# Patient Record
Sex: MALE | Race: WHITE | NOT HISPANIC OR LATINO | Employment: UNEMPLOYED | URBAN - METROPOLITAN AREA
[De-identification: names, ages, dates, MRNs, and addresses within clinical notes are randomized per-mention and may not be internally consistent; named-entity substitution may affect disease eponyms.]

---

## 2019-11-12 ENCOUNTER — HOSPITAL ENCOUNTER (EMERGENCY)
Facility: HOSPITAL | Age: 39
Discharge: HOME | End: 2019-11-12
Attending: EMERGENCY MEDICINE

## 2019-11-12 VITALS
DIASTOLIC BLOOD PRESSURE: 75 MMHG | TEMPERATURE: 99 F | BODY MASS INDEX: 25.18 KG/M2 | HEIGHT: 69 IN | OXYGEN SATURATION: 99 % | SYSTOLIC BLOOD PRESSURE: 158 MMHG | WEIGHT: 170 LBS | RESPIRATION RATE: 18 BRPM | HEART RATE: 97 BPM

## 2019-11-12 DIAGNOSIS — F19.20 DRUG ABUSE AND DEPENDENCE: Primary | ICD-10-CM

## 2019-11-12 PROCEDURE — 99283 EMERGENCY DEPT VISIT LOW MDM: CPT

## 2019-11-12 NOTE — ED NOTES
Pt states being addicted to heroine, last time he used was this morning. Pt is requesting inpatient detox.

## 2019-11-13 NOTE — ED PROVIDER NOTES
Encounter Date: 11/12/2019       History     Chief Complaint   Patient presents with    WANTS DRUG DETOX     WANTS TO STOP METH AND HEROIN, WANTS IN PT     Presents with request for inpatient drug rehab  Pt is addicted to Meth and Heroine  He states he used today  Denies suicidal of homicidal ideations        Review of patient's allergies indicates:  No Known Allergies  Past Medical History:   Diagnosis Date    Depression     Substance abuse     METH AND HEROIN     No past surgical history on file.  No family history on file.  Social History     Tobacco Use    Smoking status: Not on file   Substance Use Topics    Alcohol use: Not on file    Drug use: Not on file     Review of Systems   Constitutional: Negative for fever.   Respiratory: Negative for cough, shortness of breath and wheezing.    Cardiovascular: Negative for chest pain, palpitations and leg swelling.   Gastrointestinal: Negative for abdominal pain, diarrhea, nausea and vomiting.   Musculoskeletal: Negative for back pain.   Skin: Negative for rash.   Neurological: Negative for dizziness, speech difficulty, weakness and light-headedness.   Psychiatric/Behavioral: Negative for agitation, confusion, hallucinations, self-injury and suicidal ideas. The patient is not nervous/anxious.        Physical Exam     Initial Vitals [11/12/19 1619]   BP Pulse Resp Temp SpO2   (!) 165/98 99 18 99.6 °F (37.6 °C) 98 %      MAP       --         Physical Exam    Constitutional: He appears well-developed and well-nourished.   HENT:   Mouth/Throat: Oropharynx is clear and moist.   Eyes: Conjunctivae are normal.   Neck: Normal range of motion. Neck supple.   Cardiovascular: Normal rate.   Pulmonary/Chest: Breath sounds normal.   Abdominal: Soft. Bowel sounds are normal.   Musculoskeletal: Normal range of motion.   Neurological: He is alert and oriented to person, place, and time.   Skin: Skin is warm. Capillary refill takes less than 2 seconds.   Psychiatric: He has a  normal mood and affect.         ED Course   Procedures  Labs Reviewed - No data to display       Imaging Results    None                       Attending Attestation:     Physician Attestation Statement for NP/PA:   I discussed this assessment and plan of this patient with the NP/PA, but I did not personally examine the patient. The face to face encounter was performed by the NP/PA.    Other NP/PA Attestation Additions:    History of Present Illness: I was not called upon to see this patient but was available for consultation and agree with the patient's disposition and management as it was presented to me by the APC.                                   Clinical Impression:       ICD-10-CM ICD-9-CM   1. Drug abuse and dependence F19.20 304.90                             Joan Marino NP  11/12/19 9447       Santi Lui MD  11/12/19 3985

## 2019-11-13 NOTE — DISCHARGE INSTRUCTIONS
You have a list of drug rehab programs  Please call and admit yourself to one of these  Best of luck to you

## 2019-11-13 NOTE — ED NOTES
Pt was given community resources by [facility] case management. Pt denies any suicidal or homicidal ideations. Pt is his friend Froy that gives him support. Ml VASQUEZ is at the bedside to assess the pt.

## 2022-05-25 ENCOUNTER — HOSPITAL ENCOUNTER (EMERGENCY)
Facility: HOSPITAL | Age: 42
Discharge: HOME | End: 2022-05-25
Attending: STUDENT IN AN ORGANIZED HEALTH CARE EDUCATION/TRAINING PROGRAM
Payer: MEDICAID

## 2022-05-25 VITALS
WEIGHT: 186.94 LBS | OXYGEN SATURATION: 97 % | SYSTOLIC BLOOD PRESSURE: 137 MMHG | RESPIRATION RATE: 17 BRPM | BODY MASS INDEX: 27.61 KG/M2 | DIASTOLIC BLOOD PRESSURE: 94 MMHG | TEMPERATURE: 100 F | HEART RATE: 99 BPM

## 2022-05-25 DIAGNOSIS — K08.89 PAIN, DENTAL: Primary | ICD-10-CM

## 2022-05-25 PROCEDURE — 99284 EMERGENCY DEPT VISIT MOD MDM: CPT

## 2022-05-25 PROCEDURE — 25000003 PHARM REV CODE 250: Performed by: STUDENT IN AN ORGANIZED HEALTH CARE EDUCATION/TRAINING PROGRAM

## 2022-05-25 RX ORDER — AMOXICILLIN AND CLAVULANATE POTASSIUM 875; 125 MG/1; MG/1
1 TABLET, FILM COATED ORAL 2 TIMES DAILY
Qty: 14 TABLET | Refills: 0 | Status: ON HOLD | OUTPATIENT
Start: 2022-05-25 | End: 2022-06-21

## 2022-05-25 RX ORDER — OXYCODONE AND ACETAMINOPHEN 5; 325 MG/1; MG/1
1 TABLET ORAL
Status: COMPLETED | OUTPATIENT
Start: 2022-05-25 | End: 2022-05-25

## 2022-05-25 RX ORDER — OXYCODONE AND ACETAMINOPHEN 5; 325 MG/1; MG/1
1 TABLET ORAL EVERY 4 HOURS PRN
Qty: 4 TABLET | Refills: 0 | Status: ON HOLD | OUTPATIENT
Start: 2022-05-25 | End: 2022-06-21

## 2022-05-25 RX ORDER — AMOXICILLIN AND CLAVULANATE POTASSIUM 875; 125 MG/1; MG/1
1 TABLET, FILM COATED ORAL
Status: COMPLETED | OUTPATIENT
Start: 2022-05-25 | End: 2022-05-25

## 2022-05-25 RX ADMIN — AMOXICILLIN AND CLAVULANATE POTASSIUM 1 TABLET: 875; 125 TABLET, FILM COATED ORAL at 11:05

## 2022-05-25 RX ADMIN — OXYCODONE HYDROCHLORIDE AND ACETAMINOPHEN 1 TABLET: 5; 325 TABLET ORAL at 11:05

## 2022-05-26 NOTE — ED PROVIDER NOTES
"Encounter Date: 5/25/2022       History     Chief Complaint   Patient presents with    Dental Pain     Mouth pain after pulling two teeth out with a pair of pliers at home 2 days ago - now c/o mouth pain with swelling. Subjective fever PTA     42-year-old male with history of hepatitis-C, meth and heroin use, poor dentition, presenting with anterior mouth pain after pulling two teeth out a pair of pliers home.  Patient reports that teeth were loose prior to removal.  He reports some swelling.  Subjective fever prior to arrival this morning.  Denies vomiting or diarrhea.  No other complaints.  Denies trouble swallowing or trouble breathing.        Review of patient's allergies indicates:  No Known Allergies  Past Medical History:   Diagnosis Date    Anxiety     Depression     Hepatitis C     Substance abuse     METH AND HEROIN     No past surgical history on file.  No family history on file.  Social History     Tobacco Use    Smoking status: Current Every Day Smoker     Packs/day: 0.50     Types: Cigarettes    Smokeless tobacco: Never Used   Substance Use Topics    Alcohol use: Yes     Comment: 5th whiskey or more daily    Drug use: Yes     Types: Methamphetamines, Marijuana     Comment: heroin , "pt reports he has been doing whatever he can get his hands on"      Review of Systems   Constitutional: Negative for fever.   HENT: Positive for dental problem. Negative for sore throat.    Respiratory: Negative for shortness of breath.    Cardiovascular: Negative for chest pain.   Gastrointestinal: Negative for nausea.   Genitourinary: Negative for dysuria.   Musculoskeletal: Negative for back pain.   Skin: Negative for rash.   Neurological: Negative for weakness.   Hematological: Does not bruise/bleed easily.       Physical Exam     Initial Vitals [05/25/22 2253]   BP Pulse Resp Temp SpO2   (!) 137/94 99 16 100 °F (37.8 °C) 97 %      MAP       --         Physical Exam    Nursing note and vitals " reviewed.  Constitutional: He appears well-developed.   HENT:   Generally very poor dentition.  No erythema or swelling to gum line.  Patient has no upper anterior teeth, and there is chronic decay at all sites.  No discharge.   Eyes: EOM are normal.   Neck:   Normal range of motion.  Cardiovascular:   No murmur heard.  Pulmonary/Chest: No respiratory distress.   Abdominal: He exhibits no distension.   Musculoskeletal:         General: Normal range of motion.      Cervical back: Normal range of motion.     Neurological: He is alert.   Skin: Skin is warm.   Psychiatric: He has a normal mood and affect.         ED Course   Procedures  Labs Reviewed - No data to display       Imaging Results    None          Medications   amoxicillin-clavulanate 875-125mg per tablet 1 tablet (has no administration in time range)   oxyCODONE-acetaminophen 5-325 mg per tablet 1 tablet (has no administration in time range)     Medical Decision Making:   Differential Diagnosis:   DDX:  Patient with generally poor dentition presenting with pain after removing two teeth. Patient has no signs of active infection on exam, but does have a temperature of 100° F.  Will cover for oral infection, and refer to dentist.  DX:  None  TX:  Antibiotics, analgesia p.r.n.  Dispo:  Discharge.  I have told patient the importance of following up with a dentist tomorrow.  Patient told to return to this emergency department if unable to get dental care.  Also told to watch for continue fever, vomiting, worsening pain, swelling, and told to return to the ED for these symptoms.                        Clinical Impression:   Final diagnoses:  [K08.89] Pain, dental (Primary)          ED Disposition Condition    Discharge Stable        ED Prescriptions     Medication Sig Dispense Start Date End Date Auth. Provider    amoxicillin-clavulanate 875-125mg (AUGMENTIN) 875-125 mg per tablet Take 1 tablet by mouth 2 (two) times daily. 14 tablet 5/25/2022  Doroteo Chen,  MD    oxyCODONE-acetaminophen (PERCOCET) 5-325 mg per tablet Take 1 tablet by mouth every 4 (four) hours as needed for Pain. 4 tablet 5/25/2022  Doroteo Chen MD        Follow-up Information     Follow up With Specialties Details Why Contact [facility] Schedule an appointment as soon as possible for a visit in 2 days  [address] [address]  [phone]      [facility] - Emergency Dept Emergency Medicine  If symptoms worsen [address] [facility] [address]  [phone]           Doroteo Chen MD  05/25/22 0674

## 2022-05-26 NOTE — DISCHARGE INSTRUCTIONS
Please follow up with your primary care physician within 2 days. Ensure that you review all lab work results and/or imaging results. If you have any questions about your discharge paperwork please call the Emergency Department.     Return to the ED for any headache, fever, nausea, vomiting, speech changes, difficulty swallowing, difficulty breathing, or any new or worsening symptoms.    Thank you for visiting [facility], Department of Emergency Medicine. Please see the entirety of the educational materials provided. Please note that a visit to the emergency department does not substitute ongoing care from a primary medical provider or specialist. Please ensure to follow up as recommended. However, please return to the emergency department immediately if symptoms do not improve as discussed, symptoms worsen, new symptoms develop, difficulty in following up or for any of your concerns or issues. Please note on discharge you are acknowledging understanding and agreement on medical evaluation, management recommendations and follow up recommendations.

## 2022-06-18 ENCOUNTER — HOSPITAL ENCOUNTER (INPATIENT)
Facility: HOSPITAL | Age: 42
LOS: 6 days | Discharge: HOME | DRG: 885 | End: 2022-06-24
Attending: PSYCHIATRY & NEUROLOGY | Admitting: PSYCHIATRY & NEUROLOGY
Payer: MEDICAID

## 2022-06-18 DIAGNOSIS — F32.A DEPRESSION, UNSPECIFIED DEPRESSION TYPE: Primary | ICD-10-CM

## 2022-06-18 DIAGNOSIS — F19.959 PSYCHOACTIVE SUBSTANCE-INDUCED PSYCHOSIS: ICD-10-CM

## 2022-06-18 PROCEDURE — 90792 PSYCH DIAG EVAL W/MED SRVCS: CPT | Mod: ,,, | Performed by: PSYCHIATRY & NEUROLOGY

## 2022-06-18 PROCEDURE — 99231 PR SUBSEQUENT HOSPITAL CARE,LEVL I: ICD-10-PCS | Mod: ,,, | Performed by: STUDENT IN AN ORGANIZED HEALTH CARE EDUCATION/TRAINING PROGRAM

## 2022-06-18 PROCEDURE — 25000003 PHARM REV CODE 250: Performed by: PSYCHIATRY & NEUROLOGY

## 2022-06-18 PROCEDURE — S4991 NICOTINE PATCH NONLEGEND: HCPCS | Performed by: PSYCHIATRY & NEUROLOGY

## 2022-06-18 PROCEDURE — 90792 PR PSYCHIATRIC DIAGNOSTIC EVALUATION W/MEDICAL SERVICES: ICD-10-PCS | Mod: ,,, | Performed by: PSYCHIATRY & NEUROLOGY

## 2022-06-18 PROCEDURE — 11400000 HC PSYCH PRIVATE ROOM

## 2022-06-18 PROCEDURE — 99231 SBSQ HOSP IP/OBS SF/LOW 25: CPT | Mod: ,,, | Performed by: STUDENT IN AN ORGANIZED HEALTH CARE EDUCATION/TRAINING PROGRAM

## 2022-06-18 RX ORDER — MAG HYDROX/ALUMINUM HYD/SIMETH 200-200-20
30 SUSPENSION, ORAL (FINAL DOSE FORM) ORAL EVERY 6 HOURS PRN
Status: DISCONTINUED | OUTPATIENT
Start: 2022-06-18 | End: 2022-06-24 | Stop reason: HOSPADM

## 2022-06-18 RX ORDER — FOLIC ACID 1 MG/1
1 TABLET ORAL DAILY
Status: DISCONTINUED | OUTPATIENT
Start: 2022-06-18 | End: 2022-06-24 | Stop reason: HOSPADM

## 2022-06-18 RX ORDER — IBUPROFEN 200 MG
1 TABLET ORAL DAILY PRN
Status: DISCONTINUED | OUTPATIENT
Start: 2022-06-18 | End: 2022-06-18

## 2022-06-18 RX ORDER — IBUPROFEN 200 MG
1 TABLET ORAL DAILY
Status: DISCONTINUED | OUTPATIENT
Start: 2022-06-20 | End: 2022-06-24 | Stop reason: HOSPADM

## 2022-06-18 RX ORDER — ADHESIVE BANDAGE
30 BANDAGE TOPICAL DAILY PRN
Status: DISCONTINUED | OUTPATIENT
Start: 2022-06-18 | End: 2022-06-24 | Stop reason: HOSPADM

## 2022-06-18 RX ORDER — IBUPROFEN 400 MG/1
400 TABLET ORAL EVERY 6 HOURS PRN
Status: DISCONTINUED | OUTPATIENT
Start: 2022-06-18 | End: 2022-06-24 | Stop reason: HOSPADM

## 2022-06-18 RX ORDER — QUETIAPINE FUMARATE 25 MG/1
50 TABLET, FILM COATED ORAL NIGHTLY
Status: DISCONTINUED | OUTPATIENT
Start: 2022-06-18 | End: 2022-06-20

## 2022-06-18 RX ORDER — OLANZAPINE 10 MG/1
10 TABLET ORAL EVERY 6 HOURS PRN
Status: DISCONTINUED | OUTPATIENT
Start: 2022-06-18 | End: 2022-06-24 | Stop reason: HOSPADM

## 2022-06-18 RX ORDER — OLANZAPINE 10 MG/2ML
10 INJECTION, POWDER, FOR SOLUTION INTRAMUSCULAR EVERY 6 HOURS PRN
Status: DISCONTINUED | OUTPATIENT
Start: 2022-06-18 | End: 2022-06-24 | Stop reason: HOSPADM

## 2022-06-18 RX ORDER — HYDROXYZINE PAMOATE 50 MG/1
50 CAPSULE ORAL EVERY 6 HOURS PRN
Status: DISCONTINUED | OUTPATIENT
Start: 2022-06-18 | End: 2022-06-24 | Stop reason: HOSPADM

## 2022-06-18 RX ORDER — BUPROPION HYDROCHLORIDE 150 MG/1
150 TABLET ORAL DAILY
Status: DISCONTINUED | OUTPATIENT
Start: 2022-06-18 | End: 2022-06-22

## 2022-06-18 RX ADMIN — BUPROPION HYDROCHLORIDE 150 MG: 150 TABLET, EXTENDED RELEASE ORAL at 01:06

## 2022-06-18 RX ADMIN — NICOTINE 1 PATCH: 14 PATCH TRANSDERMAL at 10:06

## 2022-06-18 RX ADMIN — THERA TABS 1 TABLET: TAB at 10:06

## 2022-06-18 RX ADMIN — FOLIC ACID 1 MG: 1 TABLET ORAL at 10:06

## 2022-06-18 RX ADMIN — QUETIAPINE FUMARATE 50 MG: 25 TABLET ORAL at 08:06

## 2022-06-18 RX ADMIN — HYDROXYZINE PAMOATE 50 MG: 50 CAPSULE ORAL at 10:06

## 2022-06-18 NOTE — SUBJECTIVE & OBJECTIVE
"Past Medical History:   Diagnosis Date    Anxiety     Depression     Hepatitis C     Substance abuse     METH AND HEROIN       No past surgical history on file.    Review of patient's allergies indicates:  No Known Allergies    Current Facility-Administered Medications on File Prior to Encounter   Medication    [COMPLETED] lactated ringers bolus 1,000 mL     Current Outpatient Medications on File Prior to Encounter   Medication Sig    amoxicillin-clavulanate 875-125mg (AUGMENTIN) 875-125 mg per tablet Take 1 tablet by mouth 2 (two) times daily.    oxyCODONE-acetaminophen (PERCOCET) 5-325 mg per tablet Take 1 tablet by mouth every 4 (four) hours as needed for Pain.     Family History    None       Tobacco Use    Smoking status: Current Every Day Smoker     Packs/day: 0.50     Types: Cigarettes    Smokeless tobacco: Never Used   Substance and Sexual Activity    Alcohol use: Yes     Comment: 5th whiskey or more daily    Drug use: Yes     Types: Methamphetamines, Marijuana     Comment: heroin , "pt reports he has been doing whatever he can get his hands on"     Sexual activity: Not on file     Review of Systems   Constitutional:  Negative for chills, fatigue and fever.   HENT:  Negative for congestion, sinus pain and sore throat.    Eyes:  Negative for redness and visual disturbance.   Respiratory:  Negative for cough and shortness of breath.    Cardiovascular:  Negative for chest pain and palpitations.   Gastrointestinal:  Negative for abdominal pain, diarrhea and nausea.   Endocrine: Negative for cold intolerance and heat intolerance.   Genitourinary:  Negative for dysuria, frequency, hematuria and urgency.   Musculoskeletal:  Negative for arthralgias and myalgias.   Neurological:  Negative for dizziness, weakness and light-headedness.   Psychiatric/Behavioral:  Negative for agitation.    Objective:     Vital Signs (Most Recent):    Vital Signs (24h Range):  Temp:  [97.9 °F (36.6 °C)] 97.9 °F (36.6 °C)  Pulse:  " [] 95  Resp:  [16-20] 18  SpO2:  [95 %-97 %] 97 %  BP: (122-144)/() 122/74        There is no height or weight on file to calculate BMI.    Physical Exam  Vitals reviewed.   HENT:      Head: Normocephalic and atraumatic.      Nose: Nose normal.      Mouth/Throat:      Mouth: Mucous membranes are moist.      Pharynx: Oropharynx is clear.   Eyes:      Conjunctiva/sclera: Conjunctivae normal.   Cardiovascular:      Rate and Rhythm: Normal rate and regular rhythm.      Heart sounds: Normal heart sounds. No murmur heard.  Pulmonary:      Effort: Pulmonary effort is normal.      Breath sounds: Normal breath sounds.   Abdominal:      General: Bowel sounds are normal.      Palpations: Abdomen is soft.   Musculoskeletal:      Cervical back: Neck supple.   Skin:     General: Skin is warm and dry.   Neurological:      Mental Status: He is alert.       Significant Labs: All pertinent labs within the past 24 hours have been reviewed.  BMP:   Recent Labs   Lab 06/18/22  0739   GLU 80      K 3.9      CO2 20*   BUN 25*   CREATININE 1.0   CALCIUM 8.7     CBC:   Recent Labs   Lab 06/18/22  0249   WBC 11.66   HGB 15.6   HCT 46.1        CMP:   Recent Labs   Lab 06/18/22  0249 06/18/22  0739    140   K 4.4 3.9    106   CO2 20* 20*   * 80   BUN 31* 25*   CREATININE 1.6* 1.0   CALCIUM 9.9 8.7   PROT 8.2 6.9   ALBUMIN 3.9 3.3*   BILITOT 0.3 0.4   ALKPHOS 60 48*   AST 67* 58*   ALT 54* 46*   ANIONGAP 15 14   EGFRNONAA 52* >60     Urine Studies:   Recent Labs   Lab 06/18/22  0242   COLORU Yellow   APPEARANCEUA Clear   PHUR 6.0   SPECGRAV >=1.030*   PROTEINUA Trace*   GLUCUA Negative   KETONESU Trace*   BILIRUBINUA Negative   OCCULTUA Negative   NITRITE Negative   UROBILINOGEN Negative   LEUKOCYTESUR Negative       Significant Imaging: I have reviewed all pertinent imaging results/findings within the past 24 hours.

## 2022-06-18 NOTE — CONSULTS
[facility] Hospital Medicine  Consult Note    Patient Name: Leighton Carroll  MRN: [id number]  Admission Date: 6/18/2022  Hospital Length of Stay: 0 days  Attending Physician: Mundo Myrick MD   Primary Care Provider: Primary Doctor No           Patient information was obtained from patient and ER records.     Inpatient consult to Family Medicine  Consult performed by: Ge Cleary MD  Consult ordered by: Mundo Myrick MD        Subjective:     Principal Problem: <principal problem not specified>    Chief Complaint: No chief complaint on file.       HPI: Per ER:   42 y.o. male with PMHx of anxiety, depression, hepatitis C and polysubstance abuse who presents wto the ED after being OPC'd by girlfriend. Per OPC (Girlfriend: Ada Brower), he has been having homicidal ideations and threatening to stab his friends with a knife. Additionally, he has been walking in fields and having violent outbursts. Additionally, he has had auditory hallucinations. Per patient, he has had suicidal ideations but he denies homicidal ideations and hallucinations. He states that his mother is sick in the hospital and this has caused him to be severely depressed. He denies plan of suicide. He states that been off of his psychiatric meds for several years, as he was unable to receive them while in care home.     We have been consulted for medical management.       Past Medical History:   Diagnosis Date    Anxiety     Depression     Hepatitis C     Substance abuse     METH AND HEROIN       No past surgical history on file.    Review of patient's allergies indicates:  No Known Allergies    Current Facility-Administered Medications on File Prior to Encounter   Medication    [COMPLETED] lactated ringers bolus 1,000 mL     Current Outpatient Medications on File Prior to Encounter   Medication Sig    amoxicillin-clavulanate 875-125mg (AUGMENTIN) 875-125 mg per tablet Take 1 tablet by mouth 2 (two) times daily.  "   oxyCODONE-acetaminophen (PERCOCET) 5-325 mg per tablet Take 1 tablet by mouth every 4 (four) hours as needed for Pain.     Family History    None       Tobacco Use    Smoking status: Current Every Day Smoker     Packs/day: 0.50     Types: Cigarettes    Smokeless tobacco: Never Used   Substance and Sexual Activity    Alcohol use: Yes     Comment: 5th whiskey or more daily    Drug use: Yes     Types: Methamphetamines, Marijuana     Comment: heroin , "pt reports he has been doing whatever he can get his hands on"     Sexual activity: Not on file     Review of Systems   Constitutional:  Negative for chills, fatigue and fever.   HENT:  Negative for congestion, sinus pain and sore throat.    Eyes:  Negative for redness and visual disturbance.   Respiratory:  Negative for cough and shortness of breath.    Cardiovascular:  Negative for chest pain and palpitations.   Gastrointestinal:  Negative for abdominal pain, diarrhea and nausea.   Endocrine: Negative for cold intolerance and heat intolerance.   Genitourinary:  Negative for dysuria, frequency, hematuria and urgency.   Musculoskeletal:  Negative for arthralgias and myalgias.   Neurological:  Negative for dizziness, weakness and light-headedness.   Psychiatric/Behavioral:  Negative for agitation.    Objective:     Vital Signs (Most Recent):    Vital Signs (24h Range):  Temp:  [97.9 °F (36.6 °C)] 97.9 °F (36.6 °C)  Pulse:  [] 95  Resp:  [16-20] 18  SpO2:  [95 %-97 %] 97 %  BP: (122-144)/() 122/74        There is no height or weight on file to calculate BMI.    Physical Exam  Vitals reviewed.   HENT:      Head: Normocephalic and atraumatic.      Nose: Nose normal.      Mouth/Throat:      Mouth: Mucous membranes are moist.      Pharynx: Oropharynx is clear.   Eyes:      Conjunctiva/sclera: Conjunctivae normal.   Cardiovascular:      Rate and Rhythm: Normal rate and regular rhythm.      Heart sounds: Normal heart sounds. No murmur heard.  Pulmonary:      " Effort: Pulmonary effort is normal.      Breath sounds: Normal breath sounds.   Abdominal:      General: Bowel sounds are normal.      Palpations: Abdomen is soft.   Musculoskeletal:      Cervical back: Neck supple.   Skin:     General: Skin is warm and dry.   Neurological:      Mental Status: He is alert.       Significant Labs: All pertinent labs within the past 24 hours have been reviewed.  BMP:   Recent Labs   Lab 06/18/22  0739   GLU 80      K 3.9      CO2 20*   BUN 25*   CREATININE 1.0   CALCIUM 8.7     CBC:   Recent Labs   Lab 06/18/22  0249   WBC 11.66   HGB 15.6   HCT 46.1        CMP:   Recent Labs   Lab 06/18/22  0249 06/18/22  0739    140   K 4.4 3.9    106   CO2 20* 20*   * 80   BUN 31* 25*   CREATININE 1.6* 1.0   CALCIUM 9.9 8.7   PROT 8.2 6.9   ALBUMIN 3.9 3.3*   BILITOT 0.3 0.4   ALKPHOS 60 48*   AST 67* 58*   ALT 54* 46*   ANIONGAP 15 14   EGFRNONAA 52* >60     Urine Studies:   Recent Labs   Lab 06/18/22  0242   COLORU Yellow   APPEARANCEUA Clear   PHUR 6.0   SPECGRAV >=1.030*   PROTEINUA Trace*   GLUCUA Negative   KETONESU Trace*   BILIRUBINUA Negative   OCCULTUA Negative   NITRITE Negative   UROBILINOGEN Negative   LEUKOCYTESUR Negative       Significant Imaging: I have reviewed all pertinent imaging results/findings within the past 24 hours.    Assessment/Plan:     Depression  Plan per psych        VTE Risk Mitigation (From admission, onward)    None              Thank you for your consult. I will sign off. Please contact us if you have any additional questions.    Ge Cleary MD  Department of Hospital Medicine   [facility]
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
## 2022-06-18 NOTE — HPI
Per ER:   42 y.o. male with PMHx of anxiety, depression, hepatitis C and polysubstance abuse who presents wto the ED after being OPC'd by girlfriend. Per OPC (Girlfriend: Ada Brower), he has been having homicidal ideations and threatening to stab his friends with a knife. Additionally, he has been walking in fields and having violent outbursts. Additionally, he has had auditory hallucinations. Per patient, he has had suicidal ideations but he denies homicidal ideations and hallucinations. He states that his mother is sick in the hospital and this has caused him to be severely depressed. He denies plan of suicide. He states that been off of his psychiatric meds for several years, as he was unable to receive them while in residential.     We have been consulted for medical management.

## 2022-06-18 NOTE — NURSING
Report received from ED. Patient is a 43 y/o male that came in with an OPC by the girlfriend. Was reported that the patient has been off of psych meds since release from [facility] about one month ago. Hallucinating, and wanting to stab someone. Patient reports that his mom is in hospital and he is suffering from increased depression due to her illness. Tearful on admit to ED reported. Patient denies SI,HI, and hallucinations at this time. Alcohol level at this time 84. UDS + amphetamines and THC. Patient has been calm and cooperative. Awaiting patient arrival to unit.

## 2022-06-18 NOTE — NURSING
Patient is restless, diaphoretic, tearful and regretful during the initial interview assessment. He denies suicidal and homicidal ideation at this time and minimizing drug and alcohol abuse. Reports that he is regretful for saying he wanted to stab people and regretful for the drinking and drug use recently. He reports that his goal is to begin his meds and have them adjusted to help his depression. He is cooperative. Contracts for safety. Reviewed unit policies and rules. Rights of patient also reviewed. Patient denies any questions at this time. Escorted to room 213 and ambulated without difficulty.

## 2022-06-18 NOTE — NURSING
Patient up to unit via wheelchair with security and staff, patient in blue scrubs, wand used to assess for contraband, none found.  Ambulated onto unit without difficulty. Urinated without difficulty. Skin assessment completed. PIV remained from ED, removed, cath in tact, pressure held. No wounds found on skin. Multiple tattoos on body. Cooperative. Belongings taken for inventory.

## 2022-06-19 PROCEDURE — 99232 SBSQ HOSP IP/OBS MODERATE 35: CPT | Mod: ,,, | Performed by: PSYCHIATRY & NEUROLOGY

## 2022-06-19 PROCEDURE — 25000003 PHARM REV CODE 250: Performed by: PSYCHIATRY & NEUROLOGY

## 2022-06-19 PROCEDURE — 11400000 HC PSYCH PRIVATE ROOM

## 2022-06-19 PROCEDURE — 99232 PR SUBSEQUENT HOSPITAL CARE,LEVL II: ICD-10-PCS | Mod: ,,, | Performed by: PSYCHIATRY & NEUROLOGY

## 2022-06-19 RX ADMIN — NICOTINE 1 PATCH: 14 PATCH TRANSDERMAL at 08:06

## 2022-06-19 RX ADMIN — THERA TABS 1 TABLET: TAB at 08:06

## 2022-06-19 RX ADMIN — OLANZAPINE 10 MG: 10 TABLET, FILM COATED ORAL at 12:06

## 2022-06-19 RX ADMIN — HYDROXYZINE PAMOATE 50 MG: 50 CAPSULE ORAL at 07:06

## 2022-06-19 RX ADMIN — BUPROPION HYDROCHLORIDE 150 MG: 150 TABLET, EXTENDED RELEASE ORAL at 08:06

## 2022-06-19 RX ADMIN — QUETIAPINE FUMARATE 50 MG: 25 TABLET ORAL at 08:06

## 2022-06-19 RX ADMIN — FOLIC ACID 1 MG: 1 TABLET ORAL at 08:06

## 2022-06-19 NOTE — NURSING
Rested quietly with closed eyes and even resp for 7.5 hours, as pt remains now.  Modified VC and all precautions maintained.  Pathways clear and bed in low position.

## 2022-06-19 NOTE — PROGRESS NOTES
PSYCHIATRY DAILY INPATIENT PROGRESS NOTE  SUBSEQUENT HOSPITAL VISIT    ENCOUNTER DATE: 6/19/2022  SITE: [facility]    DATE OF ADMISSION: 6/18/2022 10:24 AM  LENGTH OF STAY: 1 days      CHIEF COMPLAINT   Leighton Carroll is a 42 y.o. male, seen during daily somers rounds on the inpatient unit.  Leighton Carroll presented with the chief complaint of depression, anxiety and substance problems      The patient was seen and examined. The chart was reviewed.     Reviewed notes from Rns and MD and labs from the last 24 hours.    The patient's case was discussed with the treatment team/care providers today including Rns    Staff reports no behavioral or management issues.     The patient has been compliant with treatment.      Subjective 06/19/2022       Today the patient reports he is feeling anxious. States he is worried about his girlfriend without him at home. Discussed the potential for wellbutrin to worsen anxiety and pt is adamant that his anxiety is not related to the wellbutrin. Pt states his mood is still depressed. No longer having SI this morning.     Pt slept well last night. He feels the medications are helping and denies noticing any side effects at this time.     He is somewhat guarded today and interview is limited 2/2 this.       The patient denies any side effects to medications.          Psychiatric ROS (observed, reported, or endorsed/denied):  Depressed mood - endorses  Interest/pleasure/anhedonia: endorses  Guilt/hopelessness/worthlessness - endorses  Changes in Sleep - endorses  Changes in Appetite - endorses  Changes in Concentration - endorses  Changes in Energy - endorses  PMA/R- No  Suicidal- active/passive ideations - No  Homicidal ideations: active/passive ideations - No    Hallucinations - No  Delusions - No  Disorganized behavior - No  Disorganized speech - No  Negative symptoms - No    Elevated mood - No  Decreased need for sleep - No  Grandiosity - No  Racing thoughts - No  Impulsivity -  No  Irritability- No  Increased energy - No  Distractibility - No  Increase in goal-directed activity or PMA- No    Symptoms of ARASELI - endorses  Symptoms of Panic Disorder- No  Symptoms of PTSD - No        Overall progress: Patient is showing no improvement on the Unit to date        Psychotherapy:  · Target symptoms: depression, anxiety , substance abuse  · Why chosen therapy is appropriate versus another modality: relevant to diagnosis  · Outcome monitoring methods: self-report, lab data, observation  · Therapeutic intervention type: insight oriented psychotherapy, interactive psychotherapy  · Topics discussed/themes: building skills sets for symptom management, symptom recognition, substance abuse  · The patient's response to the intervention is guarded. The patient's progress toward treatment goals is fair.   · Duration of intervention: 16 minutes.        Medical ROS  Review of Systems   Constitutional: Negative for chills and fever.   HENT: Negative for congestion, hearing loss, sore throat and tinnitus.    Eyes: Negative for blurred vision, double vision, photophobia and pain.   Respiratory: Negative for cough, hemoptysis, sputum production, shortness of breath and wheezing.    Cardiovascular: Negative for chest pain, palpitations and leg swelling.   Gastrointestinal: Negative for abdominal pain, blood in stool, constipation, diarrhea, nausea and vomiting.   Genitourinary: Negative for dysuria, frequency, hematuria and urgency.   Musculoskeletal: Negative for back pain, joint pain and myalgias.   Skin: Negative for rash.   Neurological: Negative for dizziness, tremors, seizures, weakness and headaches.         PAST MEDICAL HISTORY   Past Medical History:   Diagnosis Date    Anxiety     Depression     Hepatitis C     Substance abuse     METH AND HEROIN           PSYCHOTROPIC MEDICATIONS   Scheduled Meds:   buPROPion  150 mg Oral Daily    folic acid  1 mg Oral Daily    multivitamin  1 tablet Oral Daily     [START ON 6/20/2022] nicotine  1 patch Transdermal Daily    QUEtiapine  50 mg Oral QHS     Continuous Infusions:  PRN Meds:.aluminum-magnesium hydroxide-simethicone, hydrOXYzine pamoate, ibuprofen, magnesium hydroxide 400 mg/5 ml, OLANZapine **AND** OLANZapine        EXAMINATION    VITALS   Vitals:    06/18/22 2000 06/18/22 2050 06/19/22 0740 06/19/22 0800   BP: (!) 167/84  (!) 146/97    BP Location: Right arm      Patient Position: Sitting      Pulse: (!) 112 104 97    Resp: 20  20    Temp: 97.5 °F (36.4 °C)  96.8 °F (36 °C)    TempSrc:   Temporal    Weight:    75.8 kg (167 lb 1.7 oz)   Height:           Body mass index is 24.68 kg/m².        CONSTITUTIONAL  General Appearance: unremarkable, age appropriate, tatooed    MUSCULOSKELETAL  Muscle Strength and Tone:no tremor, no tic  Abnormal Involuntary Movements: No  Gait and Station: non-ataxic    PSYCHIATRIC   Level of Consciousness: awake and alert   Orientation: person, place and situation  Grooming: Hospital garb and Well groomed  Psychomotor Behavior: normal, cooperative  Speech: normal tone, normal rate, normal pitch, normal volume  Language: grossly intact, able to name, able to repeat  Mood: dysphoric  Affect: Consistent with mood  Thought Process: linear, logical  Associations: intact   Thought Content: DENIES suicidal ideation, DENIES homicidal ideation and no delusions  Perceptions: denies hallucinations  Memory: Able to recall past events, Remote intact and Recent intact  Attention:Attends to interview without distraction  Fund of Knowledge: Aware of current events and Vocabulary appropriate   Estimate if Intelligence:  Average based on work/education history, vocabulary and mental status exam  Insight: has awareness of illness  Judgment: behavior is adequate to circumstances        DIAGNOSTIC TESTING   Laboratory Results  No results found for this or any previous visit (from the past 24 hour(s)).          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 The patient expresses understanding of the above and displays the capacity to agree with this treatment given said understanding. Patient also agrees that, currently, the benefits outweigh the risks and would like to pursue/continue treatment at this time.      DISCHARGE PLANNING  Expected Disposition Plan: Home or Self Care      NEED FOR CONTINUED HOSPITALIZATION  Psychiatric illness continues to pose a potential threat to life or bodily function, of self or others, thereby requiring the need for continued inpatient psychiatric hospitalization: Yes, due to: danger to self and suicidal ideation, as evidenced by:  Ongoing concerns with SI.    Protective inpatient pyschiatric hospitalization required while a safe disposition plan is enacted: Yes    Patient stabilized and ready for discharge from inpatient psychiatric unit: No        STAFF:   Bartolome Myrick MD  Psychiatry

## 2022-06-19 NOTE — PLAN OF CARE
Patient mistrust, restless, and withdrawn.  Denies intentions to harm self and/or others at this time. Denies auditory and/or visual hallucinations.  Affect and emotion restricted, tearful, depressed, and sad.  Preoccupation with discharge.  Isolates in bed/room; minimal interacts with select peers and staff.  Accepts meals and medications.  Discussed medication and plan of care as well as healthy coping skills and techniques; will continue to educate and reinforce.    07:09-PRN Vistaril given for complaints of anxiety.  08: 35--Patient returned to nurses station tearful with conflicting information from admit. Reinforced coping skills and instructed to return to nurses station if unable to rest.  09:30--Follow up: patient resting comfortably in bed; appears to be sleeping.    After lunch, patient observed pacing hallway with arms crossed.  12pm PRN Zyprexa po given  2pm follow up: patient appears to be resting comfortably in bed/sleeping
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
## 2022-06-20 PROCEDURE — S4991 NICOTINE PATCH NONLEGEND: HCPCS | Performed by: PSYCHIATRY & NEUROLOGY

## 2022-06-20 PROCEDURE — 11400000 HC PSYCH PRIVATE ROOM

## 2022-06-20 PROCEDURE — 99233 SBSQ HOSP IP/OBS HIGH 50: CPT | Mod: ,,, | Performed by: STUDENT IN AN ORGANIZED HEALTH CARE EDUCATION/TRAINING PROGRAM

## 2022-06-20 PROCEDURE — 25000003 PHARM REV CODE 250: Performed by: PSYCHIATRY & NEUROLOGY

## 2022-06-20 PROCEDURE — 25000003 PHARM REV CODE 250: Performed by: STUDENT IN AN ORGANIZED HEALTH CARE EDUCATION/TRAINING PROGRAM

## 2022-06-20 PROCEDURE — 99233 PR SUBSEQUENT HOSPITAL CARE,LEVL III: ICD-10-PCS | Mod: ,,, | Performed by: STUDENT IN AN ORGANIZED HEALTH CARE EDUCATION/TRAINING PROGRAM

## 2022-06-20 RX ORDER — GABAPENTIN 300 MG/1
300 CAPSULE ORAL 3 TIMES DAILY
Status: DISCONTINUED | OUTPATIENT
Start: 2022-06-20 | End: 2022-06-21

## 2022-06-20 RX ORDER — QUETIAPINE FUMARATE 100 MG/1
100 TABLET, FILM COATED ORAL NIGHTLY
Status: DISCONTINUED | OUTPATIENT
Start: 2022-06-20 | End: 2022-06-21

## 2022-06-20 RX ADMIN — HYDROXYZINE PAMOATE 50 MG: 50 CAPSULE ORAL at 09:06

## 2022-06-20 RX ADMIN — QUETIAPINE FUMARATE 100 MG: 100 TABLET ORAL at 08:06

## 2022-06-20 RX ADMIN — GABAPENTIN 300 MG: 300 CAPSULE ORAL at 02:06

## 2022-06-20 RX ADMIN — GABAPENTIN 300 MG: 300 CAPSULE ORAL at 08:06

## 2022-06-20 RX ADMIN — BUPROPION HYDROCHLORIDE 150 MG: 150 TABLET, EXTENDED RELEASE ORAL at 08:06

## 2022-06-20 RX ADMIN — HYDROXYZINE PAMOATE 50 MG: 50 CAPSULE ORAL at 03:06

## 2022-06-20 RX ADMIN — NICOTINE 1 PATCH: 14 PATCH TRANSDERMAL at 08:06

## 2022-06-20 RX ADMIN — IBUPROFEN 400 MG: 400 TABLET, FILM COATED ORAL at 03:06

## 2022-06-20 RX ADMIN — OLANZAPINE 10 MG: 10 TABLET, FILM COATED ORAL at 12:06

## 2022-06-20 RX ADMIN — OLANZAPINE 10 MG: 10 TABLET, FILM COATED ORAL at 03:06

## 2022-06-20 RX ADMIN — OLANZAPINE 10 MG: 10 TABLET, FILM COATED ORAL at 06:06

## 2022-06-20 RX ADMIN — FOLIC ACID 1 MG: 1 TABLET ORAL at 08:06

## 2022-06-20 RX ADMIN — THERA TABS 1 TABLET: TAB at 08:06
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
## 2022-06-20 NOTE — PLAN OF CARE
"Lying quietly in bed, eyes closed, respirations even, unlabored. Apparently asleep. Slept 8.5 hours thus far with 3 interruptions. Patient received Zyprexa 10 for agitation and Ibuprofen 400 mg for generalized pain. At that time, patient stated that his "heart is broken" because he is away from girlfriend and his family. When told that he should discuss his feelings with psychiatrist, he said, "If I tell him my feelings, he will make me stay longer."  Safety precautions maintained. Rounds done every 15 minutes. Bed is fixed in low position and room is uncluttered and pathways are clear.   "

## 2022-06-20 NOTE — PROGRESS NOTES
PSYCHIATRY DAILY INPATIENT PROGRESS NOTE  SUBSEQUENT HOSPITAL VISIT    ENCOUNTER DATE: 6/20/2022  SITE: [facility]    DATE OF ADMISSION: 6/18/2022 10:24 AM  LENGTH OF STAY: 2 days      CHIEF COMPLAINT   Leighton Carroll is a 42 y.o. male, seen during daily somers rounds on the inpatient unit.  Leighton Carroll presented with the chief complaint of depression, anxiety and substance problems      The patient was seen and examined. The chart was reviewed.     Reviewed notes from Rns and MD and labs from the last 24 hours.    The patient's case was discussed with the treatment team/care providers today including Rns    Staff reports no behavioral or management issues.     The patient has been compliant with treatment.      Subjective 06/20/2022       Patient agitated and RIS yesterday, required PRN zyprexa. Patient isolating to his bedroom today. Refuses rehab because he is on parole. Discussed risks and consequences of continued substance abuse.    The patient denies any side effects to medications.        Per RN:  Patient mistrust, restless, and withdrawn.  Denies intentions to harm self and/or others at this time. Denies auditory and/or visual hallucinations.  Affect and emotion restricted, tearful, depressed, and sad.  Preoccupation with discharge.  Isolates in bed/room; minimal interacts with select peers and staff.  Accepts meals and medications.  Discussed medication and plan of care as well as healthy coping skills and techniques; will continue to educate and reinforce.     07:09-PRN Vistaril given for complaints of anxiety.  08: 35--Patient returned to nurses station tearful with conflicting information from admit. Reinforced coping skills and instructed to return to nurses station if unable to rest.  09:30--Follow up: patient resting comfortably in bed; appears to be sleeping.     After lunch, patient observed pacing hallway with arms crossed.  12pm PRN Zyprexa po given  2pm follow up: patient appears to be  resting comfortably in bed/sleeping       Zyprexa 10mg po given for severe agitation.  Noted on camera talking to self and angrily waving arms up and down and pacing room        Psychiatric ROS (observed, reported, or endorsed/denied):  Depressed mood - Continuing  Interest/pleasure/anhedonia: Continuing  Guilt/hopelessness/worthlessness - less  Changes in Sleep - fluctuating  Changes in Appetite - less  Changes in Concentration - fluctuating  Changes in Energy - Continuing  PMA/R- No  Suicidal- active/passive ideations - denies  Homicidal ideations: active/passive ideations - denies    Hallucinations - recently observed by staff  Delusions - No  Disorganized behavior - recently observed by staff  Disorganized speech - No  Negative symptoms - No    Elevated mood - No  Decreased need for sleep - No  Grandiosity - No  Racing thoughts - No  Impulsivity - No  Irritability- No  Increased energy - No  Distractibility - No  Increase in goal-directed activity or PMA- No    Symptoms of ARASELI - fluctuating  Symptoms of Panic Disorder- No  Symptoms of PTSD - No        Overall progress: Patient is showing no improvement on the Unit to date        Medical ROS  Review of Systems   Constitutional: Negative for chills and fever.   HENT: Negative for congestion, hearing loss, sore throat and tinnitus.    Eyes: Negative for blurred vision, double vision, photophobia and pain.   Respiratory: Negative for cough, hemoptysis, sputum production, shortness of breath and wheezing.    Cardiovascular: Negative for chest pain, palpitations and leg swelling.   Gastrointestinal: Negative for abdominal pain, blood in stool, constipation, diarrhea, nausea and vomiting.   Genitourinary: Negative for dysuria, frequency, hematuria and urgency.   Musculoskeletal: Negative for back pain, joint pain and myalgias.   Skin: Negative for rash.   Neurological: Negative for dizziness, tremors, seizures, weakness and headaches.         PAST MEDICAL HISTORY  "  Past Medical History:   Diagnosis Date    Anxiety     Depression     Hepatitis C     Substance abuse     METH AND HEROIN           PSYCHOTROPIC MEDICATIONS   Scheduled Meds:   buPROPion  150 mg Oral Daily    folic acid  1 mg Oral Daily    multivitamin  1 tablet Oral Daily    nicotine  1 patch Transdermal Daily    QUEtiapine  50 mg Oral QHS     Continuous Infusions:  PRN Meds:.aluminum-magnesium hydroxide-simethicone, hydrOXYzine pamoate, ibuprofen, magnesium hydroxide 400 mg/5 ml, OLANZapine **AND** OLANZapine        EXAMINATION    VITALS   Vitals:    06/19/22 0740 06/19/22 0800 06/19/22 2000 06/20/22 0800   BP: (!) 146/97  (!) 140/83 122/83   BP Location:   Right arm Right arm   Patient Position:   Sitting Sitting   Pulse: 97  99 68   Resp: 20  20 20   Temp: 96.8 °F (36 °C)  98 °F (36.7 °C) 97.2 °F (36.2 °C)   TempSrc: Temporal  Temporal Temporal   Weight:  75.8 kg (167 lb 1.7 oz)     Height:           Body mass index is 24.68 kg/m².        CONSTITUTIONAL  General Appearance: unremarkable, age appropriate    MUSCULOSKELETAL  Muscle Strength and Tone:no tremor, no tic  Abnormal Involuntary Movements: No  Gait and Station: non-ataxic    PSYCHIATRIC   Level of Consciousness: awake and alert   Orientation: person, place and situation  Grooming: Hospital garb and Well groomed  Psychomotor Behavior: normal, cooperative  Speech: normal tone, normal rate, normal pitch, normal volume  Language: grossly intact, able to name, able to repeat  Mood: "okay"  Affect: Consistent with mood  Thought Process: linear, logical  Associations: intact   Thought Content: DENIES suicidal ideation, DENIES homicidal ideation and no delusions  Perceptions: denies hallucinations  Memory: Able to recall past events, Remote intact and Recent intact  Attention:Attends to interview without distraction  Fund of Knowledge: Aware of current events and Vocabulary appropriate   Estimate if Intelligence:  Average based on work/education " history, vocabulary and mental status exam  Insight: has awareness of illness  Judgment: behavior is adequate to circumstances        DIAGNOSTIC TESTING   Laboratory Results  No results found for this or any previous visit (from the past 24 hour(s)).          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) and ANXIETY DISORDERS; 7.9.Generalized Anxiety Disorder (F41.1)   Suicidal ideations  Homicidal ideations          PROBLEM LIST AND MANAGEMENT PLANS    #Mood  - continue Wellbutrin XL 150mg PO daily with plan to titrate as tolerated  - continue Seroquel 50mg PO QHS for augmentation and insomnia  -increase to 100 mg PO qhs tonight  - follow up with outpatient mental health provider after discharge  - pt counseled     #Anxiety  - start gabapentin 300 mg PO BID today  - pt counseled  - follow up with outpatient mental health provider after discharge    Suicidal ideations  - continue psychiatric hospitalization  - provide psychotherapeutic interventions and medication management      Homicidal ideations  - continue psychiatric hospitalization  - provide psychotherapeutic interventions and medication management            Discussed diagnosis, risks and benefits of proposed treatment vs alternative treatments vs no treatment, potential side effects of these treatments and the inherent unpredictability of treatment. The patient expresses understanding of the above and displays the capacity to agree with this treatment given said understanding. Patient also agrees that, currently, the benefits outweigh the risks and would like to pursue/continue treatment at this time.      DISCHARGE PLANNING  Expected Disposition Plan: Home or Self Care      NEED FOR CONTINUED HOSPITALIZATION  Psychiatric illness  continues to pose a potential threat to life or bodily function, of self or others, thereby requiring the need for continued inpatient psychiatric hospitalization: Yes, due to: danger to self, danger to others and gravely disabled, as evidenced by:  Ongoing concerns with perceptual aberrancy and paranoid persecutory delusions leading to potential harm of self or others.    Protective inpatient pyschiatric hospitalization required while a safe disposition plan is enacted: Yes    Patient stabilized and ready for discharge from inpatient psychiatric unit: No        STAFF:   Abebe Jorge III, MD  Psychiatry

## 2022-06-20 NOTE — NURSING
"Zyprexa 10mg po given for severe agitation.  Noted on camera talking to self and angrily waving arms up and down and pacing room.  Denied hallucinations to staff.  Said him being here is making things worse.  "I'm all alone in this cold room.  My gf's out there.  Me being here is making things worse".  Said the pills don't help anything except make him not care.    "

## 2022-06-20 NOTE — CONSULTS
"  [facility]  Adult Nutrition  Consult Note    SUMMARY     Recommendations    Recommendation:   1. Regular diet appropriate - Double portions ok per MD  2. Monitor for changes in PO intake   3. RD to follow    Goals: pt to consume at least 75% of all meals by f/u  Nutrition Goal Status: new  Communication of RD Recs:  (POC)    Assessment and Plan  Nutrition Problem:  Altered Nutrition Related Lab Value    Related to (etiology):   Food choices    Signs and Symptoms (as evidenced by):   HDL 23, triglycerides 396    Interventions:  Collaboration with other providers    Nutrition Diagnosis Status:   New    Malnutrition Assessment  10.2% wt loss in <1 month    Reason for Assessment    Reason For Assessment: consult  Diagnosis: psychological disorder  Relevant Medical History: depression, Hep C    General Information Comments: Pt has consumed % of meals so far. Per chart review, pt has lost 19lbs since 5/25. NFPE not completed per psych status.    Nutrition Discharge Planning: general healthful diet    Nutrition Risk Screen    Nutrition Risk Screen: no indicators present    Nutrition/Diet History    Food Allergies: NKFA  Factors Affecting Nutritional Intake: decreased appetite    Anthropometrics    Temp: 97.2 °F (36.2 °C)  Height: 5' 9" (175.3 cm)  Height (inches): 69 in  Weight Method: Standard Scale  Weight: 75.8 kg (167 lb 1.7 oz)  Weight (lb): 167.11 lb  Ideal Body Weight (IBW), Male: 160 lb  % Ideal Body Weight, Male (lb): 106.38 %  BMI (Calculated): 24.7  BMI Grade: 18.5-24.9 - normal  Weight Change Amount: 19 lb       Lab/Procedures/Meds    Pertinent Labs Reviewed: reviewed  Pertinent Labs Comments: BUN 25, alk phos 48, albumin 3.3, AST 58, ALT 46, HDL 23, triglycerides 396  Pertinent Medications Reviewed: reviewed  Pertinent Medications Comments: folic acid, MVI    Estimated/Assessed Needs    Weight Used For Calorie Calculations: 75.8 kg (167 lb 1.7 oz)  Energy Calorie Requirements (kcal): " 2135  Energy Need Method: [place] (*1.3)  Protein Requirements: 60-75 g (.8-1 g/kg)  Weight Used For Protein Calculations: 75.8 kg (167 lb 1.7 oz)     Estimated Fluid Requirement Method: RDA Method  RDA Method (mL): 2135       Nutrition Prescription Ordered    Current Diet Order: Regular diet Double portions    Evaluation of Received Nutrient/Fluid Intake    Fluid Required: meeting needs  % Intake of Estimated Energy Needs: 75 - 100 %  % Meal Intake: %    Nutrition Risk    Level of Risk/Frequency of Follow-up: low       Monitor and Evaluation    Food and Nutrient Intake: energy intake, food and beverage intake  Food and Nutrient Adminstration: diet order  Physical Activity and Function: nutrition-related ADLs and IADLs  Anthropometric Measurements: weight, weight change, body mass index  Biochemical Data, Medical Tests and Procedures: electrolyte and renal panel, glucose/endocrine profile, lipid profile  Nutrition-Focused Physical Findings: overall appearance       Nutrition Follow-Up    RD Follow-up?: Yes

## 2022-06-20 NOTE — PROGRESS NOTES
06/20/22 1040   [facility] Group Therapy   Group Name Therapeutic Recreation   Participation Level None   Patient is resting in bed with his eyes closed and did not give a verbal response when approached for group.

## 2022-06-20 NOTE — NURSING
Patient withdrawn, isolative,depressed sad, anxious & irritable.  Appears unkempt, disheveled,unclean,& is malodorous,affect flat, bizarre, & restricted.  Thought blocking & response lag exhibited, speech pressured. Patient overheard mumbling to himself, denies A/V hallucinations, & SI / HI.  Monitor q 15 minutes per protocol

## 2022-06-20 NOTE — NURSING
Pt to nurses station stating that he is anxious and needs something to help him because he is mad about being here. PRN Visteril administered to aid with anxiety.

## 2022-06-20 NOTE — NURSING
Pt to nurses station stating angry after being awoken, and stating that he wants to go home. PRN Zyprexa PO administered to aid with agitation.

## 2022-06-20 NOTE — PLAN OF CARE
Recommendation:   1. Regular diet appropriate - Double portions ok per MD  2. Monitor for changes in PO intake   3. RD to follow    Goals: pt to consume at least 75% of all meals by f/u  Nutrition Goal Status: new

## 2022-06-20 NOTE — PSYCH
PLPC was unable to complete assessment due to pt lying in bed stretched out  with no covers unable to keep eyes open. PLPC will attempt at a later time when pt is alert to complete assessment.

## 2022-06-21 PROCEDURE — 25000003 PHARM REV CODE 250: Performed by: PSYCHIATRY & NEUROLOGY

## 2022-06-21 PROCEDURE — 25000003 PHARM REV CODE 250: Performed by: STUDENT IN AN ORGANIZED HEALTH CARE EDUCATION/TRAINING PROGRAM

## 2022-06-21 PROCEDURE — 90833 PR PSYCHOTHERAPY W/PATIENT W/E&M, 30 MIN (ADD ON): ICD-10-PCS | Mod: ,,, | Performed by: PSYCHIATRY & NEUROLOGY

## 2022-06-21 PROCEDURE — 99233 PR SUBSEQUENT HOSPITAL CARE,LEVL III: ICD-10-PCS | Mod: ,,, | Performed by: PSYCHIATRY & NEUROLOGY

## 2022-06-21 PROCEDURE — 11400000 HC PSYCH PRIVATE ROOM

## 2022-06-21 PROCEDURE — 99233 SBSQ HOSP IP/OBS HIGH 50: CPT | Mod: ,,, | Performed by: PSYCHIATRY & NEUROLOGY

## 2022-06-21 PROCEDURE — 90833 PSYTX W PT W E/M 30 MIN: CPT | Mod: ,,, | Performed by: PSYCHIATRY & NEUROLOGY

## 2022-06-21 PROCEDURE — S4991 NICOTINE PATCH NONLEGEND: HCPCS | Performed by: PSYCHIATRY & NEUROLOGY

## 2022-06-21 RX ORDER — QUETIAPINE FUMARATE 200 MG/1
200 TABLET, FILM COATED ORAL NIGHTLY
Status: DISCONTINUED | OUTPATIENT
Start: 2022-06-21 | End: 2022-06-24 | Stop reason: HOSPADM

## 2022-06-21 RX ORDER — GABAPENTIN 300 MG/1
600 CAPSULE ORAL 3 TIMES DAILY
Status: DISCONTINUED | OUTPATIENT
Start: 2022-06-21 | End: 2022-06-24 | Stop reason: HOSPADM

## 2022-06-21 RX ADMIN — MAGNESIUM HYDROXIDE 2400 MG: 400 SUSPENSION ORAL at 04:06

## 2022-06-21 RX ADMIN — NICOTINE 1 PATCH: 14 PATCH TRANSDERMAL at 08:06

## 2022-06-21 RX ADMIN — QUETIAPINE FUMARATE 200 MG: 200 TABLET ORAL at 08:06

## 2022-06-21 RX ADMIN — BUPROPION HYDROCHLORIDE 150 MG: 150 TABLET, EXTENDED RELEASE ORAL at 08:06

## 2022-06-21 RX ADMIN — FOLIC ACID 1 MG: 1 TABLET ORAL at 08:06

## 2022-06-21 RX ADMIN — GABAPENTIN 600 MG: 300 CAPSULE ORAL at 03:06

## 2022-06-21 RX ADMIN — THERA TABS 1 TABLET: TAB at 08:06

## 2022-06-21 RX ADMIN — GABAPENTIN 600 MG: 300 CAPSULE ORAL at 08:06

## 2022-06-21 RX ADMIN — GABAPENTIN 300 MG: 300 CAPSULE ORAL at 08:06

## 2022-06-21 NOTE — PLAN OF CARE
"Pt states that he is "not happy" today, spending majority of time in room. Pt irritable and annoyed that he was admitted onto the unit, and angry that he is not discharged yet. Denies SI/HI. Denies hallucinations. Denies sleep disturbances. PRN Visteril administered to aid with his anxiety and irritability. Appetite adequate. NADN. Remains calm and cooperative. Safety precautions remain in place.  "

## 2022-06-21 NOTE — PLAN OF CARE
Problem: Adult Behavioral Health Plan of Care  Goal: Optimized Coping Skills in Response to Life Stressors  Outcome: Ongoing, Progressing        GROUP NOTE:  Pt did not attend group today. PLPC met with pt individually. PLPC attempted to discuss with pt on group discussion. Pt was resting in bed quietly. Pt could not keep his eyes opened and kept falling in and out of sleep. PLPC discussed with pt PLPC will come back at a later time and date to discuss group.

## 2022-06-21 NOTE — PROGRESS NOTES
"PSYCHIATRY DAILY INPATIENT PROGRESS NOTE  SUBSEQUENT HOSPITAL VISIT    ENCOUNTER DATE: 6/21/2022  SITE: [facility]    DATE OF ADMISSION: 6/18/2022 10:24 AM  LENGTH OF STAY: 3 days      CHIEF COMPLAINT   Leighton Carroll is a 42 y.o. male, seen during daily somers rounds on the inpatient unit.  Leighton Carroll presented with the chief complaint of depression, anxiety and substance problems      The patient was seen and examined. The chart was reviewed.     Reviewed notes from Rns and MD and labs from the last 24 hours.    The patient's case was discussed with the treatment team/care providers today including Rns    Staff reports no behavioral or management issues.     The patient has been compliant with treatment.      Subjective 06/21/2022    Per yesterday's report: Patient agitated and RIS yesterday, required PRN zyprexa. Patient isolating to his bedroom today. Refuses rehab because he is on parole. Discussed risks and consequences of continued substance abuse.    Today, he reports he is here for drinking too much and "saying something I don't even know what." He reports significant stressors including legal, family, housing, and occupational.   -he was inappropriately focused on discharge, "being here is ruining my relationship."    He received prns 3x for psychotic agitation.    The patient denies any side effects to medications.      Psychiatric ROS (observed, reported, or endorsed/denied):  Depressed mood - variable  Interest/pleasure/anhedonia: variable  Guilt/hopelessness/worthlessness - less  Changes in Sleep - fluctuating  Changes in Appetite - less  Changes in Concentration - fluctuating  Changes in Energy - variable  PMA/R- No  Suicidal- active/passive ideations - denies  Homicidal ideations: active/passive ideations - denies    Hallucinations - improving steadily  Delusions - No  Disorganized behavior - improving steadily  Disorganized speech - No  Negative symptoms - No    Elevated mood - No  Decreased " need for sleep - No  Grandiosity - No  Racing thoughts - No  Impulsivity - No  Irritability- No  Increased energy - No  Distractibility - No  Increase in goal-directed activity or PMA- No    Symptoms of ARASELI - fluctuating  Symptoms of Panic Disorder- No  Symptoms of PTSD - No        Overall progress: Patient is showing no improvement on the Unit to date    PSYCHOTHERAPY ADD-ON +[id number]   16-37 minutes    Time: 16 minutes  Participants: Met with patient    Therapeutic Intervention Type: insight oriented psychotherapy, behavior modifying psychotherapy, supportive psychotherapy, interactive psychotherapy  Why chosen therapy is appropriate versus another modality: relevant to diagnosis, patient responds to this modality, evidence based practice    Target symptoms: alcohol abuse, depression, anxiety   Primary focus: mood, psychosocial stressors and substance/alcohol use  Psychotherapeutic techniques: supportive and psycho-educational techniques;     Outcome monitoring methods: self-report, observation    Patient's response to intervention:  The patient's response to intervention is reluctant.    Progress toward goals:  The patient's progress toward goals is limited.        Medical ROS  Review of Systems   Constitutional: Negative for chills and fever.   HENT: Negative for congestion, hearing loss, sore throat and tinnitus.    Eyes: Negative for blurred vision, double vision, photophobia and pain.   Respiratory: Negative for cough, hemoptysis, sputum production, shortness of breath and wheezing.    Cardiovascular: Negative for chest pain, palpitations and leg swelling.   Gastrointestinal: Negative for abdominal pain, blood in stool, constipation, diarrhea, nausea and vomiting.   Genitourinary: Negative for dysuria, frequency, hematuria and urgency.   Musculoskeletal: Negative for back pain, joint pain and myalgias.   Skin: Negative for rash.   Neurological: Negative for dizziness, tremors, seizures, weakness and headaches.  "  Endo/Heme/Allergies: Negative.    Psychiatric/Behavioral:        See HPI         PAST MEDICAL HISTORY   Past Medical History:   Diagnosis Date    Anxiety     Depression     Hepatitis C     Substance abuse     METH AND HEROIN           PSYCHOTROPIC MEDICATIONS   Scheduled Meds:   buPROPion  150 mg Oral Daily    folic acid  1 mg Oral Daily    gabapentin  300 mg Oral TID    multivitamin  1 tablet Oral Daily    nicotine  1 patch Transdermal Daily    QUEtiapine  100 mg Oral QHS     Continuous Infusions:  PRN Meds:.aluminum-magnesium hydroxide-simethicone, hydrOXYzine pamoate, ibuprofen, magnesium hydroxide 400 mg/5 ml, OLANZapine **AND** OLANZapine        EXAMINATION    VITALS   Vitals:    06/19/22 2000 06/20/22 0800 06/20/22 2024 06/21/22 0800   BP: (!) 140/83 122/83 (!) 140/93 (!) 140/93   BP Location: Right arm Right arm Right arm Right arm   Patient Position: Sitting Sitting Sitting Sitting   Pulse: 99 68 94 75   Resp: 20 20 17 16   Temp: 98 °F (36.7 °C) 97.2 °F (36.2 °C) 97.2 °F (36.2 °C) 97.7 °F (36.5 °C)   TempSrc: Temporal Temporal Temporal Temporal   Weight:       Height:           Body mass index is 24.68 kg/m².        CONSTITUTIONAL  General Appearance: unremarkable, age appropriate, normal weight, well nourished    MUSCULOSKELETAL  Muscle Strength and Tone:no tremor, no tic  Abnormal Involuntary Movements: None  Gait and Station: non-ataxic    PSYCHIATRIC   Level of Consciousness: awake and alert   Orientation: person, place, time and situation  Grooming: Hospital garb and Well groomed  Psychomotor Behavior: normal, cooperative, reluctant to participate  Speech: normal tone, normal rate, normal pitch, normal volume  Language: grossly intact, able to name, able to repeat  Mood: "okay"  Affect: Consistent with mood  Thought Process: linear, logical  Associations: intact   Thought Content: DENIES suicidal ideation, DENIES homicidal ideation and no delusions  Perceptions: denies " hallucinations  Memory: Able to recall past events, Remote intact and Recent intact  Attention:Attends to interview without distraction  Fund of Knowledge: Aware of current events and Vocabulary appropriate   Estimate if Intelligence:  Average based on work/education history, vocabulary and mental status exam  Insight: has awareness of illness- fair  Judgment: behavior is adequate to circumstances- poor with recent agitation        DIAGNOSTIC TESTING   Laboratory Results  No results found for this or any previous visit (from the past 24 hour(s)).          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)and ANXIETY DISORDERS; 7.9.Generalized Anxiety Disorder (F41.1)     Suicidal ideations  Homicidal ideations    Psychosocial stressors    Alcohol abuse        PROBLEM LIST AND MANAGEMENT PLANS    Mood:  - continue Wellbutrin XL 150mg PO daily with plan to titrate as tolerated  - continue Seroquel 50mg PO QHS for augmentation and insomnia  -increase to 100 mg PO qhs tonight- increase to 200 mg po q HS tonight  - follow up with outpatient mental health provider after discharge  - pt counseled     Anxiety:  - start gabapentin 300 mg PO TID today- increase to 600 mg po TID   - pt counseled  - follow up with outpatient mental health provider after discharge    Suicidal ideations  - continue psychiatric hospitalization  - provide psychotherapeutic interventions and medication management    Homicidal ideations  - continue psychiatric hospitalization  - provide psychotherapeutic interventions and medication management    Psychosocial stressors: pt counseled  -SW consulted to assist with resources    Alcohol abuse: pt counseled  Alcohol Brief Intervention    1. Discussed with patient that there is concern  he/she is drinking at unhealthy levels known to increase his/her risk of alcohol-related health problems - Yes  2. Discussed general feedback on health risks associated with drinking and/or given personalized feedback - Yes  3. Patient was advised to abstain from alcohol use - Yes      Substance use: pt counseled  -pt counseled      Discussed diagnosis, risks and benefits of proposed treatment vs alternative treatments vs no treatment, potential side effects of these treatments and the inherent unpredictability of treatment. The patient expresses understanding of the above and displays the capacity to agree with this treatment given said understanding. Patient also agrees that, currently, the benefits outweigh the risks and would like to pursue/continue treatment at this time.      DISCHARGE PLANNING  Expected Disposition Plan: Home or Self Care      NEED FOR CONTINUED HOSPITALIZATION  Psychiatric illness continues to pose a potential threat to life or bodily function, of self or others, thereby requiring the need for continued inpatient psychiatric hospitalization: Yes, due to: danger to self, danger to others and gravely disabled, as evidenced by:  Ongoing concerns with perceptual aberrancy and paranoid persecutory delusions leading to potential harm of self or others.    Protective inpatient pyschiatric hospitalization required while a safe disposition plan is enacted: Yes    Patient stabilized and ready for discharge from inpatient psychiatric unit: No        STAFF:   Ge Leon MD  Psychiatry

## 2022-06-21 NOTE — PLAN OF CARE
[facility] discussed with pt on collateral consent. Pt refused collateral. [facility] will attempt at a later time and date.

## 2022-06-21 NOTE — NURSING
Patient withdrawn, isolative,depressed sad, anxious & irritable.  Appears unkempt, disheveled, affect flat, bizarre, & restricted. Thoughts linear, thought blocking & response lag exhibited  Speech pressured.  Denies A/V hallucinations & SI / HI. Monitor q 15 minutes per protocol

## 2022-06-21 NOTE — PLAN OF CARE
Pt taking scheduled medications w/out difficulty;  Seizure /  Fall  prevention in place;  MVC Monitored per policy. Denies SI / HI; No C/O pain  No sleep disturbances as reported by PM shift.     Upon morning assessment patient was      awake     tense        quiet         accepting of care    Denies A/VH. No RIS observed.    Contracted for safety.      Avoids social contact,  No Interaction with peers noted.  (Mood/Behavior/Emotion):   Easily agitated angry anxious combative flat  hypoactive labile sad  withdrawn  anxious guarded labile depressed tense distrustful   thoughts linear  Ate meals/snack.     Did attend/participate in groups.  Continue POC.

## 2022-06-21 NOTE — NURSING
Pt sleeping at this time, slept 7.5 hrs with  2 awakenings. NAD. Resp even and unlabored.Pathways clear,bed in low position. Q 15 min safety check ongoing.All precautions maintained.

## 2022-06-21 NOTE — PROGRESS NOTES
06/21/22 1100   [facility] Group Therapy   Group Name Education   Specific Interventions Coping Skills Training   Participation Level Appropriate;Sharing   Participation Quality Cooperative   Insight/Motivation Improved   Affect/Mood Display Appropriate   Cognition Alert   Psychomotor WNL   Patient encouraged to participate in the activity group, sits unless directed into the activity, identified positive steps to wellbeing are seeing the big picture, asking himself what he can do right now to help himself. Accepting some situations he just can't change. Coping skills are drawing, tattooing (showing peers his tattoos he adarsh on his legs), painting

## 2022-06-21 NOTE — PLAN OF CARE
Problem: Adult Behavioral Health Plan of Care  Goal: Rounds/Family Conference  Outcome: Ongoing, Progressing  Flowsheets (Taken 6/21/2022 1021)  Participants:   psychiatrist   nursing   therapeutic recreation   other ([facility] and discharge planner)   TREATMENT TEAM      Chief Complaint:    Pt is a 42 year old male with chief complaints of depression, anxiety, and substance abuse.   Pt states he does not remember coming here and got all drunk. States his uncle bought him some whiskey and he drank it and said something that he does not remember saying.         Pt Goal(s):    Pt states he wants to go home.    Current Progress:   Pt attended treatment team dressed in blue scrubs.  Pt affect constricted/expansive mood  Pt states he just got released from alf 2 months and his uncles wife's  best friend  moved her best friend in with me and he is not ready for a relationship  and it puts a lot of stress on him.  States he is having trouble re-entering society from coming out of alf and it has took a toll on him to readjust. He does not know why he said what he said because it was just drunken words and he is not feeling homicidal nor suicidal.   Pt states he is locked up on the [facility] unit  just staring at the 4 walls and does not know what the hell is going on at home and being in here is upsetting him and that is why he is irritated.  Pt states he endorsed in meth and marijuana in the past, but endorsed in using alcohol with his uncle every now and then.  Pt states that he was on medication before he went to residential, but while in residential he did not get his medications and would like to get back on his anxiety medications. He states the whole problem is him not being on his medications and feels that the medication will help him get better.   Pt states the medication given here on the unit has been making him very sleepy.  Pt states being here is messing with his relationship and his life at home.   Pt has significant  "stressors including legal, family, housing, and occupational.   Pt was inappropriately focused on discharge, "being here is ruining my relationship."     Program/groups:  Encourage pt to continue to attend all groups.        Revisions to Plan:  Encourage pt to continue to attend treatment team and to attend all groups.  MEDICATIONS:  Seroquel- increase to 200 mg po q HS tonight  Gabapentin-Increase to 600 mg po TID      "
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
## 2022-06-21 NOTE — PROGRESS NOTES
"   06/21/22 1526   Assessment   Patient's Identification of the Problem Patient presents calm, cooperative, reports "sleepy" mood, states his admit is due to "I got drunk on some whiskey, walked out, said a bunch of crazy stuff, don't remember saying it." Patient reports he been having issues with depression and anxiety because he has been off his medications. Per H&P patient has a past history of depression, anxiety and substance abuse. Patient admits to negative leisure lifestyle of alcohol, marijuana and meth. Patient reports that he is single, has no children, 9th & GED, unemployed, live in [place] with his uncle and his wife. Patient verbalized hios main goal is "just to get back on my medicine.   Leisure Interest Arts and Crafts;Listen to Music;Enjoy Family/Friends   Leisure Barriers Lack of Transportation;Loss of Interest;Lack of Finances;ETOH Use;Drug Use   Treatment Focus To Improve Mood;To Decrease Guardedness and Isolation;To Improve Leisure Awareness/Lifestyle/Interest;To Improve Coping Skills;To Promote Successful and Safe Self Expression;To Educate and Increase Awareness of Sober Free Activities     Treatment Recommendation:  1:1 Intervention (as needed)    Cognitive Stimulation Skilled Activity  Self Expression Skilled Activity  Mild Exercises Skilled Activity  Coping Skilled Activity  Leisure Education and Awareness Skilled Activity    Treatment Goal(s):  Long Term Goals Refer To Master Treatment Plan    Short Term Treatment Goal(s)  Patient Will:  Comply with Treatment  Integrate with Therapeutic Milieu  Verbalize Improvement in Mood  Identify at Least 2 Coping Skills or Leisure Skills to Reduce Depression and Hopelessness Upon Request from Therapist    Discharge Recommendations:  Encourage Patient to Actively Utilize Available Community Resources to Increase Leisure Involvement to Decrease Signs and Symptoms of Illness  Encourage Patient to Utilize Coping Skills on a Regular Basis to Reduce the " Risk of Decompensating and Re-Hospitalizations  AA/NA Meetings  Follow Up with After Care Appointments

## 2022-06-22 PROCEDURE — 90833 PR PSYCHOTHERAPY W/PATIENT W/E&M, 30 MIN (ADD ON): ICD-10-PCS | Mod: ,,, | Performed by: PSYCHIATRY & NEUROLOGY

## 2022-06-22 PROCEDURE — 99233 SBSQ HOSP IP/OBS HIGH 50: CPT | Mod: ,,, | Performed by: PSYCHIATRY & NEUROLOGY

## 2022-06-22 PROCEDURE — S4991 NICOTINE PATCH NONLEGEND: HCPCS | Performed by: PSYCHIATRY & NEUROLOGY

## 2022-06-22 PROCEDURE — 25000003 PHARM REV CODE 250: Performed by: PSYCHIATRY & NEUROLOGY

## 2022-06-22 PROCEDURE — 99233 PR SUBSEQUENT HOSPITAL CARE,LEVL III: ICD-10-PCS | Mod: ,,, | Performed by: PSYCHIATRY & NEUROLOGY

## 2022-06-22 PROCEDURE — 11400000 HC PSYCH PRIVATE ROOM

## 2022-06-22 PROCEDURE — 90833 PSYTX W PT W E/M 30 MIN: CPT | Mod: ,,, | Performed by: PSYCHIATRY & NEUROLOGY

## 2022-06-22 RX ORDER — BUPROPION HYDROCHLORIDE 300 MG/1
300 TABLET ORAL DAILY
Status: DISCONTINUED | OUTPATIENT
Start: 2022-06-23 | End: 2022-06-24 | Stop reason: HOSPADM

## 2022-06-22 RX ADMIN — GABAPENTIN 600 MG: 300 CAPSULE ORAL at 02:06

## 2022-06-22 RX ADMIN — QUETIAPINE FUMARATE 200 MG: 200 TABLET ORAL at 09:06

## 2022-06-22 RX ADMIN — THERA TABS 1 TABLET: TAB at 08:06

## 2022-06-22 RX ADMIN — FOLIC ACID 1 MG: 1 TABLET ORAL at 08:06

## 2022-06-22 RX ADMIN — NICOTINE 1 PATCH: 14 PATCH TRANSDERMAL at 08:06

## 2022-06-22 RX ADMIN — GABAPENTIN 600 MG: 300 CAPSULE ORAL at 09:06

## 2022-06-22 RX ADMIN — GABAPENTIN 600 MG: 300 CAPSULE ORAL at 08:06

## 2022-06-22 RX ADMIN — BUPROPION HYDROCHLORIDE 150 MG: 150 TABLET, EXTENDED RELEASE ORAL at 08:06

## 2022-06-22 NOTE — PLAN OF CARE
"Pt states that he feels "a little better" stating that although he has stressors going on at home, he is trying to cope with it. Denies SI/HI. Denies hallucinations. Appetite adequate. Denies sleep disturbances. NADN. Remains calm and cooperative. Medication complaint. Safety precautions remain in place.  "

## 2022-06-22 NOTE — PROGRESS NOTES
"   06/22/22 1045   [facility] Group Therapy   Group Name Therapeutic Recreation   Specific Interventions Cognitive Stimulation Training   Participation Level Appropriate;Sharing   Participation Quality Cooperative   Insight/Motivation Improved   Affect/Mood Display Appropriate   Cognition Alert   Psychomotor WNL   Patient presents pleasant, calm, reports "better" mood, states the medicine is helping him to "feel calmer and more stable." Patient read, wrote and shared his answers.  "

## 2022-06-22 NOTE — PLAN OF CARE
Patient anxious, restless, and cooperative.  Denies intentions to harm self and/or others at this time. Denies auditory and/or visual hallucinations.  Affect and emotion aloof, restricted, and suspicious.  Thoughts are linear and logical.  Interacts appropriately with peers and staff. Accepts meals and medications. Discussed medication and plan of care as well as healthy coping skills and techniques; patient voices understanding-will continue to reinforce.

## 2022-06-22 NOTE — NURSING
Pt awake at this time, slept 7 hrs with 1 awakening 1. NAD. Resp even and unlabored.Pathways clear,bed in low position. Q 15 min safety check ongoing.All precautions maintained.

## 2022-06-22 NOTE — PLAN OF CARE
Problem: Adult Behavioral Health Plan of Care  Goal: Optimized Coping Skills in Response to Life Stressors  Outcome: Ongoing, Progressing  Intervention: Promote Effective Coping Strategies  Flowsheets (Taken 6/22/2022 5937)  Supportive Measures:   active listening utilized   counseling provided   guided imagery facilitated   positive reinforcement provided   verbalization of feelings encouraged   Group Note      Behavior    Patient attended group psychotherapy today. Pt affect flat with guarded mood.      Intervention      CBT-based group psychotherapy focused on Shape Shifters. Patients practiced how positive feelings about one-self gives courage and strength. The worksheet provide direction and gave meaning to one's life. Pts explored and discussed positive thinking of them self.        Response     Patient stated  I feel afraid because I do not want to go back to [facility] and I am very guarded about how I handle my life in certain situations.  .      Plan  Patient will be encouraged to continue to attend group psychotherapy.

## 2022-06-22 NOTE — PROGRESS NOTES
"PSYCHIATRY DAILY INPATIENT PROGRESS NOTE  SUBSEQUENT HOSPITAL VISIT    ENCOUNTER DATE: 6/22/2022  [facility] [place]    DATE OF ADMISSION: 6/18/2022 10:24 AM  LENGTH OF STAY: 4 days      CHIEF COMPLAINT   Leighton Carroll is a 42 y.o. male, seen during daily somers rounds on the inpatient unit.  Leighton Carroll presented with the chief complaint of depression, anxiety and substance problems      The patient was seen and examined. The chart was reviewed.     Reviewed notes from Rns, CTRS and LPC and labs from the last 24 hours.    The patient's case was discussed with the treatment team/care providers today including Rns, CTRS and LPC    Staff reports no behavioral or management issues.     The patient has been compliant with treatment.      Subjective 06/22/2022    Today, he reports that he is here for drinking too much and "saying something I don't even know what." He reports significant stressors including legal, family, housing, and occupational.   -he was less inappropriately focused on discharge, "being here is ruining my relationship."    He reports that he had severe family stressors which caused stress.     He received less prns  psychotic agitation.    The patient denies any side effects to medications.      Psychiatric ROS (observed, reported, or endorsed/denied):  Depressed mood - variable  Interest/pleasure/anhedonia: variable  Guilt/hopelessness/worthlessness - less  Changes in Sleep - fluctuating  Changes in Appetite - less  Changes in Concentration - fluctuating  Changes in Energy - variable  PMA/R- No  Suicidal- active/passive ideations - denies  Homicidal ideations: active/passive ideations - denies    Hallucinations - improving steadily  Delusions - No  Disorganized behavior - improving steadily  Disorganized speech - No  Negative symptoms - No    Elevated mood - No  Decreased need for sleep - No  Grandiosity - No  Racing thoughts - No  Impulsivity - No  Irritability- No  Increased energy - " No  Distractibility - No  Increase in goal-directed activity or PMA- No    Symptoms of ARASELI - fluctuating  Symptoms of Panic Disorder- No  Symptoms of PTSD - No        Overall progress: Patient is showing mild improvement     PSYCHOTHERAPY ADD-ON [id number]   16-37 minutes    Time: 16 minutes  Participants: Met with patient    Therapeutic Intervention Type: insight oriented psychotherapy, behavior modifying psychotherapy, supportive psychotherapy, interactive psychotherapy  Why chosen therapy is appropriate versus another modality: relevant to diagnosis, patient responds to this modality, evidence based practice    Target symptoms: alcohol abuse, depression, anxiety   Primary focus: mood, psychosocial stressors and substance/alcohol use  Psychotherapeutic techniques: supportive and psycho-educational techniques;     Outcome monitoring methods: self-report, observation    Patient's response to intervention:  The patient's response to intervention is reluctant.    Progress toward goals:  The patient's progress toward goals is limited.        Medical ROS  Review of Systems   Constitutional: Negative for chills and fever.   HENT: Negative for congestion, hearing loss, sore throat and tinnitus.    Eyes: Negative for blurred vision, double vision, photophobia and pain.   Respiratory: Negative for cough, hemoptysis, sputum production, shortness of breath and wheezing.    Cardiovascular: Negative for chest pain, palpitations and leg swelling.   Gastrointestinal: Negative for abdominal pain, blood in stool, constipation, diarrhea, nausea and vomiting.   Genitourinary: Negative for dysuria, frequency, hematuria and urgency.   Musculoskeletal: Negative for back pain, joint pain and myalgias.   Skin: Negative for rash.   Neurological: Negative for dizziness, tremors, seizures, weakness and headaches.   Endo/Heme/Allergies: Negative.    Psychiatric/Behavioral:        See HPI         PAST MEDICAL HISTORY   Past Medical History:  "  Diagnosis Date    Anxiety     Depression     Hepatitis C     Substance abuse     METH AND HEROIN           PSYCHOTROPIC MEDICATIONS   Scheduled Meds:   buPROPion  150 mg Oral Daily    folic acid  1 mg Oral Daily    gabapentin  600 mg Oral TID    multivitamin  1 tablet Oral Daily    nicotine  1 patch Transdermal Daily    QUEtiapine  200 mg Oral QHS     Continuous Infusions:  PRN Meds:.aluminum-magnesium hydroxide-simethicone, hydrOXYzine pamoate, ibuprofen, magnesium hydroxide 400 mg/5 ml, OLANZapine **AND** OLANZapine        EXAMINATION    VITALS   Vitals:    06/21/22 0800 06/21/22 2001 06/22/22 0700 06/22/22 0742   BP: (!) 140/93 (!) 159/91  (!) 167/96   BP Location: Right arm Right arm  Right arm   Patient Position: Sitting Sitting  Lying   Pulse: 75 (!) 112  101   Resp: 16 16  20   Temp: 97.7 °F (36.5 °C) 97.3 °F (36.3 °C)  97.8 °F (36.6 °C)   TempSrc: Temporal Temporal  Temporal   Weight:   75.4 kg (166 lb 5.4 oz)    Height:           Body mass index is 24.56 kg/m².        CONSTITUTIONAL  General Appearance: unremarkable, age appropriate, normal weight, well nourished    MUSCULOSKELETAL  Muscle Strength and Tone:no dyskinesia, no tremor, no tic  Abnormal Involuntary Movements: None  Gait and Station: non-ataxic    PSYCHIATRIC   Level of Consciousness: awake and alert   Orientation: person, place, time and situation  Grooming: Hospital garb and Well groomed  Psychomotor Behavior: normal, cooperative, reluctant to participate  Speech: normal tone, normal rate, normal pitch, normal volume  Language: grossly intact, able to name, able to repeat  Mood: "okay"  Affect: Consistent with mood  Thought Process: linear, logical  Associations: intact   Thought Content: DENIES suicidal ideation, DENIES homicidal ideation and no delusions  Perceptions: denies hallucinations  Memory: Able to recall past events, Remote intact and Recent intact  Attention:Attends to interview without distraction  Fund of Knowledge: " Aware of current events and Vocabulary appropriate   Estimate if Intelligence:  Average based on work/education history, vocabulary and mental status exam  Insight: has awareness of illness- fair  Judgment: behavior is adequate to circumstances- poor with recent agitation        DIAGNOSTIC TESTING   Laboratory Results  No results found for this or any previous visit (from the past 24 hour(s)).          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)and ANXIETY DISORDERS; 7.9.Generalized Anxiety Disorder (F41.1)     Suicidal ideations  Homicidal ideations    Psychosocial stressors    Alcohol abuse        PROBLEM LIST AND MANAGEMENT PLANS    Mood:  - continue Wellbutrin XL 150mg PO daily with plan to titrate as tolerated- increase to 300 mg po q day  - continue Seroquel 50mg PO QHS for augmentation and insomnia  -increase to 100 mg PO qhs tonight- increase to 200 mg po q HS tonight  - follow up with outpatient mental health provider after discharge  - pt counseled     Anxiety:  - start gabapentin 300 mg PO TID today- increase to 600 mg po TID   - pt counseled  - follow up with outpatient mental health provider after discharge    Suicidal ideations  - continue psychiatric hospitalization  - provide psychotherapeutic interventions and medication management    Homicidal ideations  - resolved    Psychosocial stressors: pt counseled  -SW consulted and assisting with resources    Alcohol abuse: pt counseled  Alcohol Brief Intervention    1. Discussed with patient that there is concern he/she is drinking at unhealthy levels known to increase his/her risk of alcohol-related health problems - Yes  2. Discussed general feedback on health risks associated with drinking and/or given personalized feedback -  Yes  3. Patient was advised to abstain from alcohol use - Yes      Substance use: pt counseled  -pt counseled      Discussed diagnosis, risks and benefits of proposed treatment vs alternative treatments vs no treatment, potential side effects of these treatments and the inherent unpredictability of treatment. The patient expresses understanding of the above and displays the capacity to agree with this treatment given said understanding. Patient also agrees that, currently, the benefits outweigh the risks and would like to pursue/continue treatment at this time.      DISCHARGE PLANNING  Expected Disposition Plan: Home or Self Care      NEED FOR CONTINUED HOSPITALIZATION  Psychiatric illness continues to pose a potential threat to life or bodily function, of self or others, thereby requiring the need for continued inpatient psychiatric hospitalization: Yes, due to: danger to self, danger to others and gravely disabled, as evidenced by:  Ongoing concerns with perceptual aberrancy and paranoid persecutory delusions leading to potential harm of self or others.    Protective inpatient pyschiatric hospitalization required while a safe disposition plan is enacted: Yes    Patient stabilized and ready for discharge from inpatient psychiatric unit: No        STAFF:   Ge Leon MD  Psychiatry

## 2022-06-23 PROCEDURE — 90833 PR PSYCHOTHERAPY W/PATIENT W/E&M, 30 MIN (ADD ON): ICD-10-PCS | Mod: ,,, | Performed by: PSYCHIATRY & NEUROLOGY

## 2022-06-23 PROCEDURE — 90833 PSYTX W PT W E/M 30 MIN: CPT | Mod: ,,, | Performed by: PSYCHIATRY & NEUROLOGY

## 2022-06-23 PROCEDURE — S4991 NICOTINE PATCH NONLEGEND: HCPCS | Performed by: PSYCHIATRY & NEUROLOGY

## 2022-06-23 PROCEDURE — 25000003 PHARM REV CODE 250: Performed by: PSYCHIATRY & NEUROLOGY

## 2022-06-23 PROCEDURE — 11400000 HC PSYCH PRIVATE ROOM

## 2022-06-23 PROCEDURE — 99233 PR SUBSEQUENT HOSPITAL CARE,LEVL III: ICD-10-PCS | Mod: ,,, | Performed by: PSYCHIATRY & NEUROLOGY

## 2022-06-23 PROCEDURE — 99233 SBSQ HOSP IP/OBS HIGH 50: CPT | Mod: ,,, | Performed by: PSYCHIATRY & NEUROLOGY

## 2022-06-23 RX ORDER — IBUPROFEN 200 MG
1 TABLET ORAL DAILY
COMMUNITY
Start: 2022-06-23

## 2022-06-23 RX ORDER — QUETIAPINE FUMARATE 200 MG/1
200 TABLET, FILM COATED ORAL NIGHTLY
Qty: 30 TABLET | Refills: 2 | Status: ON HOLD | OUTPATIENT
Start: 2022-06-23 | End: 2022-07-18 | Stop reason: SDUPTHER

## 2022-06-23 RX ORDER — BUPROPION HYDROCHLORIDE 300 MG/1
300 TABLET ORAL DAILY
Qty: 30 TABLET | Refills: 2 | Status: ON HOLD | OUTPATIENT
Start: 2022-06-24 | End: 2022-07-18 | Stop reason: HOSPADM

## 2022-06-23 RX ORDER — GABAPENTIN 300 MG/1
600 CAPSULE ORAL 3 TIMES DAILY
Qty: 180 CAPSULE | Refills: 2 | Status: ON HOLD | OUTPATIENT
Start: 2022-06-23 | End: 2022-07-18 | Stop reason: SDUPTHER

## 2022-06-23 RX ADMIN — FOLIC ACID 1 MG: 1 TABLET ORAL at 08:06

## 2022-06-23 RX ADMIN — BUPROPION HYDROCHLORIDE 300 MG: 300 TABLET, FILM COATED, EXTENDED RELEASE ORAL at 08:06

## 2022-06-23 RX ADMIN — GABAPENTIN 600 MG: 300 CAPSULE ORAL at 02:06

## 2022-06-23 RX ADMIN — MAGNESIUM HYDROXIDE 2400 MG: 400 SUSPENSION ORAL at 09:06

## 2022-06-23 RX ADMIN — QUETIAPINE FUMARATE 200 MG: 200 TABLET ORAL at 08:06

## 2022-06-23 RX ADMIN — THERA TABS 1 TABLET: TAB at 08:06

## 2022-06-23 RX ADMIN — HYDROXYZINE PAMOATE 50 MG: 50 CAPSULE ORAL at 01:06

## 2022-06-23 RX ADMIN — NICOTINE 1 PATCH: 14 PATCH TRANSDERMAL at 08:06

## 2022-06-23 RX ADMIN — GABAPENTIN 600 MG: 300 CAPSULE ORAL at 08:06

## 2022-06-23 NOTE — PLAN OF CARE
Patient anxious, restless, and cooperative.  Denies intentions to harm self and/or others at this time. Denies auditory and/or visual hallucinations.  Affect aloof and restricted.  Thoughts are linear and logical. Interacts appropriately with peers and staff. Accepts meals and medications.  Discussed medication and plan of care as well as healthy coping skills and techniques; patient voiced understanding.

## 2022-06-23 NOTE — PROGRESS NOTES
"PSYCHIATRY DAILY INPATIENT PROGRESS NOTE  SUBSEQUENT HOSPITAL VISIT    ENCOUNTER DATE: 6/23/2022  [facility] [place]    DATE OF ADMISSION: 6/18/2022 10:24 AM  LENGTH OF STAY: 5 days      CHIEF COMPLAINT   Leighton Carroll is a 42 y.o. male, seen during daily somers rounds on the inpatient unit.  Leighton Carroll presented with the chief complaint of depression, anxiety and substance problems      The patient was seen and examined. The chart was reviewed.     Reviewed notes from Rns, CTRS and LPC and labs from the last 24 hours.    The patient's case was discussed with the treatment team/care providers today including Rns, CTRS and LPC    Staff reports no behavioral or management issues.     The patient has been compliant with treatment.      Subjective 06/23/2022    Today, he discussed his drinking too much and recent drug use. He reported significant stressors including legal, family, housing, and occupational.   -he was more appropriately focused on discharge"    He feels better able to handle his stressors. He is calmer, more logical and better able to discuss solutions.  He reports that he plans to go back to his Uncle's home then go to rehab. He reports that he needs to talk with his  tomorrow.     He received no prns  psychotic agitation.    He is improving and should be stable for discharge home tomorrow and able to transition to outpatient care.     The patient denies any side effects to medications.      Psychiatric ROS (observed, reported, or endorsed/denied):  Depressed mood - improving steadily  Interest/pleasure/anhedonia: improving steadily  Guilt/hopelessness/worthlessness - improving steadily  Changes in Sleep - improving steadily  Changes in Appetite - improving steadily  Changes in Concentration - improving steadily  Changes in Energy - improving steadily  PMA/R- No  Suicidal- active/passive ideations - denies  Homicidal ideations: active/passive ideations - denies    Hallucinations - " denies  Delusions - No  Disorganized behavior - denies  Disorganized speech - No  Negative symptoms - No    Elevated mood - No  Decreased need for sleep - No  Grandiosity - No  Racing thoughts - No  Impulsivity - No  Irritability- No  Increased energy - No  Distractibility - No  Increase in goal-directed activity or PMA- No    Symptoms of ARASELI - improving steadily  Symptoms of Panic Disorder- No  Symptoms of PTSD - No      Overall progress: Patient is showing moderate improvement      PSYCHOTHERAPY ADD-ON +[id number]   16-37 minutes    Time: 16 minutes  Participants: Met with patient    Therapeutic Intervention Type: insight oriented psychotherapy, behavior modifying psychotherapy, supportive psychotherapy, interactive psychotherapy  Why chosen therapy is appropriate versus another modality: relevant to diagnosis, patient responds to this modality, evidence based practice    Target symptoms: alcohol abuse, depression, anxiety   Primary focus: mood, psychosocial stressors and substance/alcohol use  Psychotherapeutic techniques: supportive and psycho-educational techniques; motivational techniques    Outcome monitoring methods: self-report, observation    Patient's response to intervention:  The patient's response to intervention is accepting.    Progress toward goals:  The patient's progress toward goals is good.        Medical ROS  Review of Systems   Constitutional: Negative for chills and fever.   HENT: Negative for congestion, hearing loss, sore throat and tinnitus.    Eyes: Negative for blurred vision, double vision, photophobia and pain.   Respiratory: Negative for cough, hemoptysis, sputum production, shortness of breath and wheezing.    Cardiovascular: Negative for chest pain, palpitations and leg swelling.   Gastrointestinal: Negative for abdominal pain, blood in stool, constipation, diarrhea, nausea and vomiting.   Genitourinary: Negative for dysuria, frequency, hematuria and urgency.   Musculoskeletal: Negative  "for back pain, joint pain and myalgias.   Skin: Negative for rash.   Neurological: Negative for dizziness, tremors, seizures, weakness and headaches.   Endo/Heme/Allergies: Negative.    Psychiatric/Behavioral:        See HPI         PAST MEDICAL HISTORY   Past Medical History:   Diagnosis Date    Anxiety     Depression     Hepatitis C     Substance abuse     METH AND HEROIN           PSYCHOTROPIC MEDICATIONS   Scheduled Meds:   buPROPion  300 mg Oral Daily    folic acid  1 mg Oral Daily    gabapentin  600 mg Oral TID    multivitamin  1 tablet Oral Daily    nicotine  1 patch Transdermal Daily    QUEtiapine  200 mg Oral QHS     Continuous Infusions:  PRN Meds:.aluminum-magnesium hydroxide-simethicone, hydrOXYzine pamoate, ibuprofen, magnesium hydroxide 400 mg/5 ml, OLANZapine **AND** OLANZapine        EXAMINATION    VITALS   Vitals:    06/22/22 0700 06/22/22 0742 06/22/22 2001 06/23/22 0755   BP:  (!) 167/96 (!) 164/81 (!) 162/103   BP Location:  Right arm Right arm Left arm   Patient Position:  Lying Sitting Lying   Pulse:  101 104 90   Resp:  20 16 16   Temp:  97.8 °F (36.6 °C) 98.2 °F (36.8 °C) 98.3 °F (36.8 °C)   TempSrc:  Temporal Temporal Temporal   Weight: 75.4 kg (166 lb 5.4 oz)      Height:           Body mass index is 24.56 kg/m².        CONSTITUTIONAL  General Appearance: unremarkable, age appropriate, normal weight, well nourished    MUSCULOSKELETAL  Muscle Strength and Tone:no dyskinesia, no tremor, no tic  Abnormal Involuntary Movements: None  Gait and Station: non-ataxic    PSYCHIATRIC   Level of Consciousness: awake and alert   Orientation: person, place, time and situation  Grooming: Hospital garb and Well groomed  Psychomotor Behavior: normal, cooperative, reluctant to participate  Speech: normal tone, normal rate, normal pitch, normal volume  Language: grossly intact, able to name, able to repeat  Mood: "okay"  Affect: Consistent with mood  Thought Process: linear, logical  Associations: " intact   Thought Content: DENIES suicidal ideation, DENIES homicidal ideation and no delusions  Perceptions: denies hallucinations  Memory: Able to recall past events, Remote intact and Recent intact  Attention:Attends to interview without distraction  Fund of Knowledge: Aware of current events and Vocabulary appropriate   Estimate if Intelligence:  Average based on work/education history, vocabulary and mental status exam  Insight: has awareness of illness- good  Judgment: behavior is adequate to circumstances- good/improving      DIAGNOSTIC TESTING   Laboratory Results  No results found for this or any previous visit (from the past 24 hour(s)).          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)and ANXIETY DISORDERS; 7.9.Generalized Anxiety Disorder (F41.1)     Suicidal ideations  Homicidal ideations    Psychosocial stressors    Alcohol abuse        PROBLEM LIST AND MANAGEMENT PLANS    Mood:  - continue Wellbutrin XL 150mg PO daily with plan to titrate as tolerated- increase to 300 mg po q day  - continue Seroquel 50mg PO QHS for augmentation and insomnia  -increase to 100 mg PO qhs tonight- increase to/conitnue at 200 mg po q HS tonight  - follow up with outpatient mental health provider after discharge  - pt counseled     Anxiety:  - start gabapentin 300 mg PO TID today- increase to/continue at 600 mg po TID   - pt counseled  - follow up with outpatient mental health provider after discharge    Suicidal ideations  - continue psychiatric hospitalization  - provide psychotherapeutic interventions and medication management    Homicidal ideations  - resolved    Psychosocial stressors: pt counseled  -SW consulted and assisting with resources    Alcohol abuse: pt counseled  Alcohol Brief  Intervention    1. Discussed with patient that there is concern he/she is drinking at unhealthy levels known to increase his/her risk of alcohol-related health problems - Yes  2. Discussed general feedback on health risks associated with drinking and/or given personalized feedback - Yes  3. Patient was advised to abstain from alcohol use - Yes      Substance use: pt counseled  -pt counseled      Discussed diagnosis, risks and benefits of proposed treatment vs alternative treatments vs no treatment, potential side effects of these treatments and the inherent unpredictability of treatment. The patient expresses understanding of the above and displays the capacity to agree with this treatment given said understanding. Patient also agrees that, currently, the benefits outweigh the risks and would like to pursue/continue treatment at this time.      DISCHARGE PLANNING  Expected Disposition Plan: Home or Self Care- pt is improving and will be stable for discharge home tomorrow      NEED FOR CONTINUED HOSPITALIZATION  Psychiatric illness continues to pose a potential threat to life or bodily function, of self or others, thereby requiring the need for continued inpatient psychiatric hospitalization: Yes, due to: danger to self, danger to others and gravely disabled, as evidenced by:  Ongoing concerns with perceptual aberrancy and paranoid persecutory delusions leading to potential harm of self or others.    Protective inpatient pyschiatric hospitalization required while a safe disposition plan is enacted: Yes    Patient stabilized and ready for discharge from inpatient psychiatric unit: No        STAFF:   Ge Leon MD  Psychiatry

## 2022-06-23 NOTE — PROGRESS NOTES
06/23/22 1050   [facility] Group Therapy   Group Name Therapeutic Recreation   Specific Interventions Cognitive Stimulation Training   Participation Level Active;Appropriate   Participation Quality Cooperative   Insight/Motivation Applies New Skills;Improved   Affect/Mood Display Appropriate   Cognition Alert   Psychomotor WNL   Patient presents calm, cooperative, reports good mood, shows interest and initial ability to comprehend directions, ask for Sudoku puzzles and was given some, receptive to resource information given.

## 2022-06-23 NOTE — NURSING
Rested intermittently with closed eyes and even resp for 2.75 hours.  Said he shouldn't have taken a nap yesterday.   Modified VC and all precautions maintained.  Pathways clear and bed in low position.

## 2022-06-23 NOTE — PLAN OF CARE
"Denies suicidal thoughts at present.  Good mood.  Smiling.  Appears relaxed this evening.  Ate snack.  Interacting well with peers and staff.  Asked staff about jobs.  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  Positive encouragement given.  Said before coming into hospital, he walked in on his gf with his uncle in the bathroom.  Said they were hugging each other.  Said his gf is afraid of getting kicked out of his house.  "All I want is honesty.  Is that too much to ask"?  Denies suicidal thoughts at present.  Accepted hs meds.  Stressed compliance with meds upon discharge.    "

## 2022-06-24 VITALS
HEART RATE: 110 BPM | WEIGHT: 166.31 LBS | DIASTOLIC BLOOD PRESSURE: 90 MMHG | TEMPERATURE: 98 F | BODY MASS INDEX: 24.63 KG/M2 | RESPIRATION RATE: 18 BRPM | HEIGHT: 69 IN | SYSTOLIC BLOOD PRESSURE: 136 MMHG

## 2022-06-24 PROCEDURE — 25000003 PHARM REV CODE 250: Performed by: PSYCHIATRY & NEUROLOGY

## 2022-06-24 PROCEDURE — 90833 PSYTX W PT W E/M 30 MIN: CPT | Mod: ,,, | Performed by: PSYCHIATRY & NEUROLOGY

## 2022-06-24 PROCEDURE — 99239 PR HOSPITAL DISCHARGE DAY,>30 MIN: ICD-10-PCS | Mod: ,,, | Performed by: PSYCHIATRY & NEUROLOGY

## 2022-06-24 PROCEDURE — S4991 NICOTINE PATCH NONLEGEND: HCPCS | Performed by: PSYCHIATRY & NEUROLOGY

## 2022-06-24 PROCEDURE — 99239 HOSP IP/OBS DSCHRG MGMT >30: CPT | Mod: ,,, | Performed by: PSYCHIATRY & NEUROLOGY

## 2022-06-24 PROCEDURE — 90833 PR PSYCHOTHERAPY W/PATIENT W/E&M, 30 MIN (ADD ON): ICD-10-PCS | Mod: ,,, | Performed by: PSYCHIATRY & NEUROLOGY

## 2022-06-24 RX ADMIN — GABAPENTIN 600 MG: 300 CAPSULE ORAL at 08:06

## 2022-06-24 RX ADMIN — NICOTINE 1 PATCH: 14 PATCH TRANSDERMAL at 08:06

## 2022-06-24 RX ADMIN — BUPROPION HYDROCHLORIDE 300 MG: 300 TABLET, FILM COATED, EXTENDED RELEASE ORAL at 08:06

## 2022-06-24 NOTE — CARE UPDATE
[facility]  Encounter Date: 2022 10:24 AM    Discharge Date No discharge date for patient encounter.   Hospital Account: [id number]    MRN: [id number]   Guarantor: VALENTINA MITCHELL   Contact Serial #: [id number]         ENCOUNTER             Patient Class: IP Psych   [facility]   Hospital Service: Psychiatry   Bed: 213   Admitting Provider: Mundo Myrick Md   Referring Physician:     Attending Provider: Mundo Myrick Md   Adm Diagnosis: Psychoactive substance-i*      PATIENT                  Name: VALENTINA MITCHELL : 1980 (42 yrs)   Address: [address] [place] [address] Sex: Male   City: [place] [address]       Primary Care Provider: Primary Doctor No         Primary Phone: [phone]   EMERGENCY CONTACT   Contact Name Legal Guardian? Relationship to Patient Home Phone Work Phone Mobile Phone   1. Ana Maria Thomas  2. *No Contact Specified* No    Mother              [phone]      GUARANTOR                  Guarantor: VALENTINA MITCHELL       : 1980   Address: [address] [place] [address]    Sex: [place]     [place] [address] Guarantor  Type: B/H   Relation to Patient: Self         Home Phone: [phone]   Guarantor ID: [id number]         Work Phone:     GUARANTOR EMPLOYER     Employer:             Status: NOT EMPLO*      COVERAGE          PRIMARY INSURANCE   Payor / Plan: [place]ICA[employer] [employer]       Group Number:         Subscriber Name: VALENTINA MITCHELL Subscriber : 1980   Subscriber ID: [id number] Pat. Rel. to Subscriber: Self   Insurance Address:  O [address] [address]       SECONDARY INSURANCE   Payor / Plan: - No Secondary Coverage -       Group Number:         Subscriber Name:   Subscriber :     Subscriber ID:   Pat. Rel. to Subscriber:     Insurance Address:            Contact Serial # ([id number])         2022    Chart ID ([id number])

## 2022-06-24 NOTE — PLAN OF CARE
"Denies suicidal thoughts at present.  Smiling and interacting well.  Said he's looking forward to going home tomorrow "as long as I don't get ambushed by the police".  Reports aunt will be picking him up.  Voiced one of his biggest fears is going back to [facility].  Said he's spent 22 years of his life in [facility].  Accepted MOM this evening for constipation.  Accepted hs meds.  Stressed compliance with meds upon discharge.    "

## 2022-06-24 NOTE — DISCHARGE SUMMARY
"Discharge Summary  Psychiatry    Admit Date: 6/18/2022    Discharge Date and Time:  06/24/2022 10:27 AM    Attending Physician: Mundo Myrick MD; Ge Leon MD    Discharge Provider: Ge Leon MD    Reason for Admission:   "I was drinking and said something I didn't mean"     History of Present Illness:   Leighton Carroll is a 42 y.o. male with past psychiatric history of depression, anxiety, substance abuse     Per ER Note:  42 y.o. male with PMHx of anxiety, depression, hepatitis C and polysubstance abuse who presents wto the ED after being OPC'd by girlfriend. Per OPC (Girlfriend: Ada Brower), he has been having homicidal ideations and threatening to stab his friends with a knife. Additionally, he has been walking in fields and having violent outbursts. Additionally, he has had auditory hallucinations. Per patient, he has had suicidal ideations but he denies homicidal ideations and hallucinations. He states that his mother is sick in the hospital and this has caused him to be severely depressed. He denies plan of suicide. He states that been off of his psychiatric meds for several years, as he was unable to receive them while in snf.       Per Initial Interview:  Pt reports he was "drunk and said some shit." States that he has been going through a lot of stress lately. States his uncle bought some whiskey last night and he must have had too much to drink. States he ended up getting into an argument with his girlfriend and he told her that he feels like killing himself sometimes. He states he could never kill himself.      Pt states he has been dealing with depression for several months but that it got worse in the past month. States that he has been living with a female roommate and he does not trust her. He ended up getting in a relationship with her and being with her makes him miserable. States he has feelings for her but he is not happy. States he does not want to go through the " pain of letting her go, and additionally she lives with her.      Pt reports he has taken gabapentin wellbutrin and seroquel int he past and found this helpful. Reports that his mind is always racing and it slowed down with those medications. States he has not had these medications in about two years after being taken off of them in [facility]. States that his thoughts mess up how he feels.      Pt states that he uses amphetamines intermittently, but does smoke weed regularly.      Pt reports his mom in in the hospital and this has been a big stressor for him. Becomes tearful when attmepting to discuss this.      Denies KOBY and AVH.      Psych ROS:   Endorses consistent depression symptoms over the past 2 weeks including decreased interest/motivation/pleasure/energy/appetite/concentration/sleep.     Denies any history of manic symptoms, including decreased need for sleep, increased energy, increased goal oriented behavior, risky behavior, racing thoughts.      Denies history of psychotic symptoms, including AVH, paranoia, thought insertion/broadcasting/withdrawal, delusions.      Endorses ARASELI symptoms including excess worry, tension, insomnia. Denies panic attacks.      Denies history of PTSD symptoms including flashbacks, nightmares, hypervigilance.     Denies OCD and eating disorder symptoms.       Procedures Performed: * No surgery found *    Hospital Course:    Patient was admitted to the inpatient psychiatry unit after being medically cleared in the ED. Chart and labs were reviewed. The patient was stabilized as follows:      Mood:  - continue Wellbutrin XL 150mg PO daily with plan to titrate as tolerated- increase to 300 mg po q day  - continue Seroquel 50mg PO QHS for augmentation and insomnia  -increase to 100 mg PO qhs tonight- increase to/conitnue at 200 mg po q HS tonight  - follow up with outpatient mental health provider after discharge  - pt counseled     Anxiety:  - start gabapentin 300 mg PO TID today-  increase to/continue at 600 mg po TID   - pt counseled  - follow up with outpatient mental health provider after discharge    Suicidal ideations  -resolved     Homicidal ideations  - resolved     Psychosocial stressors: pt counseled  -SW consulted and assisted with resources     Alcohol abuse: pt counseled  Alcohol Brief Intervention     1. Discussed with patient that there is concern he/she is drinking at unhealthy levels known to increase his/her risk of alcohol-related health problems - Yes  2. Discussed general feedback on health risks associated with drinking and/or given personalized feedback - Yes  3. Patient was advised to abstain from alcohol use - Yes        Substance use: pt counseled  -pt counseled  -he declined rehab, but he is open to attending outpatient care        During hospitalization, the patient was encouraged to go to both groups and individual counseling. Patient was monitored for any side effects. A meeting was held with multidisciplinary team prior to discharge and pt's diagnosis, current medications, and follow up were discussed. The patient has been compliant with treatment and can adequately attend to activities of daily living in an independent manner. The patient denies any side effects. The patient denies SI, HI, plan or intent for self harm or harm to others. The patient is no longer a danger to self or others nor gravely disabled disabled. Patient discharged  in stable condition with scheduled outpatient follow up.      The patient reports improved symptoms as documented below. He is requesting discharge. He is currently stable for discharge home and is able/willing to attend outpatient care. He is hopeful, future oriented and goal directed. He can identify positive social support and coping skills.     Psychiatric ROS (observed, reported, or endorsed/denied):  Depressed mood - improved  Interest/pleasure/anhedonia: improved  Guilt/hopelessness/worthlessness - improved  Changes in  Sleep - improved  Changes in Appetite - improved  Changes in Concentration - improved  Changes in Energy - improved  PMA/R- No  Suicidal- active/passive ideations - denies  Homicidal ideations: active/passive ideations - denies     Hallucinations - denies  Delusions - No  Disorganized behavior - denies  Disorganized speech - No  Negative symptoms - No     Elevated mood - No  Decreased need for sleep - No  Grandiosity - No  Racing thoughts - No  Impulsivity - No  Irritability- No  Increased energy - No  Distractibility - No  Increase in goal-directed activity or PMA- No     Symptoms of ARASELI - improved  Symptoms of Panic Disorder- No  Symptoms of PTSD - No      Discussed diagnosis, risks and benefits of proposed treatment vs alternative treatments vs no treatment, and potential side effects of these treatments.  The patient expresses understanding of the above and displays the capacity to agree with this treatment given said understanding.  Patient also agrees that, currently, the benefits outweigh the risks and would like to pursue treatment at this time.      Discharge MSE: stated age, casually dressed, well groomed.  No psychomotor agitation or retardation.  No abnormal involuntary movements.  Gait normal.  Speech normal, conversational.  Language fluent English. Mood fine.  Affect normal range, pleasant, euthymic.  Thought process linear.  Associations intact.  Denies suicidal or homicidal ideation.  Denies auditory hallucinations, paranoid ideation, ideas of reference.  Memory intact.  Attention intact.  Fund of knowledge intact.  Insight intact.  Judgment intact.  Alert and oriented to person, place, time.      Tobacco Usage:  Is patient a smoker? Yes  Does patient want prescription for Tobacco Cessation? Yes  Does patient want counseling for Tobacco Cessation? No    If patient would like to quit, then over the counter nicotine patch could be used. The patient could also follow up with his PCP or psychiatric  provider for other alternatives.     Final Diagnoses:    Principal Problem: Major Depressive Disorder, Recurrent: Severe Without Psychotic Features    Secondary Diagnoses:   Generalized Anxiety Disorder     Psychosocial stressors     Alcohol abuse  Amphetamine Related Disorders; Amphetamine Abuse  and Cannabis-Related Disorders; Cannabis Abuse    Labs:  Admission on 06/18/2022, Discharged on 06/18/2022   Component Date Value Ref Range Status    WBC 06/18/2022 11.66  3.90 - 12.70 K/uL Final    RBC 06/18/2022 5.21  4.60 - 6.20 M/uL Final    Hemoglobin 06/18/2022 15.6  14.0 - 18.0 g/dL Final    Hematocrit 06/18/2022 46.1  40.0 - 54.0 % Final    MCV 06/18/2022 89  82 - 98 fL Final    MCH 06/18/2022 29.9  27.0 - 31.0 pg Final    MCHC 06/18/2022 33.8  32.0 - 36.0 g/dL Final    RDW 06/18/2022 14.5  11.5 - 14.5 % Final    Platelets 06/18/2022 321  150 - 450 K/uL Final    MPV 06/18/2022 9.3  9.2 - 12.9 fL Final    Immature Granulocytes 06/18/2022 0.3  0.0 - 0.5 % Final    Gran # (ANC) 06/18/2022 9.1 (A) 1.8 - 7.7 K/uL Final    Immature Grans (Abs) 06/18/2022 0.04  0.00 - 0.04 K/uL Final    Lymph # 06/18/2022 1.9  1.0 - 4.8 K/uL Final    Mono # 06/18/2022 0.6  0.3 - 1.0 K/uL Final    Eos # 06/18/2022 0.0  0.0 - 0.5 K/uL Final    Baso # 06/18/2022 0.06  0.00 - 0.20 K/uL Final    nRBC 06/18/2022 0  0 /100 WBC Final    Gran % 06/18/2022 78.0 (A) 38.0 - 73.0 % Final    Lymph % 06/18/2022 16.0 (A) 18.0 - 48.0 % Final    Mono % 06/18/2022 4.9  4.0 - 15.0 % Final    Eosinophil % 06/18/2022 0.3  0.0 - 8.0 % Final    Basophil % 06/18/2022 0.5  0.0 - 1.9 % Final    Differential Method 06/18/2022 Automated   Final    Sodium 06/18/2022 141  136 - 145 mmol/L Final    Potassium 06/18/2022 4.4  3.5 - 5.1 mmol/L Final    Chloride 06/18/2022 106  95 - 110 mmol/L Final    CO2 06/18/2022 20 (A) 23 - 29 mmol/L Final    Glucose 06/18/2022 126 (A) 70 - 110 mg/dL Final    BUN 06/18/2022 31 (A) 6 - 20 mg/dL Final     Creatinine 06/18/2022 1.6 (A) 0.5 - 1.4 mg/dL Final    Calcium 06/18/2022 9.9  8.7 - 10.5 mg/dL Final    Total Protein 06/18/2022 8.2  6.0 - 8.4 g/dL Final    Albumin 06/18/2022 3.9  3.5 - 5.2 g/dL Final    Total Bilirubin 06/18/2022 0.3  0.1 - 1.0 mg/dL Final    Alkaline Phosphatase 06/18/2022 60  55 - 135 U/L Final    AST 06/18/2022 67 (A) 10 - 40 U/L Final    ALT 06/18/2022 54 (A) 10 - 44 U/L Final    Anion Gap 06/18/2022 15  8 - 16 mmol/L Final    eGFR if African American 06/18/2022 >60  >60 mL/min/1.73 m^2 Final    eGFR if non  06/18/2022 52 (A) >60 mL/min/1.73 m^2 Final    TSH 06/18/2022 0.655  0.400 - 4.000 uIU/mL Final    Specimen UA 06/18/2022 Urine, Clean Catch   Final    Color, UA 06/18/2022 Yellow  Yellow, Straw, Susu Final    Appearance, UA 06/18/2022 Clear  Clear Final    pH, UA 06/18/2022 6.0  5.0 - 8.0 Final    Specific Gravity, UA 06/18/2022 >=1.030 (A) 1.005 - 1.030 Final    Protein, UA 06/18/2022 Trace (A) Negative Final    Glucose, UA 06/18/2022 Negative  Negative Final    Ketones, UA 06/18/2022 Trace (A) Negative Final    Bilirubin (UA) 06/18/2022 Negative  Negative Final    Occult Blood UA 06/18/2022 Negative  Negative Final    Nitrite, UA 06/18/2022 Negative  Negative Final    Urobilinogen, UA 06/18/2022 Negative  <2.0 EU/dL Final    Leukocytes, UA 06/18/2022 Negative  Negative Final    Benzodiazepines 06/18/2022 Negative  Negative Final    Methadone metabolites 06/18/2022 Negative  Negative Final    Cocaine (Metab.) 06/18/2022 Negative  Negative Final    Opiate Scrn, Ur 06/18/2022 Negative  Negative Final    Barbiturate Screen, Ur 06/18/2022 Negative  Negative Final    Amphetamine Screen, Ur 06/18/2022 Presumptive Positive (A) Negative Final    THC 06/18/2022 Presumptive Positive (A) Negative Final    Phencyclidine 06/18/2022 Negative  Negative Final    Creatinine, Urine 06/18/2022 292.1  23.0 - 375.0 mg/dL Final    Toxicology Information  06/18/2022 SEE COMMENT   Final    Alcohol, Serum 06/18/2022 202 (A) <10 mg/dL Final    Acetaminophen (Tylenol), Serum 06/18/2022 <3.0 (A) 10.0 - 20.0 ug/mL Final    SARS-CoV-2 RNA, Amplification, Qual 06/18/2022 Negative  Negative Final    Sodium 06/18/2022 140  136 - 145 mmol/L Final    Potassium 06/18/2022 3.9  3.5 - 5.1 mmol/L Final    Chloride 06/18/2022 106  95 - 110 mmol/L Final    CO2 06/18/2022 20 (A) 23 - 29 mmol/L Final    Glucose 06/18/2022 80  70 - 110 mg/dL Final    BUN 06/18/2022 25 (A) 6 - 20 mg/dL Final    Creatinine 06/18/2022 1.0  0.5 - 1.4 mg/dL Final    Calcium 06/18/2022 8.7  8.7 - 10.5 mg/dL Final    Total Protein 06/18/2022 6.9  6.0 - 8.4 g/dL Final    Albumin 06/18/2022 3.3 (A) 3.5 - 5.2 g/dL Final    Total Bilirubin 06/18/2022 0.4  0.1 - 1.0 mg/dL Final    Alkaline Phosphatase 06/18/2022 48 (A) 55 - 135 U/L Final    AST 06/18/2022 58 (A) 10 - 40 U/L Final    ALT 06/18/2022 46 (A) 10 - 44 U/L Final    Anion Gap 06/18/2022 14  8 - 16 mmol/L Final    eGFR if African American 06/18/2022 >60  >60 mL/min/1.73 m^2 Final    eGFR if non African American 06/18/2022 >60  >60 mL/min/1.73 m^2 Final    Alcohol, Serum 06/18/2022 84 (A) <10 mg/dL Final    Hemoglobin A1C 06/18/2022 5.0  4.0 - 5.6 % Final    Estimated Avg Glucose 06/18/2022 97  68 - 131 mg/dL Final    Cholesterol 06/18/2022 170  120 - 199 mg/dL Final    Triglycerides 06/18/2022 396 (A) 30 - 150 mg/dL Final    HDL 06/18/2022 23 (A) 40 - 75 mg/dL Final    LDL Cholesterol 06/18/2022 67.8  63.0 - 159.0 mg/dL Final    HDL/Cholesterol Ratio 06/18/2022 13.5 (A) 20.0 - 50.0 % Final    Total Cholesterol/HDL Ratio 06/18/2022 7.4 (A) 2.0 - 5.0 Final    Non-HDL Cholesterol 06/18/2022 147  mg/dL Final         Discharged Condition: stable and improved; not currently a danger to self/others or gravely disabled    Disposition: Home or Self Care    Is patient being discharged on multiple neuroleptics? No    Follow Up/Patient  Instructions:     · Take all medications as prescribed.  · Attend all psychiatric and medical follow up appointments.   · Abstain from all drugs and alcohol.  · Call the crisis line at: [phone] for help in a crisis and emergent situations or call 911 and Return to ED for any acute worsening of your condition including suicidal or homicidal ideations      No discharge procedures on file.   Follow-up Information     [facility] Follow up.    Specialties: Psychology, Psychiatry, Behavioral Health  Contact information:  [address] [address]  [phone]                           [place] [facility] [place]      Medications:  Reconciled Home Medications:      Medication List      START taking these medications    buPROPion 300 MG 24 hr tablet  Commonly known as: WELLBUTRIN XL  Take 1 tablet (300 mg total) by mouth once daily.     gabapentin 300 MG capsule  Commonly known as: NEURONTIN  Take 2 capsules (600 mg total) by mouth 3 (three) times daily.     nicotine 14 mg/24 hr  Commonly known as: NICODERM CQ  Place 1 patch onto the skin once daily.     QUEtiapine 200 MG Tab  Commonly known as: SEROQUEL  Take 1 tablet (200 mg total) by mouth every evening.        STOP taking these medications    amoxicillin-clavulanate 875-125mg 875-125 mg per tablet  Commonly known as: AUGMENTIN     oxyCODONE-acetaminophen 5-325 mg per tablet  Commonly known as: PERCOCET              Diet: regular     Activity as tolerated    Total time spent discharging patient: 35 minutes    Ge Leon MD  Psychiatry

## 2022-06-24 NOTE — NURSING
Patient restless,sad, guarded & withdrawn.  Affect flat, appears disheveled & unkempt. Thoughts linear, speech effective.  Denies SI / HI & A/V hallucinations.  Monitor q 15 minutes per protocol.

## 2022-06-24 NOTE — PSYCH
Patient will be follow up with [facility] at [address] [place] [address] in [place], [phone].  Appointment will be on 6/27/22 at 2 pm.  Patient will receive tobacco cessation therapy and addictions counseling along with substance abuse therapy due to a positive UDS on admit.  AVS faxed to [phone] on 6/24/2022 on 1:04 pm.

## 2022-06-24 NOTE — NURSING
Rested quietly with closed eyes and even resp for 6 hours.  Modified VC and all precautions maintained.  Pathways clear and bed in low position.

## 2022-06-24 NOTE — PROGRESS NOTES
PSYCHOTHERAPY ADD-ON +[id number]   16-37 minutes     Time: 16 minutes  Participants: Met with patient     Therapeutic Intervention Type: insight oriented psychotherapy, behavior modifying psychotherapy, supportive psychotherapy, interactive psychotherapy  Why chosen therapy is appropriate versus another modality: relevant to diagnosis, patient responds to this modality, evidence based practice     Target symptoms: alcohol abuse, depression, anxiety   Primary focus: mood, psychosocial stressors and substance/alcohol use  Psychotherapeutic techniques: supportive and psycho-educational techniques; motivational techniques  -safety planning and wrap up session   Outcome monitoring methods: self-report, observation     Patient's response to intervention:  The patient's response to intervention is accepting.     Progress toward goals:  The patient's progress toward goals is good.    Ge Leon MD  Psychiatry

## 2022-06-24 NOTE — NURSING
Patient discharged routinely per  Returned personal belongings to patient, patient signed inventory of personal items certification of receipt on discharge sheet. Prescriptions sent to [facility] in [place], [place],  patient  given verbal instructions, patient verbalized understanding. Discharge paperwork reviewed with and given to patient, patient verbalized understanding. Patient denied SI/HI and A/V/H at this time. Patient escorted to the entrance with staff where family, aunt was waiting. Patient discharged stable without S/S of distress @ 12:53

## 2022-07-11 ENCOUNTER — HOSPITAL ENCOUNTER (INPATIENT)
Facility: HOSPITAL | Age: 42
LOS: 7 days | Discharge: HOME | DRG: 885 | End: 2022-07-18
Attending: STUDENT IN AN ORGANIZED HEALTH CARE EDUCATION/TRAINING PROGRAM | Admitting: STUDENT IN AN ORGANIZED HEALTH CARE EDUCATION/TRAINING PROGRAM
Payer: MEDICAID

## 2022-07-11 DIAGNOSIS — Z72.0 NICOTINE ABUSE: ICD-10-CM

## 2022-07-11 DIAGNOSIS — F19.10 SUBSTANCE ABUSE: ICD-10-CM

## 2022-07-11 DIAGNOSIS — T14.91XA SUICIDE ATTEMPT: ICD-10-CM

## 2022-07-11 DIAGNOSIS — F32.A DEPRESSION, UNSPECIFIED DEPRESSION TYPE: Primary | ICD-10-CM

## 2022-07-11 DIAGNOSIS — R74.8 ELEVATED CPK: ICD-10-CM

## 2022-07-11 LAB
ESTIMATED AVG GLUCOSE: 97 MG/DL (ref 68–131)
HBA1C MFR BLD: 5 % (ref 4–5.6)

## 2022-07-11 PROCEDURE — 11400000 HC PSYCH PRIVATE ROOM

## 2022-07-11 PROCEDURE — S4991 NICOTINE PATCH NONLEGEND: HCPCS | Performed by: STUDENT IN AN ORGANIZED HEALTH CARE EDUCATION/TRAINING PROGRAM

## 2022-07-11 PROCEDURE — 36415 COLL VENOUS BLD VENIPUNCTURE: CPT | Performed by: STUDENT IN AN ORGANIZED HEALTH CARE EDUCATION/TRAINING PROGRAM

## 2022-07-11 PROCEDURE — 83036 HEMOGLOBIN GLYCOSYLATED A1C: CPT | Performed by: STUDENT IN AN ORGANIZED HEALTH CARE EDUCATION/TRAINING PROGRAM

## 2022-07-11 PROCEDURE — 25000003 PHARM REV CODE 250: Performed by: STUDENT IN AN ORGANIZED HEALTH CARE EDUCATION/TRAINING PROGRAM

## 2022-07-11 RX ORDER — IBUPROFEN 200 MG
1 TABLET ORAL DAILY
Status: DISCONTINUED | OUTPATIENT
Start: 2022-07-11 | End: 2022-07-18 | Stop reason: HOSPADM

## 2022-07-11 RX ADMIN — NICOTINE 1 PATCH: 14 PATCH, EXTENDED RELEASE TRANSDERMAL at 07:07

## 2022-07-11 NOTE — PLAN OF CARE
Pt presented to our ER on OPC by Aunt for SI today, pt PEC'd for fractured thought process with some paranoia, pt up to unit with security and MHT in attendance, pt anxious, restless, elevated, aloof, bizarre,  pt states he took some pills to get loaded, pt rambling speech, pt given tobacco cessation education and handout, pt offered tobacco cessation patch but he refused, pt to receive out patient education for tobacco cessation upon discharge. oriented pt to unit, safety precautions maintained, will continue to monitor

## 2022-07-12 PROBLEM — Z72.0 NICOTINE ABUSE: Status: ACTIVE | Noted: 2022-07-12

## 2022-07-12 PROBLEM — F19.10 SUBSTANCE ABUSE: Status: ACTIVE | Noted: 2022-07-12

## 2022-07-12 PROBLEM — R74.8 ELEVATED CPK: Status: ACTIVE | Noted: 2022-07-12

## 2022-07-12 PROBLEM — R45.851 SUICIDAL IDEATION: Status: ACTIVE | Noted: 2022-07-12

## 2022-07-12 PROCEDURE — 93010 ELECTROCARDIOGRAM REPORT: CPT | Mod: ,,, | Performed by: INTERNAL MEDICINE

## 2022-07-12 PROCEDURE — 99222 PR INITIAL HOSPITAL CARE,LEVL II: ICD-10-PCS | Mod: ,,, | Performed by: NURSE PRACTITIONER

## 2022-07-12 PROCEDURE — 99222 1ST HOSP IP/OBS MODERATE 55: CPT | Mod: ,,, | Performed by: NURSE PRACTITIONER

## 2022-07-12 PROCEDURE — 99223 1ST HOSP IP/OBS HIGH 75: CPT | Mod: ,,, | Performed by: STUDENT IN AN ORGANIZED HEALTH CARE EDUCATION/TRAINING PROGRAM

## 2022-07-12 PROCEDURE — 25000003 PHARM REV CODE 250: Performed by: STUDENT IN AN ORGANIZED HEALTH CARE EDUCATION/TRAINING PROGRAM

## 2022-07-12 PROCEDURE — 11400000 HC PSYCH PRIVATE ROOM

## 2022-07-12 PROCEDURE — 99223 PR INITIAL HOSPITAL CARE,LEVL III: ICD-10-PCS | Mod: ,,, | Performed by: STUDENT IN AN ORGANIZED HEALTH CARE EDUCATION/TRAINING PROGRAM

## 2022-07-12 PROCEDURE — 93005 ELECTROCARDIOGRAM TRACING: CPT

## 2022-07-12 PROCEDURE — 93010 EKG 12-LEAD: ICD-10-PCS | Mod: ,,, | Performed by: INTERNAL MEDICINE

## 2022-07-12 PROCEDURE — S4991 NICOTINE PATCH NONLEGEND: HCPCS | Performed by: STUDENT IN AN ORGANIZED HEALTH CARE EDUCATION/TRAINING PROGRAM

## 2022-07-12 RX ORDER — LORAZEPAM 1 MG/1
1 TABLET ORAL 3 TIMES DAILY
Status: DISCONTINUED | OUTPATIENT
Start: 2022-07-12 | End: 2022-07-13

## 2022-07-12 RX ORDER — RISPERIDONE 0.5 MG/1
1 TABLET ORAL ONCE AS NEEDED
Status: DISCONTINUED | OUTPATIENT
Start: 2022-07-12 | End: 2022-07-12

## 2022-07-12 RX ORDER — LORAZEPAM 1 MG/1
1 TABLET ORAL ONCE AS NEEDED
Status: DISCONTINUED | OUTPATIENT
Start: 2022-07-12 | End: 2022-07-12

## 2022-07-12 RX ORDER — LORAZEPAM 1 MG/1
1 TABLET ORAL ONCE AS NEEDED
Status: COMPLETED | OUTPATIENT
Start: 2022-07-12 | End: 2022-07-13

## 2022-07-12 RX ORDER — LORAZEPAM 1 MG/1
2 TABLET ORAL EVERY 6 HOURS PRN
Status: DISCONTINUED | OUTPATIENT
Start: 2022-07-12 | End: 2022-07-12

## 2022-07-12 RX ORDER — LORAZEPAM 2 MG/ML
2 INJECTION INTRAMUSCULAR ONCE AS NEEDED
Status: DISCONTINUED | OUTPATIENT
Start: 2022-07-12 | End: 2022-07-18 | Stop reason: HOSPADM

## 2022-07-12 RX ORDER — RISPERIDONE 0.5 MG/1
1 TABLET ORAL ONCE AS NEEDED
Status: COMPLETED | OUTPATIENT
Start: 2022-07-12 | End: 2022-07-13

## 2022-07-12 RX ADMIN — LORAZEPAM 1 MG: 1 TABLET ORAL at 08:07

## 2022-07-12 RX ADMIN — LORAZEPAM 1 MG: 1 TABLET ORAL at 01:07

## 2022-07-12 RX ADMIN — NICOTINE 1 PATCH: 14 PATCH, EXTENDED RELEASE TRANSDERMAL at 08:07

## 2022-07-12 RX ADMIN — LORAZEPAM 2 MG: 1 TABLET ORAL at 08:07
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
## 2022-07-12 NOTE — HPI
Leighton Carroll is a 42 y.o. male past psychiatric history of substance abuse, depression, anxiety currently admitted to the inpatient unit with the following chief complaint: thoughts of death/suicide and overdose    Consulted for medical H&P    Records reviewed;   42-year-old male presents after taking excess Wellbutrin and gabapentin today  Patient initially said that he felt he took the pills out of anger and is not suicidal  [facility] arrived with reported suicidal ideation, off of all medications for psych  Patient is not angry, patient is not violent, aunt says he is depressed on OPC  Patient appears to be agitated but making complete sense on initial evaluation
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
## 2022-07-12 NOTE — PLAN OF CARE
Problem: Adult Behavioral Health Plan of Care  Goal: Optimized Coping Skills in Response to Life Stressors  Outcome: Ongoing, Progressing        Pt did not attend group today. PLPC met with pt individually.  PLPC attempted to  discuss with pt on group discussion. Pt was resting in bed quietly. Pt had covers over his body. PLPC discussed with pt PLPC will come back at a later time and date to discuss group.

## 2022-07-12 NOTE — PLAN OF CARE
Pt is resting in bed at this time and has slept maybe 1 hour.  Pt has been restless, pacing room back and forth from bed to window to bathroom.  Appears to be talking to responding to internal stimuli..  NAD.  Resp even & unlabored.  Pathways clear.  Q 15 minute safety checks ongoing.  All precautions maintained

## 2022-07-12 NOTE — ASSESSMENT & PLAN NOTE
Per psychiatry      feels better today  enjoying a burger    81 years old white gentleman who is well-known to me underwent thoracic surgery yesterday as follows:      procedure robotic rt upper lobe wedge enblock pleural resection followed by apical posterior segementectomy and mediastinal ln disection     He spent the night in the ICU and is recovering nicely.  Still coughing up some mucous plugs and desaturates on exertion however there is no air leak from the chest tube into the pleural VAC.  And he is sitting up comfortably in no distress.  Course noted and reviewed and discussed with Dr. Shay yesterday as well as ICU team today and yesterday.    Past medical history of COPD stridor in the past idiopathic pulmonary fibrosis history of pneumonia Parkinson disease he has had history of esophageal reflux hernia hypertension coronary disease had a cardiac clearance preoperatively and had a stress test on my understanding history of ulcerative colitis.  Surgical history also included a cardiac catheterization 2001 a lung biopsy in 20 12.  Umbilical hernia surgery bronchoscopy and may 2015  and a cardiac EP ablation and 2019.  History of ulcerative colitis on immunosuppression including Remicade and azathioprine suspected to may have induced his interstitial lung disease and fibrosis.  Previous stridor has resolved his solitary pulmonary nodule is was measured at 1.5 x 1.2 cm in the right upper lobe and then had a PET +1.6 cm prior to surgery.  I believe his cardiac clearance was completed by Dr. Jimbo Lopez    Preoperative pulmonary function test as noted  FVC and FEV1 are normal.  The ratio is 71.  Scooping of expiratory limb is seen.  MVV and TLC are normal.  RV is isolated reduction to 55% predicted.  Diffusion capacity adjusted for volume, is normal.    CONCLUSION:  Mild obstructive physiology with otherwise noncontributory.        Family history of prostate cancer.  There is coronary disease in the family.    Social history  no longer smoker no alcohol no drugs  and retired.    Allergies (1) Active Reaction  morphine None Documented      Patient History: Home Medications  amLODIPine 10 mg, PO, qAM  06/12/2020 08:33  atorvastatin (Lipitor 40 mg oral tablet) 40 mg = 1 tab, PO, qPM  06/12/2020 08:33  azaTHIOprine 25 mg, qPM  06/12/2020 08:33  bacillus coagulans-inulin (Probiotic Formula (Bacillus Coagulans) oral capsule) 1 cap, PO, qDay  06/12/2020 08:33  carbidopa-levodopa (carbidopa-levodopa 25 mg-100 mg oral tablet) 1 tab, PO, TID  06/12/2020 08:33  cholecalciferol (Vitamin D3 5000 intl units (125 mcg) oral tablet) PO, qDay  06/12/2020 08:33  cyanocobalamin (Vitamin B12 1000 mcg oral tablet) 1,000 mcg = 1 tab, PO, qDay  06/12/2020 08:33  folic acid (folic acid 1 mg oral tablet) 1 mg = 1 tab, PO, qDay  06/12/2020 08:33  metoprolol (metoprolol succinate 25 mg oral capsule, extended release) 25 mg = 1 cap, PO, BID  06/12/2020 08:33  mirabegron (Myrbetriq 50 mg oral tablet, extended release) 50 mg = 1 tab, PO, qPM  06/12/2020 08:33  multivitamin qDay, stop now per anesthesia guidelines  06/12/2020 08:33  omega-3 polyunsaturated fatty acids (Fish Oil) PO, stop now  06/12/2020 08:33  omeprazole (omeprazole 40 mg oral delayed release capsule) 40 mg = 1 cap, PO, BID  06/12/2020 08:33  umeclidinium-vilanterol (Anoro Ellipta 62.5 mcg-25 mcg inhalation powder) 1 puff, Inhalation, qAM  06/12/2020 08:33  tamsulosin (Flomax 0.4 mg oral capsule) 0.8 mg = 2 cap, PO, qPM, # 30 cap(s)  06/12/2020 08:33  ascorbic acid (Vitamin C)   12/03/2010 07:28    Medications (20) Active  Scheduled: (14)  albuterol-ipratropium 2.5 mg-0.5 mg/3 mL Soln UD  3 mL, Nebulized, Q6RT  amLODIPine 5 mg Tab  10 mg 2 tab, PO, qAM  atorvastatin 40 mg Tab  40 mg 1 tab, PO, qPM  azaTHIOprine 50mg Tab  25 mg 0.5 tab, PO, qPM  azithromycin 250 mg Tab  500 mg 2 tab, PO, qDay  carbid/levo 25/100 Tab  1 tab, PO, TID  cefTRIAXone 2,000 mg/20 mL inj  2,000 mg 20 mL, IV Push,  q24hr  docusate sodium 100 mg Cap  100 mg 1 cap, PO, BID  famotidine 20 mg Tab  20 mg 1 tab, PO, q12hrS  heparin 5000 units/mL Vial  5,000 unit 1 mL, SubQ, q8hr  metoprolol succ 25 mg ER Tab  25 mg 1 tab, PO, BID  Mybetriq 50mg Oral Tablet Extended Release  50 mg 1 tab, PO, qDay  tamsulosin 0.4 mg Cap  0.8 mg 2 cap, PO, qPM  umeclidinium-vilanterol (ANORO) 62.5 mcg-25 mcg/inh Powd  1 puff, Inhalation, qAM  Continuous: (0)  PRN: (6)  acetaminophen 325 mg Tab  650 mg 2 tab, PO, q4hr  albuterol 0.083% neb Soln  2.5 mg 3 mL, Nebulized, Q4RT  bisacodyl 5mg EC Tab  5 mg 1 tab, PO, qDay  HYDROcodone-APAP 10/325 Tab  1 tab, PO, q4hr  HYDROcodone-APAP 5/325 Tab  1 tab, PO, q4hr  ondansetron 4 mg/2 mL Vial  4 mg 2 mL, IV Push, q6hr          Vital Signs (last 24 hrs)_____ Last Charted___________Minimum____________ Maximum____________  Temp    98  (JUN 18 11:50) L 97.5 (JUN 18 07:47) 98.2  (JUN 17 22:19)  Heart Rate   76  (JUN 18 11:50) 63  (JUN 17 12:43) 79  (JUN 17 22:19)  Resp Rate       14  (JUN 18 11:50) 16  (JUN 17 19:44) 20  (JUN 17 13:00)  SBP    105  (JUN 18 11:50) 105  (JUN 18 11:50) 128  (JUN 17 19:44)  DBP    61  (JUN 18 11:50) 61  (JUN 18 11:50) 67  (JUN 17 15:10)  Awake alert comfortable less tachypneic withmuch less retraction diminished in the right base no wheezing heart is regular   extremities noncontributory mentation is normal 4 L 95%  up on chair        6.18  7.46 H  36.0  70.0 L        Impression    Status post robotic rt upper lobe wedge enblock pleural resection followed by apical posterior segementectomy and mediastinal ln disection.  Frozen section is compatible with non-small cell cancer official pathology is pending.  There is a concern that there may have been pleural invasion final pathology will tell us more  COPD  Parkinson disease   ulcerative colitis on immunosuppression      6.18  Patient is having hemoptysis and increasing FiO2 requirement most likely postoperative related issue  Concern  that blood may cause a secondary infection  His breathing pattern is compatible with an element of respiratory compromise with some tachypnea and minimal retraction but no paradox    6.19  CXR 6/18  IMPRESSION: Infiltrate in the right upper lobe which is new since  prior study.  Increasing pleural effusion versus pleural thickening on  the right.  Atelectasis versus infiltrate in the right lower lobe.  Left lung remains clear  still on 3 L o2 sat is 93%    Plan  add Zithromax to  Rocephin empirically    Respiratory treatments as needed to clear secretions and with COPD therapy postoperatively Anoro  DVT and ulcer prophylaxis per thoracic surgery    Pain control and early ambulation  discussed with Dr Jovani Roldan  Patient is not ready for discharge          Electronically Signed On 06.19.2020 13:34  ___________________________________________________   Mikel Rodriguez MD

## 2022-07-12 NOTE — PLAN OF CARE
Pt isolating in room most of shift. Up for meal- eating well. Compliant with meds. Safety precautions continued.

## 2022-07-12 NOTE — PLAN OF CARE
Pt bizarre behavior, fractured thought process, anxious, restless, denies SI at this time, out on unit earlier trying to use phone, could not dial phone, I assisted him and he was able to reach number desired but know one answered, pt had snack then went to sleep, no distress noted

## 2022-07-12 NOTE — CONSULTS
[facility] Hospital Medicine  Consult Note    Patient Name: Leighton Carroll  MRN: [id number]  Admission Date: 7/11/2022  Hospital Length of Stay: 1 days  Attending Physician: Abebe Jorge III, MD   Primary Care Provider: Primary Doctor No           Patient information was obtained from patient, past medical records and ER records.     Inpatient consult to [facility] for History and Physical  Consult performed by: Breanne Dumont NP  Consult ordered by: Abebe Jorge III, MD        Subjective:     Principal Problem: <principal problem not specified>    Chief Complaint: No chief complaint on file.       HPI: Leighton Carroll is a 42 y.o. male past psychiatric history of substance abuse, depression, anxiety currently admitted to the inpatient unit with the following chief complaint: thoughts of death/suicide and overdose    Consulted for medical H&P    Records reviewed;   42-year-old male presents after taking excess Wellbutrin and gabapentin today  Patient initially said that he felt he took the pills out of anger and is not suicidal  [facility] arrived with reported suicidal ideation, off of all medications for psych  Patient is not angry, patient is not violent, aunt says he is depressed on OPC  Patient appears to be agitated but making complete sense on initial evaluation         Past Medical History:   Diagnosis Date    Anxiety     Depression     Hepatitis C     Substance abuse     METH AND HEROIN       No past surgical history on file.    Review of patient's allergies indicates:  No Known Allergies    Current Facility-Administered Medications on File Prior to Encounter   Medication    [COMPLETED] OLANZapine injection 10 mg    [DISCONTINUED] diphenhydrAMINE injection 50 mg    [DISCONTINUED] haloperidol lactate injection 5 mg    [DISCONTINUED] lorazepam injection 2 mg     Current Outpatient Medications on File Prior to Encounter   Medication Sig    buPROPion (WELLBUTRIN XL) 300 MG 24 hr  "tablet Take 1 tablet (300 mg total) by mouth once daily.    gabapentin (NEURONTIN) 300 MG capsule Take 2 capsules (600 mg total) by mouth 3 (three) times daily.    nicotine (NICODERM CQ) 14 mg/24 hr Place 1 patch onto the skin once daily.    QUEtiapine (SEROQUEL) 200 MG Tab Take 1 tablet (200 mg total) by mouth every evening.     Family History    None       Tobacco Use    Smoking status: Current Every Day Smoker     Packs/day: 0.50     Types: Cigarettes    Smokeless tobacco: Never Used   Substance and Sexual Activity    Alcohol use: Yes     Comment: 5th whiskey or more daily    Drug use: Yes     Types: Methamphetamines, Marijuana     Comment: heroin , "pt reports he has been doing whatever he can get his hands on"     Sexual activity: Not on file     Review of Systems   Constitutional:  Negative for chills and fever.   HENT:  Negative for congestion and sore throat.    Respiratory:  Negative for cough, chest tightness and shortness of breath.    Cardiovascular:  Negative for chest pain, palpitations and leg swelling.   Gastrointestinal:  Negative for abdominal pain, diarrhea, nausea and vomiting.   Genitourinary:  Negative for flank pain, frequency and hematuria.   Musculoskeletal:  Negative for back pain and neck pain.   Skin:  Negative for rash and wound.   Neurological:  Negative for dizziness, seizures, syncope and headaches.   Psychiatric/Behavioral:  Positive for dysphoric mood and suicidal ideas. Negative for agitation, confusion and self-injury.    Objective:     Vital Signs (Most Recent):  Temp: 98.4 °F (36.9 °C) (07/11/22 2000)  Pulse: 99 (07/11/22 2000)  Resp: 18 (07/11/22 2000)  BP: 131/87 (07/11/22 2000) Vital Signs (24h Range):  Temp:  [97.2 °F (36.2 °C)-99.2 °F (37.3 °C)] 98.4 °F (36.9 °C)  Pulse:  [] 99  Resp:  [16-22] 18  SpO2:  [95 %-97 %] 95 %  BP: (114-170)/() 131/87     Weight: 80.3 kg (177 lb 2.2 oz)  Body mass index is 26.16 kg/m².    Physical Exam  Vitals and nursing note " reviewed.   Constitutional:       General: He is not in acute distress.     Appearance: He is well-developed.   HENT:      Head: Normocephalic and atraumatic.   Eyes:      Pupils: Pupils are equal, round, and reactive to light.   Neck:      Thyroid: No thyromegaly.   Cardiovascular:      Rate and Rhythm: Normal rate and regular rhythm.      Heart sounds: Normal heart sounds. No murmur heard.  Pulmonary:      Effort: Pulmonary effort is normal. No respiratory distress.      Breath sounds: Normal breath sounds. No wheezing or rales.   Abdominal:      General: Bowel sounds are normal. There is no distension.      Palpations: Abdomen is soft. There is no mass.      Tenderness: There is no abdominal tenderness.   Musculoskeletal:         General: No deformity. Normal range of motion.   Lymphadenopathy:      Cervical: No cervical adenopathy.   Skin:     General: Skin is warm and dry.      Findings: No erythema or rash.   Neurological:      Mental Status: He is alert and oriented to person, place, and time.      Comments: CN II visual fields full to confrontation  CN III, Iv, VI- pupils ERRL   CN III- no palsy  Nystagmus; none   Diplopia- none  ophthalmoparesis- none  CN V- facial sensation intact  CN VII- facial expression full and symmetric  CNVIII- normal  CN IV-Normal  CN X- normal  CN XI- Normal   CN XII normal          Significant Labs: All pertinent labs within the past 24 hours have been reviewed.  Urine Culture: No results for input(s): LABURIN in the last 48 hours.  Urine Studies:   Recent Labs   Lab 07/11/22  1041   COLORU Yellow   APPEARANCEUA Clear   PHUR 7.0   SPECGRAV 1.025   PROTEINUA Negative   GLUCUA Negative   KETONESU Negative   BILIRUBINUA Negative   OCCULTUA Trace*   NITRITE Negative   UROBILINOGEN Negative   LEUKOCYTESUR Negative     UPT  No results found for this or any previous visit.  U/A  No results found for this or any previous visit.  UDS  Results for orders placed or performed during the  hospital encounter of 07/11/22   Drug screen panel, in-house   Result Value Ref Range    Benzodiazepines Negative Negative    Methadone metabolites Negative Negative    Cocaine (Metab.) Negative Negative    Opiate Scrn, Ur Negative Negative    Barbiturate Screen, Ur Negative Negative    Amphetamine Screen, Ur Negative Negative    THC Presumptive Positive (A) Negative    Phencyclidine Negative Negative    Creatinine, Urine 238.7 23.0 - 375.0 mg/dL    Toxicology Information SEE COMMENT      CBC  Results for orders placed or performed during the hospital encounter of 07/11/22   CBC Auto Differential   Result Value Ref Range    WBC 9.59 3.90 - 12.70 K/uL    RBC 4.88 4.60 - 6.20 M/uL    Hemoglobin 14.5 14.0 - 18.0 g/dL    Hematocrit 43.9 40.0 - 54.0 %    MCV 90 82 - 98 fL    MCH 29.7 27.0 - 31.0 pg    MCHC 33.0 32.0 - 36.0 g/dL    RDW 15.3 (H) 11.5 - 14.5 %    Platelets 384 150 - 450 K/uL    MPV 8.9 (L) 9.2 - 12.9 fL    Immature Granulocytes 0.5 0.0 - 0.5 %    Gran # (ANC) 7.1 1.8 - 7.7 K/uL    Immature Grans (Abs) 0.05 (H) 0.00 - 0.04 K/uL    Lymph # 1.4 1.0 - 4.8 K/uL    Mono # 0.9 0.3 - 1.0 K/uL    Eos # 0.0 0.0 - 0.5 K/uL    Baso # 0.04 0.00 - 0.20 K/uL    nRBC 0 0 /100 WBC    Gran % 74.5 (H) 38.0 - 73.0 %    Lymph % 15.0 (L) 18.0 - 48.0 %    Mono % 9.2 4.0 - 15.0 %    Eosinophil % 0.4 0.0 - 8.0 %    Basophil % 0.4 0.0 - 1.9 %    Differential Method Automated      CMP  Results for orders placed or performed during the hospital encounter of 07/11/22   Comprehensive Metabolic Panel   Result Value Ref Range    Sodium 141 136 - 145 mmol/L    Potassium 5.0 3.5 - 5.1 mmol/L    Chloride 105 95 - 110 mmol/L    CO2 26 23 - 29 mmol/L    Glucose 119 (H) 70 - 110 mg/dL    BUN 14 6 - 20 mg/dL    Creatinine 1.2 0.5 - 1.4 mg/dL    Calcium 9.6 8.7 - 10.5 mg/dL    Total Protein 8.2 6.0 - 8.4 g/dL    Albumin 4.1 3.5 - 5.2 g/dL    Total Bilirubin 0.5 0.1 - 1.0 mg/dL    Alkaline Phosphatase 82 55 - 135 U/L    AST 56 (H) 10 - 40 U/L     ALT 80 (H) 10 - 44 U/L    Anion Gap 10 8 - 16 mmol/L    eGFR if African American >60 >60 mL/min/1.73 m^2    eGFR if non African American >60 >60 mL/min/1.73 m^2     TSH  Results for orders placed or performed during the hospital encounter of 07/11/22   TSH   Result Value Ref Range    TSH 3.077 0.400 - 4.000 uIU/mL     ETOH  Results for orders placed or performed during the hospital encounter of 07/11/22   Ethanol   Result Value Ref Range    Alcohol, Serum <10 <10 mg/dL     Salicylate  Results for orders placed or performed during the hospital encounter of 07/11/22   Salicylate Level   Result Value Ref Range    Salicylate Lvl <5.0 (L) 15.0 - 30.0 mg/dL     Acetaminophen  Results for orders placed or performed during the hospital encounter of 07/11/22   Acetaminophen Level   Result Value Ref Range    Acetaminophen (Tylenol), Serum <3.0 (L) 10.0 - 20.0 ug/mL     Lab Results   Component Value Date    HGBA1C 5.0 07/11/2022      , normal TNI     Lab Results   Component Value Date    FOLATE 16.8 07/11/2022     Lab Results   Component Value Date    [id number] 540 07/11/2022   Vitamin D- 53   Covid 19 negative     Significant Imaging: I have reviewed and interpreted all pertinent imaging results/findings within the past 24 hours.    NONE     Assessment/Plan:     Elevated CPK  Monitor CPK- trend , scheduled for am   Encourage fluids       Nicotine abuse  nicotine patch       Substance abuse  Per psychiatry       Suicidal ideation  Per psychiatry       Depression  Per psychiatry         VTE Risk Mitigation (From admission, onward)    None              Thank you for your consult. I will sign off. Please contact us if you have any additional questions.    Breanne Dumont, NP  Department of Hospital Medicine   [facility]

## 2022-07-12 NOTE — SUBJECTIVE & OBJECTIVE
"Past Medical History:   Diagnosis Date    Anxiety     Depression     Hepatitis C     Substance abuse     METH AND HEROIN       No past surgical history on file.    Review of patient's allergies indicates:  No Known Allergies    Current Facility-Administered Medications on File Prior to Encounter   Medication    [COMPLETED] OLANZapine injection 10 mg    [DISCONTINUED] diphenhydrAMINE injection 50 mg    [DISCONTINUED] haloperidol lactate injection 5 mg    [DISCONTINUED] lorazepam injection 2 mg     Current Outpatient Medications on File Prior to Encounter   Medication Sig    buPROPion (WELLBUTRIN XL) 300 MG 24 hr tablet Take 1 tablet (300 mg total) by mouth once daily.    gabapentin (NEURONTIN) 300 MG capsule Take 2 capsules (600 mg total) by mouth 3 (three) times daily.    nicotine (NICODERM CQ) 14 mg/24 hr Place 1 patch onto the skin once daily.    QUEtiapine (SEROQUEL) 200 MG Tab Take 1 tablet (200 mg total) by mouth every evening.     Family History    None       Tobacco Use    Smoking status: Current Every Day Smoker     Packs/day: 0.50     Types: Cigarettes    Smokeless tobacco: Never Used   Substance and Sexual Activity    Alcohol use: Yes     Comment: 5th whiskey or more daily    Drug use: Yes     Types: Methamphetamines, Marijuana     Comment: heroin , "pt reports he has been doing whatever he can get his hands on"     Sexual activity: Not on file     Review of Systems   Constitutional:  Negative for chills and fever.   HENT:  Negative for congestion and sore throat.    Respiratory:  Negative for cough, chest tightness and shortness of breath.    Cardiovascular:  Negative for chest pain, palpitations and leg swelling.   Gastrointestinal:  Negative for abdominal pain, diarrhea, nausea and vomiting.   Genitourinary:  Negative for flank pain, frequency and hematuria.   Musculoskeletal:  Negative for back pain and neck pain.   Skin:  Negative for rash and wound.   Neurological:  Negative for dizziness, seizures, " syncope and headaches.   Psychiatric/Behavioral:  Positive for dysphoric mood and suicidal ideas. Negative for agitation, confusion and self-injury.    Objective:     Vital Signs (Most Recent):  Temp: 98.4 °F (36.9 °C) (07/11/22 2000)  Pulse: 99 (07/11/22 2000)  Resp: 18 (07/11/22 2000)  BP: 131/87 (07/11/22 2000) Vital Signs (24h Range):  Temp:  [97.2 °F (36.2 °C)-99.2 °F (37.3 °C)] 98.4 °F (36.9 °C)  Pulse:  [] 99  Resp:  [16-22] 18  SpO2:  [95 %-97 %] 95 %  BP: (114-170)/() 131/87     Weight: 80.3 kg (177 lb 2.2 oz)  Body mass index is 26.16 kg/m².    Physical Exam  Vitals and nursing note reviewed.   Constitutional:       General: He is not in acute distress.     Appearance: He is well-developed.   HENT:      Head: Normocephalic and atraumatic.   Eyes:      Pupils: Pupils are equal, round, and reactive to light.   Neck:      Thyroid: No thyromegaly.   Cardiovascular:      Rate and Rhythm: Normal rate and regular rhythm.      Heart sounds: Normal heart sounds. No murmur heard.  Pulmonary:      Effort: Pulmonary effort is normal. No respiratory distress.      Breath sounds: Normal breath sounds. No wheezing or rales.   Abdominal:      General: Bowel sounds are normal. There is no distension.      Palpations: Abdomen is soft. There is no mass.      Tenderness: There is no abdominal tenderness.   Musculoskeletal:         General: No deformity. Normal range of motion.   Lymphadenopathy:      Cervical: No cervical adenopathy.   Skin:     General: Skin is warm and dry.      Findings: No erythema or rash.   Neurological:      Mental Status: He is alert and oriented to person, place, and time.      Comments: CN II visual fields full to confrontation  CN III, Iv, VI- pupils ERRL   CN III- no palsy  Nystagmus; none   Diplopia- none  ophthalmoparesis- none  CN V- facial sensation intact  CN VII- facial expression full and symmetric  CNVIII- normal  CN IV-Normal  CN X- normal  CN XI- Normal   CN XII normal           Significant Labs: All pertinent labs within the past 24 hours have been reviewed.  Urine Culture: No results for input(s): LABURIN in the last 48 hours.  Urine Studies:   Recent Labs   Lab 07/11/22  1041   COLORU Yellow   APPEARANCEUA Clear   PHUR 7.0   SPECGRAV 1.025   PROTEINUA Negative   GLUCUA Negative   KETONESU Negative   BILIRUBINUA Negative   OCCULTUA Trace*   NITRITE Negative   UROBILINOGEN Negative   LEUKOCYTESUR Negative     UPT  No results found for this or any previous visit.  U/A  No results found for this or any previous visit.  UDS  Results for orders placed or performed during the hospital encounter of 07/11/22   Drug screen panel, in-house   Result Value Ref Range    Benzodiazepines Negative Negative    Methadone metabolites Negative Negative    Cocaine (Metab.) Negative Negative    Opiate Scrn, Ur Negative Negative    Barbiturate Screen, Ur Negative Negative    Amphetamine Screen, Ur Negative Negative    THC Presumptive Positive (A) Negative    Phencyclidine Negative Negative    Creatinine, Urine 238.7 23.0 - 375.0 mg/dL    Toxicology Information SEE COMMENT      CBC  Results for orders placed or performed during the hospital encounter of 07/11/22   CBC Auto Differential   Result Value Ref Range    WBC 9.59 3.90 - 12.70 K/uL    RBC 4.88 4.60 - 6.20 M/uL    Hemoglobin 14.5 14.0 - 18.0 g/dL    Hematocrit 43.9 40.0 - 54.0 %    MCV 90 82 - 98 fL    MCH 29.7 27.0 - 31.0 pg    MCHC 33.0 32.0 - 36.0 g/dL    RDW 15.3 (H) 11.5 - 14.5 %    Platelets 384 150 - 450 K/uL    MPV 8.9 (L) 9.2 - 12.9 fL    Immature Granulocytes 0.5 0.0 - 0.5 %    Gran # (ANC) 7.1 1.8 - 7.7 K/uL    Immature Grans (Abs) 0.05 (H) 0.00 - 0.04 K/uL    Lymph # 1.4 1.0 - 4.8 K/uL    Mono # 0.9 0.3 - 1.0 K/uL    Eos # 0.0 0.0 - 0.5 K/uL    Baso # 0.04 0.00 - 0.20 K/uL    nRBC 0 0 /100 WBC    Gran % 74.5 (H) 38.0 - 73.0 %    Lymph % 15.0 (L) 18.0 - 48.0 %    Mono % 9.2 4.0 - 15.0 %    Eosinophil % 0.4 0.0 - 8.0 %    Basophil % 0.4  0.0 - 1.9 %    Differential Method Automated      CMP  Results for orders placed or performed during the hospital encounter of 07/11/22   Comprehensive Metabolic Panel   Result Value Ref Range    Sodium 141 136 - 145 mmol/L    Potassium 5.0 3.5 - 5.1 mmol/L    Chloride 105 95 - 110 mmol/L    CO2 26 23 - 29 mmol/L    Glucose 119 (H) 70 - 110 mg/dL    BUN 14 6 - 20 mg/dL    Creatinine 1.2 0.5 - 1.4 mg/dL    Calcium 9.6 8.7 - 10.5 mg/dL    Total Protein 8.2 6.0 - 8.4 g/dL    Albumin 4.1 3.5 - 5.2 g/dL    Total Bilirubin 0.5 0.1 - 1.0 mg/dL    Alkaline Phosphatase 82 55 - 135 U/L    AST 56 (H) 10 - 40 U/L    ALT 80 (H) 10 - 44 U/L    Anion Gap 10 8 - 16 mmol/L    eGFR if African American >60 >60 mL/min/1.73 m^2    eGFR if non African American >60 >60 mL/min/1.73 m^2     TSH  Results for orders placed or performed during the hospital encounter of 07/11/22   TSH   Result Value Ref Range    TSH 3.077 0.400 - 4.000 uIU/mL     ETOH  Results for orders placed or performed during the hospital encounter of 07/11/22   Ethanol   Result Value Ref Range    Alcohol, Serum <10 <10 mg/dL     Salicylate  Results for orders placed or performed during the hospital encounter of 07/11/22   Salicylate Level   Result Value Ref Range    Salicylate Lvl <5.0 (L) 15.0 - 30.0 mg/dL     Acetaminophen  Results for orders placed or performed during the hospital encounter of 07/11/22   Acetaminophen Level   Result Value Ref Range    Acetaminophen (Tylenol), Serum <3.0 (L) 10.0 - 20.0 ug/mL     Lab Results   Component Value Date    HGBA1C 5.0 07/11/2022      , normal TNI     Lab Results   Component Value Date    FOLATE 16.8 07/11/2022     Lab Results   Component Value Date    MMVPMBBH27 540 07/11/2022   Vitamin D- 53   Covid 19 negative     Significant Imaging: I have reviewed and interpreted all pertinent imaging results/findings within the past 24 hours.    NONE

## 2022-07-13 LAB — CK SERPL-CCNC: 167 U/L (ref 20–200)

## 2022-07-13 PROCEDURE — 99233 SBSQ HOSP IP/OBS HIGH 50: CPT | Mod: ,,, | Performed by: STUDENT IN AN ORGANIZED HEALTH CARE EDUCATION/TRAINING PROGRAM

## 2022-07-13 PROCEDURE — 11400000 HC PSYCH PRIVATE ROOM

## 2022-07-13 PROCEDURE — 36415 COLL VENOUS BLD VENIPUNCTURE: CPT | Performed by: STUDENT IN AN ORGANIZED HEALTH CARE EDUCATION/TRAINING PROGRAM

## 2022-07-13 PROCEDURE — 82550 ASSAY OF CK (CPK): CPT | Performed by: STUDENT IN AN ORGANIZED HEALTH CARE EDUCATION/TRAINING PROGRAM

## 2022-07-13 PROCEDURE — 99233 PR SUBSEQUENT HOSPITAL CARE,LEVL III: ICD-10-PCS | Mod: ,,, | Performed by: STUDENT IN AN ORGANIZED HEALTH CARE EDUCATION/TRAINING PROGRAM

## 2022-07-13 PROCEDURE — S4991 NICOTINE PATCH NONLEGEND: HCPCS | Performed by: STUDENT IN AN ORGANIZED HEALTH CARE EDUCATION/TRAINING PROGRAM

## 2022-07-13 PROCEDURE — 25000003 PHARM REV CODE 250: Performed by: STUDENT IN AN ORGANIZED HEALTH CARE EDUCATION/TRAINING PROGRAM

## 2022-07-13 RX ORDER — DIVALPROEX SODIUM 500 MG/1
500 TABLET, FILM COATED, EXTENDED RELEASE ORAL NIGHTLY
Status: DISCONTINUED | OUTPATIENT
Start: 2022-07-13 | End: 2022-07-14

## 2022-07-13 RX ORDER — DIVALPROEX SODIUM 500 MG/1
1000 TABLET, FILM COATED, EXTENDED RELEASE ORAL DAILY
Status: DISCONTINUED | OUTPATIENT
Start: 2022-07-13 | End: 2022-07-18 | Stop reason: HOSPADM

## 2022-07-13 RX ORDER — LORAZEPAM 1 MG/1
2 TABLET ORAL 3 TIMES DAILY
Status: DISCONTINUED | OUTPATIENT
Start: 2022-07-13 | End: 2022-07-14

## 2022-07-13 RX ORDER — RISPERIDONE 0.5 MG/1
1 TABLET ORAL 2 TIMES DAILY
Status: DISCONTINUED | OUTPATIENT
Start: 2022-07-13 | End: 2022-07-13

## 2022-07-13 RX ORDER — RISPERIDONE 2 MG/1
2 TABLET ORAL 2 TIMES DAILY
Status: DISCONTINUED | OUTPATIENT
Start: 2022-07-13 | End: 2022-07-14

## 2022-07-13 RX ADMIN — LORAZEPAM 1 MG: 1 TABLET ORAL at 12:07

## 2022-07-13 RX ADMIN — RISPERIDONE 1 MG: 0.5 TABLET ORAL at 12:07

## 2022-07-13 RX ADMIN — RISPERIDONE 2 MG: 2 TABLET ORAL at 08:07

## 2022-07-13 RX ADMIN — LORAZEPAM 2 MG: 1 TABLET ORAL at 08:07

## 2022-07-13 RX ADMIN — LORAZEPAM 2 MG: 1 TABLET ORAL at 02:07

## 2022-07-13 RX ADMIN — NICOTINE 1 PATCH: 14 PATCH, EXTENDED RELEASE TRANSDERMAL at 09:07

## 2022-07-13 RX ADMIN — LORAZEPAM 1 MG: 1 TABLET ORAL at 09:07

## 2022-07-13 RX ADMIN — DIVALPROEX SODIUM 1000 MG: 500 TABLET, FILM COATED, EXTENDED RELEASE ORAL at 12:07

## 2022-07-13 RX ADMIN — DIVALPROEX SODIUM 500 MG: 500 TABLET, FILM COATED, EXTENDED RELEASE ORAL at 08:07

## 2022-07-13 NOTE — PLAN OF CARE
Problem: Adult Behavioral Health Plan of Care  Goal: Optimized Coping Skills in Response to Life Stressors  Outcome: Ongoing, Progressing      Group Note:  Pt did not attend group today. PLPC met with pt individually.  PLPC attempted to  discuss with pt on group discussion on Forgiveness. Pt was resting in bed quietly. Pt could not keep his eyes opened and kept falling in and out of sleep. PLPC discussed with pt PLPC will come back at a later time and date to discuss group.

## 2022-07-13 NOTE — PLAN OF CARE
Pt is sleeping at this time and has been in assigned room 213 sleeping since 0300.  NAD.  Resp even & unlabored.  Pathways clear and bed in low position.  Q 15 minute safety checks ongoing.  All precautions maintained

## 2022-07-13 NOTE — NURSING
Pt increasingly psychotic and uncooperative.  Pulling on doors, pacing.  Warm milk provided.  Staff redirecting pt to stay in room and try to sleep.  Pt talking about supper.  Reoriented pt to time.
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
## 2022-07-13 NOTE — PLAN OF CARE
"Pt responding to internal stimuli, pt states "the picture frame is moving" however the frame is secured to the wall immovable, pt restless, MD aware  "

## 2022-07-13 NOTE — PROGRESS NOTES
"PSYCHIATRY DAILY INPATIENT PROGRESS NOTE  SUBSEQUENT HOSPITAL VISIT    ENCOUNTER DATE: 7/13/2022  SITE: [facility] [place]    DATE OF ADMISSION: 7/11/2022  5:46 PM  LENGTH OF STAY: 2 days      CHIEF COMPLAINT   Leighton Carroll is a 42 y.o. male, seen during daily somers rounds on the inpatient unit.  Leighton Carroll presented with the chief complaint of SI, overdose, psychosis      The patient was seen and examined. The chart was reviewed.     Reviewed notes from Rns, CTRS and LPC and labs from the last 24 hours.    The patient's case was discussed with the treatment team/care providers today including Rns, CTRS and LPC    Staff reports severe (requiring PRN medications for non redirectable, agitated behavior in order to prevent harm to self or others) behavioral or management issues.     The patient has been compliant with treatment.      Subjective 07/13/2022       Today the patient is visibly agitated. He exhibited threatening behavior. He has poor insight into his condition. Reality testing is not intact.    Insomnia and psychotic agitation overnight noted.    The patient denies any side effects to medications.        Interim/overnight events per report/notes:  Pt seen on camera pulling on picture screwed into wall in assigned room.  Staff asking pt what he was doing and pt replied that the picture was moving and had been freaking him out.  Says last night it was moving and mechanics were outside working and he is scared they are trying to get him.  Supportive therapy provided.  Ativan 2mg po given prn.  Pt is asking to change rooms.  Pt informed that he needs a private room at this time and that the only room available is double bed.  Pt encouraged to let the medicine have time to work and try to get some sleep and that we are monitoring him.    Pt responding to internal stimuli, pt states "the picture frame is moving" however the frame is secured to the wall immovable, pt restless, MD aware    Pt moved to quiet room due " to continued paranoia, restlessness, saying the picture frame is moving, punching at wall and bed.    Pt remains restless, actively hallucinating.  Walked to 213 and back to quiet room twice.  Says he feels like the meds are starting to work and he will sleep.  Yet pt continues walking around quiet room.  Will continue to monitor for safety.    Pt with continued psychosis.  Says there is a family in the bed with him and they need a blanket.  Ativan 1mg and risperadal 1mg po given per order.  Pt remains in quiet room.  Will continue to monitor for safety.    Pt increasingly psychotic and uncooperative.  Pulling on doors, pacing.  Warm milk provided.  Staff redirecting pt to stay in room and try to sleep.  Pt talking about supper.  Reoriented pt to time.            Psychiatric ROS (observed, reported, or endorsed/denied):  Depressed mood - No  Interest/pleasure/anhedonia: No  Guilt/hopelessness/worthlessness - No  Changes in Sleep - Yes  Changes in Appetite - No  Changes in Concentration - Yes  Changes in Energy - Yes  PMA/R- Yes  Suicidal- active/passive ideations - less  Homicidal ideations: active/passive ideations - No    Hallucinations - Continuing  Delusions - Continuing  Disorganized behavior - Continuing  Disorganized speech - Continuing  Negative symptoms - Continuing    Elevated mood - No  Decreased need for sleep - Yes  Grandiosity - No  Racing thoughts - No  Impulsivity - Yes  Irritability- Yes  Increased energy - Yes  Distractibility - Yes  Increase in goal-directed activity or PMA- Yes    Symptoms of ARASELI - Continuing  Symptoms of Panic Disorder- No  Symptoms of PTSD - No        Overall progress: Patient is showing no improvement on the Unit to date          Medical ROS  Review of Systems   Constitutional: Negative for chills and fever.   HENT: Negative for hearing loss.    Eyes: Negative for blurred vision and double vision.   Respiratory: Negative for shortness of breath.    Cardiovascular: Negative for  chest pain and palpitations.   Gastrointestinal: Negative for constipation, diarrhea, nausea and vomiting.   Genitourinary: Negative for dysuria.   Musculoskeletal: Negative for back pain and neck pain.   Skin: Negative for rash.   Neurological: Negative for dizziness and headaches.   Endo/Heme/Allergies: Negative for environmental allergies.         PAST MEDICAL HISTORY   Past Medical History:   Diagnosis Date    Anxiety     Depression     Hepatitis C     Substance abuse     METH AND HEROIN           PSYCHOTROPIC MEDICATIONS   Scheduled Meds:   divalproex ER  1,000 mg Oral Daily    divalproex ER  500 mg Oral QHS    LORazepam  2 mg Oral TID    nicotine  1 patch Transdermal Daily    risperiDONE  2 mg Oral BID     Continuous Infusions:  PRN Meds:.lorazepam        EXAMINATION    VITALS   Vitals:    07/11/22 2000 07/12/22 0800 07/12/22 1946 07/13/22 1200   BP: 131/87 134/75 126/76 122/76   BP Location: Right arm Right arm  Right arm   Patient Position: Sitting Sitting  Sitting   Pulse: 99 84 88 99   Resp: 18 20 16 20   Temp: 98.4 °F (36.9 °C) 97.9 °F (36.6 °C) 97.2 °F (36.2 °C) 97.9 °F (36.6 °C)   TempSrc: Temporal Temporal  Temporal   Weight:       Height:           Body mass index is 26.16 kg/m².        CONSTITUTIONAL  General Appearance: unremarkable, age appropriate, heavily tattoo'd    MUSCULOSKELETAL  Muscle Strength and Tone:no tremor, no tic  Abnormal Involuntary Movements: No  Gait and Station: non-ataxic    PSYCHIATRIC   Level of Consciousness: awake and alert   Orientation: person, place and situation  Grooming: Disheveled and Hospital garb  Psychomotor Behavior: reluctant to participate, uncooperative, hostile, psychomotor agitation  Speech: loud, profane, rapid  Language: grossly intact  Mood: anxious  Affect: Consistent with mood  Thought Process: circumstantial  Associations: intact   Thought Content: +delusions, less SI and denies HI  Perceptions: +AH and +VH  Memory: Able to recall past events,  Remote intact and Recent intact  Attention:Easily distracted  Fund of Knowledge: Aware of current events and Vocabulary appropriate   Estimate if Intelligence:  Average based on work/education history, vocabulary and mental status exam  Insight: poor awareness of illness  Judgment: poor        DIAGNOSTIC TESTING   Laboratory Results  Recent Results (from the past 24 hour(s))   CK    Collection Time: 07/13/22  6:23 AM   Result Value Ref Range     20 - 200 U/L           MEDICAL DECISION MAKING        ASSESSMENT      Unspecified mood disorder  (pt unreliable historian)  Unspecified psychotic disorder (pt unreliable historian)  Suicidal ideations  Overdose of medications  Cannabis abuse  Amphetamine abuse  Alcohol abuse  Nicotine dependence  Psychosocial stressors     Elevated CK           PROBLEM LIST AND MANAGEMENT PLANS     Unspecified mood disorder  (pt unreliable historian)  - hold seroquel and wellbutrin for now given overdose and seizure risk  - start depakote 1000 mg PO qd and 500 mg PO qhs  - pt counseled  - follow up with outpatient mental health provider after discharge     Unspecified psychotic disorder (pt unreliable historian)  - started risperdal 1mg yesterday given PRN,  - 1 mg today with 2 mg PO tonight, then 2 mg PO BID tomorrow  (depakote and ativan should help to decrease seizure risk)  - hold seroquel given overdose and seizure risk  - pt counseled  - follow up with outpatient mental health provider after discharge        Suicidal ideations  - reported per family, c/f pt minimization   - continue psychiatric hospitalization  - provide psychotherapeutic interventions and medication management  - monitor     Overdose of medications  - FM consulted  - repeat EKG qtc WNL  - continue psychiatric hospitalization  - provide psychotherapeutic interventions and medication management  - monitor     Cannabis abuse  - SW consulted for assistance with additional resources   - pt counseled  - follow up  with outpatient mental health provider after discharge        Amphetamine abuse  - SW consulted for assistance with additional resources   - pt counseled  - follow up with outpatient mental health provider after discharge     Alcohol abuse  Alcohol Brief Intervention     1. Discussed with patient that there is concern he/she is drinking at unhealthy levels known to increase his/her risk of alcohol-related health problems - Yes  2. Discussed general feedback on health risks associated with drinking and/or given personalized feedback - Yes  3. Patient was advised to abstain from alcohol use - Yes     - start ativan 1 mg PO TID for detox -increase to 2 mg PO TID, will taper as tolerated  - pt counseled  - follow up with outpatient mental health provider after discharge     Nicotine dependence  - start nicotine patch 14 mg PO qd PRN        Psychosocial stressors  - pt counseled  - SW consulted for assistance with additional resources         Elevated CK  - FM consulted: Monitor CPK- trend , scheduled for am   Encourage fluids   - monitor   - recheck improved/WNL        Discussed diagnosis, risks and benefits of proposed treatment vs alternative treatments vs no treatment, potential side effects of these treatments and the inherent unpredictability of treatment. The patient expresses understanding of the above and displays the capacity to agree with this treatment given said understanding. Patient also agrees that, currently, the benefits outweigh the risks and would like to pursue/continue treatment at this time.      DISCHARGE PLANNING  Expected Disposition Plan: Home or Self Care      NEED FOR CONTINUED HOSPITALIZATION  Psychiatric illness continues to pose a potential threat to life or bodily function, of self or others, thereby requiring the need for continued inpatient psychiatric hospitalization: Yes, due to: danger to self and danger to others, as evidenced by:  Ongoing concerns with Active HI/Threatening  behavior. and Ongoing concerns with perceptual aberrancy and paranoid persecutory delusions leading to potential harm of self or others.    Protective inpatient pyschiatric hospitalization required while a safe disposition plan is enacted: Yes    Patient stabilized and ready for discharge from inpatient psychiatric unit: No        STAFF:   Abebe Jorge III, MD  Psychiatry

## 2022-07-13 NOTE — NURSING
Pt moved to quiet room due to continued paranoia, restlessness, saying the picture frame is moving, punching at wall and bed.

## 2022-07-13 NOTE — NURSING
Patient restless,confused, withdrawn & isolative.  Appears unkempt, bizarre, & is malodors,affect flat & restricted. Easily agitated,irritable, & very paranoid. Patient denies SI / HI & A/V hallucinations.  Monitor q 15 minutes per protocol.

## 2022-07-13 NOTE — PROGRESS NOTES
"   07/13/22 1050   [facility] Group Therapy   Group Name Therapeutic Recreation   Specific Interventions Cognitive Stimulation Training  (skilled match-up activity)   Participation Level Minimal   Participation Quality Requires Prompting;Lack of Interest   Insight/Motivation Improved;Other (see comments)   Affect/Mood Display Other (see comments)   Cognition Alert   Psychomotor WNL   Patient pouting and frowning, focused on discharge, sits unless directed into the activity, states "I'm ready to go. I'm missing work."   "
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
## 2022-07-13 NOTE — NURSING
Pt seen on camera pulling on picture screwed into wall in assigned room.  Staff asking pt what he was doing and pt replied that the picture was moving and had been freaking him out.  Says last night it was moving and mechanics were outside working and he is scared they are trying to get him.  Supportive therapy provided.  Ativan 2mg po given prn.  Pt is asking to change rooms.  Pt informed that he needs a private room at this time and that the only room available is double bed.  Pt encouraged to let the medicine have time to work and try to get some sleep and that we are monitoring him.

## 2022-07-13 NOTE — PLAN OF CARE
Lying quietly in bed, eyes closed, respirations even, unlabored. Apparently asleep. Slept 2 hours thus far. Safety precautions maintained. Rounds done every 15 minutes. Bed is fixed in low position and room is uncluttered and pathways are clear.

## 2022-07-13 NOTE — PROGRESS NOTES
"   07/12/22 1550   Assessment   Patient's Identification of the Problem Patient presents upset and focused on discharge. Patient states his admit is due to "my aunt bought me here because I was having chest pains." Patient states "I took 8 of my prescribe medicine. I was drunk and making poor judgements." Per H&P patient admit is due to thoughts of death/suicide and overdose. Patient verbalized "I don't want to come here wasting my life away. The longer I stay in here the more damage it does out there, my job, my relationship." Patient admits to negative leisure lifestyle of cigarettes, alcohol, marijuana daily, and occasional meth use. Patient reports that he is single, has one son, GED, unemployed, lives in [place] with his aunt, uncle and girlfriend. Patient verbalized his main goals "stay drug free and follow up with the mental health clinic."   Leisure Interest Enjoy Family/Friends;Ride Bike;Sit Outside  (swim, hiking,going for a ride in the car)   Leisure Barriers Lack of Transportation;Loss of Interest;Lack of Finances;ETOH Use;Drug Use;Fears/Phobias;Other (See Comments)  (fear going back to prison and being locked in a place that drive me nuts)   Treatment Focus To Improve Leisure Awareness/Lifestyle/Interest;To Improve Coping Skills;To Promote Successful and Safe Self Expression;To Educate and Increase Awareness of Sober Free Activities     Treatment Recommendation:   1:1 Intervention (as needed)    Cognitive Stimulation Skilled Activity  Self Expression Skilled Activity  Mild Exercises Skilled Activity  Coping Skilled Activity  Leisure Education and Awareness Skilled Activity    Treatment Goal(s):  Long Term Goals Refer To Master Treatment Plan    Short Term Treatment Goal(s)  Patient Will:  Comply with Treatment  Exhibit Improvement in Mood  Demonstrate Constructive Expression of Feelings and Behavior  Verbalize Improvement in Mood  Identify (+) Leisure / Recreation Activities that Promote Wellness and " Promote a Sober Lifestyle    Discharge Recommendations:  Encourage Patient to Actively Utilize Available Community Resources to Increase Leisure Involvement to Decrease Signs and Symptoms of Illness  Encourage Patient to Utilize Coping Skills on a Regular Basis to Reduce the Risk of Decompensating and Re-Hospitalizations  AA/NA Meetings  Follow Up with After Care Appointments

## 2022-07-13 NOTE — NURSING
Pt with continued psychosis.  Says there is a family in the bed with him and they need a blanket.  Ativan 1mg and risperadal 1mg po given per order.  Pt remains in quiet room.  Will continue to monitor for safety.

## 2022-07-13 NOTE — NURSING
Pt remains restless, actively hallucinating.  Walked to 213 and back to quiet room twice.  Says he feels like the meds are starting to work and he will sleep.  Yet pt continues walking around quiet room.  Will continue to monitor for safety.

## 2022-07-13 NOTE — MEDICAL/APP STUDENT
PSYCHIATRY DAILY INPATIENT PROGRESS NOTE  SUBSEQUENT HOSPITAL VISIT    ENCOUNTER DATE: 7/13/2022  SITE: [facility]    DATE OF ADMISSION: 7/11/2022  5:46 PM  LENGTH OF STAY: 2 days      CHIEF COMPLAINT   Leighton Carroll is a 42 y.o. male, seen during daily somers rounds on the inpatient unit.  Leighton Carroll presented with the chief complaint of thoughts of death/suicide and overdose      The patient was seen and examined. The chart was reviewed.     Reviewed notes from Rns, MD, CTRS, NP and LPC and labs from the last 24 hours.    The patient's case was discussed with the treatment team/care providers today including Rns, MD, CTRS, NP and LPC    Staff reports some behavioral or management issues.     The patient has been compliant with treatment.      Subjective 07/13/2022       Today the patient reports lethargy/fatigued. Patient sleeping comfortably in bed upon entering the room. Expected given minimal sleep over course of the night. Had repeat questions multiple times over the course of the interview.       The patient denies any side effects to medications.        Interim/overnight events per report/notes:    Per RN Note:    Pt seen on camera pulling on picture screwed into wall in assigned room.  Staff asking pt what he was doing and pt replied that the picture was moving and had been freaking him out.  Says last night it was moving and mechanics were outside working and he is scared they are trying to get him.  Supportive therapy provided.  Ativan 2mg po given prn at 2042.  Pt is asking to change rooms.  Pt informed that he needs a private room at this time and that the only room available is double bed.  Pt encouraged to let the medicine have time to work and try to get some sleep and that we are monitoring him.     Patient moved to quiet room due to continued paranoia/restlessness. Patient walked to quiet room and back to room 213 twice. States he needs a blanket because there is another family in the bed with  him. Given Ativan 1mg and risperadal 1mg PO at 0017. Patient became increasing psychotic, pulling on doors and pacing. Slept approximately 2 hrs       Psychiatric ROS (observed, reported, or endorsed/denied):  Depressed mood - denies  Interest/pleasure/anhedonia: denies  Guilt/hopelessness/worthlessness - denies  Changes in Sleep - continuing; slept 2 hrs, restless/psychotic during the night  Changes in Appetite - No  Changes in Concentration - No  Changes in Energy - Yes  PMA/R- No  Suicidal- active/passive ideations - denies, yes per report from family  Homicidal ideations: active/passive ideations - No    Hallucinations - denies, however, observed by staff overnight  Delusions - No  Disorganized behavior - No  Disorganized speech - No  Negative symptoms - No    Elevated mood - No  Decreased need for sleep - No  Grandiosity - No  Racing thoughts - No  Impulsivity - No  Irritability- No  Increased energy - No  Distractibility - No  Increase in goal-directed activity or PMA- No    Symptoms of ARASELI - denies  Symptoms of Panic Disorder- denies  Symptoms of PTSD - denies        Overall progress: Patient is showing no improvement on the Unit to date      Medical ROS  Review of Systems   Constitutional: Negative.    HENT: Negative.    Eyes: Negative.    Respiratory: Negative.    Cardiovascular: Negative.    Gastrointestinal: Negative.    Genitourinary: Negative.    Musculoskeletal: Negative.    Skin: Negative.    Neurological: Negative.    Endo/Heme/Allergies: Negative.          PAST MEDICAL HISTORY   Past Medical History:   Diagnosis Date    Anxiety     Depression     Hepatitis C     Substance abuse     METH AND HEROIN           PSYCHOTROPIC MEDICATIONS   Scheduled Meds:   LORazepam  1 mg Oral TID    nicotine  1 patch Transdermal Daily     Continuous Infusions:  PRN Meds:.lorazepam        EXAMINATION    VITALS   Vitals:    07/11/22 1830 07/11/22 2000 07/12/22 0800 07/12/22 1946   BP: (!) 162/97 131/87 134/75 126/76  "  BP Location: Right arm Right arm Right arm    Patient Position: Sitting Sitting Sitting    Pulse: 102 99 84 88   Resp: 16 18 20 16   Temp: 97.2 °F (36.2 °C) 98.4 °F (36.9 °C) 97.9 °F (36.6 °C) 97.2 °F (36.2 °C)   TempSrc: Temporal Temporal Temporal    Weight: 80.3 kg (177 lb 2.2 oz)      Height: 5' 9" (1.753 m)          Body mass index is 26.16 kg/m².        CONSTITUTIONAL  General Appearance: unremarkable, age appropriate, lying in bed    MUSCULOSKELETAL  Muscle Strength and Tone:no tremor, no tic  Abnormal Involuntary Movements: No  Gait and Station: non-ataxic    PSYCHIATRIC   Level of Consciousness: lethargic and responds to stimulation  Orientation: person, place and situation  Grooming: Hospital garb  Psychomotor Behavior: reluctant to participate, uncooperative  Speech: normal tone, normal rate, normal pitch, normal volume  Language: not tested  Mood: unable to ilicit  Affect: unable to illict  Thought Process: linear, logical  Associations: intact   Thought Content: denies SI, denies HI and no delusions  Perceptions: +VH, +RIS and +observed RIS ,   Memory: Unable to assess  Attention: lethargic, must repeat question multiple times  Fund of Knowledge: unable to assess  Estimate if Intelligence:  Average based on work/education history, vocabulary and mental status exam  Insight: poor awareness of illness  Judgment: behavior is adequate to circumstances        DIAGNOSTIC TESTING   Laboratory Results  Recent Results (from the past 24 hour(s))   CK    Collection Time: 07/13/22  6:23 AM   Result Value Ref Range     20 - 200 U/L             MEDICAL DECISION MAKING      ASSESSMENT:     Mood Disorder, unspecified  Psychotic Disorder, unspecified   Suicidal Ideations   Overdose on medications  Cannabis Abuse  Alcohol Abuse   Amphetamine Abuse   Nicotine Dependence  Psychosocial stressors   Elevated CK        PROBLEM LIST AND MANAGEMENT PLANS    Mood Disorder, unspecified  - start Wellbutrin 150mg QD, " gabapentin 100mg TID, quetiapine 100mg QHS, will titrate up to home doses during admission  - f/u outpatient with counselor and psychiatrist     Psychotic Disorder, unspecified   - start Wellbutrin 150mg QD, gabapentin 100mg TID, quetiapine 100mg QHS, will titrate up to home doses during admission  - continue risperidone 1 mg PO PRN  - continue lorazepam 1 mg PO PRN  - f/u outpatient with counselor and psychiatrist     Suicidal Ideations   - continue psychiatric hospitalization  - provide psychotherapy and medication management    Medication Overdose  -  patient on correct medication dose and usage  - monitor for seizures   - EKG normal: QTc 426    Cannabis Abuse  - f/u outpatient with counselor and psychiatrist    Alcohol Abuse   - continue lorazepam 1mg TID PO, taper as tolerated  - monitor for Delirium Tremens   - encourage rehab admission  - f/u outpatient with counselor and psychiatrist    Amphetamine Abuse   - continue lorazepam 1mg TID PO, taper as tolerated  - encourage rehab admission  - f/u outpatient with counselor and psychiatrist    Nicotine Dependence  - nicotine patch 14mg prn    Psychosocial stressors       Elevated CK  - Hospital medicine consulted   - trending, CPK normal range at 167   - Encourage fluids           Discussed diagnosis, risks and benefits of proposed treatment vs alternative treatments vs no treatment, potential side effects of these treatments and the inherent unpredictability of treatment. The patient expresses understanding of the above and displays the capacity to agree with this treatment given said understanding. Patient also agrees that, currently, the benefits outweigh the risks and would like to pursue/continue treatment at this time.      DISCHARGE PLANNING  Expected Disposition Plan: Home or Self Care      NEED FOR CONTINUED HOSPITALIZATION  Psychiatric illness continues to pose a potential threat to life or bodily function, of self or others, thereby requiring the  need for continued inpatient psychiatric hospitalization: Yes, due to: hallucinations, danger to self and suicidal ideation, as evidenced by:  Concerns with SI--Fading. and Ongoing concerns with command hallucinations to hit/harm others.    Protective inpatient pyschiatric hospitalization required while a safe disposition plan is enacted: Yes    Patient stabilized and ready for discharge from inpatient psychiatric unit: No         Saji Miranda, MS3  Psychiatry

## 2022-07-14 PROCEDURE — S4991 NICOTINE PATCH NONLEGEND: HCPCS | Performed by: STUDENT IN AN ORGANIZED HEALTH CARE EDUCATION/TRAINING PROGRAM

## 2022-07-14 PROCEDURE — 99233 PR SUBSEQUENT HOSPITAL CARE,LEVL III: ICD-10-PCS | Mod: ,,, | Performed by: STUDENT IN AN ORGANIZED HEALTH CARE EDUCATION/TRAINING PROGRAM

## 2022-07-14 PROCEDURE — 11400000 HC PSYCH PRIVATE ROOM

## 2022-07-14 PROCEDURE — 99233 SBSQ HOSP IP/OBS HIGH 50: CPT | Mod: ,,, | Performed by: STUDENT IN AN ORGANIZED HEALTH CARE EDUCATION/TRAINING PROGRAM

## 2022-07-14 PROCEDURE — 25000003 PHARM REV CODE 250: Performed by: STUDENT IN AN ORGANIZED HEALTH CARE EDUCATION/TRAINING PROGRAM

## 2022-07-14 RX ORDER — BUPROPION HYDROCHLORIDE 150 MG/1
150 TABLET ORAL DAILY
Status: DISCONTINUED | OUTPATIENT
Start: 2022-07-15 | End: 2022-07-18 | Stop reason: HOSPADM

## 2022-07-14 RX ORDER — GABAPENTIN 300 MG/1
300 CAPSULE ORAL 3 TIMES DAILY
Status: DISCONTINUED | OUTPATIENT
Start: 2022-07-14 | End: 2022-07-15

## 2022-07-14 RX ORDER — LORAZEPAM 1 MG/1
1 TABLET ORAL 3 TIMES DAILY
Status: DISCONTINUED | OUTPATIENT
Start: 2022-07-14 | End: 2022-07-17

## 2022-07-14 RX ORDER — TALC
9 POWDER (GRAM) TOPICAL NIGHTLY PRN
Status: DISCONTINUED | OUTPATIENT
Start: 2022-07-14 | End: 2022-07-18 | Stop reason: HOSPADM

## 2022-07-14 RX ORDER — HALOPERIDOL 5 MG/1
5 TABLET ORAL EVERY 8 HOURS PRN
Status: DISCONTINUED | OUTPATIENT
Start: 2022-07-15 | End: 2022-07-15

## 2022-07-14 RX ORDER — RISPERIDONE 2 MG/1
2 TABLET ORAL DAILY
Status: DISCONTINUED | OUTPATIENT
Start: 2022-07-15 | End: 2022-07-18 | Stop reason: HOSPADM

## 2022-07-14 RX ORDER — QUETIAPINE FUMARATE 100 MG/1
100 TABLET, FILM COATED ORAL NIGHTLY
Status: DISCONTINUED | OUTPATIENT
Start: 2022-07-14 | End: 2022-07-15

## 2022-07-14 RX ORDER — DIPHENHYDRAMINE HCL 50 MG
50 CAPSULE ORAL EVERY 6 HOURS PRN
Status: DISCONTINUED | OUTPATIENT
Start: 2022-07-15 | End: 2022-07-15

## 2022-07-14 RX ADMIN — LORAZEPAM 1 MG: 1 TABLET ORAL at 08:07

## 2022-07-14 RX ADMIN — GABAPENTIN 300 MG: 300 CAPSULE ORAL at 04:07

## 2022-07-14 RX ADMIN — LORAZEPAM 2 MG: 1 TABLET ORAL at 08:07

## 2022-07-14 RX ADMIN — NICOTINE 1 PATCH: 14 PATCH, EXTENDED RELEASE TRANSDERMAL at 08:07

## 2022-07-14 RX ADMIN — GABAPENTIN 300 MG: 300 CAPSULE ORAL at 08:07

## 2022-07-14 RX ADMIN — RISPERIDONE 2 MG: 2 TABLET ORAL at 08:07

## 2022-07-14 RX ADMIN — Medication 9 MG: at 11:07

## 2022-07-14 RX ADMIN — QUETIAPINE FUMARATE 100 MG: 100 TABLET ORAL at 08:07

## 2022-07-14 RX ADMIN — LORAZEPAM 2 MG: 1 TABLET ORAL at 02:07

## 2022-07-14 RX ADMIN — DIVALPROEX SODIUM 1000 MG: 500 TABLET, FILM COATED, EXTENDED RELEASE ORAL at 08:07

## 2022-07-14 NOTE — PLAN OF CARE
Problem: Adult Behavioral Health Plan of Care  Goal: Optimized Coping Skills in Response to Life Stressors  Outcome: Ongoing, Progressing       Group Note:    Pt is isolating in room, in bed refusing to attend group. Pt was in bed with covers over head.  [facility] provided 1:1 with pt to dicuss group. Patient states he felt tired from the medications hew as given. Staff encouraged pt to meeet with staff at a later time to initatie group discussion.

## 2022-07-14 NOTE — PROGRESS NOTES
"PSYCHIATRY DAILY INPATIENT PROGRESS NOTE  SUBSEQUENT HOSPITAL VISIT    ENCOUNTER DATE: 7/14/2022  [facility]    DATE OF ADMISSION: 7/11/2022  5:46 PM  LENGTH OF STAY: 3 days      CHIEF COMPLAINT   Leighton Carroll is a 42 y.o. male, seen during daily somers rounds on the inpatient unit.  Leighton Carroll presented with the chief complaint of SI, overdose, psychosis      The patient was seen and examined. The chart was reviewed.     Reviewed notes from Rns, CTRS and LPC and labs from the last 24 hours.    The patient's case was discussed with the treatment team/care providers today including Rns, CTRS and LPC    Staff reports severe (requiring PRN medications for non redirectable, agitated behavior in order to prevent harm to self or others) behavioral or management issues.     The patient has been compliant with treatment.      Subjective 07/14/2022       Today the patient continues to exhibit agitated behavior despite multiple sedatives. Educated patient regarding overdose. He verbalized u/a. He requests to get back on wellbutrin, seroquel, and gabapentin, "That's what was working for me.... I've got no energy without those, no desire for life."    He has a significant history of severe antisocial behavior resulting in long incarceration and severe substance abuse. He has a high tolerance to medications.    The patient denies any side effects to medications.        Interim/overnight events per report/notes:  Patient anxious, & frustrated, states, " I'm not taking any more medications!  They make me feel shaky & tired!".  Easily agitated, guarded & paranoid.  Affect flat, restricted, appears unkempt, disheveled, & bizarre. Denies SI / HI & A/V hallucinations, no RIS observed.  Monitor q 15 minutes per protocol.      Patient sat frowning with his arms folded. Patient presents anxious, upset, reports "no energy, no vibe for life" mood, states "I'm thinking about getting the hell out of here now that they took me off " "the medicine that was helping me, made me stable. I'm all messed up." Patient participated minimal, lack interest.        Psychiatric ROS (observed, reported, or endorsed/denied):  Depressed mood - Yes  Interest/pleasure/anhedonia: Yes  Guilt/hopelessness/worthlessness - No  Changes in Sleep - Yes  Changes in Appetite - No  Changes in Concentration - Yes  Changes in Energy - Yes  PMA/R- Yes  Suicidal- active/passive ideations - less  Homicidal ideations: active/passive ideations - No    Hallucinations - less  Delusions - less  Disorganized behavior - less  Disorganized speech - less  Negative symptoms - less    Elevated mood - No  Decreased need for sleep - Yes  Grandiosity - No  Racing thoughts - No  Impulsivity - Continuing  Irritability- Continuing  Increased energy - Continuing  Distractibility - Continuing  Increase in goal-directed activity or PMA- less    Symptoms of ARASELI - Continuing  Symptoms of Panic Disorder- No  Symptoms of PTSD - No        Overall progress: minimal improvement          Medical ROS  Review of Systems   Constitutional: Negative for chills and fever.   HENT: Negative for hearing loss.    Eyes: Negative for blurred vision and double vision.   Respiratory: Negative for shortness of breath.    Cardiovascular: Negative for chest pain and palpitations.   Gastrointestinal: Negative for constipation, diarrhea, nausea and vomiting.   Genitourinary: Negative for dysuria.   Musculoskeletal: Negative for back pain and neck pain.   Skin: Negative for rash.   Neurological: Negative for dizziness and headaches.   Endo/Heme/Allergies: Negative for environmental allergies.         PAST MEDICAL HISTORY   Past Medical History:   Diagnosis Date    Anxiety     Depression     Hepatitis C     Substance abuse     METH AND HEROIN           PSYCHOTROPIC MEDICATIONS   Scheduled Meds:   divalproex ER  1,000 mg Oral Daily    divalproex ER  500 mg Oral QHS    LORazepam  2 mg Oral TID    nicotine  1 patch " Transdermal Daily    risperiDONE  2 mg Oral BID     Continuous Infusions:  PRN Meds:.lorazepam        EXAMINATION    VITALS   Vitals:    07/12/22 1946 07/13/22 1200 07/13/22 2002 07/14/22 0800   BP: 126/76 122/76 120/69 109/67   BP Location:  Right arm Left arm Right arm   Patient Position:  Sitting Sitting Sitting   Pulse: 88 99 102 95   Resp: 16 20 18 20   Temp: 97.2 °F (36.2 °C) 97.9 °F (36.6 °C) 97.8 °F (36.6 °C) 97.6 °F (36.4 °C)   TempSrc:  Temporal  Temporal   Weight:       Height:           Body mass index is 26.16 kg/m².        CONSTITUTIONAL  General Appearance: unremarkable, age appropriate, heavily tattoo'd    MUSCULOSKELETAL  Muscle Strength and Tone:no tremor, no tic  Abnormal Involuntary Movements: No  Gait and Station: non-ataxic    PSYCHIATRIC   Level of Consciousness: awake and alert   Orientation: person, place and situation  Grooming: Disheveled and Hospital garb  Psychomotor Behavior: reluctant to participate, uncooperative, hostile, psychomotor agitation  Speech: normal tone, normal rate, normal pitch, normal volume  Language: grossly intact  Mood: depressed  Affect: Consistent with mood  Thought Process: circumstantial  Associations: intact   Thought Content: +delusions, less SI and denies HI  Perceptions: +AH and +VH  Memory: Able to recall past events, Remote intact and Recent intact  Attention:Easily distracted  Fund of Knowledge: Aware of current events and Vocabulary appropriate   Estimate if Intelligence:  Average based on work/education history, vocabulary and mental status exam  Insight: poor awareness of illness  Judgment: poor        DIAGNOSTIC TESTING   Laboratory Results  No results found for this or any previous visit (from the past 24 hour(s)).        MEDICAL DECISION MAKING        ASSESSMENT      Unspecified mood disorder  (pt unreliable historian)  Unspecified psychotic disorder (pt unreliable historian)  Suicidal ideations  Overdose of medications  Cannabis abuse  Amphetamine  abuse  Alcohol abuse  Nicotine dependence  Psychosocial stressors     Elevated CK           PROBLEM LIST AND MANAGEMENT PLANS     Unspecified mood disorder  (pt unreliable historian)  - resume wellbutrin 150 mg PO qd tomorrow  - started/continue depakote 1000 mg PO qd and 500 mg PO qhs  -decrease to 1000 mg PO qd; will check level  - pt counseled  - follow up with outpatient mental health provider after discharge     Unspecified psychotic disorder (pt unreliable historian)  - started risperdal 1mg yesterday given PRN,  - 1 mg today with 2 mg PO  -change to seroquel 100 mg PO qhs per patient's request and risperdal 2 mg PO qd (depakote and ativan should help to decrease seizure risk)  - hold seroquel given overdose and seizure risk  - pt counseled  - follow up with outpatient mental health provider after discharge        Suicidal ideations  - reported per family, c/f pt minimization   - continue psychiatric hospitalization  - provide psychotherapeutic interventions and medication management  - monitor     Overdose of medications  - FM consulted  - repeat EKG qtc WNL  - continue psychiatric hospitalization  - provide psychotherapeutic interventions and medication management  - monitor     Cannabis abuse  - SW consulted for assistance with additional resources   - pt counseled  - follow up with outpatient mental health provider after discharge        Amphetamine abuse  - SW consulted for assistance with additional resources   - pt counseled  - follow up with outpatient mental health provider after discharge     Alcohol abuse  Alcohol Brief Intervention     1. Discussed with patient that there is concern he/she is drinking at unhealthy levels known to increase his/her risk of alcohol-related health problems - Yes  2. Discussed general feedback on health risks associated with drinking and/or given personalized feedback - Yes  3. Patient was advised to abstain from alcohol use - Yes     - start ativan 1 mg PO TID for  detox -increase to 2 mg PO TID  -decrease to 1 mg PO TID  - start gabapentin 300 mg PO BID today - TID tomorrow  - pt counseled  - follow up with outpatient mental health provider after discharge     Nicotine dependence  - start nicotine patch 14 mg PO qd PRN        Psychosocial stressors  - pt counseled  - SW consulted for assistance with additional resources         Elevated CK  - FM consulted: Monitor CPK- trend , scheduled for am   Encourage fluids   - monitor   - recheck improved/WNL        Discussed diagnosis, risks and benefits of proposed treatment vs alternative treatments vs no treatment, potential side effects of these treatments and the inherent unpredictability of treatment. The patient expresses understanding of the above and displays the capacity to agree with this treatment given said understanding. Patient also agrees that, currently, the benefits outweigh the risks and would like to pursue/continue treatment at this time.      DISCHARGE PLANNING  Expected Disposition Plan: Home or Self Care      NEED FOR CONTINUED HOSPITALIZATION  Psychiatric illness continues to pose a potential threat to life or bodily function, of self or others, thereby requiring the need for continued inpatient psychiatric hospitalization: Yes, due to: danger to self and danger to others, as evidenced by:  Ongoing concerns with Active HI/Threatening behavior. and Ongoing concerns with perceptual aberrancy and paranoid persecutory delusions leading to potential harm of self or others.    Protective inpatient pyschiatric hospitalization required while a safe disposition plan is enacted: Yes    Patient stabilized and ready for discharge from inpatient psychiatric unit: No        STAFF:   Abebe Jorge III, MD  Psychiatry
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
## 2022-07-14 NOTE — NURSING
"Patient anxious, & frustrated, states, " I'm not taking any more medications!  They make me feel shaky & tired!".  Easily agitated, guarded & paranoid.  Affect flat, restricted, appears unkempt, disheveled, & bizarre. Denies SI / HI & A/V hallucinations, no RIS observed.  Monitor q 15 minutes per protocol.  "
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
## 2022-07-14 NOTE — PLAN OF CARE
"Less paranoid this evening.  No outward evidence of responding to hallucinations.  Said he doesn't think he needs to be here.  "I drank too much and took a few extra pills by accident".  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  Denies suicidal/homicidal thoughts at this time.  Accepted hs snack and medications.  Stressed compliance with meds upon discharge.    "

## 2022-07-14 NOTE — PROGRESS NOTES
"   07/14/22 1040   [facility] Group Therapy   Group Name Therapeutic Recreation   Specific Interventions Coping Skills Training  (brain teaser)   Participation Level Minimal   Participation Quality Cooperative   Insight/Motivation Improved   Affect/Mood Display Irritable;Anxious   Cognition Alert   Psychomotor WNL   Patient sat frowning with his arms folded. Patient presents anxious, upset, reports "no energy, no vibe for life" mood, states "I'm thinking about getting the hell out of here now that they took me off the medicine that was helping me, made me stable. I'm all messed up." Patient participated minimal, lack interest.  "

## 2022-07-15 PROCEDURE — 25000003 PHARM REV CODE 250: Performed by: STUDENT IN AN ORGANIZED HEALTH CARE EDUCATION/TRAINING PROGRAM

## 2022-07-15 PROCEDURE — 11400000 HC PSYCH PRIVATE ROOM

## 2022-07-15 PROCEDURE — 99233 SBSQ HOSP IP/OBS HIGH 50: CPT | Mod: ,,, | Performed by: STUDENT IN AN ORGANIZED HEALTH CARE EDUCATION/TRAINING PROGRAM

## 2022-07-15 PROCEDURE — 63600175 PHARM REV CODE 636 W HCPCS: Performed by: STUDENT IN AN ORGANIZED HEALTH CARE EDUCATION/TRAINING PROGRAM

## 2022-07-15 PROCEDURE — S4991 NICOTINE PATCH NONLEGEND: HCPCS | Performed by: STUDENT IN AN ORGANIZED HEALTH CARE EDUCATION/TRAINING PROGRAM

## 2022-07-15 PROCEDURE — 99233 PR SUBSEQUENT HOSPITAL CARE,LEVL III: ICD-10-PCS | Mod: ,,, | Performed by: STUDENT IN AN ORGANIZED HEALTH CARE EDUCATION/TRAINING PROGRAM

## 2022-07-15 RX ORDER — LORAZEPAM 2 MG/ML
2 INJECTION INTRAMUSCULAR EVERY 6 HOURS PRN
Status: DISCONTINUED | OUTPATIENT
Start: 2022-07-15 | End: 2022-07-18 | Stop reason: HOSPADM

## 2022-07-15 RX ORDER — GABAPENTIN 300 MG/1
600 CAPSULE ORAL 3 TIMES DAILY
Status: DISCONTINUED | OUTPATIENT
Start: 2022-07-15 | End: 2022-07-18 | Stop reason: HOSPADM

## 2022-07-15 RX ORDER — QUETIAPINE FUMARATE 200 MG/1
200 TABLET, FILM COATED ORAL NIGHTLY
Status: DISCONTINUED | OUTPATIENT
Start: 2022-07-15 | End: 2022-07-18 | Stop reason: HOSPADM

## 2022-07-15 RX ORDER — HALOPERIDOL 5 MG/ML
5 INJECTION INTRAMUSCULAR EVERY 6 HOURS PRN
Status: DISCONTINUED | OUTPATIENT
Start: 2022-07-15 | End: 2022-07-18 | Stop reason: HOSPADM

## 2022-07-15 RX ORDER — DIPHENHYDRAMINE HYDROCHLORIDE 50 MG/ML
50 INJECTION INTRAMUSCULAR; INTRAVENOUS EVERY 6 HOURS PRN
Status: DISCONTINUED | OUTPATIENT
Start: 2022-07-15 | End: 2022-07-18 | Stop reason: HOSPADM

## 2022-07-15 RX ADMIN — GABAPENTIN 300 MG: 300 CAPSULE ORAL at 02:07

## 2022-07-15 RX ADMIN — QUETIAPINE FUMARATE 200 MG: 200 TABLET ORAL at 08:07

## 2022-07-15 RX ADMIN — LORAZEPAM 1 MG: 1 TABLET ORAL at 02:07

## 2022-07-15 RX ADMIN — HALOPERIDOL LACTATE 5 MG: 5 INJECTION, SOLUTION INTRAMUSCULAR at 03:07

## 2022-07-15 RX ADMIN — Medication 9 MG: at 08:07

## 2022-07-15 RX ADMIN — LORAZEPAM 1 MG: 1 TABLET ORAL at 08:07

## 2022-07-15 RX ADMIN — BUPROPION HYDROCHLORIDE 150 MG: 150 TABLET, EXTENDED RELEASE ORAL at 08:07

## 2022-07-15 RX ADMIN — LORAZEPAM 2 MG: 2 INJECTION INTRAMUSCULAR; INTRAVENOUS at 03:07

## 2022-07-15 RX ADMIN — NICOTINE 1 PATCH: 14 PATCH, EXTENDED RELEASE TRANSDERMAL at 08:07

## 2022-07-15 RX ADMIN — GABAPENTIN 300 MG: 300 CAPSULE ORAL at 08:07

## 2022-07-15 RX ADMIN — DIVALPROEX SODIUM 1000 MG: 500 TABLET, FILM COATED, EXTENDED RELEASE ORAL at 08:07

## 2022-07-15 RX ADMIN — GABAPENTIN 600 MG: 300 CAPSULE ORAL at 08:07

## 2022-07-15 RX ADMIN — RISPERIDONE 2 MG: 2 TABLET ORAL at 08:07

## 2022-07-15 RX ADMIN — DIPHENHYDRAMINE HYDROCHLORIDE 50 MG: 50 INJECTION, SOLUTION INTRAMUSCULAR; INTRAVENOUS at 03:07

## 2022-07-15 NOTE — PROGRESS NOTES
"PSYCHIATRY DAILY INPATIENT PROGRESS NOTE  SUBSEQUENT HOSPITAL VISIT    ENCOUNTER DATE: 7/15/2022  SITE: [facility] [place]    DATE OF ADMISSION: 7/11/2022  5:46 PM  LENGTH OF STAY: 4 days      CHIEF COMPLAINT   Leighton Carroll is a 42 y.o. male, seen during daily somers rounds on the inpatient unit.  Leightno Carroll presented with the chief complaint of SI, overdose, psychosis      The patient was seen and examined. The chart was reviewed.     Reviewed notes from Rns and labs from the last 24 hours.    The patient's case was discussed with the treatment team/care providers today including Rns, CTRS and LPC    Staff reports severe (requiring PRN medications for non redirectable, agitated behavior in order to prevent harm to self or others) behavioral or management issues.     The patient has been compliant with treatment.      Subjective 07/15/2022       Today the patient continues to exhibit agitated behavior despite being administered multiple scheduled sedatives. He exhibited intrusive behavior during interview, invading personal space in a threatening manner, he appeared irritable, and was showing PMA, pacing hallways. He could not be redirected therefore interview was terminated.    He has a significant history of severe antisocial behavior resulting in long incarceration and severe substance abuse as well. He has a high tolerance to medications AEB his treatment response.    The patient denies any side effects to medications.    His symptoms of psychosis/king are not currently controlled.        Interim/overnight events per report/notes:    Per RNs:  Pt states that he feels "alright" today, spending majority of time in room and hallway on telephone. Pt restless in room    Pt states that he feels "alright" today, spending majority of time in room and hallway pacing with minimal interaction with peers.    Denies hallucinations, but intermittently appears to be distracted in room      Psychiatric ROS (observed, reported, " or endorsed/denied):  Depressed mood - less  Interest/pleasure/anhedonia: Yes  Guilt/hopelessness/worthlessness - No  Changes in Sleep - Yes  Changes in Appetite - No  Changes in Concentration - Yes  Changes in Energy - Yes  PMA/R- Yes  Suicidal- active/passive ideations - less  Homicidal ideations: active/passive ideations - No    Hallucinations - fluctuating  Delusions - less  Disorganized behavior - less  Disorganized speech - less  Negative symptoms - less    Elevated mood - No  Decreased need for sleep - Continuing  Grandiosity - No  Racing thoughts - No  Impulsivity - Continuing  Irritability- Continuing  Increased energy - Continuing  Distractibility - Continuing  Increase in goal-directed activity or PMA- Continuing    Symptoms of ARASELI - Continuing  Symptoms of Panic Disorder- No  Symptoms of PTSD - No        Overall progress: minimal improvement          Medical ROS  Review of Systems   Constitutional: Negative for chills and fever.   HENT: Negative for hearing loss.    Eyes: Negative for blurred vision and double vision.   Respiratory: Negative for shortness of breath.    Cardiovascular: Negative for chest pain and palpitations.   Gastrointestinal: Negative for constipation, diarrhea, nausea and vomiting.   Genitourinary: Negative for dysuria.   Musculoskeletal: Negative for back pain and neck pain.   Skin: Negative for rash.   Neurological: Negative for dizziness and headaches.   Endo/Heme/Allergies: Negative for environmental allergies.         PAST MEDICAL HISTORY   Past Medical History:   Diagnosis Date    Anxiety     Depression     Hepatitis C     Substance abuse     METH AND HEROIN           PSYCHOTROPIC MEDICATIONS   Scheduled Meds:   buPROPion  150 mg Oral Daily    divalproex ER  1,000 mg Oral Daily    gabapentin  300 mg Oral TID    LORazepam  1 mg Oral TID    nicotine  1 patch Transdermal Daily    QUEtiapine  100 mg Oral QHS    risperiDONE  2 mg Oral Daily     Continuous Infusions:  PRN  Meds:.diphenhydrAMINE, haloperidoL, lorazepam, melatonin        EXAMINATION    VITALS   Vitals:    07/14/22 0800 07/14/22 1945 07/15/22 0707 07/15/22 0800   BP: 109/67 119/76 117/71 117/71   BP Location: Right arm  Right arm Right arm   Patient Position: Sitting  Sitting Sitting   Pulse: 95 99 69 69   Resp: 20 18 16 16   Temp: 97.6 °F (36.4 °C) 97.7 °F (36.5 °C) 97.2 °F (36.2 °C) 97.2 °F (36.2 °C)   TempSrc: Temporal   Temporal   Weight:       Height:           Body mass index is 26.16 kg/m².        CONSTITUTIONAL  General Appearance: unremarkable, age appropriate, heavily tattoo'd    MUSCULOSKELETAL  Muscle Strength and Tone:no tremor, no tic  Abnormal Involuntary Movements: No  Gait and Station: non-ataxic    PSYCHIATRIC   Level of Consciousness: awake and alert   Orientation: person, place and situation  Grooming: Disheveled and Hospital garb  Psychomotor Behavior: reluctant to participate, uncooperative, hostile, psychomotor agitation  Speech: normal tone, normal rate, normal pitch, normal volume  Language: grossly intact  Mood: alright  Affect: Constricted and Inappropriate  Thought Process: circumstantial  Associations: intact   Thought Content: less SI, denies HI and no delusions  Perceptions: +AH and +VH  Memory: Able to recall past events, Remote intact and Recent intact  Attention:Easily distracted  Fund of Knowledge: Aware of current events and Vocabulary appropriate   Estimate if Intelligence:  Average based on work/education history, vocabulary and mental status exam  Insight: poor awareness of illness  Judgment: poor        DIAGNOSTIC TESTING   Laboratory Results  No results found for this or any previous visit (from the past 24 hour(s)).        MEDICAL DECISION MAKING        ASSESSMENT      Unspecified mood disorder  (pt unreliable historian)  Unspecified psychotic disorder (pt unreliable historian)  Suicidal ideations  Overdose of medications  Cannabis abuse  Amphetamine abuse  Alcohol  abuse  Nicotine dependence  Psychosocial stressors     Elevated CK           PROBLEM LIST AND MANAGEMENT PLANS     Unspecified mood disorder  (pt unreliable historian)  - resumed/continue wellbutrin 150 mg PO qd today  - started/continue depakote 1000 mg PO qd and 500 mg PO qhs  -decreased to 1000 mg PO qd 2/2 pt c/o ASE (vague, low suspicion); will check level  - pt counseled  - follow up with outpatient mental health provider after discharge     Unspecified psychotic disorder (pt unreliable historian)  - started risperdal 1mg given PRN,  - increased to 1 mg with 2 mg PO  -changed to seroquel 100 mg PO qhs per patient's request and risperdal 2 mg PO qd (depakote and ativan should help to decrease seizure risk post overdose)  -increase to risperdal 2 mg PO qd and seroquel 200 mg PO qhs  - pt counseled  - follow up with outpatient mental health provider after discharge        Suicidal ideations  - reported per family, c/f pt minimization   - continue psychiatric hospitalization  - provide psychotherapeutic interventions and medication management  - monitor     Overdose of medications  - FM consulted  - repeat EKG qtc WNL  - continue psychiatric hospitalization  - provide psychotherapeutic interventions and medication management  - monitor     Cannabis abuse  - SW consulted for assistance with additional resources   - pt counseled  - follow up with outpatient mental health provider after discharge        Amphetamine abuse  - SW consulted for assistance with additional resources   - pt counseled  - follow up with outpatient mental health provider after discharge     Alcohol abuse  Alcohol Brief Intervention     1. Discussed with patient that there is concern he/she is drinking at unhealthy levels known to increase his/her risk of alcohol-related health problems - Yes  2. Discussed general feedback on health risks associated with drinking and/or given personalized feedback - Yes  3. Patient was advised to abstain from  alcohol use - Yes     - started ativan 1 mg PO TID for detox -increase to 2 mg PO TID  -decreased to 1 mg PO TID, will taper off as tolerated  - started/continue gabapentin 300 mg PO BID -increase to 600 mg PO TID   - pt counseled  - follow up with outpatient mental health provider after discharge     Nicotine dependence  - started/continue nicotine patch 14 mg PO qd PRN        Psychosocial stressors  - pt counseled  - SW consulted for assistance with additional resources         Elevated CK  - FM consulted: Monitor CPK- trend , scheduled for am   Encourage fluids   - monitor   - recheck improved/WNL        Discussed diagnosis, risks and benefits of proposed treatment vs alternative treatments vs no treatment, potential side effects of these treatments and the inherent unpredictability of treatment. The patient expresses understanding of the above and displays the capacity to agree with this treatment given said understanding. Patient also agrees that, currently, the benefits outweigh the risks and would like to pursue/continue treatment at this time.      DISCHARGE PLANNING  Expected Disposition Plan: Home or Self Care      NEED FOR CONTINUED HOSPITALIZATION  Psychiatric illness continues to pose a potential threat to life or bodily function, of self or others, thereby requiring the need for continued inpatient psychiatric hospitalization: Yes, due to: danger to self and danger to others, as evidenced by:  Ongoing concerns with Active HI/Threatening behavior. and Ongoing concerns with perceptual aberrancy and paranoid persecutory delusions leading to potential harm of self or others.    Protective inpatient pyschiatric hospitalization required while a safe disposition plan is enacted: Yes    Patient stabilized and ready for discharge from inpatient psychiatric unit: No        STAFF:   Abebe Jorge III, MD  Psychiatry

## 2022-07-15 NOTE — PROGRESS NOTES
"   07/15/22 1040   [facility] Group Therapy   Group Name Education   Participation Level None   Participation Quality Refused   Patient presents irritable, upset, isolating in room, refused to attend group, focused on discharge. Patient states everybody is leaving but him and his problems are less than their's. Patient states "I took a few extra pills for energy but that didn't happen. It backfired on me. I'm fine now."  "

## 2022-07-15 NOTE — NURSING
Rested intermittently with closed eyes and even resp for 5 hours, as pt remains now.  Modified VC and all precautions maintained.  Pathways clear and bed in low position.

## 2022-07-15 NOTE — PLAN OF CARE
"Pt states that he feels "alright" today, spending majority of time in room and hallway on telephone. Pt restless in room. Denies SI/HI. Denies hallucinations, but intermittently appears to be distracted in room. Medication complaint. Appetite adequate. Denies sleep disturbances. NADN. Remains calm and cooperative. Safety precautions remain in place.  "

## 2022-07-15 NOTE — MEDICAL/APP STUDENT
"PSYCHIATRY DAILY INPATIENT PROGRESS NOTE  SUBSEQUENT HOSPITAL VISIT    ENCOUNTER DATE: 7/15/2022  [facility]    DATE OF ADMISSION: 7/11/2022  5:46 PM  LENGTH OF STAY: 4 days      CHIEF COMPLAINT   Leighton Carroll is a 42 y.o. male, seen during daily somers rounds on the inpatient unit.  Leighton Carroll presented with the chief complaint of suicidal ideations, overdose, and psychosis      The patient was seen and examined. The chart was reviewed.     Reviewed notes from Rns, MD, CTRS and LPC and labs from the last 24 hours.    The patient's case was discussed with the treatment team/care providers today including Rns, MD, CTRS and LPC    Staff reports less behavioral or management issues.     The patient has been compliant with treatment.      Subjective 07/15/2022       Today the patient reports his "mood has been down since they took my normal medicines away". Remains focused on discharge. Endorses anxiety about how he is anxious and ready to get out of the hospital to go back to work. Denies SI/HI. States he is sleeping well. Denies any changes to his appetite. Denies VH/AH.      The patient denies any side effects to medications.        Interim/overnight events per report/notes:    Per RN Note:    Pt states that he feels "alright" today, spending majority of time in room and hallway on telephone. Pt restless in room. Denies SI/HI. Denies hallucinations, but intermittently appears to be distracted in room. Medication complaint. Appetite adequate. Denies sleep disturbances. NADN. Remains calm and cooperative. Safety precautions remain in place    Rested intermittently with closed eyes and even resp for 5 hrs       Psychiatric ROS (observed, reported, or endorsed/denied):  Depressed mood - Yes  Interest/pleasure/anhedonia: Yes  Guilt/hopelessness/worthlessness - No  Changes in Sleep - improving minimally  Changes in Appetite - No  Changes in Concentration - Yes  Changes in Energy - Yes  PMA/R- Yes  Suicidal- " active/passive ideations - denies  Homicidal ideations: active/passive ideations - No    Hallucinations - improving minimally  Delusions - improving minimally  Disorganized behavior - improving minimally  Disorganized speech - improving minimally  Negative symptoms - improving minimally    Elevated mood - No  Decreased need for sleep - Yes  Grandiosity - No  Racing thoughts - No  Impulsivity - less  Irritability- fluctuating  Increased energy - Continuing  Distractibility - Continuing  Increase in goal-directed activity or PMA- Yes    Symptoms of ARASELI - Continuing  Symptoms of Panic Disorder- No  Symptoms of PTSD - No        Overall progress: Patient is showing mild improvement         Medical ROS  ROS      PAST MEDICAL HISTORY   Past Medical History:   Diagnosis Date    Anxiety     Depression     Hepatitis C     Substance abuse     METH AND HEROIN           PSYCHOTROPIC MEDICATIONS   Scheduled Meds:   buPROPion  150 mg Oral Daily    divalproex ER  1,000 mg Oral Daily    gabapentin  300 mg Oral TID    LORazepam  1 mg Oral TID    nicotine  1 patch Transdermal Daily    QUEtiapine  100 mg Oral QHS    risperiDONE  2 mg Oral Daily     Continuous Infusions:  PRN Meds:.diphenhydrAMINE, haloperidoL, lorazepam, melatonin        EXAMINATION    VITALS   Vitals:    07/13/22 2002 07/14/22 0800 07/14/22 1945 07/15/22 0707   BP: 120/69 109/67 119/76 117/71   BP Location: Left arm Right arm  Right arm   Patient Position: Sitting Sitting  Sitting   Pulse: 102 95 99 69   Resp: 18 20 18 16   Temp: 97.8 °F (36.6 °C) 97.6 °F (36.4 °C) 97.7 °F (36.5 °C) 97.2 °F (36.2 °C)   TempSrc:  Temporal     Weight:       Height:           Body mass index is 26.16 kg/m².        CONSTITUTIONAL  General Appearance: unremarkable, age appropriate    MUSCULOSKELETAL  Muscle Strength and Tone:no tremor, no tic  Abnormal Involuntary Movements: No  Gait and Station: non-ataxic    PSYCHIATRIC   Level of Consciousness: awake, alert  and  "oriented  Orientation: person, place, time and situation  Grooming: Hospital garb and Well groomed  Psychomotor Behavior: cooperative, psychomotor agitation, eye contact normal  Speech: normal tone, normal rate, normal pitch, normal volume  Language: grossly intact  Mood: anxious, depressed and "down since they took away my normal meds"  Affect: Consistent with mood  Thought Process: linear, logical  Associations: intact   Thought Content: denies SI, denies HI and no delusions  Perceptions: less AH and less VH  Memory: Able to recall past events, Remote intact and Recent intact  Attention:Attends to interview without distraction  Fund of Knowledge: Aware of current events and Vocabulary appropriate   Estimate if Intelligence:  Average based on work/education history, vocabulary and mental status exam  Insight: limited awareness of illness  Judgment: Poor        DIAGNOSTIC TESTING   Laboratory Results  No results found for this or any previous visit (from the past 24 hour(s)).          MEDICAL DECISION MAKING      ASSESSMENT:     Mood Disorder, unspecified  Psychotic Disorder, unspecified   Suicidal Ideations   Overdose on medications  Cannabis Abuse  Alcohol Abuse   Amphetamine Abuse   Nicotine Dependence  Psychosocial stressors       PROBLEM LIST AND MANAGEMENT PLANS    Mood Disorder, unspecified  - start Wellbutrin 150mg QD  - continue gabapentin 300mg TID  - quetiapine 100mg QHS, will titrate up to home doses during admission  - continue depakote 1000mg PO qd, f/u valproic acid levels, titrate prn  - f/u outpatient with counselor and psychiatrist      Psychotic Disorder, unspecified   - continue risperidone 2 mg PO PRN  - continue quetiapine 100mg QHS, titrate to home doses prn  - f/u outpatient with counselor and psychiatrist      Suicidal Ideations   - continue psychiatric hospitalization  - provide psychotherapy and medication management    Medication Overdose  -  patient on correct medication dose and " usage  - monitor for seizures   - EKG normal: QTc 426     Cannabis Abuse  - f/u outpatient with counselor and psychiatrist     Alcohol Abuse   - monitor for Delirium Tremens   - encourage rehab admission  - f/u outpatient with counselor and psychiatrist     Amphetamine Abuse   - encourage rehab admission  - f/u outpatient with counselor and psychiatrist     Nicotine Dependence  - nicotine patch 14mg prn     Psychosocial stressors   - patient counseled   - f/u outpatient with counselor and psychiatrist                Discussed diagnosis, risks and benefits of proposed treatment vs alternative treatments vs no treatment, potential side effects of these treatments and the inherent unpredictability of treatment. The patient expresses understanding of the above and displays the capacity to agree with this treatment given said understanding. Patient also agrees that, currently, the benefits outweigh the risks and would like to pursue/continue treatment at this time.      DISCHARGE PLANNING  Expected Disposition Plan: Home or Self Care      NEED FOR CONTINUED HOSPITALIZATION  Psychiatric illness continues to pose a potential threat to life or bodily function, of self or others, thereby requiring the need for continued inpatient psychiatric hospitalization: Yes, due to: hallucinations, danger to self and suicidal ideation, as evidenced by:  Ongoing concerns with SI., Ongoing concerns with Active HI/Threatening behavior. and Ongoing concerns with perceptual aberrancy and paranoid persecutory delusions leading to potential harm of self or others.    Protective inpatient pyschiatric hospitalization required while a safe disposition plan is enacted: Yes    Patient stabilized and ready for discharge from inpatient psychiatric unit: No        STAFF:   Saji Miranda MS3  Psychiatry

## 2022-07-15 NOTE — PLAN OF CARE
"Pt states that he feels "alright" today, spending majority of time in room and hallway pacing with minimal interaction with peers. Denies SI/HI. Denies hallucinations. Appetite adequate. Medication complaint. Remains calm and cooperative. Safety precautions remain in place.  "

## 2022-07-15 NOTE — PLAN OF CARE
Problem: Adult Behavioral Health Plan of Care  Goal: Optimized Coping Skills in Response to Life Stressors  Outcome: Ongoing, Progressing     Group Note:    Pt is isolating in room, in bed refusing to attend group. Pt was  in room talking to self and curing. Pt was very agitated.  PLPC provided 1:1 with pt to dicuss group. Staff encouraged pt to meet with staff at a later time to initatie group discussion.

## 2022-07-15 NOTE — NURSING
Pt agitated due to not being able to be discharged tomorrow. Despite educating pt that he needs to be tapered off the Ativan, pt continues to become increasingly agitated. PRN Benadryl, Ativan, and Haldol administered IM  For relief of agitation.

## 2022-07-16 LAB
ALBUMIN SERPL BCP-MCNC: 3.6 G/DL (ref 3.5–5.2)
ALP SERPL-CCNC: 67 U/L (ref 55–135)
ALT SERPL W/O P-5'-P-CCNC: 60 U/L (ref 10–44)
ANION GAP SERPL CALC-SCNC: 9 MMOL/L (ref 8–16)
AST SERPL-CCNC: 41 U/L (ref 10–40)
BILIRUB SERPL-MCNC: 0.6 MG/DL (ref 0.1–1)
BUN SERPL-MCNC: 21 MG/DL (ref 6–20)
CALCIUM SERPL-MCNC: 9.2 MG/DL (ref 8.7–10.5)
CHLORIDE SERPL-SCNC: 104 MMOL/L (ref 95–110)
CO2 SERPL-SCNC: 25 MMOL/L (ref 23–29)
CREAT SERPL-MCNC: 0.8 MG/DL (ref 0.5–1.4)
EST. GFR  (AFRICAN AMERICAN): >60 ML/MIN/1.73 M^2
EST. GFR  (NON AFRICAN AMERICAN): >60 ML/MIN/1.73 M^2
GLUCOSE SERPL-MCNC: 79 MG/DL (ref 70–110)
POTASSIUM SERPL-SCNC: 4.5 MMOL/L (ref 3.5–5.1)
PROT SERPL-MCNC: 7.3 G/DL (ref 6–8.4)
SODIUM SERPL-SCNC: 138 MMOL/L (ref 136–145)
VALPROATE SERPL-MCNC: 57.2 UG/ML (ref 50–100)

## 2022-07-16 PROCEDURE — 25000003 PHARM REV CODE 250: Performed by: STUDENT IN AN ORGANIZED HEALTH CARE EDUCATION/TRAINING PROGRAM

## 2022-07-16 PROCEDURE — 80053 COMPREHEN METABOLIC PANEL: CPT | Performed by: STUDENT IN AN ORGANIZED HEALTH CARE EDUCATION/TRAINING PROGRAM

## 2022-07-16 PROCEDURE — S4991 NICOTINE PATCH NONLEGEND: HCPCS | Performed by: STUDENT IN AN ORGANIZED HEALTH CARE EDUCATION/TRAINING PROGRAM

## 2022-07-16 PROCEDURE — 99232 SBSQ HOSP IP/OBS MODERATE 35: CPT | Mod: ,,, | Performed by: PSYCHIATRY & NEUROLOGY

## 2022-07-16 PROCEDURE — 11400000 HC PSYCH PRIVATE ROOM

## 2022-07-16 PROCEDURE — 99232 PR SUBSEQUENT HOSPITAL CARE,LEVL II: ICD-10-PCS | Mod: ,,, | Performed by: PSYCHIATRY & NEUROLOGY

## 2022-07-16 PROCEDURE — 36415 COLL VENOUS BLD VENIPUNCTURE: CPT | Performed by: STUDENT IN AN ORGANIZED HEALTH CARE EDUCATION/TRAINING PROGRAM

## 2022-07-16 PROCEDURE — 80164 ASSAY DIPROPYLACETIC ACD TOT: CPT | Performed by: STUDENT IN AN ORGANIZED HEALTH CARE EDUCATION/TRAINING PROGRAM

## 2022-07-16 RX ADMIN — LORAZEPAM 1 MG: 1 TABLET ORAL at 08:07

## 2022-07-16 RX ADMIN — LORAZEPAM 1 MG: 1 TABLET ORAL at 02:07

## 2022-07-16 RX ADMIN — NICOTINE 1 PATCH: 14 PATCH, EXTENDED RELEASE TRANSDERMAL at 08:07

## 2022-07-16 RX ADMIN — GABAPENTIN 600 MG: 300 CAPSULE ORAL at 08:07

## 2022-07-16 RX ADMIN — BUPROPION HYDROCHLORIDE 150 MG: 150 TABLET, EXTENDED RELEASE ORAL at 08:07

## 2022-07-16 RX ADMIN — QUETIAPINE FUMARATE 200 MG: 200 TABLET ORAL at 08:07

## 2022-07-16 RX ADMIN — GABAPENTIN 600 MG: 300 CAPSULE ORAL at 02:07

## 2022-07-16 RX ADMIN — Medication 9 MG: at 08:07

## 2022-07-16 RX ADMIN — RISPERIDONE 2 MG: 2 TABLET ORAL at 08:07

## 2022-07-16 RX ADMIN — DIVALPROEX SODIUM 1000 MG: 500 TABLET, FILM COATED, EXTENDED RELEASE ORAL at 08:07

## 2022-07-16 NOTE — PLAN OF CARE
Lying quietly in bed, eyes closed, respirations even, unlabored. Apparently asleep. Slept 3.5 hours thus far with multiple interruptions. Safety precautions maintained. Rounds done every 15 minutes. Bed is fixed in low position and room is uncluttered and pathways are clear.

## 2022-07-16 NOTE — PLAN OF CARE
"Pt states that he feels "alright", spending majority of time in room resting with RR E/U with minimal interaction with peers. Denies SI/HI. Denies hallucinations. Appetite adequate. Medication complaint. Remains calm and cooperative. Safety precautions remain in place.  "

## 2022-07-16 NOTE — PROGRESS NOTES
PSYCHIATRY DAILY INPATIENT PROGRESS NOTE  SUBSEQUENT HOSPITAL VISIT    ENCOUNTER DATE: 7/16/2022  S[facility]    DATE OF ADMISSION: 7/11/2022  5:46 PM  LENGTH OF STAY: 5 days      CHIEF COMPLAINT   Leighton Carroll is a 42 y.o. male, seen during daily somers rounds on the inpatient unit.  Leighton Carroll presented with the chief complaint of SI, overdose, psychosis      The patient was seen and examined. The chart was reviewed.     Reviewed notes from Rns and labs from the last 24 hours.    The patient's case was discussed with the treatment team/care providers today including Rns, CTRS and LPC    Staff reports severe (requiring PRN medications for non redirectable, agitated behavior in order to prevent harm to self or others) behavioral or management issues.     The patient has been compliant with treatment.      Subjective 07/16/2022       Today the patient is calm and cooperative. Reports he is feeling alright this morning, and is asking about discharge. Discussed Ativan taper with pt.  Pt reports no issues with his medication. States he slept well last night. Mood is improving.    Pt received IM PRN medication in yesterday afternoon for agitation.      He has a significant history of severe antisocial behavior resulting in long incarceration and severe substance abuse as well. He has a high tolerance to medications AEB his treatment response.    The patient denies any side effects to medications.    His symptoms of psychosis/king are not currently controlled.        Interim/overnight events per report/notes:    Per RNs:  Pt agitated due to not being able to be discharged tomorrow. Despite educating pt that he needs to be tapered off the Ativan, pt continues to become increasingly agitated. PRN Benadryl, Ativan, and Haldol administered IM  For relief of agitation.       Psychiatric ROS (observed, reported, or endorsed/denied):  Depressed mood - less  Interest/pleasure/anhedonia:  Yes  Guilt/hopelessness/worthlessness - No  Changes in Sleep - Yes  Changes in Appetite - No  Changes in Concentration - Yes  Changes in Energy - Yes  PMA/R- Yes  Suicidal- active/passive ideations - less  Homicidal ideations: active/passive ideations - No    Hallucinations - fluctuating  Delusions - less  Disorganized behavior - less  Disorganized speech - less  Negative symptoms - less    Elevated mood - No  Decreased need for sleep - Continuing  Grandiosity - No  Racing thoughts - No  Impulsivity - Continuing  Irritability- Continuing  Increased energy - Continuing  Distractibility - Continuing  Increase in goal-directed activity or PMA- Continuing    Symptoms of ARASELI - Continuing  Symptoms of Panic Disorder- No  Symptoms of PTSD - No        Overall progress: minimal improvement          Medical ROS  Review of Systems   Constitutional: Negative for chills and fever.   HENT: Negative for hearing loss.    Eyes: Negative for blurred vision and double vision.   Respiratory: Negative for shortness of breath.    Cardiovascular: Negative for chest pain and palpitations.   Gastrointestinal: Negative for constipation, diarrhea, nausea and vomiting.   Genitourinary: Negative for dysuria.   Musculoskeletal: Negative for back pain and neck pain.   Skin: Negative for rash.   Neurological: Negative for dizziness and headaches.   Endo/Heme/Allergies: Negative for environmental allergies.         PAST MEDICAL HISTORY   Past Medical History:   Diagnosis Date    Anxiety     Depression     Hepatitis C     Substance abuse     METH AND HEROIN           PSYCHOTROPIC MEDICATIONS   Scheduled Meds:   buPROPion  150 mg Oral Daily    divalproex ER  1,000 mg Oral Daily    gabapentin  600 mg Oral TID    LORazepam  1 mg Oral TID    nicotine  1 patch Transdermal Daily    QUEtiapine  200 mg Oral QHS    risperiDONE  2 mg Oral Daily     Continuous Infusions:  PRN Meds:.haloperidol lactate **AND** lorazepam **AND** diphenhydrAMINE,  lorazepam, melatonin        EXAMINATION    VITALS   Vitals:    07/15/22 0707 07/15/22 0800 07/15/22 2002 07/16/22 0742   BP: 117/71 117/71 130/77 111/70   BP Location: Right arm Right arm Left arm    Patient Position: Sitting Sitting Sitting    Pulse: 69 69 82 71   Resp: 16 16 20 18   Temp: 97.2 °F (36.2 °C) 97.2 °F (36.2 °C) 97.4 °F (36.3 °C) 97.4 °F (36.3 °C)   TempSrc:  Temporal     Weight:       Height:           Body mass index is 26.16 kg/m².        CONSTITUTIONAL  General Appearance: unremarkable, age appropriate, heavily tattoo'd    MUSCULOSKELETAL  Muscle Strength and Tone:no tremor, no tic  Abnormal Involuntary Movements: No  Gait and Station: non-ataxic    PSYCHIATRIC   Level of Consciousness: awake and alert   Orientation: person, place and situation  Grooming: Disheveled and Hospital garb  Psychomotor Behavior: reluctant to participate, uncooperative, hostile, psychomotor agitation  Speech: normal tone, normal rate, normal pitch, normal volume  Language: grossly intact  Mood: alright  Affect: Constricted and Inappropriate  Thought Process: circumstantial  Associations: intact   Thought Content: less SI, denies HI and no delusions  Perceptions: +AH and +VH  Memory: Able to recall past events, Remote intact and Recent intact  Attention:Easily distracted  Fund of Knowledge: Aware of current events and Vocabulary appropriate   Estimate if Intelligence:  Average based on work/education history, vocabulary and mental status exam  Insight: poor awareness of illness  Judgment: poor        DIAGNOSTIC TESTING   Laboratory Results  Recent Results (from the past 24 hour(s))   Valproic Acid    Collection Time: 07/16/22  7:13 AM   Result Value Ref Range    Valproic Acid Level 57.2 50.0 - 100.0 ug/mL   Comprehensive metabolic panel    Collection Time: 07/16/22  7:13 AM   Result Value Ref Range    Sodium 138 136 - 145 mmol/L    Potassium 4.5 3.5 - 5.1 mmol/L    Chloride 104 95 - 110 mmol/L    CO2 25 23 - 29 mmol/L     Glucose 79 70 - 110 mg/dL    BUN 21 (H) 6 - 20 mg/dL    Creatinine 0.8 0.5 - 1.4 mg/dL    Calcium 9.2 8.7 - 10.5 mg/dL    Total Protein 7.3 6.0 - 8.4 g/dL    Albumin 3.6 3.5 - 5.2 g/dL    Total Bilirubin 0.6 0.1 - 1.0 mg/dL    Alkaline Phosphatase 67 55 - 135 U/L    AST 41 (H) 10 - 40 U/L    ALT 60 (H) 10 - 44 U/L    Anion Gap 9 8 - 16 mmol/L    eGFR if African American >60 >60 mL/min/1.73 m^2    eGFR if non African American >60 >60 mL/min/1.73 m^2           MEDICAL DECISION MAKING        ASSESSMENT      Unspecified mood disorder  (pt unreliable historian)  Unspecified psychotic disorder (pt unreliable historian)  Suicidal ideations  Overdose of medications  Cannabis abuse  Amphetamine abuse  Alcohol abuse  Nicotine dependence  Psychosocial stressors     Elevated CK           PROBLEM LIST AND MANAGEMENT PLANS     Unspecified mood disorder  (pt unreliable historian)  - resumed/continue wellbutrin 150 mg PO qd today  - started/continue depakote 1000 mg PO qd and 500 mg PO qhs  -decreased to 1000 mg PO qd 2/2 pt c/o ASE (vague, low suspicion); VPA 57.2 on 7/16  - pt counseled  - follow up with outpatient mental health provider after discharge     Unspecified psychotic disorder (pt unreliable historian)  - started risperdal 1mg given PRN,  - increased to 1 mg with 2 mg PO  -changed to seroquel 100 mg PO qhs per patient's request and risperdal 2 mg PO qd (depakote and ativan should help to decrease seizure risk post overdose)  -increase to risperdal 2 mg PO qd and seroquel 200 mg PO qhs  - pt counseled  - follow up with outpatient mental health provider after discharge        Suicidal ideations  - reported per family, c/f pt minimization   - continue psychiatric hospitalization  - provide psychotherapeutic interventions and medication management  - monitor     Overdose of medications  - FM consulted  - repeat EKG qtc WNL  - continue psychiatric hospitalization  - provide psychotherapeutic interventions and medication  management  - monitor     Cannabis abuse  - SW consulted for assistance with additional resources   - pt counseled  - follow up with outpatient mental health provider after discharge        Amphetamine abuse  - SW consulted for assistance with additional resources   - pt counseled  - follow up with outpatient mental health provider after discharge     Alcohol abuse  Alcohol Brief Intervention     1. Discussed with patient that there is concern he/she is drinking at unhealthy levels known to increase his/her risk of alcohol-related health problems - Yes  2. Discussed general feedback on health risks associated with drinking and/or given personalized feedback - Yes  3. Patient was advised to abstain from alcohol use - Yes     - started ativan 1 mg PO TID for detox -increase to 2 mg PO TID  -decreased to 1 mg PO TID, will taper off as tolerated. Plan to taper tomorrow.   - started/continue gabapentin 300 mg PO BID -increase to 600 mg PO TID   - pt counseled  - follow up with outpatient mental health provider after discharge     Nicotine dependence  - started/continue nicotine patch 14 mg PO qd PRN        Psychosocial stressors  - pt counseled  - SW consulted for assistance with additional resources         Elevated CK  - FM consulted: Monitor CPK- trend , scheduled for am   Encourage fluids   - monitor   - recheck improved/WNL        Discussed diagnosis, risks and benefits of proposed treatment vs alternative treatments vs no treatment, potential side effects of these treatments and the inherent unpredictability of treatment. The patient expresses understanding of the above and displays the capacity to agree with this treatment given said understanding. Patient also agrees that, currently, the benefits outweigh the risks and would like to pursue/continue treatment at this time.      DISCHARGE PLANNING  Expected Disposition Plan: Home or Self Care      NEED FOR CONTINUED HOSPITALIZATION  Psychiatric illness continues to  pose a potential threat to life or bodily function, of self or others, thereby requiring the need for continued inpatient psychiatric hospitalization: Yes, due to: danger to self and danger to others, as evidenced by:  Ongoing concerns with Active HI/Threatening behavior. and Ongoing concerns with perceptual aberrancy and paranoid persecutory delusions leading to potential harm of self or others.    Protective inpatient pyschiatric hospitalization required while a safe disposition plan is enacted: Yes    Patient stabilized and ready for discharge from inpatient psychiatric unit: No        STAFF:   Bartolome Myrick MD  Psychiatry

## 2022-07-16 NOTE — PLAN OF CARE
"Isolating in room. Denies SI/HI/AH/VH. States "Im tired from yesterday. They gave me two shots because I was aggravated. Appetite good.   "

## 2022-07-17 PROCEDURE — 99232 PR SUBSEQUENT HOSPITAL CARE,LEVL II: ICD-10-PCS | Mod: ,,, | Performed by: PSYCHIATRY & NEUROLOGY

## 2022-07-17 PROCEDURE — S4991 NICOTINE PATCH NONLEGEND: HCPCS | Performed by: STUDENT IN AN ORGANIZED HEALTH CARE EDUCATION/TRAINING PROGRAM

## 2022-07-17 PROCEDURE — 25000003 PHARM REV CODE 250: Performed by: STUDENT IN AN ORGANIZED HEALTH CARE EDUCATION/TRAINING PROGRAM

## 2022-07-17 PROCEDURE — 25000003 PHARM REV CODE 250: Performed by: PSYCHIATRY & NEUROLOGY

## 2022-07-17 PROCEDURE — 11400000 HC PSYCH PRIVATE ROOM

## 2022-07-17 PROCEDURE — 99232 SBSQ HOSP IP/OBS MODERATE 35: CPT | Mod: ,,, | Performed by: PSYCHIATRY & NEUROLOGY

## 2022-07-17 RX ORDER — LORAZEPAM 0.5 MG/1
0.5 TABLET ORAL 3 TIMES DAILY
Status: DISCONTINUED | OUTPATIENT
Start: 2022-07-17 | End: 2022-07-18

## 2022-07-17 RX ADMIN — GABAPENTIN 600 MG: 300 CAPSULE ORAL at 08:07

## 2022-07-17 RX ADMIN — RISPERIDONE 2 MG: 2 TABLET ORAL at 08:07

## 2022-07-17 RX ADMIN — DIVALPROEX SODIUM 1000 MG: 500 TABLET, FILM COATED, EXTENDED RELEASE ORAL at 08:07

## 2022-07-17 RX ADMIN — LORAZEPAM 1 MG: 1 TABLET ORAL at 08:07

## 2022-07-17 RX ADMIN — QUETIAPINE FUMARATE 200 MG: 200 TABLET ORAL at 08:07

## 2022-07-17 RX ADMIN — NICOTINE 1 PATCH: 14 PATCH, EXTENDED RELEASE TRANSDERMAL at 08:07

## 2022-07-17 RX ADMIN — BUPROPION HYDROCHLORIDE 150 MG: 150 TABLET, EXTENDED RELEASE ORAL at 08:07

## 2022-07-17 RX ADMIN — LORAZEPAM 0.5 MG: 0.5 TABLET ORAL at 08:07

## 2022-07-17 RX ADMIN — Medication 9 MG: at 08:07

## 2022-07-17 RX ADMIN — LORAZEPAM 0.5 MG: 0.5 TABLET ORAL at 02:07

## 2022-07-17 RX ADMIN — GABAPENTIN 600 MG: 300 CAPSULE ORAL at 02:07

## 2022-07-17 NOTE — PROGRESS NOTES
PSYCHIATRY DAILY INPATIENT PROGRESS NOTE  SUBSEQUENT HOSPITAL VISIT    ENCOUNTER DATE: 7/17/2022  [facility]    DATE OF ADMISSION: 7/11/2022  5:46 PM  LENGTH OF STAY: 6 days      CHIEF COMPLAINT   Leighton Carroll is a 42 y.o. male, seen during daily somers rounds on the inpatient unit.  Leighton Carroll presented with the chief complaint of SI, overdose, psychosis      The patient was seen and examined. The chart was reviewed.     Reviewed notes from Rns and labs from the last 24 hours.    The patient's case was discussed with the treatment team/care providers today including Rns, CTRS and LPC    Staff reports severe (requiring PRN medications for non redirectable, agitated behavior in order to prevent harm to self or others) behavioral or management issues.     The patient has been compliant with treatment.      Subjective 07/17/2022       Today the patient is calm and cooperative. Reports he is feeling alright this morning.  Discussed Ativan taper with pt, and he is amenable to taper today.      Pt reports no issues with his medication. States he slept well last night. Mood is improving. He is somewhat guarded this morning.     He has a significant history of severe antisocial behavior resulting in long incarceration and severe substance abuse as well. He has a high tolerance to medications AEB his treatment response.    The patient denies any side effects to medications.    His symptoms of psychosis/king are not currently controlled.      Psychiatric ROS (observed, reported, or endorsed/denied):  Depressed mood - less  Interest/pleasure/anhedonia: Yes  Guilt/hopelessness/worthlessness - No  Changes in Sleep - Yes  Changes in Appetite - No  Changes in Concentration - Yes  Changes in Energy - Yes  PMA/R- Yes  Suicidal- active/passive ideations - less  Homicidal ideations: active/passive ideations - No    Hallucinations - fluctuating  Delusions - less  Disorganized behavior - less  Disorganized speech -  less  Negative symptoms - less    Elevated mood - No  Decreased need for sleep - Continuing  Grandiosity - No  Racing thoughts - No  Impulsivity - Continuing  Irritability- Continuing  Increased energy - Continuing  Distractibility - Continuing  Increase in goal-directed activity or PMA- Continuing    Symptoms of ARASELI - Continuing  Symptoms of Panic Disorder- No  Symptoms of PTSD - No        Overall progress: minimal improvement          Medical ROS  Review of Systems   Constitutional: Negative for chills and fever.   HENT: Negative for hearing loss.    Eyes: Negative for blurred vision and double vision.   Respiratory: Negative for shortness of breath.    Cardiovascular: Negative for chest pain and palpitations.   Gastrointestinal: Negative for constipation, diarrhea, nausea and vomiting.   Genitourinary: Negative for dysuria.   Musculoskeletal: Negative for back pain and neck pain.   Skin: Negative for rash.   Neurological: Negative for dizziness and headaches.   Endo/Heme/Allergies: Negative for environmental allergies.         PAST MEDICAL HISTORY   Past Medical History:   Diagnosis Date    Anxiety     Depression     Hepatitis C     Substance abuse     METH AND HEROIN           PSYCHOTROPIC MEDICATIONS   Scheduled Meds:   buPROPion  150 mg Oral Daily    divalproex ER  1,000 mg Oral Daily    gabapentin  600 mg Oral TID    LORazepam  1 mg Oral TID    nicotine  1 patch Transdermal Daily    QUEtiapine  200 mg Oral QHS    risperiDONE  2 mg Oral Daily     Continuous Infusions:  PRN Meds:.haloperidol lactate **AND** lorazepam **AND** diphenhydrAMINE, lorazepam, melatonin        EXAMINATION    VITALS   Vitals:    07/16/22 0742 07/16/22 1925 07/17/22 0715 07/17/22 0800   BP: 111/70 (!) 149/76 (!) 145/67    BP Location:  Right arm Left arm    Patient Position:  Sitting Sitting    Pulse: 71 96 100    Resp: 18 20 20    Temp: 97.4 °F (36.3 °C) 97.8 °F (36.6 °C) 97.5 °F (36.4 °C)    TempSrc:   Temporal    Weight:     76.6 kg (168 lb 14 oz)   Height:           Body mass index is 24.94 kg/m².        CONSTITUTIONAL  General Appearance: unremarkable, age appropriate, heavily tattoo'd    MUSCULOSKELETAL  Muscle Strength and Tone:no tremor, no tic  Abnormal Involuntary Movements: No  Gait and Station: non-ataxic    PSYCHIATRIC   Level of Consciousness: awake and alert   Orientation: person, place and situation  Grooming: Disheveled and Hospital garb  Psychomotor Behavior: reluctant to participate, uncooperative, hostile, psychomotor agitation  Speech: normal tone, normal rate, normal pitch, normal volume  Language: grossly intact  Mood: alright  Affect: Constricted and Inappropriate  Thought Process: circumstantial  Associations: intact   Thought Content: less SI, denies HI and no delusions  Perceptions: +AH and +VH  Memory: Able to recall past events, Remote intact and Recent intact  Attention:Easily distracted  Fund of Knowledge: Aware of current events and Vocabulary appropriate   Estimate if Intelligence:  Average based on work/education history, vocabulary and mental status exam  Insight: poor awareness of illness  Judgment: poor        DIAGNOSTIC TESTING   Laboratory Results  No results found for this or any previous visit (from the past 24 hour(s)).        MEDICAL DECISION MAKING        ASSESSMENT      Unspecified mood disorder  (pt unreliable historian)  Unspecified psychotic disorder (pt unreliable historian)  Suicidal ideations  Overdose of medications  Cannabis abuse  Amphetamine abuse  Alcohol abuse  Nicotine dependence  Psychosocial stressors     Elevated CK           PROBLEM LIST AND MANAGEMENT PLANS     Unspecified mood disorder  (pt unreliable historian)  - resumed/continue wellbutrin 150 mg PO qd today  - started/continue depakote 1000 mg PO qd and 500 mg PO qhs  -decreased to 1000 mg PO qd 2/2 pt c/o ASE (vague, low suspicion); VPA 57.2 on 7/16  - pt counseled  - follow up with outpatient mental health provider after  discharge     Unspecified psychotic disorder (pt unreliable historian)  - started risperdal 1mg given PRN,  - increased to 1 mg with 2 mg PO  -changed to seroquel 100 mg PO qhs per patient's request and risperdal 2 mg PO qd (depakote and ativan should help to decrease seizure risk post overdose)  -increase to risperdal 2 mg PO qd and seroquel 200 mg PO qhs  - pt counseled  - follow up with outpatient mental health provider after discharge        Suicidal ideations  - reported per family, c/f pt minimization   - continue psychiatric hospitalization  - provide psychotherapeutic interventions and medication management  - monitor     Overdose of medications  - FM consulted  - repeat EKG qtc WNL  - continue psychiatric hospitalization  - provide psychotherapeutic interventions and medication management  - monitor     Cannabis abuse  - SW consulted for assistance with additional resources   - pt counseled  - follow up with outpatient mental health provider after discharge        Amphetamine abuse  - SW consulted for assistance with additional resources   - pt counseled  - follow up with outpatient mental health provider after discharge     Alcohol abuse  Alcohol Brief Intervention     1. Discussed with patient that there is concern he/she is drinking at unhealthy levels known to increase his/her risk of alcohol-related health problems - Yes  2. Discussed general feedback on health risks associated with drinking and/or given personalized feedback - Yes  3. Patient was advised to abstain from alcohol use - Yes     - started ativan 1 mg PO TID for detox -increase to 2 mg PO TID  -decreased to 1 mg PO TID, will taper off as tolerated. --> Taper to 0.5mg PO TID on 7/17.   - started/continue gabapentin 300 mg PO BID -increase to 600 mg PO TID   - pt counseled  - follow up with outpatient mental health provider after discharge     Nicotine dependence  - started/continue nicotine patch 14 mg PO qd PRN        Psychosocial  stressors  - pt counseled  - SW consulted for assistance with additional resources         Elevated CK  - FM consulted: Monitor CPK- trend , scheduled for am   Encourage fluids   - monitor   - recheck improved/WNL        Discussed diagnosis, risks and benefits of proposed treatment vs alternative treatments vs no treatment, potential side effects of these treatments and the inherent unpredictability of treatment. The patient expresses understanding of the above and displays the capacity to agree with this treatment given said understanding. Patient also agrees that, currently, the benefits outweigh the risks and would like to pursue/continue treatment at this time.      DISCHARGE PLANNING  Expected Disposition Plan: Home or Self Care      NEED FOR CONTINUED HOSPITALIZATION  Psychiatric illness continues to pose a potential threat to life or bodily function, of self or others, thereby requiring the need for continued inpatient psychiatric hospitalization: Yes, due to: danger to self and danger to others, as evidenced by:  Ongoing concerns with Active HI/Threatening behavior. and Ongoing concerns with perceptual aberrancy and paranoid persecutory delusions leading to potential harm of self or others.    Protective inpatient pyschiatric hospitalization required while a safe disposition plan is enacted: Yes    Patient stabilized and ready for discharge from inpatient psychiatric unit: No        STAFF:   Bartolome Myrick MD  Psychiatry

## 2022-07-17 NOTE — PLAN OF CARE
Plan of care reviewed. Denies intent to harm self or others at this time. Accepts snacks and medications. Gait steady, no falls. Limited interactions noted with staff and peers. Patient denies having hallucinations and delusions. Patient is cooperative. Promoted individualized safety plan, reality-based interactions, effective coping strategies, and impulse control. Will continue precautions and monitor for safety.

## 2022-07-17 NOTE — PLAN OF CARE
Pt making needs known,no distress noted,minimal interaction with others,restricted affect,accepting meds and snacks, performing ADLs,denies hallucinations no outward s/s of responding to internal stimuli noted.

## 2022-07-18 VITALS
BODY MASS INDEX: 25.01 KG/M2 | RESPIRATION RATE: 16 BRPM | HEART RATE: 87 BPM | WEIGHT: 168.88 LBS | SYSTOLIC BLOOD PRESSURE: 120 MMHG | DIASTOLIC BLOOD PRESSURE: 65 MMHG | TEMPERATURE: 97 F | HEIGHT: 69 IN

## 2022-07-18 PROCEDURE — 25000003 PHARM REV CODE 250: Performed by: PSYCHIATRY & NEUROLOGY

## 2022-07-18 PROCEDURE — 99239 HOSP IP/OBS DSCHRG MGMT >30: CPT | Mod: ,,, | Performed by: STUDENT IN AN ORGANIZED HEALTH CARE EDUCATION/TRAINING PROGRAM

## 2022-07-18 PROCEDURE — 99239 PR HOSPITAL DISCHARGE DAY,>30 MIN: ICD-10-PCS | Mod: ,,, | Performed by: STUDENT IN AN ORGANIZED HEALTH CARE EDUCATION/TRAINING PROGRAM

## 2022-07-18 PROCEDURE — 25000003 PHARM REV CODE 250: Performed by: STUDENT IN AN ORGANIZED HEALTH CARE EDUCATION/TRAINING PROGRAM

## 2022-07-18 PROCEDURE — S4991 NICOTINE PATCH NONLEGEND: HCPCS | Performed by: STUDENT IN AN ORGANIZED HEALTH CARE EDUCATION/TRAINING PROGRAM

## 2022-07-18 RX ORDER — LORAZEPAM 0.5 MG/1
TABLET ORAL
Qty: 2 TABLET | Refills: 0 | Status: ON HOLD | OUTPATIENT
Start: 2022-07-18 | End: 2022-09-15 | Stop reason: HOSPADM

## 2022-07-18 RX ORDER — QUETIAPINE FUMARATE 200 MG/1
200 TABLET, FILM COATED ORAL NIGHTLY
Qty: 30 TABLET | Refills: 0 | Status: SHIPPED | OUTPATIENT
Start: 2022-07-18 | End: 2023-07-18

## 2022-07-18 RX ORDER — RISPERIDONE 2 MG/1
2 TABLET ORAL DAILY
Qty: 30 TABLET | Refills: 0 | Status: SHIPPED | OUTPATIENT
Start: 2022-07-19 | End: 2023-07-19

## 2022-07-18 RX ORDER — LORAZEPAM 0.5 MG/1
0.5 TABLET ORAL 2 TIMES DAILY
Status: DISCONTINUED | OUTPATIENT
Start: 2022-07-18 | End: 2022-07-18 | Stop reason: HOSPADM

## 2022-07-18 RX ORDER — BUPROPION HYDROCHLORIDE 150 MG/1
150 TABLET ORAL DAILY
Qty: 30 TABLET | Refills: 0 | Status: SHIPPED | OUTPATIENT
Start: 2022-07-19 | End: 2023-07-19

## 2022-07-18 RX ORDER — GABAPENTIN 300 MG/1
600 CAPSULE ORAL 3 TIMES DAILY
Qty: 180 CAPSULE | Refills: 0 | Status: ON HOLD | OUTPATIENT
Start: 2022-07-18 | End: 2022-09-15 | Stop reason: HOSPADM

## 2022-07-18 RX ORDER — DIVALPROEX SODIUM 500 MG/1
1000 TABLET, FILM COATED, EXTENDED RELEASE ORAL DAILY
Qty: 60 TABLET | Refills: 0 | Status: SHIPPED | OUTPATIENT
Start: 2022-07-19 | End: 2023-07-19

## 2022-07-18 RX ADMIN — RISPERIDONE 2 MG: 2 TABLET ORAL at 08:07

## 2022-07-18 RX ADMIN — GABAPENTIN 600 MG: 300 CAPSULE ORAL at 08:07

## 2022-07-18 RX ADMIN — LORAZEPAM 0.5 MG: 0.5 TABLET ORAL at 08:07

## 2022-07-18 RX ADMIN — GABAPENTIN 600 MG: 300 CAPSULE ORAL at 02:07

## 2022-07-18 RX ADMIN — BUPROPION HYDROCHLORIDE 150 MG: 150 TABLET, EXTENDED RELEASE ORAL at 08:07

## 2022-07-18 RX ADMIN — NICOTINE 1 PATCH: 14 PATCH, EXTENDED RELEASE TRANSDERMAL at 08:07

## 2022-07-18 RX ADMIN — DIVALPROEX SODIUM 1000 MG: 500 TABLET, FILM COATED, EXTENDED RELEASE ORAL at 08:07

## 2022-07-18 NOTE — PROGRESS NOTES
"   07/18/22 1240   [facility] Group Therapy   Group Name Other  (positive steps to wellbeing)   Specific Interventions Coping Skills Training   Participation Level Appropriate   Participation Quality Cooperative   Insight/Motivation Improved   Affect/Mood Display Appropriate   Cognition Alert   Psychomotor WNL   Patient reports a "good" mood, states " the medication is helping, without the medicine it's like a storm or screaming in my head. When I get high it calms me down. When I take the medicine it calms me down." "I had time to think and review some of the choices I made and could have made a different choice and got a better solution.   "

## 2022-07-18 NOTE — PSYCH
Called [employer] at [phone] for discharge ride.  Spoke to [place] and was given a ride number of [id number].  3 hour window is at 5:00 pm.

## 2022-07-18 NOTE — PLAN OF CARE
Problem: Adult Behavioral Health Plan of Care  Goal: Optimized Coping Skills in Response to Life Stressors  Outcome: Ongoing, Progressing       Group Note:    Pt is isolating in room, in bed refusing to attend group. Pt was in resting quietly in bed. [facility] provided 1:1 with pt to dicuss group. Patient states he felt tired. Staff encouraged pt to meeet with staff at a later time to initatie group discussion.

## 2022-07-18 NOTE — PSYCH
Patient will be following up with [facility] at [address] [place] [address] in [place], [phone].  Appointment will be on 7/20/2022 at 9 am.  Patient will receive tobacco cessation therapy and addictions counseling along with substance abuse therapy due to a positive UDS on admit.  AVS faxed to [phone] on 7/18/2022 at 3:14 pm.

## 2022-07-18 NOTE — NURSING
AVS and discharge information reviewed with pt. Prescriptions reviewed and educated pt on how to safely take medications. Follow up appointment scheduled and reviewed with pt. Smoking cessation education provided. Pt also encouraged and educated on drug cessation. Informed pt that if any worsening symptoms or thoughts begin, to return to ED/BHU for further evaluation. Pt verbalizes understanding for all information/education provided. Safety precautions remain in place until pt departure.

## 2022-07-18 NOTE — PLAN OF CARE
Lying quietly in bed, eyes closed, respirations even, unlabored. Apparently asleep. Slept 6 hours thus far with 2 interruptions. Safety precautions maintained. Rounds done every 15 minutes. Bed is fixed in low position and room is uncluttered and pathways are clear.

## 2022-07-18 NOTE — PLAN OF CARE
Cooperative but slightly anxious. Slightly paranoid, pacing around room and with darting eye contact. Accepts meals and medications without difficulty. Interactions with staff and peers are guarded. Denies SI / HI and hallucinations at this time. Will continue to monitor for safety on unit.

## 2022-07-18 NOTE — DISCHARGE SUMMARY
"Discharge Summary  Psychiatry    Admit Date: 7/11/2022    Discharge Date and Time:  07/18/2022 2:48 PM    Attending Physician: Abebe Jorge III, MD     Discharge Provider: Abebe Jorge III    Reason for Admission:  CHIEF COMPLAINT   Leighton Carroll is a 42 y.o. male with a past psychiatric history of substance abuse, depression, anxiety currently admitted to the inpatient unit with the following chief complaint: thoughts of death/suicide and overdose    HPI   The patient was seen and examined. The chart was reviewed.     The patient presented to the ER on 7/11/2022 .     The patient was medically cleared and admitted to the U.     Per ED RN:  42 y.o. male presents to ER        Chief Complaint   Patient presents with    Ingestion       States took all buproprion hcl 300mg (approx 12 tablets) and gabapentin 300 mg (approx 72 capsules). Denies SI, HI, hallucinations.   . No acute distress noted.     States drank 1 fifth of alcohol yesterday and smokes weed regularly.  Took these pills around 3am-4am this am. States tooks pills because he became angry.  States was trying to take them to get "loaded."  Patient states he didn't want to come because he doesn't want to go to [facility]. States he does not want to die.  Endorses feeling dizzy and falling 8 times this morning.  Denies loss of consciousness     Per RN:  Spoke with Sasha from Poison Control who reported to get basic psych labs, monitor for serotonin syndrome, seizures, tremors, and hallucinations. If QRS >100 give Bicarb, if QTC >450 give mag. MD notified.     Per ED MD:  States took all buproprion hcl 300mg (approx 12 tablets) and gabapentin 300 mg (approx 72 capsules). Denies SI, HI, hallucinations.      42-year-old male presents after taking excess Wellbutrin and gabapentin today  Patient initially said that he felt he took the pills out of anger and is not suicidal  OPC arrived with reported suicidal ideation, off of all medications for psych  Patient is not " "angry, patient is not violent, aunt says he is depressed on OPC  Patient appears to be agitated but making complete sense on initial evaluation     Per RN:  Pt is resting in bed at this time and has slept maybe 1 hour.  Pt has been restless, pacing room back and forth from bed to window to bathroom.  Appears to be talking to responding to internal stimuli..       Psychiatric interview:  Patient states he was "drunk," and didn't realize how many pills he was taking. He states he was trying to take the pills "for energy." He states he was drinking because he was in [place]. Patient reports he has been doing well since leaving the hospital, denies any recent methamphetamine abuse. He states he has been compliant with outpatient treatment. He states he has a new job which is going well. He states his aunt has "autism," and the OCP/history that she provided is inaccurate. Patient discharged from this [facility] about 3 weeks ago.           Procedures Performed: * No surgery found *    Hospital Course:    Patient was admitted to the inpatient psychiatry unit after being medically cleared in the ED. Chart and labs were reviewed. The patient was stabilized as follows:      Unspecified mood disorder  (pt unreliable historian)  - resumed/continue wellbutrin 150 mg PO qd today  - started/continue depakote 1000 mg PO qd and 500 mg PO qhs  -decreased to 1000 mg PO qd 2/2 pt c/o ASE (vague, low suspicion); VPA 57.2 on 7/16  - pt counseled  - follow up with outpatient mental health provider after discharge     Unspecified psychotic disorder (pt unreliable historian)  - started risperdal 1mg given PRN,  - increased to 1 mg with 2 mg PO  -changed to seroquel 100 mg PO qhs per patient's request and risperdal 2 mg PO qd (depakote and ativan should help to decrease seizure risk post overdose)  -increase to risperdal 2 mg PO qd and seroquel 200 mg PO qhs, complete cross taper to monotherapy under care of outpatient provider  - pt " counseled  - follow up with outpatient mental health provider after discharge        Suicidal ideations  - resolved, no return of suicidality observed     Overdose of medications  - FM consulted  - repeat EKG qtc WNL  - continue psychiatric hospitalization  - provide psychotherapeutic interventions and medication management  - monitor     Cannabis abuse  - SW consulted for assistance with additional resources   - pt counseled  - follow up with outpatient mental health provider after discharge        Amphetamine abuse  - SW consulted for assistance with additional resources   - pt counseled  - follow up with outpatient mental health provider after discharge     Alcohol abuse  - refuses rehab    Alcohol Brief Intervention     1. Discussed with patient that there is concern he/she is drinking at unhealthy levels known to increase his/her risk of alcohol-related health problems - Yes  2. Discussed general feedback on health risks associated with drinking and/or given personalized feedback - Yes  3. Patient was advised to abstain from alcohol use - Yes     - started ativan 1 mg PO TID for detox -increase to 2 mg PO TID  -decreased to 1 mg PO TID, will taper off as tolerated. --> Taper to 0.5mg PO TID on 7/17 -BID today, once tomorrow then stop (complete outpatient)  - started/continue gabapentin 300 mg PO BID -increase to 600 mg PO TID   - pt counseled  - follow up with outpatient mental health provider after discharge     Nicotine dependence  - started/continue nicotine patch 14 mg PO qd PRN        Psychosocial stressors  - pt counseled  - SW consulted for assistance with additional resources         Elevated CK  - FM consulted: Monitor CPK- trend , scheduled for am   Encourage fluids   - monitor   - recheck improved/WNL              During hospitalization, the patient was encouraged to go to both groups and individual counseling. Patient was monitored for any side effects. A meeting was held with multidisciplinary team  prior to discharge and pt's diagnosis, current medications, and follow up were discussed. The patient has been compliant with treatment and can adequately attend to activities of daily living in an independent manner. The patient denies any side effects. The patient denies SI, HI, plan or intent for self harm or harm to others. The patient is no longer a danger to self or others nor gravely disabled disabled. He request to be discharged, declines further inpatient care. Patient discharged  in stable condition with scheduled outpatient follow up.      Discussed diagnosis, risks and benefits of proposed treatment vs alternative treatments vs no treatment, and potential side effects of these treatments.  The patient expresses understanding of the above and displays the capacity to agree with this treatment given said understanding.  Patient also agrees that, currently, the benefits outweigh the risks and would like to pursue treatment at this time.      Discharge MSE: stated age, casually dressed, well groomed.  No psychomotor agitation or retardation.  No abnormal involuntary movements.  Gait normal.  Speech normal, conversational.  Language fluent English. Mood fine.  Affect normal range, pleasant, euthymic.  Thought process linear.  Associations intact.  Denies suicidal or homicidal ideation.  Denies auditory hallucinations, paranoid ideation, ideas of reference.  Memory intact.  Attention intact.  Fund of knowledge intact.  Insight intact.  Judgment intact.  Alert and oriented to person, place, time.      Tobacco Usage:  Is patient a smoker? Yes  Does patient want prescription for Tobacco Cessation? Yes  Does patient want counseling for Tobacco Cessation? Yes    If patient would like to quit, then over the counter nicotine patch could be used. The patient could also follow up with his PCP or psychiatric provider for other alternatives.     Final Diagnoses:    Principal Problem: Unspecified mood disorder  (pt  unreliable historian)   Secondary Diagnoses:       Unspecified psychotic disorder (pt unreliable historian)  Suicidal ideations  Overdose of medications  Cannabis abuse  Amphetamine abuse  Alcohol abuse  Nicotine dependence  Psychosocial stressors     Elevated CK       Labs:  Admission on 07/11/2022   Component Date Value Ref Range Status    Hemoglobin A1C 07/11/2022 5.0  4.0 - 5.6 % Final    Estimated Avg Glucose 07/11/2022 97  68 - 131 mg/dL Final    CPK 07/13/2022 167  20 - 200 U/L Final    Valproic Acid Level 07/16/2022 57.2  50.0 - 100.0 ug/mL Final    Sodium 07/16/2022 138  136 - 145 mmol/L Final    Potassium 07/16/2022 4.5  3.5 - 5.1 mmol/L Final    Chloride 07/16/2022 104  95 - 110 mmol/L Final    CO2 07/16/2022 25  23 - 29 mmol/L Final    Glucose 07/16/2022 79  70 - 110 mg/dL Final    BUN 07/16/2022 21 (A) 6 - 20 mg/dL Final    Creatinine 07/16/2022 0.8  0.5 - 1.4 mg/dL Final    Calcium 07/16/2022 9.2  8.7 - 10.5 mg/dL Final    Total Protein 07/16/2022 7.3  6.0 - 8.4 g/dL Final    Albumin 07/16/2022 3.6  3.5 - 5.2 g/dL Final    Total Bilirubin 07/16/2022 0.6  0.1 - 1.0 mg/dL Final    Alkaline Phosphatase 07/16/2022 67  55 - 135 U/L Final    AST 07/16/2022 41 (A) 10 - 40 U/L Final    ALT 07/16/2022 60 (A) 10 - 44 U/L Final    Anion Gap 07/16/2022 9  8 - 16 mmol/L Final    eGFR if African American 07/16/2022 >60  >60 mL/min/1.73 m^2 Final    eGFR if non African American 07/16/2022 >60  >60 mL/min/1.73 m^2 Final   Admission on 07/11/2022, Discharged on 07/11/2022   Component Date Value Ref Range Status    Sodium 07/11/2022 141  136 - 145 mmol/L Final    Potassium 07/11/2022 5.0  3.5 - 5.1 mmol/L Final    Chloride 07/11/2022 105  95 - 110 mmol/L Final    CO2 07/11/2022 26  23 - 29 mmol/L Final    Glucose 07/11/2022 119 (A) 70 - 110 mg/dL Final    BUN 07/11/2022 14  6 - 20 mg/dL Final    Creatinine 07/11/2022 1.2  0.5 - 1.4 mg/dL Final    Calcium 07/11/2022 9.6  8.7 - 10.5 mg/dL  Final    Total Protein 07/11/2022 8.2  6.0 - 8.4 g/dL Final    Albumin 07/11/2022 4.1  3.5 - 5.2 g/dL Final    Total Bilirubin 07/11/2022 0.5  0.1 - 1.0 mg/dL Final    Alkaline Phosphatase 07/11/2022 82  55 - 135 U/L Final    AST 07/11/2022 56 (A) 10 - 40 U/L Final    ALT 07/11/2022 80 (A) 10 - 44 U/L Final    Anion Gap 07/11/2022 10  8 - 16 mmol/L Final    eGFR if African American 07/11/2022 >60  >60 mL/min/1.73 m^2 Final    eGFR if non African American 07/11/2022 >60  >60 mL/min/1.73 m^2 Final    WBC 07/11/2022 9.59  3.90 - 12.70 K/uL Final    RBC 07/11/2022 4.88  4.60 - 6.20 M/uL Final    Hemoglobin 07/11/2022 14.5  14.0 - 18.0 g/dL Final    Hematocrit 07/11/2022 43.9  40.0 - 54.0 % Final    MCV 07/11/2022 90  82 - 98 fL Final    MCH 07/11/2022 29.7  27.0 - 31.0 pg Final    MCHC 07/11/2022 33.0  32.0 - 36.0 g/dL Final    RDW 07/11/2022 15.3 (A) 11.5 - 14.5 % Final    Platelets 07/11/2022 384  150 - 450 K/uL Final    MPV 07/11/2022 8.9 (A) 9.2 - 12.9 fL Final    Immature Granulocytes 07/11/2022 0.5  0.0 - 0.5 % Final    Gran # (ANC) 07/11/2022 7.1  1.8 - 7.7 K/uL Final    Immature Grans (Abs) 07/11/2022 0.05 (A) 0.00 - 0.04 K/uL Final    Lymph # 07/11/2022 1.4  1.0 - 4.8 K/uL Final    Mono # 07/11/2022 0.9  0.3 - 1.0 K/uL Final    Eos # 07/11/2022 0.0  0.0 - 0.5 K/uL Final    Baso # 07/11/2022 0.04  0.00 - 0.20 K/uL Final    nRBC 07/11/2022 0  0 /100 WBC Final    Gran % 07/11/2022 74.5 (A) 38.0 - 73.0 % Final    Lymph % 07/11/2022 15.0 (A) 18.0 - 48.0 % Final    Mono % 07/11/2022 9.2  4.0 - 15.0 % Final    Eosinophil % 07/11/2022 0.4  0.0 - 8.0 % Final    Basophil % 07/11/2022 0.4  0.0 - 1.9 % Final    Differential Method 07/11/2022 Automated   Final    Troponin I 07/11/2022 <0.006  0.000 - 0.026 ng/mL Final    CPK 07/11/2022 428 (A) 20 - 200 U/L Final    CPK 07/11/2022 428 (A) 20 - 200 U/L Final    CPK MB 07/11/2022 4.6  0.1 - 6.5 ng/mL Final    MB % 07/11/2022 1.1  0.0 - 5.0  % Final    Acetaminophen (Tylenol), Serum 07/11/2022 <3.0 (A) 10.0 - 20.0 ug/mL Final    SARS-CoV-2 RNA, Amplification, Qual 07/11/2022 Negative  Negative Final    Specimen UA 07/11/2022 Urine, Clean Catch   Final    Color, UA 07/11/2022 Yellow  Yellow, Straw, Susu Final    Appearance, UA 07/11/2022 Clear  Clear Final    pH, UA 07/11/2022 7.0  5.0 - 8.0 Final    Specific Gravity, UA 07/11/2022 1.025  1.005 - 1.030 Final    Protein, UA 07/11/2022 Negative  Negative Final    Glucose, UA 07/11/2022 Negative  Negative Final    Ketones, UA 07/11/2022 Negative  Negative Final    Bilirubin (UA) 07/11/2022 Negative  Negative Final    Occult Blood UA 07/11/2022 Trace (A) Negative Final    Nitrite, UA 07/11/2022 Negative  Negative Final    Urobilinogen, UA 07/11/2022 Negative  <2.0 EU/dL Final    Leukocytes, UA 07/11/2022 Negative  Negative Final    Benzodiazepines 07/11/2022 Negative  Negative Final    Methadone metabolites 07/11/2022 Negative  Negative Final    Cocaine (Metab.) 07/11/2022 Negative  Negative Final    Opiate Scrn, Ur 07/11/2022 Negative  Negative Final    Barbiturate Screen, Ur 07/11/2022 Negative  Negative Final    Amphetamine Screen, Ur 07/11/2022 Negative  Negative Final    THC 07/11/2022 Presumptive Positive (A) Negative Final    Phencyclidine 07/11/2022 Negative  Negative Final    Creatinine, Urine 07/11/2022 238.7  23.0 - 375.0 mg/dL Final    Toxicology Information 07/11/2022 SEE COMMENT   Final    Salicylate Lvl 07/11/2022 <5.0 (A) 15.0 - 30.0 mg/dL Final    TSH 07/11/2022 3.077  0.400 - 4.000 uIU/mL Final    Vit D, 25-Hydroxy 07/11/2022 53  30 - 96 ng/mL Final    Free T4 07/11/2022 0.89  0.71 - 1.51 ng/dL Final    Vitamin B-12 07/11/2022 540  210 - 950 pg/mL Final    Folate 07/11/2022 16.8  4.0 - 24.0 ng/mL Final    Alcohol, Serum 07/11/2022 <10  <10 mg/dL Final         Discharged Condition: stable and improved; not currently a danger to self/others or gravely  disabled    Disposition: Home or Self Care    Is patient being discharged on multiple neuroleptics? Yes  cross-taper to monotherapy in progress    Follow Up/Patient Instructions:     · Take all medications as prescribed.  · Attend all psychiatric and medical follow up appointments.   · Abstain from all drugs and alcohol.  · Call the crisis line at: [phone] for help in a crisis and emergent situations or call 911 and Return to ED for any acute worsening of your condition including suicidal or homicidal ideations      Discharge Procedure Orders   Notify your health care provider if you experience any of the following:  temperature >100.4     Notify your health care provider if you experience any of the following:  persistent nausea and vomiting or diarrhea     Notify your health care provider if you experience any of the following:   Order Comments: Suicidal thoughts, homicidal thoughts, or any other changes in mental status  If you would like immediate help/crisis counseling, please call [phone] (TALK). Through this toll-free phone number for a network of crisis centers across the country. These centers staff their lines with people who are trained to listen and offer support to people in emotional crisis. If you are in an emergency, please call 911.     Notify your health care provider if you experience any of the following:  persistent dizziness, light-headedness, or visual disturbances     Notify your health care provider if you experience any of the following:  increased confusion or weakness     Reason for not Prescribing Nicotine Replacement     Order Specific Question Answer Comments   Reason for not Prescribing: Other (specify)    Other (Specify): d      Activity as tolerated           Follow up apt: [facility]      Medications:  Reconciled Home Medications:      Medication List      START taking these medications    divalproex  MG Tb24  Commonly known as: DEPAKOTE  Take 2 tablets (1,000 mg  total) by mouth once daily.  Start taking on: July 19, 2022     LORazepam 0.5 MG tablet  Commonly known as: ATIVAN  Take 1 tablet at bedtime 7/18, one tablet in morning on 7/19 then stop     risperiDONE 2 MG tablet  Commonly known as: RISPERDAL  Take 1 tablet (2 mg total) by mouth once daily.  Start taking on: July 19, 2022        CHANGE how you take these medications    buPROPion 150 MG TB24 tablet  Commonly known as: WELLBUTRIN XL  Take 1 tablet (150 mg total) by mouth once daily.  Start taking on: July 19, 2022  What changed:   · medication strength  · how much to take        CONTINUE taking these medications    gabapentin 300 MG capsule  Commonly known as: NEURONTIN  Take 2 capsules (600 mg total) by mouth 3 (three) times daily.     nicotine 14 mg/24 hr  Commonly known as: NICODERM CQ  Place 1 patch onto the skin once daily.     QUEtiapine 200 MG Tab  Commonly known as: SEROQUEL  Take 1 tablet (200 mg total) by mouth every evening.              Diet: regular     Activity as tolerated    Total time spent discharging patient: 34 minutes    Abebe Jorge III, MD  Psychiatry

## 2022-07-18 NOTE — DISCHARGE INSTRUCTIONS
Attend follow up appointment and take medications as prescribed. If any worsening signs or symptoms occur, return to nearest hospital for further evaluation.

## 2022-07-18 NOTE — NURSING
Pt discharged off unit with family accompanied per myself. NADN. Belongings, discharge paperwork, prescriptions, and home medications sent with pt.

## 2022-07-18 NOTE — CARE UPDATE
[facility]  Encounter Date: 2022  5:46 PM    Discharge Date No discharge date for patient encounter.   Hospital Account: [id number]    MRN: [id number]   Guarantor: VALENTINA MITCHELL   Contact Serial #: [id number]         ENCOUNTER             Patient Class: IP Psych   Unit: [facility]*   Hospital Service: Psychiatry   Bed: 213   Admitting Provider: Abebe Jorge Iii, Md   Referring Physician:     Attending Provider: Abebe Jorge Iii, Md   Adm Diagnosis: Suicide attempt [T14.91X*      PATIENT                  Name: VALENTINA MITCHELL : 1980 (42 yrs)   Address: [address] [place] [address] Sex: Male   City: [place] [address]       Primary Care Provider: Primary Doctor No         Primary Phone: [phone]   EMERGENCY CONTACT   Contact Name Legal Guardian? Relationship to Patient Home Phone Work Phone Mobile Phone   1. Ana Maria Thomas  2. *No Contact Specified* No    Mother              [phone]      GUARANTOR                  Guarantor: VALENTINA MITCHELL       : 1980   Address: [address] [place] [address]    Sex: [place]     [place] [address] Guarantor  Type: B/H   Relation to Patient: Self         Home Phone: [phone]   Guarantor ID: [id number]         Work Phone:     GUARANTOR EMPLOYER     Employer:             Status: NOT EMPLO*      COVERAGE          PRIMARY INSURANCE   Payor / Plan: [place]ICA[employer]/[place] [employer]       Group Number:         Subscriber Name: VALENTINA MITCHELL Subscriber : 1980   Subscriber ID: [id number] Pat. Rel. to Subscriber: Self   Insurance Address:  O [address] [address]       SECONDARY INSURANCE   Payor / Plan: - No Secondary Coverage -       Group Number:         Subscriber Name:   Subscriber :     Subscriber ID:   Pat. Rel. to Subscriber:     Insurance Address:            Contact Serial # ([id number])         2022    Chart ID ([id number])

## 2022-07-18 NOTE — PLAN OF CARE
Plan of care reviewed. Denies intent to harm self or others at this time. Accepts snacks and medications. Gait steady, no falls. Limited interactions noted with staff and peers. Promoted individualized safety plan, reality-based interactions, effective coping strategies, and impulse control. Will continue precautions and monitor for safety.

## 2022-08-25 ENCOUNTER — HOSPITAL ENCOUNTER (EMERGENCY)
Facility: HOSPITAL | Age: 42
Discharge: ANOTHER HOSPITAL | End: 2022-08-26
Attending: STUDENT IN AN ORGANIZED HEALTH CARE EDUCATION/TRAINING PROGRAM
Payer: MEDICAID

## 2022-08-25 DIAGNOSIS — R06.02 SHORTNESS OF BREATH: ICD-10-CM

## 2022-08-25 DIAGNOSIS — Z78.9 INTUBATION OF AIRWAY PERFORMED WITHOUT DIFFICULTY: ICD-10-CM

## 2022-08-25 DIAGNOSIS — R05.9 COUGH: ICD-10-CM

## 2022-08-25 DIAGNOSIS — R65.20 SEPSIS WITH ACUTE HYPOXIC RESPIRATORY FAILURE WITHOUT SEPTIC SHOCK, DUE TO UNSPECIFIED ORGANISM: ICD-10-CM

## 2022-08-25 DIAGNOSIS — J18.9 SEPSIS DUE TO PNEUMONIA: Primary | ICD-10-CM

## 2022-08-25 DIAGNOSIS — R50.9 FEVER: ICD-10-CM

## 2022-08-25 DIAGNOSIS — F12.10 MILD TETRAHYDROCANNABINOL (THC) ABUSE: ICD-10-CM

## 2022-08-25 DIAGNOSIS — A41.9 SEPSIS: ICD-10-CM

## 2022-08-25 DIAGNOSIS — R06.89 HYPERCAPNIA: ICD-10-CM

## 2022-08-25 DIAGNOSIS — Z01.818 ENCOUNTER FOR INTUBATION: ICD-10-CM

## 2022-08-25 DIAGNOSIS — A41.9 SEPSIS WITH ACUTE HYPOXIC RESPIRATORY FAILURE WITHOUT SEPTIC SHOCK, DUE TO UNSPECIFIED ORGANISM: ICD-10-CM

## 2022-08-25 DIAGNOSIS — J96.01 SEPSIS WITH ACUTE HYPOXIC RESPIRATORY FAILURE WITHOUT SEPTIC SHOCK, DUE TO UNSPECIFIED ORGANISM: ICD-10-CM

## 2022-08-25 DIAGNOSIS — Z97.8 ENDOTRACHEALLY INTUBATED: ICD-10-CM

## 2022-08-25 DIAGNOSIS — A41.9 SEPSIS DUE TO PNEUMONIA: Primary | ICD-10-CM

## 2022-08-25 LAB
BASOPHILS # BLD AUTO: 0.04 K/UL (ref 0–0.2)
BASOPHILS NFR BLD: 0.2 % (ref 0–1.9)
DIFFERENTIAL METHOD: ABNORMAL
EOSINOPHIL # BLD AUTO: 0 K/UL (ref 0–0.5)
EOSINOPHIL NFR BLD: 0.1 % (ref 0–8)
ERYTHROCYTE [DISTWIDTH] IN BLOOD BY AUTOMATED COUNT: 14.5 % (ref 11.5–14.5)
HCT VFR BLD AUTO: 43.3 % (ref 40–54)
HGB BLD-MCNC: 14.1 G/DL (ref 14–18)
IMM GRANULOCYTES # BLD AUTO: 0.07 K/UL (ref 0–0.04)
IMM GRANULOCYTES NFR BLD AUTO: 0.3 % (ref 0–0.5)
LYMPHOCYTES # BLD AUTO: 0.5 K/UL (ref 1–4.8)
LYMPHOCYTES NFR BLD: 2.2 % (ref 18–48)
MCH RBC QN AUTO: 30 PG (ref 27–31)
MCHC RBC AUTO-ENTMCNC: 32.6 G/DL (ref 32–36)
MCV RBC AUTO: 92 FL (ref 82–98)
MONOCYTES # BLD AUTO: 0.5 K/UL (ref 0.3–1)
MONOCYTES NFR BLD: 2.6 % (ref 4–15)
NEUTROPHILS # BLD AUTO: 19 K/UL (ref 1.8–7.7)
NEUTROPHILS NFR BLD: 94.6 % (ref 38–73)
NRBC BLD-RTO: 0 /100 WBC
PLATELET # BLD AUTO: 403 K/UL (ref 150–450)
PMV BLD AUTO: 8.5 FL (ref 9.2–12.9)
RBC # BLD AUTO: 4.7 M/UL (ref 4.6–6.2)
WBC # BLD AUTO: 20.07 K/UL (ref 3.9–12.7)

## 2022-08-25 PROCEDURE — 99291 CRITICAL CARE FIRST HOUR: CPT | Mod: 25

## 2022-08-25 PROCEDURE — 83605 ASSAY OF LACTIC ACID: CPT | Performed by: STUDENT IN AN ORGANIZED HEALTH CARE EDUCATION/TRAINING PROGRAM

## 2022-08-25 PROCEDURE — 87651 STREP A DNA AMP PROBE: CPT | Performed by: STUDENT IN AN ORGANIZED HEALTH CARE EDUCATION/TRAINING PROGRAM

## 2022-08-25 PROCEDURE — 87502 INFLUENZA DNA AMP PROBE: CPT | Performed by: STUDENT IN AN ORGANIZED HEALTH CARE EDUCATION/TRAINING PROGRAM

## 2022-08-25 PROCEDURE — 85730 THROMBOPLASTIN TIME PARTIAL: CPT | Performed by: STUDENT IN AN ORGANIZED HEALTH CARE EDUCATION/TRAINING PROGRAM

## 2022-08-25 PROCEDURE — 87186 SC STD MICRODIL/AGAR DIL: CPT | Mod: 59 | Performed by: STUDENT IN AN ORGANIZED HEALTH CARE EDUCATION/TRAINING PROGRAM

## 2022-08-25 PROCEDURE — 93010 ELECTROCARDIOGRAM REPORT: CPT | Mod: ,,, | Performed by: INTERNAL MEDICINE

## 2022-08-25 PROCEDURE — 93010 EKG 12-LEAD: ICD-10-PCS | Mod: ,,, | Performed by: INTERNAL MEDICINE

## 2022-08-25 PROCEDURE — 84484 ASSAY OF TROPONIN QUANT: CPT | Performed by: STUDENT IN AN ORGANIZED HEALTH CARE EDUCATION/TRAINING PROGRAM

## 2022-08-25 PROCEDURE — 96365 THER/PROPH/DIAG IV INF INIT: CPT

## 2022-08-25 PROCEDURE — 99900035 HC TECH TIME PER 15 MIN (STAT)

## 2022-08-25 PROCEDURE — 27000221 HC OXYGEN, UP TO 24 HOURS

## 2022-08-25 PROCEDURE — 85025 COMPLETE CBC W/AUTO DIFF WBC: CPT | Performed by: STUDENT IN AN ORGANIZED HEALTH CARE EDUCATION/TRAINING PROGRAM

## 2022-08-25 PROCEDURE — 96360 HYDRATION IV INFUSION INIT: CPT

## 2022-08-25 PROCEDURE — U0002 COVID-19 LAB TEST NON-CDC: HCPCS | Performed by: STUDENT IN AN ORGANIZED HEALTH CARE EDUCATION/TRAINING PROGRAM

## 2022-08-25 PROCEDURE — 87077 CULTURE AEROBIC IDENTIFY: CPT | Performed by: STUDENT IN AN ORGANIZED HEALTH CARE EDUCATION/TRAINING PROGRAM

## 2022-08-25 PROCEDURE — 83880 ASSAY OF NATRIURETIC PEPTIDE: CPT | Performed by: STUDENT IN AN ORGANIZED HEALTH CARE EDUCATION/TRAINING PROGRAM

## 2022-08-25 PROCEDURE — 96366 THER/PROPH/DIAG IV INF ADDON: CPT

## 2022-08-25 PROCEDURE — 83735 ASSAY OF MAGNESIUM: CPT | Performed by: STUDENT IN AN ORGANIZED HEALTH CARE EDUCATION/TRAINING PROGRAM

## 2022-08-25 PROCEDURE — 63600175 PHARM REV CODE 636 W HCPCS: Performed by: STUDENT IN AN ORGANIZED HEALTH CARE EDUCATION/TRAINING PROGRAM

## 2022-08-25 PROCEDURE — 85610 PROTHROMBIN TIME: CPT | Performed by: STUDENT IN AN ORGANIZED HEALTH CARE EDUCATION/TRAINING PROGRAM

## 2022-08-25 PROCEDURE — 99292 CRITICAL CARE ADDL 30 MIN: CPT

## 2022-08-25 PROCEDURE — 87184 SC STD DISK METHOD PER PLATE: CPT | Performed by: STUDENT IN AN ORGANIZED HEALTH CARE EDUCATION/TRAINING PROGRAM

## 2022-08-25 PROCEDURE — 85379 FIBRIN DEGRADATION QUANT: CPT | Performed by: STUDENT IN AN ORGANIZED HEALTH CARE EDUCATION/TRAINING PROGRAM

## 2022-08-25 PROCEDURE — 87040 BLOOD CULTURE FOR BACTERIA: CPT | Performed by: STUDENT IN AN ORGANIZED HEALTH CARE EDUCATION/TRAINING PROGRAM

## 2022-08-25 PROCEDURE — 94660 CPAP INITIATION&MGMT: CPT

## 2022-08-25 PROCEDURE — 96367 TX/PROPH/DG ADDL SEQ IV INF: CPT

## 2022-08-25 PROCEDURE — 84100 ASSAY OF PHOSPHORUS: CPT | Performed by: STUDENT IN AN ORGANIZED HEALTH CARE EDUCATION/TRAINING PROGRAM

## 2022-08-25 PROCEDURE — 27000190 HC CPAP FULL FACE MASK W/VALVE

## 2022-08-25 PROCEDURE — 84145 PROCALCITONIN (PCT): CPT | Performed by: STUDENT IN AN ORGANIZED HEALTH CARE EDUCATION/TRAINING PROGRAM

## 2022-08-25 PROCEDURE — 93005 ELECTROCARDIOGRAM TRACING: CPT

## 2022-08-25 PROCEDURE — 80053 COMPREHEN METABOLIC PANEL: CPT | Performed by: STUDENT IN AN ORGANIZED HEALTH CARE EDUCATION/TRAINING PROGRAM

## 2022-08-25 RX ORDER — VANCOMYCIN 2 GRAM/500 ML IN 0.9 % SODIUM CHLORIDE INTRAVENOUS
2000 ONCE
Status: COMPLETED | OUTPATIENT
Start: 2022-08-26 | End: 2022-08-26

## 2022-08-25 RX ORDER — CEFEPIME HYDROCHLORIDE 1 G/50ML
2 INJECTION, SOLUTION INTRAVENOUS
Status: DISCONTINUED | OUTPATIENT
Start: 2022-08-26 | End: 2022-08-26

## 2022-08-25 RX ADMIN — CEFEPIME HYDROCHLORIDE 2 G: 2 INJECTION, SOLUTION INTRAVENOUS at 11:08

## 2022-08-25 RX ADMIN — SODIUM CHLORIDE, SODIUM LACTATE, POTASSIUM CHLORIDE, AND CALCIUM CHLORIDE 2286 ML: .6; .31; .03; .02 INJECTION, SOLUTION INTRAVENOUS at 11:08

## 2022-08-26 ENCOUNTER — HOSPITAL ENCOUNTER (INPATIENT)
Facility: OTHER | Age: 42
LOS: 20 days | Discharge: NURSING, REHAB OR LONG-TERM CARE | DRG: 870 | End: 2022-09-15
Attending: INTERNAL MEDICINE | Admitting: INTERNAL MEDICINE
Payer: MEDICAID

## 2022-08-26 VITALS
OXYGEN SATURATION: 100 % | RESPIRATION RATE: 22 BRPM | DIASTOLIC BLOOD PRESSURE: 59 MMHG | HEART RATE: 82 BPM | WEIGHT: 168 LBS | BODY MASS INDEX: 24.88 KG/M2 | HEIGHT: 69 IN | TEMPERATURE: 100 F | SYSTOLIC BLOOD PRESSURE: 103 MMHG

## 2022-08-26 DIAGNOSIS — R78.81 GRAM-NEGATIVE BACTEREMIA: ICD-10-CM

## 2022-08-26 DIAGNOSIS — J96.90 RESPIRATORY FAILURE: ICD-10-CM

## 2022-08-26 DIAGNOSIS — T17.908D ASPIRATION INTO AIRWAY, SUBSEQUENT ENCOUNTER: ICD-10-CM

## 2022-08-26 DIAGNOSIS — J15.1 PNEUMONIA OF BOTH LUNGS DUE TO PSEUDOMONAS SPECIES, UNSPECIFIED PART OF LUNG: Primary | ICD-10-CM

## 2022-08-26 DIAGNOSIS — J15.20 STAPHYLOCOCCAL PNEUMONIA: ICD-10-CM

## 2022-08-26 DIAGNOSIS — J96.01 ACUTE RESPIRATORY FAILURE WITH HYPOXIA AND HYPERCARBIA: ICD-10-CM

## 2022-08-26 DIAGNOSIS — J96.02 ACUTE RESPIRATORY FAILURE WITH HYPOXIA AND HYPERCARBIA: ICD-10-CM

## 2022-08-26 DIAGNOSIS — J80 ARDS (ADULT RESPIRATORY DISTRESS SYNDROME): ICD-10-CM

## 2022-08-26 DIAGNOSIS — J18.9 PNEUMONIA: ICD-10-CM

## 2022-08-26 DIAGNOSIS — J95.851 VENTILATOR ASSOCIATED PNEUMONIA: ICD-10-CM

## 2022-08-26 DIAGNOSIS — J15.212 PNEUMONIA DUE TO METHICILLIN RESISTANT STAPHYLOCOCCUS AUREUS (MRSA), UNSPECIFIED LATERALITY, UNSPECIFIED PART OF LUNG: ICD-10-CM

## 2022-08-26 DIAGNOSIS — R00.0 TACHYCARDIA: ICD-10-CM

## 2022-08-26 DIAGNOSIS — I49.9 ARRHYTHMIA: ICD-10-CM
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
## 2022-08-26 PROBLEM — Z91.89 HISTORY OF DRUG OVERDOSE: Status: ACTIVE | Noted: 2022-08-26

## 2022-08-26 PROBLEM — R65.20 SEVERE SEPSIS: Status: ACTIVE | Noted: 2022-08-26

## 2022-08-26 PROBLEM — R65.20 SEVERE SEPSIS: Status: RESOLVED | Noted: 2022-08-26 | Resolved: 2022-08-26

## 2022-08-26 PROBLEM — A41.9 SEVERE SEPSIS: Status: ACTIVE | Noted: 2022-08-26

## 2022-08-26 PROBLEM — A41.9 SEVERE SEPSIS: Status: RESOLVED | Noted: 2022-08-26 | Resolved: 2022-08-26

## 2022-08-26 LAB
ALBUMIN SERPL BCP-MCNC: 3.5 G/DL (ref 3.5–5.2)
ALLENS TEST: ABNORMAL
ALP SERPL-CCNC: 49 U/L (ref 55–135)
ALT SERPL W/O P-5'-P-CCNC: 52 U/L (ref 10–44)
AMPHET+METHAMPHET UR QL: NEGATIVE
ANION GAP SERPL CALC-SCNC: 13 MMOL/L (ref 8–16)
APTT BLDCRRT: 23.1 SEC (ref 21–32)
AST SERPL-CCNC: 51 U/L (ref 10–40)
AV INDEX (PROSTH): 0.91
AV MEAN GRADIENT: 4 MMHG
AV PEAK GRADIENT: 8 MMHG
AV VALVE AREA: 2.21 CM2
AV VELOCITY RATIO: 0.81
BARBITURATES UR QL SCN>200 NG/ML: NEGATIVE
BASOPHILS # BLD AUTO: 0.11 K/UL (ref 0–0.2)
BASOPHILS NFR BLD: 0.3 % (ref 0–1.9)
BENZODIAZ UR QL SCN>200 NG/ML: NEGATIVE
BILIRUB SERPL-MCNC: 0.3 MG/DL (ref 0.1–1)
BILIRUB UR QL STRIP: NEGATIVE
BNP SERPL-MCNC: 13 PG/ML (ref 0–99)
BSA FOR ECHO PROCEDURE: 2.05 M2
BUN SERPL-MCNC: 14 MG/DL (ref 6–20)
BZE UR QL SCN: NEGATIVE
CALCIUM SERPL-MCNC: 9.5 MG/DL (ref 8.7–10.5)
CANNABINOIDS UR QL SCN: ABNORMAL
CHLORIDE SERPL-SCNC: 107 MMOL/L (ref 95–110)
CK SERPL-CCNC: 479 U/L (ref 20–200)
CLARITY UR: CLEAR
CO2 SERPL-SCNC: 21 MMOL/L (ref 23–29)
COLOR UR: YELLOW
CREAT SERPL-MCNC: 1.3 MG/DL (ref 0.5–1.4)
CREAT UR-MCNC: 108.3 MG/DL (ref 23–375)
CV ECHO LV RWT: 0.44 CM
D DIMER PPP IA.FEU-MCNC: 3.68 MG/L FEU
DELSYS: ABNORMAL
DIFFERENTIAL METHOD: ABNORMAL
DOP CALC AO PEAK VEL: 1.43 M/S
DOP CALC AO VTI: 25.8 CM
DOP CALC LVOT AREA: 2.4 CM2
DOP CALC LVOT DIAMETER: 1.76 CM
DOP CALC LVOT PEAK VEL: 1.16 M/S
DOP CALC LVOT STROKE VOLUME: 57.14 CM3
DOP CALCLVOT PEAK VEL VTI: 23.5 CM
E WAVE DECELERATION TIME: 247.65 MSEC
E/A RATIO: 1.07
E/E' RATIO: 7.09 M/S
ECHO LV POSTERIOR WALL: 0.96 CM (ref 0.6–1.1)
EJECTION FRACTION: 50 %
EOSINOPHIL # BLD AUTO: 0 K/UL (ref 0–0.5)
EOSINOPHIL NFR BLD: 0.1 % (ref 0–8)
ERYTHROCYTE [DISTWIDTH] IN BLOOD BY AUTOMATED COUNT: 14.5 % (ref 11.5–14.5)
ERYTHROCYTE [SEDIMENTATION RATE] IN BLOOD BY WESTERGREN METHOD: 18 MM/H
EST. GFR  (NO RACE VARIABLE): >60 ML/MIN/1.73 M^2
ETHANOL UR-MCNC: <10 MG/DL
FIO2: 100 (ref 21–100)
FIO2: 100 (ref 21–100)
FIO2: 75 (ref 21–100)
FIO2: 80
FRACTIONAL SHORTENING: 23 % (ref 28–44)
GLUCOSE SERPL-MCNC: 71 MG/DL (ref 70–110)
GLUCOSE UR QL STRIP: NEGATIVE
GROUP A STREP, MOLECULAR: NEGATIVE
HCO3 UR-SCNC: 25.6 MMOL/L
HCO3 UR-SCNC: 27.6 MMOL/L
HCO3 UR-SCNC: 27.7 MMOL/L (ref 24–28)
HCO3 UR-SCNC: 28.5 MMOL/L
HCT VFR BLD AUTO: 39.1 % (ref 40–54)
HGB BLD-MCNC: 12.6 G/DL (ref 14–18)
HGB UR QL STRIP: NEGATIVE
HIV 1+2 AB+HIV1 P24 AG SERPL QL IA: NEGATIVE
IMM GRANULOCYTES # BLD AUTO: 0.17 K/UL (ref 0–0.04)
IMM GRANULOCYTES NFR BLD AUTO: 0.5 % (ref 0–0.5)
INFLUENZA A, MOLECULAR: NEGATIVE
INFLUENZA B, MOLECULAR: NEGATIVE
INR PPP: 1.1 (ref 0.8–1.2)
INTERVENTRICULAR SEPTUM: 0.86 CM (ref 0.6–1.1)
KETONES UR QL STRIP: NEGATIVE
LA MAJOR: 5.12 CM
LA MINOR: 4.7 CM
LA WIDTH: 3.9 CM
LACTATE SERPL-SCNC: 1.3 MMOL/L (ref 0.5–2.2)
LACTATE SERPL-SCNC: 2 MMOL/L (ref 0.5–2.2)
LACTATE SERPL-SCNC: 3 MMOL/L (ref 0.5–2.2)
LEFT ATRIUM SIZE: 3.33 CM
LEFT ATRIUM VOLUME INDEX MOD: 30.2 ML/M2
LEFT ATRIUM VOLUME INDEX: 26.8 ML/M2
LEFT ATRIUM VOLUME MOD: 61 CM3
LEFT ATRIUM VOLUME: 54.1 CM3
LEFT INTERNAL DIMENSION IN SYSTOLE: 3.36 CM (ref 2.1–4)
LEFT VENTRICLE DIASTOLIC VOLUME INDEX: 43.24 ML/M2
LEFT VENTRICLE DIASTOLIC VOLUME: 87.35 ML
LEFT VENTRICLE MASS INDEX: 64 G/M2
LEFT VENTRICLE SYSTOLIC VOLUME INDEX: 22.8 ML/M2
LEFT VENTRICLE SYSTOLIC VOLUME: 46.07 ML
LEFT VENTRICULAR INTERNAL DIMENSION IN DIASTOLE: 4.39 CM (ref 3.5–6)
LEFT VENTRICULAR MASS: 129.46 G
LEUKOCYTE ESTERASE UR QL STRIP: NEGATIVE
LIPASE SERPL-CCNC: 11 U/L (ref 4–60)
LV LATERAL E/E' RATIO: 6.5 M/S
LV SEPTAL E/E' RATIO: 7.8 M/S
LVOT MG: 2.8 MMHG
LVOT MV: 0.77 CM/S
LYMPHOCYTES # BLD AUTO: 1.7 K/UL (ref 1–4.8)
LYMPHOCYTES NFR BLD: 5 % (ref 18–48)
MAGNESIUM SERPL-MCNC: 2 MG/DL (ref 1.6–2.6)
MCH RBC QN AUTO: 30.1 PG (ref 27–31)
MCHC RBC AUTO-ENTMCNC: 32.2 G/DL (ref 32–36)
MCV RBC AUTO: 94 FL (ref 82–98)
METHADONE UR QL SCN>300 NG/ML: NEGATIVE
MODE: ABNORMAL
MONOCYTES # BLD AUTO: 1.3 K/UL (ref 0.3–1)
MONOCYTES NFR BLD: 3.9 % (ref 4–15)
MV PEAK A VEL: 0.73 M/S
MV PEAK E VEL: 0.78 M/S
MV STENOSIS PRESSURE HALF TIME: 71.82 MS
MV VALVE AREA P 1/2 METHOD: 3.06 CM2
NEUTROPHILS # BLD AUTO: 31.2 K/UL (ref 1.8–7.7)
NEUTROPHILS NFR BLD: 90.2 % (ref 38–73)
NITRITE UR QL STRIP: NEGATIVE
NRBC BLD-RTO: 0 /100 WBC
OPIATES UR QL SCN: NEGATIVE
PCO2 BLDA: 52 MMHG (ref 35–45)
PCO2 BLDA: 60.8 MMHG (ref 35–45)
PCO2 BLDA: 80 MMHG (ref 35–45)
PCO2 BLDA: 81 MMHG (ref 35–45)
PCP UR QL SCN>25 NG/ML: NEGATIVE
PEEP: 5
PH SMN: 7.14 [PH] (ref 7.35–7.45)
PH SMN: 7.16 [PH] (ref 7.35–7.45)
PH SMN: 7.27 [PH] (ref 7.35–7.45)
PH SMN: 7.3 [PH] (ref 7.35–7.45)
PH UR STRIP: 6 [PH] (ref 5–8)
PHOSPHATE SERPL-MCNC: 3.8 MG/DL (ref 2.7–4.5)
PISA MRMAX VEL: 2.33 M/S
PISA TR MAX VEL: 2.26 M/S
PLATELET # BLD AUTO: 379 K/UL (ref 150–450)
PMV BLD AUTO: 8.9 FL (ref 9.2–12.9)
PO2 BLDA: 145 MMHG (ref 75–100)
PO2 BLDA: 208 MMHG (ref 75–100)
PO2 BLDA: 82 MMHG (ref 80–100)
PO2 BLDA: 96 MMHG (ref 75–100)
POC BE: -1.5 MMOL/L (ref -2–2)
POC BE: -2 MMOL/L (ref -2–2)
POC BE: -3.3 MMOL/L (ref -2–2)
POC BE: 1 MMOL/L
POC COHB: 1.9 % (ref 0–3)
POC COHB: 1.9 % (ref 0–3)
POC COHB: 2.8 % (ref 0–3)
POC METHB: 0.9 % (ref 0–1.5)
POC METHB: 1 % (ref 0–1.5)
POC METHB: 1.2 % (ref 0–1.5)
POC O2HB ARTERIAL: 95.3 % (ref 94–100)
POC O2HB ARTERIAL: 96.3 % (ref 94–100)
POC O2HB ARTERIAL: 96.7 % (ref 94–100)
POC SATURATED O2: 94 % (ref 95–100)
POC SATURATED O2: 99 % (ref 90–100)
POC SATURATED O2: 99.4 % (ref 90–100)
POC SATURATED O2: 99.6 % (ref 90–100)
POC TCO2: 27.2 MMOL/L
POC TCO2: 30 MMOL/L (ref 23–27)
POC TCO2: 30.1 MMOL/L
POC TCO2: 31 MMOL/L
POC THB: 12.6 G/DL (ref 12–18)
POC THB: 12.6 G/DL (ref 12–18)
POC THB: 12.9 G/DL (ref 12–18)
POTASSIUM SERPL-SCNC: 4 MMOL/L (ref 3.5–5.1)
PROCALCITONIN SERPL IA-MCNC: 0.23 NG/ML
PROT SERPL-MCNC: 7.1 G/DL (ref 6–8.4)
PROT UR QL STRIP: NEGATIVE
PROTHROMBIN TIME: 11.3 SEC (ref 9–12.5)
RBC # BLD AUTO: 4.18 M/UL (ref 4.6–6.2)
RV TISSUE DOPPLER FREE WALL SYSTOLIC VELOCITY 1 (APICAL 4 CHAMBER VIEW): 0.01 CM/S
SAMPLE: ABNORMAL
SARS-COV-2 RDRP RESP QL NAA+PROBE: NEGATIVE
SITE: ABNORMAL
SODIUM SERPL-SCNC: 141 MMOL/L (ref 136–145)
SP GR UR STRIP: 1.01 (ref 1–1.03)
SPECIMEN SOURCE: NORMAL
TDI LATERAL: 0.12 M/S
TDI SEPTAL: 0.1 M/S
TDI: 0.11 M/S
TOXICOLOGY INFORMATION: ABNORMAL
TR MAX PG: 20 MMHG
TROPONIN I SERPL DL<=0.01 NG/ML-MCNC: <0.006 NG/ML (ref 0–0.03)
URN SPEC COLLECT METH UR: NORMAL
UROBILINOGEN UR STRIP-ACNC: NEGATIVE EU/DL
VT: 500
WBC # BLD AUTO: 34.58 K/UL (ref 3.9–12.7)

## 2022-08-26 PROCEDURE — 25000003 PHARM REV CODE 250

## 2022-08-26 PROCEDURE — 87205 SMEAR GRAM STAIN: CPT | Performed by: STUDENT IN AN ORGANIZED HEALTH CARE EDUCATION/TRAINING PROGRAM

## 2022-08-26 PROCEDURE — 99900026 HC AIRWAY MAINTENANCE (STAT)

## 2022-08-26 PROCEDURE — 63600175 PHARM REV CODE 636 W HCPCS: Performed by: INTERNAL MEDICINE

## 2022-08-26 PROCEDURE — 87899 AGENT NOS ASSAY W/OPTIC: CPT

## 2022-08-26 PROCEDURE — 87798 DETECT AGENT NOS DNA AMP: CPT | Mod: 59

## 2022-08-26 PROCEDURE — 27000221 HC OXYGEN, UP TO 24 HOURS

## 2022-08-26 PROCEDURE — 83605 ASSAY OF LACTIC ACID: CPT | Mod: 91

## 2022-08-26 PROCEDURE — 63600175 PHARM REV CODE 636 W HCPCS

## 2022-08-26 PROCEDURE — 25000003 PHARM REV CODE 250: Performed by: INTERNAL MEDICINE

## 2022-08-26 PROCEDURE — 96368 THER/DIAG CONCURRENT INF: CPT | Mod: 59

## 2022-08-26 PROCEDURE — 82803 BLOOD GASES ANY COMBINATION: CPT | Performed by: SURGERY

## 2022-08-26 PROCEDURE — 25000003 PHARM REV CODE 250: Performed by: STUDENT IN AN ORGANIZED HEALTH CARE EDUCATION/TRAINING PROGRAM

## 2022-08-26 PROCEDURE — 99900035 HC TECH TIME PER 15 MIN (STAT)

## 2022-08-26 PROCEDURE — 63700000 PHARM REV CODE 250 ALT 637 W/O HCPCS: Performed by: STUDENT IN AN ORGANIZED HEALTH CARE EDUCATION/TRAINING PROGRAM

## 2022-08-26 PROCEDURE — 94002 VENT MGMT INPAT INIT DAY: CPT

## 2022-08-26 PROCEDURE — 31500 INSERT EMERGENCY AIRWAY: CPT

## 2022-08-26 PROCEDURE — 96376 TX/PRO/DX INJ SAME DRUG ADON: CPT | Mod: 59

## 2022-08-26 PROCEDURE — 83690 ASSAY OF LIPASE: CPT

## 2022-08-26 PROCEDURE — 80307 DRUG TEST PRSMV CHEM ANLYZR: CPT

## 2022-08-26 PROCEDURE — 36600 WITHDRAWAL OF ARTERIAL BLOOD: CPT

## 2022-08-26 PROCEDURE — 82803 BLOOD GASES ANY COMBINATION: CPT

## 2022-08-26 PROCEDURE — 87389 HIV-1 AG W/HIV-1&-2 AB AG IA: CPT | Performed by: STUDENT IN AN ORGANIZED HEALTH CARE EDUCATION/TRAINING PROGRAM

## 2022-08-26 PROCEDURE — 96375 TX/PRO/DX INJ NEW DRUG ADDON: CPT | Mod: 59

## 2022-08-26 PROCEDURE — 51702 INSERT TEMP BLADDER CATH: CPT | Mod: 59

## 2022-08-26 PROCEDURE — 25500020 PHARM REV CODE 255: Performed by: STUDENT IN AN ORGANIZED HEALTH CARE EDUCATION/TRAINING PROGRAM

## 2022-08-26 PROCEDURE — 94002 VENT MGMT INPAT INIT DAY: CPT | Mod: 59

## 2022-08-26 PROCEDURE — 85025 COMPLETE CBC W/AUTO DIFF WBC: CPT

## 2022-08-26 PROCEDURE — 87070 CULTURE OTHR SPECIMN AEROBIC: CPT | Performed by: STUDENT IN AN ORGANIZED HEALTH CARE EDUCATION/TRAINING PROGRAM

## 2022-08-26 PROCEDURE — 20000000 HC ICU ROOM

## 2022-08-26 PROCEDURE — C9399 UNCLASSIFIED DRUGS OR BIOLOG: HCPCS

## 2022-08-26 PROCEDURE — 81003 URINALYSIS AUTO W/O SCOPE: CPT | Mod: 59 | Performed by: STUDENT IN AN ORGANIZED HEALTH CARE EDUCATION/TRAINING PROGRAM

## 2022-08-26 PROCEDURE — 96366 THER/PROPH/DIAG IV INF ADDON: CPT | Mod: 59

## 2022-08-26 PROCEDURE — 80307 DRUG TEST PRSMV CHEM ANLYZR: CPT | Performed by: STUDENT IN AN ORGANIZED HEALTH CARE EDUCATION/TRAINING PROGRAM

## 2022-08-26 PROCEDURE — 87633 RESP VIRUS 12-25 TARGETS: CPT

## 2022-08-26 PROCEDURE — 36415 COLL VENOUS BLD VENIPUNCTURE: CPT | Performed by: STUDENT IN AN ORGANIZED HEALTH CARE EDUCATION/TRAINING PROGRAM

## 2022-08-26 PROCEDURE — 82550 ASSAY OF CK (CPK): CPT

## 2022-08-26 PROCEDURE — 36569 INSJ PICC 5 YR+ W/O IMAGING: CPT

## 2022-08-26 PROCEDURE — 63600175 PHARM REV CODE 636 W HCPCS: Performed by: STUDENT IN AN ORGANIZED HEALTH CARE EDUCATION/TRAINING PROGRAM

## 2022-08-26 PROCEDURE — S4991 NICOTINE PATCH NONLEGEND: HCPCS

## 2022-08-26 PROCEDURE — 83605 ASSAY OF LACTIC ACID: CPT | Performed by: STUDENT IN AN ORGANIZED HEALTH CARE EDUCATION/TRAINING PROGRAM

## 2022-08-26 PROCEDURE — 76937 US GUIDE VASCULAR ACCESS: CPT

## 2022-08-26 PROCEDURE — 96367 TX/PROPH/DG ADDL SEQ IV INF: CPT | Mod: 59

## 2022-08-26 PROCEDURE — 96361 HYDRATE IV INFUSION ADD-ON: CPT | Mod: 59

## 2022-08-26 PROCEDURE — C1751 CATH, INF, PER/CENT/MIDLINE: HCPCS

## 2022-08-26 PROCEDURE — 27100171 HC OXYGEN HIGH FLOW UP TO 24 HOURS

## 2022-08-26 PROCEDURE — 94761 N-INVAS EAR/PLS OXIMETRY MLT: CPT

## 2022-08-26 PROCEDURE — 63600175 PHARM REV CODE 636 W HCPCS: Performed by: SURGERY

## 2022-08-26 PROCEDURE — 96365 THER/PROPH/DIAG IV INF INIT: CPT | Mod: 59

## 2022-08-26 PROCEDURE — 82803 BLOOD GASES ANY COMBINATION: CPT | Performed by: STUDENT IN AN ORGANIZED HEALTH CARE EDUCATION/TRAINING PROGRAM

## 2022-08-26 RX ORDER — DEXMEDETOMIDINE HYDROCHLORIDE 4 UG/ML
INJECTION INTRAVENOUS
Status: COMPLETED
Start: 2022-08-26 | End: 2022-08-26

## 2022-08-26 RX ORDER — MIDAZOLAM HYDROCHLORIDE 1 MG/ML
5 INJECTION INTRAMUSCULAR; INTRAVENOUS
Status: COMPLETED | OUTPATIENT
Start: 2022-08-26 | End: 2022-08-26

## 2022-08-26 RX ORDER — SODIUM CHLORIDE 0.9 % (FLUSH) 0.9 %
10 SYRINGE (ML) INJECTION
Status: DISCONTINUED | OUTPATIENT
Start: 2022-08-26 | End: 2022-09-15 | Stop reason: HOSPADM

## 2022-08-26 RX ORDER — VANCOMYCIN HCL IN 5 % DEXTROSE 1G/250ML
1000 PLASTIC BAG, INJECTION (ML) INTRAVENOUS
Status: DISCONTINUED | OUTPATIENT
Start: 2022-08-26 | End: 2022-08-26 | Stop reason: HOSPADM

## 2022-08-26 RX ORDER — DEXMEDETOMIDINE HYDROCHLORIDE 4 UG/ML
0-1.4 INJECTION, SOLUTION INTRAVENOUS CONTINUOUS
Status: DISCONTINUED | OUTPATIENT
Start: 2022-08-26 | End: 2022-08-27

## 2022-08-26 RX ORDER — ONDANSETRON 2 MG/ML
4 INJECTION INTRAMUSCULAR; INTRAVENOUS EVERY 8 HOURS PRN
Status: DISCONTINUED | OUTPATIENT
Start: 2022-08-26 | End: 2022-09-06

## 2022-08-26 RX ORDER — DEXMEDETOMIDINE HYDROCHLORIDE 4 UG/ML
0-1.4 INJECTION INTRAVENOUS CONTINUOUS
Status: DISCONTINUED | OUTPATIENT
Start: 2022-08-26 | End: 2022-08-26 | Stop reason: HOSPADM

## 2022-08-26 RX ORDER — NOREPINEPHRINE BITARTRATE/D5W 4MG/250ML
0-3 PLASTIC BAG, INJECTION (ML) INTRAVENOUS CONTINUOUS
Status: DISCONTINUED | OUTPATIENT
Start: 2022-08-26 | End: 2022-08-27

## 2022-08-26 RX ORDER — PROPOFOL 10 MG/ML
0-50 INJECTION, EMULSION INTRAVENOUS CONTINUOUS
Status: DISCONTINUED | OUTPATIENT
Start: 2022-08-26 | End: 2022-09-03

## 2022-08-26 RX ORDER — AMOXICILLIN 250 MG
1 CAPSULE ORAL 2 TIMES DAILY
Status: DISCONTINUED | OUTPATIENT
Start: 2022-08-26 | End: 2022-09-12

## 2022-08-26 RX ORDER — MIDAZOLAM HYDROCHLORIDE 5 MG/ML
INJECTION INTRAMUSCULAR; INTRAVENOUS
Status: COMPLETED
Start: 2022-08-26 | End: 2022-08-26

## 2022-08-26 RX ORDER — PROCHLORPERAZINE EDISYLATE 5 MG/ML
5 INJECTION INTRAMUSCULAR; INTRAVENOUS EVERY 6 HOURS PRN
Status: DISCONTINUED | OUTPATIENT
Start: 2022-08-26 | End: 2022-09-06

## 2022-08-26 RX ORDER — MIDAZOLAM HYDROCHLORIDE 1 MG/ML
5 INJECTION INTRAMUSCULAR; INTRAVENOUS
Status: DISCONTINUED | OUTPATIENT
Start: 2022-08-26 | End: 2022-08-26 | Stop reason: SDUPTHER

## 2022-08-26 RX ORDER — VANCOMYCIN HCL IN 5 % DEXTROSE 1G/250ML
1000 PLASTIC BAG, INJECTION (ML) INTRAVENOUS
Status: DISCONTINUED | OUTPATIENT
Start: 2022-08-26 | End: 2022-08-27

## 2022-08-26 RX ORDER — LORAZEPAM 2 MG/ML
2 INJECTION INTRAMUSCULAR
Status: COMPLETED | OUTPATIENT
Start: 2022-08-26 | End: 2022-08-26

## 2022-08-26 RX ORDER — SUCCINYLCHOLINE CHLORIDE 20 MG/ML
1 INJECTION INTRAMUSCULAR; INTRAVENOUS
Status: COMPLETED | OUTPATIENT
Start: 2022-08-26 | End: 2022-08-26

## 2022-08-26 RX ORDER — IBUPROFEN 200 MG
1 TABLET ORAL DAILY
Status: DISCONTINUED | OUTPATIENT
Start: 2022-08-26 | End: 2022-09-15 | Stop reason: HOSPADM

## 2022-08-26 RX ORDER — CEFEPIME HYDROCHLORIDE 1 G/50ML
2 INJECTION, SOLUTION INTRAVENOUS
Status: DISCONTINUED | OUTPATIENT
Start: 2022-08-26 | End: 2022-08-26 | Stop reason: HOSPADM

## 2022-08-26 RX ORDER — POLYETHYLENE GLYCOL 3350 17 G/17G
17 POWDER, FOR SOLUTION ORAL DAILY
Status: DISCONTINUED | OUTPATIENT
Start: 2022-08-26 | End: 2022-09-12

## 2022-08-26 RX ORDER — PROPOFOL 10 MG/ML
INJECTION, EMULSION INTRAVENOUS
Status: COMPLETED
Start: 2022-08-26 | End: 2022-08-26

## 2022-08-26 RX ORDER — SUCCINYLCHOLINE CHLORIDE 20 MG/ML
INJECTION INTRAMUSCULAR; INTRAVENOUS
Status: COMPLETED
Start: 2022-08-26 | End: 2022-08-26

## 2022-08-26 RX ORDER — NOREPINEPHRINE BITARTRATE/D5W 4MG/250ML
PLASTIC BAG, INJECTION (ML) INTRAVENOUS
Status: COMPLETED
Start: 2022-08-26 | End: 2022-08-26

## 2022-08-26 RX ORDER — ROCURONIUM BROMIDE 10 MG/ML
INJECTION, SOLUTION INTRAVENOUS
Status: DISCONTINUED
Start: 2022-08-26 | End: 2022-08-26 | Stop reason: WASHOUT

## 2022-08-26 RX ORDER — AZITHROMYCIN 250 MG/1
500 TABLET, FILM COATED ORAL ONCE
Status: COMPLETED | OUTPATIENT
Start: 2022-08-26 | End: 2022-08-26

## 2022-08-26 RX ORDER — ETOMIDATE 2 MG/ML
INJECTION INTRAVENOUS
Status: COMPLETED
Start: 2022-08-26 | End: 2022-08-26

## 2022-08-26 RX ORDER — SODIUM CHLORIDE 9 MG/ML
1000 INJECTION, SOLUTION INTRAVENOUS
Status: COMPLETED | OUTPATIENT
Start: 2022-08-26 | End: 2022-08-26

## 2022-08-26 RX ORDER — PROPOFOL 10 MG/ML
0-50 INJECTION, EMULSION INTRAVENOUS CONTINUOUS
Status: DISCONTINUED | OUTPATIENT
Start: 2022-08-26 | End: 2022-08-26 | Stop reason: HOSPADM

## 2022-08-26 RX ORDER — CEFEPIME HYDROCHLORIDE 1 G/50ML
2 INJECTION, SOLUTION INTRAVENOUS
Status: COMPLETED | OUTPATIENT
Start: 2022-08-26 | End: 2022-09-07

## 2022-08-26 RX ORDER — HYDROCODONE BITARTRATE AND ACETAMINOPHEN 10; 325 MG/1; MG/1
1 TABLET ORAL EVERY 6 HOURS PRN
Status: DISCONTINUED | OUTPATIENT
Start: 2022-08-26 | End: 2022-08-29

## 2022-08-26 RX ORDER — ENOXAPARIN SODIUM 100 MG/ML
40 INJECTION SUBCUTANEOUS EVERY 24 HOURS
Status: DISCONTINUED | OUTPATIENT
Start: 2022-08-26 | End: 2022-09-15 | Stop reason: HOSPADM

## 2022-08-26 RX ORDER — AZITHROMYCIN 250 MG/1
250 TABLET, FILM COATED ORAL DAILY
Status: DISCONTINUED | OUTPATIENT
Start: 2022-08-27 | End: 2022-08-29

## 2022-08-26 RX ORDER — ETOMIDATE 2 MG/ML
0.3 INJECTION INTRAVENOUS
Status: COMPLETED | OUTPATIENT
Start: 2022-08-26 | End: 2022-08-26

## 2022-08-26 RX ORDER — ACETAMINOPHEN 325 MG/1
650 TABLET ORAL EVERY 4 HOURS PRN
Status: DISCONTINUED | OUTPATIENT
Start: 2022-08-26 | End: 2022-09-15 | Stop reason: HOSPADM

## 2022-08-26 RX ORDER — LORAZEPAM 2 MG/ML
INJECTION INTRAMUSCULAR
Status: COMPLETED
Start: 2022-08-26 | End: 2022-08-26

## 2022-08-26 RX ORDER — ACETAMINOPHEN 500 MG
1000 TABLET ORAL EVERY 8 HOURS PRN
Status: DISCONTINUED | OUTPATIENT
Start: 2022-08-26 | End: 2022-08-30

## 2022-08-26 RX ORDER — FENTANYL CITRATE-0.9 % NACL/PF 10 MCG/ML
0-200 PLASTIC BAG, INJECTION (ML) INTRAVENOUS CONTINUOUS
Status: DISCONTINUED | OUTPATIENT
Start: 2022-08-26 | End: 2022-08-27

## 2022-08-26 RX ORDER — NOREPINEPHRINE BITARTRATE/D5W 4MG/250ML
0-3 PLASTIC BAG, INJECTION (ML) INTRAVENOUS CONTINUOUS
Status: DISCONTINUED | OUTPATIENT
Start: 2022-08-26 | End: 2022-08-26 | Stop reason: HOSPADM

## 2022-08-26 RX ADMIN — PROPOFOL 1000 MG: 10 INJECTION, EMULSION INTRAVENOUS at 12:08

## 2022-08-26 RX ADMIN — SUCCINYLCHOLINE CHLORIDE 76 MG: 20 INJECTION, SOLUTION INTRAMUSCULAR; INTRAVENOUS at 04:08

## 2022-08-26 RX ADMIN — CEFEPIME HYDROCHLORIDE 2 G: 2 INJECTION, SOLUTION INTRAVENOUS at 09:08

## 2022-08-26 RX ADMIN — ENOXAPARIN SODIUM 40 MG: 100 INJECTION SUBCUTANEOUS at 05:08

## 2022-08-26 RX ADMIN — NICOTINE 1 PATCH: 14 PATCH TRANSDERMAL at 03:08

## 2022-08-26 RX ADMIN — DEXMEDETOMIDINE HYDROCHLORIDE 0.1 MCG/KG/HR: 4 INJECTION, SOLUTION INTRAVENOUS at 05:08

## 2022-08-26 RX ADMIN — Medication 50 MCG/HR: at 11:08

## 2022-08-26 RX ADMIN — AZITHROMYCIN MONOHYDRATE 500 MG: 250 TABLET ORAL at 01:08

## 2022-08-26 RX ADMIN — PROPOFOL 5 MCG/KG/MIN: 10 INJECTION, EMULSION INTRAVENOUS at 04:08

## 2022-08-26 RX ADMIN — SODIUM CHLORIDE 1000 ML: 0.9 INJECTION, SOLUTION INTRAVENOUS at 06:08

## 2022-08-26 RX ADMIN — ETOMIDATE 22.8 MG: 2 INJECTION INTRAVENOUS at 04:08

## 2022-08-26 RX ADMIN — Medication 0.02 MCG/KG/MIN: at 06:08

## 2022-08-26 RX ADMIN — DEXMEDETOMIDINE HYDROCHLORIDE 1 MCG/KG/HR: 4 INJECTION, SOLUTION INTRAVENOUS at 12:08

## 2022-08-26 RX ADMIN — Medication 0.12 MCG/KG/MIN: at 03:08

## 2022-08-26 RX ADMIN — PROPOFOL 5 MCG/KG/MIN: 10 INJECTION, EMULSION INTRAVENOUS at 09:08

## 2022-08-26 RX ADMIN — Medication 0.2 MCG/KG/MIN: at 12:08

## 2022-08-26 RX ADMIN — VANCOMYCIN HYDROCHLORIDE 1000 MG: 1 INJECTION, POWDER, LYOPHILIZED, FOR SOLUTION INTRAVENOUS at 03:08

## 2022-08-26 RX ADMIN — PROPOFOL 50 MCG/KG/MIN: 10 INJECTION, EMULSION INTRAVENOUS at 11:08

## 2022-08-26 RX ADMIN — LORAZEPAM 2 MG: 2 INJECTION INTRAMUSCULAR at 05:08

## 2022-08-26 RX ADMIN — CEFEPIME HYDROCHLORIDE 2 G: 2 INJECTION, SOLUTION INTRAVENOUS at 05:08

## 2022-08-26 RX ADMIN — NOREPINEPHRINE BITARTRATE 0.02 MCG/KG/MIN: 4 INJECTION, SOLUTION INTRAVENOUS at 06:08

## 2022-08-26 RX ADMIN — DEXMEDETOMIDINE HYDROCHLORIDE 0.5 MCG/KG/HR: 4 INJECTION INTRAVENOUS at 04:08

## 2022-08-26 RX ADMIN — SUCCINYLCHOLINE CHLORIDE 76 MG: 20 INJECTION INTRAMUSCULAR; INTRAVENOUS at 04:08

## 2022-08-26 RX ADMIN — IOHEXOL 100 ML: 350 INJECTION, SOLUTION INTRAVENOUS at 02:08

## 2022-08-26 RX ADMIN — LORAZEPAM 2 MG: 2 INJECTION INTRAMUSCULAR; INTRAVENOUS at 05:08

## 2022-08-26 RX ADMIN — VANCOMYCIN 2 GRAM/500 ML IN 0.9 % SODIUM CHLORIDE INTRAVENOUS 2000 MG: at 12:08

## 2022-08-26 RX ADMIN — MIDAZOLAM HYDROCHLORIDE 5 MG: 1 INJECTION, SOLUTION INTRAMUSCULAR; INTRAVENOUS at 07:08

## 2022-08-26 RX ADMIN — PROPOFOL 20 MCG/KG/MIN: 10 INJECTION, EMULSION INTRAVENOUS at 12:08

## 2022-08-26 RX ADMIN — POLYETHYLENE GLYCOL 3350 17 G: 17 POWDER, FOR SOLUTION ORAL at 03:08

## 2022-08-26 RX ADMIN — ACETAMINOPHEN 650 MG: 325 TABLET, FILM COATED ORAL at 08:08

## 2022-08-26 RX ADMIN — MIDAZOLAM 2.5 MG: 5 INJECTION INTRAMUSCULAR; INTRAVENOUS at 09:08

## 2022-08-26 RX ADMIN — SENNOSIDES AND DOCUSATE SODIUM 1 TABLET: 50; 8.6 TABLET ORAL at 08:08

## 2022-08-26 NOTE — NURSING
Nurses Note -- 4 Eyes      8/26/2022   11:45AM      Skin assessed during: Admission to the Hospital    [] No Pressure Injuries Present    [x]Prevention Measures Documented     [x] Yes- Altered Skin Integrity Present or Discovered   [] LDA Added if Not in Epic (Describe Wound)   [x] New Altered Skin Integrity was Present on Admit and Documented in LDA   [] Wound Image Taken    Wound Care Consulted? No      Pt was noted to have several healing scabs.  Also, pt is noted to have a blanchable redness on the their bottom.     Attending Nurse:  Naveen Jensen RN     Second RN/Staff Member: Kerrie Soliz

## 2022-08-26 NOTE — ASSESSMENT & PLAN NOTE
With severe sepsis  Hx of drug overdose (buproprion and gabapentin), Polysubstance abuse (meth, heroin), nicotine abuse, chest compressions performed by girlfriend PTA.      Hypoxic to 60s on room air on EMS arrival, failed BiPAP 2/2 due to agitation, intubated at OSH. CTA with patchy perihilar opacities, leukocytosis, lactic acidosis, febrile to 101. Tox +ve for THC. D-dimer elevated but CTPA -ve for PE. Trops and EKG benign for cardiac cause. Covid, respiratory viral panel, Group A strep -ve. Lipase -ve. CXR -ve for pneumothorax.     Concern for aspiration vs community acquired pneumonia    - Consulted pulm crit care - appreciate recs  - Currently on vent support, 80% O2 and levophed   - Lactate improving (3>1.3)  - Cr elevated 1.3 from baseline of 0.8 (7/16/2022), judicious fluids for now >4L of fluids given in [place] with right small pleural effusion   - Repeat CXR - patchy opacities, small right pleural effusion  - Worsening leukocytosis (20>34), procal WNL  - Drop in H/H likely dilutional, no overt bleeding      Plan:   - Continue ventilatory support  - Continue IV Cefepime 2g qd, IV Vancomycin 1g qd and PO Azithromycin   - Resp gram stain and culture, blood cultures pending   - echo with EF 50%, no valvular vegetations

## 2022-08-26 NOTE — PROGRESS NOTES
Pharmacokinetic Initial Assessment: IV Vancomycin    Assessment/Plan:    Initiate intravenous vancomycin with loading dose of 2000 mg once followed by a maintenance dose of vancomycin 1000mg IV every 12 hours  Desired empiric serum trough concentration is 15 to 20 mcg/mL  Draw vancomycin trough level 60 min prior to fourth dose on 8/27 at approximately 1200  Pharmacy will continue to follow and monitor vancomycin.      Please contact pharmacy at extension [phone] with any questions regarding this assessment.     Thank you for the consult,   Sherrie Munson       Patient brief summary:  Leighton Carroll is a 42 y.o. male initiated on antimicrobial therapy with IV Vancomycin for treatment of suspected bacteremia    Drug Allergies:   Review of patient's allergies indicates:  No Known Allergies    Actual Body Weight:   76.2 kg    Renal Function:   Estimated Creatinine Clearance: 73.3 mL/min (based on SCr of 1.3 mg/dL).,     Dialysis Method (if applicable):  N/A    CBC (last 72 hours):  Recent Labs   Lab Result Units 08/25/22  2337   WBC K/uL 20.07*   Hemoglobin g/dL 14.1   Hematocrit % 43.3   Platelets K/uL 403   Gran % % 94.6*   Lymph % % 2.2*   Mono % % 2.6*   Eosinophil % % 0.1   Basophil % % 0.2   Differential Method  Automated       Metabolic Panel (last 72 hours):  Recent Labs   Lab Result Units 08/25/22  2337   Sodium mmol/L 141   Potassium mmol/L 4.0   Chloride mmol/L 107   CO2 mmol/L 21*   Glucose mg/dL 71   BUN mg/dL 14   Creatinine mg/dL 1.3   Albumin g/dL 3.5   Total Bilirubin mg/dL 0.3   Alkaline Phosphatase U/L 49*   AST U/L 51*   ALT U/L 52*   Magnesium mg/dL 2.0   Phosphorus mg/dL 3.8       Drug levels (last 3 results):  No results for input(s): VANCOMYCINRA, VANCORANDOM, VANCOMYCINPE, VANCOPEAK, VANCOMYCINTR, VANCOTROUGH in the last 72 hours.    Microbiologic Results:  Microbiology Results (last 7 days)       Procedure Component Value Units Date/Time    Group A Strep, Molecular [[id number]] Collected:  08/25/22 2316    Order Status: Sent Specimen: Throat Updated: 08/26/22 0002    Influenza A & B by Molecular [[id number]] Collected: 08/25/22 2316    Order Status: Sent Specimen: Nasopharyngeal Swab Updated: 08/26/22 0002    Blood culture x two cultures. Draw prior to antibiotics. [[id number]] Collected: 08/25/22 2330    Order Status: Sent Specimen: Blood from Antecubital, Right Arm Updated: 08/25/22 2337    Blood culture x two cultures. Draw prior to antibiotics. [[id number]] Collected: 08/25/22 2337    Order Status: Sent Specimen: Blood from Antecubital, Left Arm Updated: 08/25/22 2337    Influenza A & B by Molecular [[id number]]     Order Status: Canceled Specimen: Nasopharyngeal Swab

## 2022-08-26 NOTE — CONSULTS
"[facility] - Intensive Care ([facility])  Wound Care    Patient Name:  Leighton Carroll   MRN:  [id number]  Date: 8/26/2022  Diagnosis: Acute respiratory failure with hypoxia and hypercarbia    History:     Past Medical History:   Diagnosis Date    Anxiety     Depression     Hepatitis C     Substance abuse     METH AND HEROIN       Social History     Socioeconomic History    Marital status: Single   Tobacco Use    Smoking status: Current Every Day Smoker     Packs/day: 0.50     Types: Cigarettes    Smokeless tobacco: Never Used   Substance and Sexual Activity    Alcohol use: Yes     Comment: 5th whiskey or more daily    Drug use: Yes     Types: Methamphetamines, Marijuana     Comment: heroin , "pt reports he has been doing whatever he can get his hands on"        Precautions:     Allergies as of 08/26/2022    (No Known Allergies)       [facility] Assessment Details/Treatment     Wound care consult received from RN for assessment of bilateral lower extremities and buttocks. Patient is a 42 year old male admitted [place] [facility] [place]. Patient is currently intubated.     Upon assessment to bilateral lower extremities noted several scattered scabbed areas of unknown etiology. 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 Attempted to assess patient's sacrum. Patient became agitated upon turning. Noted some blanchable redness and some scattered scabbed areas on left buttocks as well.     Recommendations:   - BLEs can be left open to air. No open wounds noted.   - Buttocks- pressure injury prevention interventons - sacral foam dressing and turning/repositioning    Nursing notified at bedside. Wound care to sign off. Please re-consult for any concerns.     Left leg- intact scabbed areas      Right leg- intact scabbed areas       Buttocks- intact skin with blanchable redness- resolving scabbed ulceration of unknown etiology         08/26/2022  "

## 2022-08-26 NOTE — ED NOTES
Pt resting comfortably. Agitation subsided. Cardiac monitor in place. Pt remains on mechanical ventilation, restraints in place. NAD.

## 2022-08-26 NOTE — PROGRESS NOTES
Pharmacokinetic Initial Assessment: IV Vancomycin    Assessment/Plan:    Initiate intravenous vancomycin with loading dose of 2000 mg once (previously given)  followed by a maintenance dose of vancomycin 1000 mg IV every 12 hours.  Desired empiric serum trough concentration is 10 to 20 mcg/mL.  Draw vancomycin trough level 30 min prior to next dose on 8/27/22 at approximately 1500.  Pharmacy will continue to follow and monitor vancomycin.      Please contact pharmacy at extension [phone] with any questions regarding this assessment.     Thank you for the consult,   Antionette Zepeda       Patient brief summary:  Leighton Carroll is a 42 y.o. male initiated on antimicrobial therapy with IV Vancomycin for treatment of suspected lower respiratory infection.    Drug Allergies:   Review of patient's allergies indicates:  No Known Allergies    Actual Body Weight:   86.3 kg    Renal Function:   Estimated Creatinine Clearance: 79.7 mL/min (based on SCr of 1.3 mg/dL).,     Dialysis Method (if applicable):  N/A    CBC (last 72 hours):  Recent Labs   Lab Result Units 08/25/22  2337   WBC K/uL 20.07*   Hemoglobin g/dL 14.1   Hematocrit % 43.3   Platelets K/uL 403   Gran % % 94.6*   Lymph % % 2.2*   Mono % % 2.6*   Eosinophil % % 0.1   Basophil % % 0.2   Differential Method  Automated       Metabolic Panel (last 72 hours):  Recent Labs   Lab Result Units 08/25/22  2337 08/26/22  0356 08/26/22  0357   Sodium mmol/L 141  --   --    Potassium mmol/L 4.0  --   --    Chloride mmol/L 107  --   --    CO2 mmol/L 21*  --   --    Glucose mg/dL 71  --   --    Glucose, UA   --  Negative  --    BUN mg/dL 14  --   --    Creatinine mg/dL 1.3  --   --    Creatinine, Urine mg/dL  --   --  108.3   Albumin g/dL 3.5  --   --    Total Bilirubin mg/dL 0.3  --   --    Alkaline Phosphatase U/L 49*  --   --    AST U/L 51*  --   --    ALT U/L 52*  --   --    Magnesium mg/dL 2.0  --   --    Phosphorus mg/dL 3.8  --   --        Drug levels (last 3 results):  No  results for input(s): VANCOMYCINRA, VANCORANDOM, VANCOMYCINPE, VANCOPEAK, VANCOMYCINTR, VANCOTROUGH in the last 72 hours.    Microbiologic Results:  Microbiology Results (last 7 days)       Procedure Component Value Units Date/Time    Culture, Respiratory with Gram Stain [[id number]] Collected: 08/26/22 0144    Order Status: Sent Specimen: Respiratory from Sputum Updated: 08/26/22 1352    Respiratory Infection Panel (PCR), Nasopharyngeal [[id number]]     Order Status: No result Specimen: Nasopharyngeal Swab

## 2022-08-26 NOTE — ED TRIAGE NOTES
42 y.o. male presents to ER ED 05/ED 05   Chief Complaint   Patient presents with    Shortness of Breath     Pt arrived via [facility] with c/c of SOB.  Pt O2 sat 66% upon EMS Arrival.  Pt placed on NRB @15LPm.  O2 sat upon arrival to ED 92%.  Pt denies CP, N/V/D/  fever 101.2 noted in triage.

## 2022-08-26 NOTE — PROGRESS NOTES
Pharmacist Renal Dose Adjustment Note    Leighton Carroll is a 42 y.o. male being treated with the medication cefepime    Patient Data:    Vital Signs (Most Recent):  Temp: 99.3 °F (37.4 °C) (08/26/22 0007)  Pulse: (!) 114 (08/26/22 0007)  Resp: (!) 24 (08/26/22 0007)  BP: 107/64 (08/26/22 0007)  SpO2: 100 % (08/26/22 0007)   Vital Signs (72h Range):  Temp:  [99.3 °F (37.4 °C)-101.2 °F (38.4 °C)]   Pulse:  [114-133]   Resp:  [24-30]   BP: (107-118)/(64-73)   SpO2:  [94 %-100 %]      Recent Labs   Lab 08/25/22 2337   CREATININE 1.3     Serum creatinine: 1.3 mg/dL 08/25/22 2337  Estimated creatinine clearance: 73.3 mL/min    Medication:cefepime dose: 2 G frequency q12h will be changed to medication:cefepime dose:2G frequency:q8h    Pharmacist's Name: Sherrie Munson  Pharmacist's Extension: [phone]

## 2022-08-26 NOTE — HPI
"Patient's HPI is adapted from notes of Dr. Schuler, Dr. Morin and Dr. Hall ([facility]).   Patient arrived in [facility] ICU intubated, pt's girlfriend's number not on file, unable to verify events PTAJannette Carroll is a 42 year old man with a PMHx of depression, hx of drug overdose (7/11/2022, buproprion and gabapentin), polysubstance abuse (meth, heroin) and nicotine dependence.    He was presented to [facility] on 8/25/2022 via EMS with shortness of breath and dry cough. He was reportedly found down by his girlfriend at home yesterday evening (8/25/2022) and was given multiple rounds of chest compressions. He woke up and began having shortness of breath and called EMS, which found him saturating at 66% on room air, which increased to 90s with nonrebreather mask. Patient denied subjective fever, chills, sick contacts, chest pain, palpitations, abdominal pain.     At [facility], patient had a fever to 101, with leukocytosis (20) and lactic acidosis (3.0) with a normal procal. CTA chest showed patchy perihilar opacities bilaterally most pronounced in right lower lobe. Covid, Group A strep, respiratory influenza panel -ve. D-dimer was elevated (3.68), but CTPA negative for PE. Tox postive for THC only. CXR was -ve for pneumothorax. Patient was started on IV Cefepime and IV Vancomycin.     Patient was initially on BiPAP but became agitated and was intubated. Patient reportedly had "red spit". Patient was difficult to sedate requiring propofol and precedex and multiple doses of versed and ketamine, thus became hypotensive after intubation and was started on levophed.     Patient is transferred to [facility] ICU, hospital medicine, for management of acute respiratory failure with hypoxia and hypercapnia.         "

## 2022-08-26 NOTE — ED NOTES
Report received from MIKHAIL Morataya. Pt laying on stretcher, mechanical ventilation in progress. Cardiac monitor maintained. Bilateral wrist restraints in place. NAD. Pt agitated, pulling against restraints and trying to sit up. MD at bedside. Multiple RN ED staff at bedside. New orders noted.

## 2022-08-26 NOTE — ED PROVIDER NOTES
"Encounter Date: 8/25/2022       History     Chief Complaint   Patient presents with    Shortness of Breath     Pt arrived via [facility] with c/c of SOB.  Pt O2 sat 66% upon EMS Arrival.  Pt placed on NRB @15LPm.  O2 sat upon arrival to ED 92%.  Pt denies CP, N/V/D/  fever 101.2 noted in triage.       HPI  Review of patient's allergies indicates:  No Known Allergies  Past Medical History:   Diagnosis Date    Anxiety     Depression     Hepatitis C     Substance abuse     METH AND HEROIN     History reviewed. No pertinent surgical history.  History reviewed. No pertinent family history.  Social History     Tobacco Use    Smoking status: Current Every Day Smoker     Packs/day: 0.50     Types: Cigarettes    Smokeless tobacco: Never Used   Substance Use Topics    Alcohol use: Yes     Comment: 5th whiskey or more daily    Drug use: Yes     Types: Methamphetamines, Marijuana     Comment: heroin , "pt reports he has been doing whatever he can get his hands on"      Review of Systems    Physical Exam     Initial Vitals [08/25/22 2312]   BP Pulse Resp Temp SpO2   118/73 (!) 133 (!) 30 (!) 101.2 °F (38.4 °C) 95 %      MAP       --         Physical Exam    ED Course   Critical Care    Date/Time: 8/26/2022 1:39 PM  Performed by: OXANA Hall III, MD  Authorized by: OXANA Hall III, MD   Direct patient critical care time: 60 minutes  Total critical care time (exclusive of procedural time) : 60 minutes        Labs Reviewed   CBC W/ AUTO DIFFERENTIAL - Abnormal; Notable for the following components:       Result Value    WBC 20.07 (*)     MPV 8.5 (*)     Gran # (ANC) 19.0 (*)     Immature Grans (Abs) 0.07 (*)     Lymph # 0.5 (*)     Gran % 94.6 (*)     Lymph % 2.2 (*)     Mono % 2.6 (*)     All other components within normal limits   COMPREHENSIVE METABOLIC PANEL - Abnormal; Notable for the following components:    CO2 21 (*)     Alkaline Phosphatase 49 (*)     AST 51 (*)     ALT 52 (*)     All other components within " normal limits   LACTIC ACID, PLASMA - Abnormal; Notable for the following components:    Lactate (Lactic Acid) 3.0 (*)     All other components within normal limits   D DIMER, QUANTITATIVE - Abnormal; Notable for the following components:    D-Dimer 3.68 (*)     All other components within normal limits   DRUG SCREEN PANEL, URINE EMERGENCY - Abnormal; Notable for the following components:    THC Presumptive Positive (*)     All other components within normal limits    Narrative:     Specimen Source->Urine   INFLUENZA A & B BY MOLECULAR   GROUP A STREP, MOLECULAR   CULTURE, BLOOD    Narrative:     Aerobic and anaerobic   CULTURE, BLOOD    Narrative:     Aerobic and anaerobic   SARS-COV-2 RNA AMPLIFICATION, QUAL   URINALYSIS, REFLEX TO URINE CULTURE    Narrative:     Specimen Source->Urine   TROPONIN I   B-TYPE NATRIURETIC PEPTIDE   PROCALCITONIN   PROTIME-INR   APTT   PHOSPHORUS   MAGNESIUM   LACTIC ACID, PLASMA          Imaging Results          X-Ray Chest 1 View (Final result)  Result time 08/26/22 07:32:36    Final result by Reina Guerrier MD (08/26/22 07:32:36)                 Impression:      As above.      Electronically signed by: Reina Guerrier MD  Date:    08/26/2022  Time:    07:32             Narrative:    EXAMINATION:  XR CHEST 1 VIEW    CLINICAL HISTORY:  Other specified health status    TECHNIQUE:  Single frontal view of the chest was performed.    COMPARISON:  08/26/2022    FINDINGS:  Endotracheal tube and enteric tube are in place.  Patchy nodular opacities distributed throughout both lungs, with confluence of opacities in the right mid and lower lung zone.  Findings similar to the prior examination.  Heart is normal in size.  Skeletal structures are intact.                               X-Ray Chest 1 View (Final result)  Result time 08/26/22 07:44:21    Final result by Reina Guerrier MD (08/26/22 07:44:21)                 Impression:      As above.      Electronically signed by: Reina Guerrier  MD  Date:    08/26/2022  Time:    07:44             Narrative:    EXAMINATION:  XR CHEST 1 VIEW    CLINICAL HISTORY:  Presence of other specified devices    TECHNIQUE:  Single frontal view of the chest was performed.    COMPARISON:  08/26/2022    FINDINGS:  Interval placement of an endotracheal tube which terminates at the level of the clavicles and enteric tube which terminates in the gastric body.  Persistent patchy mixed opacity seen throughout both lungs.  Heart size is normal.  Skeletal structures are intact.                               CTA Chest Non-Coronary (PE Study) (Edited Result - FINAL)  Result time 08/26/22 11:16:28    Addendum 1 of 1 by Reina Guerrier MD (08/26/22 11:16:28)      Diffuse thickening of the walls of the esophagus, nonspecific.  This could reflect an underlying inflammatory/infectious process or possibly be related to a sequela of esophagitis from reflux disease.  Clinical correlation suggested.      Electronically signed by: Reina Guerrier MD  Date:    08/26/2022  Time:    11:16                 Final result by Reina Guerrier MD (08/26/22 08:43:32)                 Impression:      No evidence for aortic dissection or pulmonary embolus.    Patchy mixed opacities throughout both lungs primarily in a perihilar distribution and appearing most pronounced in the right lower lobe.  Differential considerations include multifocal aspiration, pneumonia and ARDS.      Electronically signed by: Reina Guerrier MD  Date:    08/26/2022  Time:    08:43             Narrative:    EXAMINATION:  CTA CHEST NON CORONARY    CLINICAL HISTORY:  Pulmonary embolism (PE) suspected, positive D-dimer;    TECHNIQUE:  Low dose axial images, sagittal and coronal reformations were obtained from the thoracic inlet to the lung bases following the IV administration of 100 mL of Omnipaque 350.  Contrast timing was optimized to evaluate the pulmonary arteries.    Technical quality of the exam:  Satisfactory.    COMPARISON:  None    FINDINGS:  No pulmonary embolus is identified.    The thyroid appears normal.  The main central airways are patent.  Thickening of the walls of the esophagus.  The heart is normal in size.  No pericardial effusion.  No mediastinal, hilar or axillary adenopathy is seen.    Patchy mixed opacities are seen throughout both lungs, primarily in a perihilar distribution and most pronounced in the right lower lobe.  Some of these opacities are confluent in nature such as in the right lower lobe.  Interlobular septal thickening is visualized in the lung bases.  No pleural effusions.    Hepatomegaly.    Skeletal structures are intact.                               X-Ray Chest 1 View (Final result)  Result time 08/26/22 07:55:39    Final result by Reina Guerrier MD (08/26/22 07:55:39)                 Impression:      As above.      Electronically signed by: Reina Guerrier MD  Date:    08/26/2022  Time:    07:55             Narrative:    EXAMINATION:  XR CHEST 1 VIEW    CLINICAL HISTORY:  Fever, unspecified    TECHNIQUE:  Single frontal view of the chest was performed.    COMPARISON:  None    FINDINGS:  Patchy and confluent opacities in the mid and lower lung zones bilaterally which could reflect multifocal pneumonia, aspiration as well as ARDS.  Heart size is normal.  Skeletal structures are intact.                                 Medications   lactated ringers bolus 2,286 mL (0 mL/kg × 76.2 kg Intravenous Stopped 8/26/22 0024)   vancomycin 2 g in 0.9% sodium chloride 500 mL IVPB (0 mg Intravenous Stopped 8/26/22 0217)   iohexoL (OMNIPAQUE 350) injection 100 mL (100 mLs Intravenous Given 8/26/22 0219)   etomidate injection 22.8 mg (22.8 mg Intravenous Given 8/26/22 0404)   succinylcholine injection 76 mg (76 mg Intravenous Given 8/26/22 0405)   lorazepam injection 2 mg (2 mg Intravenous Given 8/26/22 0517)   lorazepam injection 2 mg (2 mg Intravenous Given 8/26/22 0524)   0.9%  NaCl  infusion (0 mLs Intravenous Stopped 8/26/22 0928)   midazolam (VERSED) 5 mg/mL injection (0 mg Intravenous Override Pull 8/26/22 0702)   midazolam (VERSED) 1 mg/mL injection 5 mg (5 mg Intravenous Given 8/26/22 0705)   midazolam (VERSED) 1 mg/mL injection 5 mg (0 mg Intravenous Override Pull 8/26/22 0915)   midazolam (VERSED) 5 mg/mL injection (2.5 mg  Given 8/26/22 0914)     Medical Decision Making:   Initial Assessment:   I assumed care at 6:00 a.m. patient was being transferred for sepsis due to pneumonia with respiratory failure status post intubation  ED Management:  At the time arrival patient was stable but was restless from having an endotracheal tube in he was started on propofol but because of the pressure problems it was discontinued  And Precedex was started Versed was given and the patient was started on Levophed to maintain his pressure and was pressure was elevated to start the propofol he continued to have breakthrough agitation given another dose of Versed.  Patient was intubated he was placed on IMV of 12 tidal volume of 500 and oxygen 100% no blood gas was done.  The blood gas that was drawn March showed 50 pCO2 of 81 with a pH of 7.14 the IMV was changed to 20 tidal volume of 5000 and 4 the patient was transported the repeat blood gases shows the patient with severe hypercapnia and the IV was switched to 26 for the transportation.  His pressure was stable he was maintained on Levophed fluids propofol Precedex and the  was going to administer ketamine if he started to have any agitation.  A chest x-ray was done to make sure that the tube was in good position for transport and the tube and not been changed it is above the messi           ED Course as of 08/26/22 1339   Fri Aug 26, 2022   0838 X-Ray Chest 1 View [CC]      ED Course User Index  [CC] OXANA Hall III, MD             Clinical Impression:   Final diagnoses:  [R06.02] Shortness of breath  [R05.9] Cough  [R50.9]  Fever  [A41.9] Sepsis  [J18.9, A41.9] Sepsis due to pneumonia (Primary)  [F12.10] Mild tetrahydrocannabinol (THC) abuse  [Z97.8] Endotracheally intubated  [A41.9, R65.20, J96.01] Sepsis with acute hypoxic respiratory failure without septic shock, due to unspecified organism  [R06.89] Hypercapnia  [Z78.9] Intubation of airway performed without difficulty  [Z01.818] Encounter for intubation          ED Disposition Condition    Transfer to Another Facility Stable              OXANA Hall III, MD  08/26/22 [id number]       OXANA Hall III, MD  08/26/22 7084

## 2022-08-26 NOTE — ED PROVIDER NOTES
"[facility]                                                  Chief Complaint     Chief Complaint   Patient presents with    Shortness of Breath     Pt arrived via [facility] with c/c of SOB.  Pt O2 sat 66% upon EMS Arrival.  Pt placed on NRB @15LPm.  O2 sat upon arrival to ED 92%.  Pt denies CP, N/V/D/  fever 101.2 noted in triage.         History of Present Illness  42 y.o. male with PMHx of anxiety, depression, hepatitis C and polysubstance abuse who presents with shortness of breath. Just prior to arrival, patient became short of breath, prompting him to knock on a neighbor's door for assistance. The neighbor then called EMS. Upon EMS arrival, O2 sat was 67% on room air. Patient had to be placed on 15 L non-rebreather for improvement in his O2 sats to the mid 90s. Endorses dry cough. He has received his covid vaccine. He has not received his flu vaccine. Denies sick contacts. Denies chest pain, diaphoresis, agitation, fever, chills, myalgias, nausea, vomiting, palpitations, lightheadedness or leg swelling. Of note, he has been intubated in the past (2015) for drug overdose.     History obtained from:  Patient, EMS    Review of patient's allergies indicates:  No Known Allergies  Past Medical History:   Diagnosis Date    Anxiety     Depression     Hepatitis C     Substance abuse     METH AND HEROIN     History reviewed. No pertinent surgical history.   History reviewed. No pertinent family history.  Social History     Tobacco Use    Smoking status: Current Every Day Smoker     Packs/day: 0.50     Types: Cigarettes    Smokeless tobacco: Never Used   Substance Use Topics    Alcohol use: Yes     Comment: 5th whiskey or more daily    Drug use: Yes     Types: Methamphetamines, Marijuana     Comment: heroin , "pt reports he has been doing whatever he can get his hands on"           Review of Systems and Physical Exam     Review of Systems      + shortness of breath, cough    All other symptoms negative as " "stated below    --Constitutional - Denies fever, appetite change, chills  --Eyes - Denies eye discharge, eye pain, eye redness or visual disturbance  --HENT- Denies congestion, sore throat, drooling, ear discharge, rhinorrhea or trouble swallowing  --Respiratory - Denies wheezing  --Cardiovascular - Denies chest pain, leg swelling, palpitations  --Gastrointestinal - Denies abdominal pain, abdominal distension, constipation, diarrhea, nausea or vomiting  --Genitourinary - Denies difficulty urinating, dysuria, hematuria, urgency  --Musculoskeletal - Denies arthralgias, myalgias  --Neurological - Denies dizziness, headaches, lightheadedness, weakness  --Hematologic - Denies easy bruising or easy bleeding  --Skin - Denies rash, wound or pallor  --Psychiatric- Denies dysphoric mood, nervousness/anxiety    Vital Signs   height is 5' 9" (1.753 m) and weight is 76.2 kg (168 lb). His temperature is 99.7 °F (37.6 °C). His blood pressure is 103/45 (abnormal) and his pulse is 98. His respiration is 26 (abnormal) and oxygen saturation is 100%.      Physical Exam   Nursing note and vitals reviewed  --Constitutional:  Well developed, well nourished. In mild distress.  --HENT: Normocephalic, atraumatic. No rhinorrhea. Moist oral mucosa. No oropharyngeal edema, erythema or exudates.   --Eyes: PERRL. Extraocular movements intact. No periorbital swelling. Normal conjunctiva.  --Neck: Normal range of motion. Neck supple. No adenopathy  --Cardiac: Regular rhythm, normal S1, normal S2, no murmur, tachcyardic, intact distal pulses  --Pulmonary: Increased work of breathing while on 15L non-rebreather mask. Bibasilar ronchi present. Infracostal accessory muscle use.   --Abdominal: Soft, normal bowel sounds, no tenderness, no guarding, no rebound  --Musculoskeletal: Normal range of motion. No deformity, tenderness or edema.  --Neurological:  Alert and oriented x 4. Follows commands appropriately.    --Skin: Warm and dry. No rash, pallor, " cyanosis or jaundice.  --Psych: Normal mood    ED Course   Critical Care    Date/Time: 8/25/2022 11:28 PM  Performed by: Justin Schuler MD  Authorized by: Justin Schuler MD   Direct patient critical care time: 6 minutes  Additional history critical care time: 7 minutes  Ordering / reviewing critical care time: 7 minutes  Documentation critical care time: 6 minutes  Consulting other physicians critical care time: 7 minutes  Total critical care time (exclusive of procedural time) : 33 minutes  Critical care time was exclusive of separately billable procedures and treating other patients.  Critical care was necessary to treat or prevent imminent or life-threatening deterioration of the following conditions: sepsis.  Critical care was time spent personally by me on the following activities: blood draw for specimens, development of treatment plan with patient or surrogate, discussions with consultants, evaluation of patient's response to treatment, review of old charts, re-evaluation of patient's condition, pulse oximetry, ordering and review of radiographic studies, ordering and review of laboratory studies, ordering and performing treatments and interventions, obtaining history from patient or surrogate and examination of patient.    Intubation    Date/Time: 8/26/2022 4:19 AM  Location procedure was performed: [facility] EMERGENCY DEPARTMENT  Performed by: Justin Schuler MD  Authorized by: Justin Schuler MD   Consent Done: Emergent Situation  Indications: respiratory distress  Intubation method: video-assisted  Patient status: paralyzed (RSI)  Preoxygenation: bag valve mask  Sedatives: etomidate  Paralytic: succinylcholine  Laryngoscope size: Glide 3  Tube size: 7.5 mm  Tube type: cuffed  Number of attempts: 1  Cricoid pressure: yes  Cords visualized: yes  Post-procedure assessment: chest rise and CO2 detector  Breath sounds: rales/crackles  ETT to lip: 24 cm  ETT to teeth: 23 cm  Tube secured with: bite  block and ETT donohue  Chest x-ray findings: endotracheal tube in appropriate position  Patient tolerance: Patient tolerated the procedure well with no immediate complications      EKG (interpreted by me)  Rate: 129 bpm  Rhythm: Sinus tachcyardia  Axis: Normal  ST-segments: No elevation or depression  Overall Interpretation: Sinus tachycardia with no signs of ischemia or infarction    Lab Results (reviewed by me)  Labs Reviewed   CBC W/ AUTO DIFFERENTIAL - Abnormal; Notable for the following components:       Result Value    WBC 20.07 (*)     MPV 8.5 (*)     Gran # (ANC) 19.0 (*)     Immature Grans (Abs) 0.07 (*)     Lymph # 0.5 (*)     Gran % 94.6 (*)     Lymph % 2.2 (*)     Mono % 2.6 (*)     All other components within normal limits   COMPREHENSIVE METABOLIC PANEL - Abnormal; Notable for the following components:    CO2 21 (*)     Alkaline Phosphatase 49 (*)     AST 51 (*)     ALT 52 (*)     All other components within normal limits   LACTIC ACID, PLASMA - Abnormal; Notable for the following components:    Lactate (Lactic Acid) 3.0 (*)     All other components within normal limits   D DIMER, QUANTITATIVE - Abnormal; Notable for the following components:    D-Dimer 3.68 (*)     All other components within normal limits   DRUG SCREEN PANEL, URINE EMERGENCY - Abnormal; Notable for the following components:    THC Presumptive Positive (*)     All other components within normal limits    Narrative:     Specimen Source->Urine   INFLUENZA A & B BY MOLECULAR   GROUP A STREP, MOLECULAR   CULTURE, BLOOD   CULTURE, BLOOD   SARS-COV-2 RNA AMPLIFICATION, QUAL   URINALYSIS, REFLEX TO URINE CULTURE    Narrative:     Specimen Source->Urine   TROPONIN I   B-TYPE NATRIURETIC PEPTIDE   PROCALCITONIN   PROTIME-INR   APTT   PHOSPHORUS   MAGNESIUM   LACTIC ACID, PLASMA       Radiology (images visualized & reports reviewed by me)  CTA Chest Non-Coronary (PE Study)        No evidence of PE. Moderate-marked areas of  "inflammation/consolidation involving bilateral lungs.    X-Ray Chest 1 View     Diffuse bilateral perihilar and lower lobe opacities.        Medications Given  Medications   vancomycin - pharmacy to dose (has no administration in time range)   cefepime in dextrose 5 % IVPB 2 g (has no administration in time range)   vancomycin in dextrose 5 % 1 gram/250 mL IVPB 1,000 mg (has no administration in time range)   propofol (DIPRIVAN) 10 mg/mL infusion (30 mcg/kg/min × 76.2 kg Intravenous Rate/Dose Change 8/26/22 0545)   dexmedetomidine (PRECEDEX) 400mcg/100mL dextrose 5% infusion (1.4 mcg/kg/hr × 76.2 kg Intravenous Rate/Dose Change 8/26/22 0515)   lactated ringers bolus 2,286 mL (0 mL/kg × 76.2 kg Intravenous Stopped 8/26/22 0024)   vancomycin 2 g in 0.9% sodium chloride 500 mL IVPB (0 mg Intravenous Stopped 8/26/22 0217)   iohexoL (OMNIPAQUE 350) injection 100 mL (100 mLs Intravenous Given 8/26/22 0219)   etomidate injection 22.8 mg (22.8 mg Intravenous Given 8/26/22 0404)   succinylcholine injection 76 mg (76 mg Intravenous Given 8/26/22 0405)   lorazepam injection 2 mg (2 mg Intravenous Given 8/26/22 0517)   lorazepam injection 2 mg (2 mg Intravenous Given 8/26/22 0524)       Differential Diagnosis:  Asthma, COPD, pulmonary embolism, Covid, Influenza, cardiogenic pulmonary edema, noncardiogenic pulmonary edema, pneumonia, myocardial infarction, cardiac tamponade, sepsis    Clinical Tests:  Lab Tests: Ordered and reviewed  Radiological Study: Ordered and reviewed  Medical Tests: Ordered and reviewed    ED Management     height is 5' 9" (1.753 m) and weight is 76.2 kg (168 lb). His temperature is 99.7 °F (37.6 °C). His blood pressure is 103/45 (abnormal) and his pulse is 98. His respiration is 26 (abnormal) and oxygen saturation is 100%.     Physical exam with increased work of breathing while on 15L non-rebreather mask, with bibasilar ronchi and infracostal accessory muscle use. Patient placed on BiPAP with " significant improvement in respiratory status. Labs revealed elevated lactic acid, elevated d-dimer, leukocytosis.and hypercapnia. EKG revealed sinus tachycardia with no signs of ischemia or infarction. CXR revealed diffuse bilateral patchy opacities. CTA-Chest revealed no evidence of PE, but bilateral perihilar and lower lobe infiltrates were noted. 30 cc/kg IVF bolus and broad spectrum antibiotics given. During his his ED course, patient's shortness of breath worsened. As a result, he was intubated. Patient will be transferred for further care.     Diagnosis  The primary encounter diagnosis was Sepsis due to pneumonia. Diagnoses of Shortness of breath, Cough, Fever, Sepsis, Mild tetrahydrocannabinol (THC) abuse, Endotracheally intubated, Sepsis with acute hypoxic respiratory failure without septic shock, due to unspecified organism, and Hypercapnia were also pertinent to this visit.    Disposition and Plan  Condition: Stable  Disposition: Transfer              Justin Schuler MD  08/26/22 0518

## 2022-08-26 NOTE — PLAN OF CARE
Pt admitted to ICU via EMS intubated and placed on mechanical vent with order per Dr Rosario [facility] critical care.  Abgs drawn and documented and reported to Dr Rosario.  Will continue to monitor pt.

## 2022-08-26 NOTE — SUBJECTIVE & OBJECTIVE
Past Medical History:   Diagnosis Date    Anxiety     Depression     Hepatitis C     Substance abuse     METH AND HEROIN       No past surgical history on file.    Review of patient's allergies indicates:  No Known Allergies    Current Facility-Administered Medications on File Prior to Encounter   Medication    [COMPLETED] 0.9%  NaCl infusion    [COMPLETED] etomidate injection 22.8 mg    [COMPLETED] iohexoL (OMNIPAQUE 350) injection 100 mL    [COMPLETED] lactated ringers bolus 2,286 mL    [COMPLETED] lorazepam injection 2 mg    [COMPLETED] lorazepam injection 2 mg    [COMPLETED] midazolam (VERSED) 1 mg/mL injection 5 mg    [COMPLETED] midazolam (VERSED) 1 mg/mL injection 5 mg    [COMPLETED] midazolam (VERSED) 5 mg/mL injection    [COMPLETED] midazolam (VERSED) 5 mg/mL injection    [COMPLETED] succinylcholine injection 76 mg    [COMPLETED] vancomycin 2 g in 0.9% sodium chloride 500 mL IVPB    [DISCONTINUED] cefepime in dextrose 5 % IVPB 2 g    [DISCONTINUED] cefepime in dextrose 5 % IVPB 2 g    [DISCONTINUED] dexmedetomidine (PRECEDEX) 400mcg/100mL dextrose 5% infusion    [DISCONTINUED] midazolam (VERSED) 1 mg/mL injection 5 mg    [DISCONTINUED] midazolam (VERSED) 1 mg/mL injection 5 mg    [DISCONTINUED] NORepinephrine 4 mg in dextrose 5% 250 mL infusion (premix) (titrating)    [DISCONTINUED] propofol (DIPRIVAN) 10 mg/mL infusion    [DISCONTINUED] rocuronium 10 mg/mL injection    [DISCONTINUED] sodium chloride 0.9% bolus 2,286 mL    [DISCONTINUED] vancomycin - pharmacy to dose    [DISCONTINUED] vancomycin in dextrose 5 % 1 gram/250 mL IVPB 1,000 mg     Current Outpatient Medications on File Prior to Encounter   Medication Sig    buPROPion (WELLBUTRIN XL) 150 MG TB24 tablet Take 1 tablet (150 mg total) by mouth once daily.    divalproex ER (DEPAKOTE) 500 MG Tb24 Take 2 tablets (1,000 mg total) by mouth once daily.    gabapentin (NEURONTIN) 300 MG capsule Take 2 capsules (600 mg total) by mouth 3 (three) times daily.  "   LORazepam (ATIVAN) 0.5 MG tablet Take 1 tablet at bedtime 7/18, one tablet in morning on 7/19 then stop    nicotine (NICODERM CQ) 14 mg/24 hr Place 1 patch onto the skin once daily.    QUEtiapine (SEROQUEL) 200 MG Tab Take 1 tablet (200 mg total) by mouth every evening.    risperiDONE (RISPERDAL) 2 MG tablet Take 1 tablet (2 mg total) by mouth once daily.     Family History    None       Tobacco Use    Smoking status: Current Every Day Smoker     Packs/day: 0.50     Types: Cigarettes    Smokeless tobacco: Never Used   Substance and Sexual Activity    Alcohol use: Yes     Comment: 5th whiskey or more daily    Drug use: Yes     Types: Methamphetamines, Marijuana     Comment: heroin , "pt reports he has been doing whatever he can get his hands on"     Sexual activity: Not on file     Review of Systems   Reason unable to perform ROS: Patient is on ventilatory support and sedated.   Objective:     Vital Signs (Most Recent):  Temp: 97.1 °F (36.2 °C) (08/26/22 1415)  Pulse: 81 (08/26/22 1500)  Resp: (!) 26 (08/26/22 1500)  BP: 110/62 (08/26/22 1500)  SpO2: 97 % (08/26/22 1500)   Vital Signs (24h Range):  Temp:  [97.1 °F (36.2 °C)-101.2 °F (38.4 °C)] 97.1 °F (36.2 °C)  Pulse:  [] 81  Resp:  [18-51] 26  SpO2:  [80 %-100 %] 97 %  BP: ()/(42-92) 110/62     Weight: 86.2 kg (190 lb)  Body mass index is 28.06 kg/m².    Physical Exam  Vitals and nursing note reviewed.   HENT:      Head: Normocephalic and atraumatic.   Eyes:      General: No scleral icterus.        Right eye: No discharge.         Left eye: No discharge.   Cardiovascular:      Rate and Rhythm: Normal rate and regular rhythm.      Pulses: Normal pulses.      Heart sounds: Normal heart sounds.   Pulmonary:      Comments: On ventilatory support, decreased lung sounds over the right anterior basal chest   Abdominal:      General: Bowel sounds are normal.      Palpations: Abdomen is soft.   Genitourinary:     Comments: On salomon catheter, draining yellow " urine    Musculoskeletal:      Right lower leg: No edema.      Left lower leg: No edema.   Skin:     General: Skin is warm and dry.      Findings: No rash.      Comments: Dry healed scabs on chest and abdomen   Neurological:      Comments: Unable to assess, sedated             Significant Labs: All pertinent labs within the past 24 hours have been reviewed.  ABGs:   Recent Labs   Lab 08/26/22  0220 08/26/22  0845 08/26/22  0957 08/26/22  1201   PH 7.300* 7.140* 7.160* 7.267*   PCO2 52* 81* 80* 60.8*   HCO3 25.60 27.60 98.60 27.7   POCSATURATED 99.0 99.6 99.4 94*   BE -1.50 -3.30* -2.00 1   TOTALHB 12.6 12.9 12.6  --    COHB 2.8 1.9 1.9  --    METHB 0.9 1 1.2  --    PO2 96 208* 145* 82     Blood Culture:   Recent Labs   Lab 08/25/22  2330 08/25/22 2337   LABBLOO No Growth to date No Growth to date     BMP:   Recent Labs   Lab 08/25/22 2337   GLU 71      K 4.0      CO2 21*   BUN 14   CREATININE 1.3   CALCIUM 9.5   MG 2.0     CBC:   Recent Labs   Lab 08/25/22 2337 08/26/22  1404   WBC 20.07* 34.58*   HGB 14.1 12.6*   HCT 43.3 39.1*    379     CMP:   Recent Labs   Lab 08/25/22 2337      K 4.0      CO2 21*   GLU 71   BUN 14   CREATININE 1.3   CALCIUM 9.5   PROT 7.1   ALBUMIN 3.5   BILITOT 0.3   ALKPHOS 49*   AST 51*   ALT 52*   ANIONGAP 13     Cardiac Markers:   Recent Labs   Lab 08/25/22 2337   BNP 13     Lactic Acid:   Recent Labs   Lab 08/25/22 2337 08/26/22  0337 08/26/22  1404   LACTATE 3.0* 2.0 1.3     Lipase:   Recent Labs   Lab 08/26/22  1404   LIPASE 11     Magnesium:   Recent Labs   Lab 08/25/22 2337   MG 2.0     Respiratory Culture: No results for input(s): GSRESP, RESPIRATORYC in the last 48 hours.  Troponin:   Recent Labs   Lab 08/25/22  2337   TROPONINI <0.006     Urine Culture: No results for input(s): LABURIN in the last 48 hours.  Urine Studies:   Recent Labs   Lab 08/26/22  0356   COLORU Yellow   APPEARANCEUA Clear   PHUR 6.0   SPECGRAV 1.015   PROTEINUA Negative    GLUCUA Negative   KETONESU Negative   BILIRUBINUA Negative   OCCULTUA Negative   NITRITE Negative   UROBILINOGEN Negative   LEUKOCYTESUR Negative       Significant Imaging: I have reviewed and interpreted all pertinent imaging results/findings within the past 24 hours.

## 2022-08-26 NOTE — ASSESSMENT & PLAN NOTE
Hx of drug overdose (7/11/2022, buproprion and gabapentin) and suicidal ideation  Currently tox screen +THC    - Check urine etoh

## 2022-08-26 NOTE — ASSESSMENT & PLAN NOTE
Patient has persistent depression which is unknown and is currently controlled. Will hold anti-depressant medications. We will not consult psychiatry at this time. Patient does not display psychosis at this time. Continue to monitor closely and adjust plan of care as needed.    - Held home dose buproprion 150mg qd, divalproex ER 500mg 1g qd, quetiapine 200mg qd, risperiodone 2mg qd, will continue when patient is off sedation

## 2022-08-26 NOTE — ED NOTES
Pt agitated. Pulling against restraints; flailing legs. Pt requiring multiple staff members to assist with safety. MD at bedside. New orders noted.

## 2022-08-26 NOTE — ED NOTES
"Restraint Note- [facility]  /73 (BP Location: Right arm, Patient Position: Sitting)   Pulse 93   Temp 99.7 °F (37.6 °C)   Resp (!) 29   Ht 5' 9" (1.753 m)   Wt 76.2 kg (168 lb)   SpO2 96%   BMI 24.81 kg/m²  Time: 4:28 AM Patient's immediate situation: Patient was unable to be redirected following intubation. Reaction to intervention: Patient continues to be agitated and angry, thrashing Medications/behavioral condition: Patient will hurt himself given this condition Restraint status: Continue restraints until patient acts appropriately       Justin Schuler MD  08/30/22 1408    "

## 2022-08-26 NOTE — ED NOTES
"Pt continuously taking BiPAP mask off stating, "I have to spit. I want y'all to see my spit. It's red." Pt instructed to keep BiPAP mask in place and that we would suction him if needed. Dr. Schuler notified. VU.  "
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
## 2022-08-26 NOTE — PROVIDER TRANSFER
Outside Transfer Acceptance Note / Regional Referral Center    Referring facility: [facility]   Referring provider: STEWART KENNEDY  Accepting facility: [facility] ([facility])  Accepting provider: TAMAR ROMO  Reason for transfer: Higher Level of Care   Transfer diagnosis: Pneumonia  Transfer specialty requested: Pulmonology  Transfer specialty notified: no  Transfer level: NUMBER 1-5: 2  Bed type requested: ICU   Isolation status: No active isolations   Admission class or status: IP- Inpatient    Narrative     42-year-old m with PMH substance abuse (methamphetamine and heroin), HCV, depression, anxiety, and drug overdose presented to [facility] via EMS with SOB and dry cough.  EMS reported SpO2 67% improving to mid-90s on NRB (15 L) in the field. When the patient arrived at the hospital he was placed on BiPAP but later was intubated due to respiratory failure.      Labs WBC 20, Hb 14.1, D-dimer 3.68, Na 141, K 4.0, CO2 21, Cr 1.3, AST 51, ALT 52, BNP 13, troponin < 0.006, LA 3.0  > 2, procalcitonin 0.23, UTOX pos THC, influenza neg, COVID neg, group B strep neg, U/A neg, ABGs pH 7.30, pCO2 296, CO2 52 (on BiPAP FiO2 75%). CT chest pos for BL patchy infiltrates with no evidence of PE.    BC x2 obtained and patient was started on cefepime, vancomycin and IVF.  Patient became hypotensive on propofol and was switched to Precedex but was switched back to propofol to control combativeness.  Levophed gtt started to maintain BP.      VS (0810) HR 74, Resp 27, Sats 100% on Vent at 100%, BP 99/62. Vent FiO2 100% , R 12 PEEP 5.    Transfer requested or higher level of care.  Transfer diagnosis acute respiratory failure, pneumonia    Instructions      [facility]  Admit to Hospital Medicine  Upon patient arrival to floor, please contact Hospital Medicine on call.

## 2022-08-26 NOTE — CONSULTS
[facility] Pulmonary/Critical Care Consult Note      Patient:Leighton Carroll  Age:42 y.o.  MRN:[id number]  Admit date:(Not on file)  LOS:0 day(s)     HPI:       Leighton Carroll is a 42 year old man with PMH unspecified psychiatric disorder, hep C, polysubstance abuse. He was reportedly found down by girlfriend at home yesterday evening and was given multiple rounds of chest compressions. Unclear whether or not he had pulse.  He woke up and began feeling SOB, prompting him to alert EMS. No available contact information for his girlfriend to clarify events. On EMS arrival he was noted to be satting 66% on RA, increased to 90s with NRB. Brought to [facility] where ER workup was remarkable for fever to 101, leukocytosis to 20, LA 3.0 >> 2.0, tox positive for THC, normal procal, normal trop, normal BNP, EKG with sinus tach. CTA chest without signs of PE, but did show patchy perihilar opacities bilaterally most pronounced in RLL. Per ER physician notes, patient denied subjective fevers, chills, sick contacts, chest pain, palpitations, abdominal pain. He was given dose of vanc and cefepime.     Patient initially on BIPAP 12/5 with ABG 7.3/52/96. He became agitated and was subsequently intubated. Difficult to sedate requiring propofol and precedex + multiple doses of versed and ketamine. Became hypotensive following intubation and was started on levophed. On 0.2 levo prior to transfer. Initial ABG following intubation 7.14/81/208 on AC//12/5/100%. Rate adjusted to 22 with repeat ABG an hour later 7.16/80/145. His rate was then increased to 26 and TV increased to 520 just prior to transfer.      MEDICAL HISTORY:      Past Medical History:  Past Medical History:   Diagnosis Date    Anxiety     Depression     Hepatitis C     Substance abuse     METH AND HEROIN      Past Surgical History:  No past surgical history on file.      Family History:   No family history on file.      Social History:  - Tobacco: current 1/2 ppd smoker   -  Alcohol: approx a 5th whiskey 2x weekly per chart review  - Drugs: polysubstance use - THC, meth, heroin per chart review  - Baseline: independent with ADLs per chart review     Allergies:  Review of patient's allergies indicates:  No Known Allergies    Home Medications:  Current Outpatient Medications   Medication Instructions    buPROPion (WELLBUTRIN XL) 150 mg, Oral, Daily    divalproex 1,000 mg, Oral, Daily    gabapentin (NEURONTIN) 600 mg, Oral, 3 times daily    LORazepam (ATIVAN) 0.5 MG tablet Take 1 tablet at bedtime , one tablet in morning on  then stop    nicotine (NICODERM CQ) 14 mg/24 hr 1 patch, Transdermal, Daily    QUEtiapine (SEROQUEL) 200 mg, Oral, Nightly    risperiDONE (RISPERDAL) 2 mg, Oral, Daily      OBJECTIVE DATA:      Vital Signs:  There were no vitals filed for this visit.    Intake/Output:    Intake/Output Summary (Last 24 hours) at 2022 1030  Last data filed at 2022 0928  Gross per 24 hour   Intake 3836 ml   Output --   Net 3836 ml     Physical Exam:  Constitutional: appears stated age, comfortable on vent  HEENT: NCAT, EOMI, ETT and OGT in place  Neck: Supple, trachea midline  Resp: crackles bilaterally, no wheeze  Cardio: RRR, S1 and S2 normal  GI: Soft, non-tender, bowel sounds active  Extremities: No edema, peripheral pulses 2+ and symmetric  Skin: No rashes, bruises, or lesions  Neurologic: intubated, sedated on propofol     Laboratory/Imaging:  Recent Labs   Lab 22  2337   WBC 20.07*   HGB 14.1   HCT 43.3         K 4.0      CREATININE 1.3   BUN 14   CO2 21*   ALT 52*   AST 51*     Microbiology:  22 Flu A&B: negative  22 GAS: negative  22 COVID: negative  22 Blood Cx: NGTD  22 RIP: ordered  22 Sputum Cx: ordered     Imagin22 CTA Chest: No evidence for aortic dissection or pulmonary embolus. Patchy mixed opacities throughout both lungs primarily in a perihilar distribution and appearing most  "pronounced in the right lower lobe.  Differential considerations include multifocal aspiration, pneumonia and ARDS.    Medications:     Current Facility-Administered Medications:     azithromycin tablet 500 mg, 500 mg, Oral, Once **FOLLOWED BY** [START ON 8/27/2022] azithromycin tablet 250 mg, 250 mg, Oral, Daily, Susu Rosario MD    dexmedetomidine (PRECEDEX) 400mcg/100mL 0.9% NaCL infusion, 0-1.4 mcg/kg/hr, Intravenous, Continuous, Susu Rosario MD, Last Rate: 21.6 mL/hr at 08/26/22 1216, 1 mcg/kg/hr at 08/26/22 1216    fentaNYL 2500 mcg in 0.9% sodium chloride 250 mL infusion premix (titrating), 0-200 mcg/hr, Intravenous, Continuous, Susu Rosario MD, Last Rate: 5 mL/hr at 08/26/22 1151, 50 mcg/hr at 08/26/22 1151    fentanyl IV bolus from bag/infusion 50 mcg, 50 mcg, Intravenous, Once, Susu Rosario MD    NORepinephrine 4 mg in dextrose 5% 250 mL infusion (premix) (titrating), 0-3 mcg/kg/min, Intravenous, Continuous, Susu Rosario MD, Last Rate: 64.7 mL/hr at 08/26/22 1208, 0.2 mcg/kg/min at 08/26/22 1208    propofol (DIPRIVAN) 10 mg/mL infusion, 0-50 mcg/kg/min, Intravenous, Continuous, Susu Rosario MD, Last Rate: 10.4 mL/hr at 08/26/22 1207, 20 mcg/kg/min at 08/26/22 1207     dexmedetomidine (PRECEDEX) infusion 1 mcg/kg/hr (08/26/22 1216)    fentanyl 50 mcg/hr (08/26/22 1151)    NORepinephrine bitartrate-D5W 0.2 mcg/kg/min (08/26/22 1208)    propofoL 20 mcg/kg/min (08/26/22 1207)        Assessment/Plan:     Neuro/Psych  - intubated, sedated on propofol, fentanyl    Polysubstance Abuse  - tox positive for THC   - hx of amphetamine, heroin, etoh abuse per chart review with multiple recent admissions for overdoses    Psychiatric Disorder  - recent inpatient psych admission for overdose and SI, "unspecified" disorder per psychiatry notes  - takes depakote, risperdal, wellbutrin, seroquel at home     Cardiovascular  Shock  - no clear etiology presently, possibly 2/2 sedation  - initial LA 3.0 down-trended to 2.0, " trop and EKG unremarkable  - CTA without PE, patchy bilateral perihilar opacities noted  - cultures in process, on vanc/cefepime/azithro  - bedside echo: with good squeeze, formal TTE ordered    Respiratory  Acute Hypoxic Hypercapnic Respiratory Failure  - hypoxic to 60s on RA on EMS arrival  - failed BIPAP 2/2 agitation, was intubated at OSH  - CTA with patchy perihilar opacities, concern for aspiration vs infection  - severely underventilated prior to arrival, ABG 7.1/81 with RR 12  - ABG on [facility] arrival 7.26/60/82 on 520/18/8/80%  - on vanc/cefepime, have added azithro  - sputum cx, RIP, HIV ordered     GI/FEN  Hep C  - per chart review, unknown if treated  - mild transaminitis, will monitor     F: s/p 2.3L LR prior to transfer  E: WNL, replete prn  N: NPO      Renal   - no active issues     Heme  - no active issues     Endocrine  - BG goal 140s-180s     Infectious Disease  Leukocytosis  - CTA with patchy perihilar opacities, worst RLL  - on vanc/cefepime, have added azithro  - blood cx, sputum cx, RIP, HIV in process    Feeding: NPO  Analgesia: fentanyl  Sedation: propofol, fentanyl  Thrombo PPX: lovenox  Head of Bed: > 30 degrees  Ulcer PPX: none  Glucose: goal 140s-180s  SBT/SAT: daily  Bowel Regimen: none  Indwelling Lines: PIV x3, salomon, ETT, OGT, picc  Abx: vanc/cefepime/azithro    Code: Full     Susu Rosario MD  [facility] Pulmonary/Critical Care Fellow  08/26/2022   [phone]

## 2022-08-26 NOTE — PLAN OF CARE
Unable to complete initial discharge assessment as patient in intubated. Unable to reach any family by phone.    08/26/22 3815   Discharge Assessment   Assessment Type Discharge Planning Assessment   Source of Information health record   Do you take prescription medications? Yes   Do you have prescription coverage? Yes

## 2022-08-26 NOTE — PROCEDURES
"Leighton Carroll is a 42 y.o. male patient.    Pulse: 80 (08/26/22 1206)  Resp: (!) 36 (08/26/22 1206)  BP: 115/60 (08/26/22 1206)  SpO2: (!) 94 % (08/26/22 1206)  Weight: 86.3 kg (190 lb 4.1 oz) (08/26/22 1141)  Height: 5' 9" (175.3 cm) (08/26/22 1141)    PICC  Date/Time: 8/26/2022 12:40 PM  Location procedure was performed: [facility] PICC LINE PLACEMENT  Performed by: Rafia Melendez RN  Consent Done: Yes  Time out: Immediately prior to procedure a time out was called to verify the correct patient, procedure, equipment, support staff and site/side marked as required  Indications: med administration and vascular access  Anesthesia: local infiltration  Local anesthetic: lidocaine 1% without epinephrine  Anesthetic Total (mL): 1  Preparation: skin prepped with ChloraPrep  Skin prep agent dried: skin prep agent completely dried prior to procedure  Sterile barriers: all five maximum sterile barriers used - cap, mask, sterile gown, sterile gloves, and large sterile sheet  Hand hygiene: hand hygiene performed prior to central venous catheter insertion  Location details: left basilic  Catheter type: double lumen  Catheter size: 5 Fr  Catheter Length: 45cm    Ultrasound guidance: yes  Vessel Caliber: medium and patent, compressibility normal  Vascular Doppler: not done  Needle advanced into vessel with real time Ultrasound guidance.  Guidewire confirmed in vessel.  Sterile sheath used.  no esophageal manometryNumber of attempts: 1  Post-procedure: sterile dressing applied and blood return through all ports  Technical procedures used: microintroducer with ultrasound  Estimated blood loss (mL): 0  Specimens: No  Implants: No  Assessment: placement verified by x-ray, no pneumothorax on x-ray, tip termination and successful placement  Tip Termination Explanation: svc  Complications: none          Name hbourg  8/26/2022  "

## 2022-08-26 NOTE — H&P
[facility] Intensive Care [facility] Medicine  History & Physical    Patient Name: Leighton Carroll  MRN: [id number]  Patient Class: IP- Inpatient  Admission Date: 8/26/2022  Attending Physician: Nolan Torres MD   Primary Care Provider: Primary Doctor No         Patient information was obtained from past medical records and ER records.     Subjective:     Principal Problem:Acute respiratory failure with hypoxia and hypercarbia    Chief Complaint:   Chief Complaint   Patient presents with    Shortness of Breath        HPI: Patient's HPI is adapted from notes of Dr. Schuler, Dr. Morin and Dr. Hall ([facility]).   Patient arrived in [facility] ICU intubated, pt's girlfriend's number not on file, unable to verify events PTA.     Leighton Carroll is a 42 year old man with a PMHx of depression, hx of drug overdose (7/11/2022, buproprion and gabapentin), polysubstance abuse (meth, heroin) and nicotine dependence.    He was presented to [facility] on 8/25/2022 via EMS with shortness of breath and dry cough. He was reportedly found down by his girlfriend at home yesterday evening (8/25/2022) and was given multiple rounds of chest compressions. He woke up and began having shortness of breath and called EMS, which found him saturating at 66% on room air, which increased to 90s with nonrebreather mask. Patient denied subjective fever, chills, sick contacts, chest pain, palpitations, abdominal pain.     At [facility], patient had a fever to 101, with leukocytosis (20) and lactic acidosis (3.0) with a normal procal. CTA chest showed patchy perihilar opacities bilaterally most pronounced in right lower lobe. Covid, Group A strep, respiratory influenza panel -ve. D-dimer was elevated (3.68), but CTPA negative for PE. Tox postive for THC only. CXR was -ve for pneumothorax. Patient was started on IV Cefepime and IV Vancomycin.     Patient was initially on BiPAP but became agitated and was intubated. Patient reportedly had  ""red spit". Patient was difficult to sedate requiring propofol and precedex and multiple doses of versed and ketamine, thus became hypotensive after intubation and was started on levophed.     Patient is transferred to [facility] ICU, hospitals medicine, for management of acute respiratory failure with hypoxia and hypercapnia.             Past Medical History:   Diagnosis Date    Anxiety     Depression     Hepatitis C     Substance abuse     METH AND HEROIN       No past surgical history on file.    Review of patient's allergies indicates:  No Known Allergies    Current Facility-Administered Medications on File Prior to Encounter   Medication    [COMPLETED] 0.9%  NaCl infusion    [COMPLETED] etomidate injection 22.8 mg    [COMPLETED] iohexoL (OMNIPAQUE 350) injection 100 mL    [COMPLETED] lactated ringers bolus 2,286 mL    [COMPLETED] lorazepam injection 2 mg    [COMPLETED] lorazepam injection 2 mg    [COMPLETED] midazolam (VERSED) 1 mg/mL injection 5 mg    [COMPLETED] midazolam (VERSED) 1 mg/mL injection 5 mg    [COMPLETED] midazolam (VERSED) 5 mg/mL injection    [COMPLETED] midazolam (VERSED) 5 mg/mL injection    [COMPLETED] succinylcholine injection 76 mg    [COMPLETED] vancomycin 2 g in 0.9% sodium chloride 500 mL IVPB    [DISCONTINUED] cefepime in dextrose 5 % IVPB 2 g    [DISCONTINUED] cefepime in dextrose 5 % IVPB 2 g    [DISCONTINUED] dexmedetomidine (PRECEDEX) 400mcg/100mL dextrose 5% infusion    [DISCONTINUED] midazolam (VERSED) 1 mg/mL injection 5 mg    [DISCONTINUED] midazolam (VERSED) 1 mg/mL injection 5 mg    [DISCONTINUED] NORepinephrine 4 mg in dextrose 5% 250 mL infusion (premix) (titrating)    [DISCONTINUED] propofol (DIPRIVAN) 10 mg/mL infusion    [DISCONTINUED] rocuronium 10 mg/mL injection    [DISCONTINUED] sodium chloride 0.9% bolus 2,286 mL    [DISCONTINUED] vancomycin - pharmacy to dose    [DISCONTINUED] vancomycin in dextrose 5 % 1 gram/250 mL IVPB 1,000 mg     Current " "Outpatient Medications on File Prior to Encounter   Medication Sig    buPROPion (WELLBUTRIN XL) 150 MG TB24 tablet Take 1 tablet (150 mg total) by mouth once daily.    divalproex ER (DEPAKOTE) 500 MG Tb24 Take 2 tablets (1,000 mg total) by mouth once daily.    gabapentin (NEURONTIN) 300 MG capsule Take 2 capsules (600 mg total) by mouth 3 (three) times daily.    LORazepam (ATIVAN) 0.5 MG tablet Take 1 tablet at bedtime 7/18, one tablet in morning on 7/19 then stop    nicotine (NICODERM CQ) 14 mg/24 hr Place 1 patch onto the skin once daily.    QUEtiapine (SEROQUEL) 200 MG Tab Take 1 tablet (200 mg total) by mouth every evening.    risperiDONE (RISPERDAL) 2 MG tablet Take 1 tablet (2 mg total) by mouth once daily.     Family History    None       Tobacco Use    Smoking status: Current Every Day Smoker     Packs/day: 0.50     Types: Cigarettes    Smokeless tobacco: Never Used   Substance and Sexual Activity    Alcohol use: Yes     Comment: 5th whiskey or more daily    Drug use: Yes     Types: Methamphetamines, Marijuana     Comment: heroin , "pt reports he has been doing whatever he can get his hands on"     Sexual activity: Not on file     Review of Systems   Reason unable to perform ROS: Patient is on ventilatory support and sedated.   Objective:     Vital Signs (Most Recent):  Temp: 97.1 °F (36.2 °C) (08/26/22 1415)  Pulse: 81 (08/26/22 1500)  Resp: (!) 26 (08/26/22 1500)  BP: 110/62 (08/26/22 1500)  SpO2: 97 % (08/26/22 1500)   Vital Signs (24h Range):  Temp:  [97.1 °F (36.2 °C)-101.2 °F (38.4 °C)] 97.1 °F (36.2 °C)  Pulse:  [] 81  Resp:  [18-51] 26  SpO2:  [80 %-100 %] 97 %  BP: ()/(42-92) 110/62     Weight: 86.2 kg (190 lb)  Body mass index is 28.06 kg/m².    Physical Exam  Vitals and nursing note reviewed.   HENT:      Head: Normocephalic and atraumatic.   Eyes:      General: No scleral icterus.        Right eye: No discharge.         Left eye: No discharge.   Cardiovascular:      Rate " and Rhythm: Normal rate and regular rhythm.      Pulses: Normal pulses.      Heart sounds: Normal heart sounds.   Pulmonary:      Comments: On ventilatory support, decreased lung sounds over the right anterior basal chest   Abdominal:      General: Bowel sounds are normal.      Palpations: Abdomen is soft.   Genitourinary:     Comments: On salomon catheter, draining yellow urine    Musculoskeletal:      Right lower leg: No edema.      Left lower leg: No edema.   Skin:     General: Skin is warm and dry.      Findings: No rash.      Comments: Dry healed scabs on chest and abdomen   Neurological:      Comments: Unable to assess, sedated             Significant Labs: All pertinent labs within the past 24 hours have been reviewed.  ABGs:   Recent Labs   Lab 08/26/22  0220 08/26/22  0845 08/26/22  0957 08/26/22  1201   PH 7.300* 7.140* 7.160* 7.267*   PCO2 52* 81* 80* 60.8*   HCO3 25.60 27.60 98.60 27.7   POCSATURATED 99.0 99.6 99.4 94*   BE -1.50 -3.30* -2.00 1   TOTALHB 12.6 12.9 12.6  --    COHB 2.8 1.9 1.9  --    METHB 0.9 1 1.2  --    PO2 96 208* 145* 82     Blood Culture:   Recent Labs   Lab 08/25/22 2330 08/25/22 2337   LABBLOO No Growth to date No Growth to date     BMP:   Recent Labs   Lab 08/25/22 2337   GLU 71      K 4.0      CO2 21*   BUN 14   CREATININE 1.3   CALCIUM 9.5   MG 2.0     CBC:   Recent Labs   Lab 08/25/22 2337 08/26/22  1404   WBC 20.07* 34.58*   HGB 14.1 12.6*   HCT 43.3 39.1*    379     CMP:   Recent Labs   Lab 08/25/22 2337      K 4.0      CO2 21*   GLU 71   BUN 14   CREATININE 1.3   CALCIUM 9.5   PROT 7.1   ALBUMIN 3.5   BILITOT 0.3   ALKPHOS 49*   AST 51*   ALT 52*   ANIONGAP 13     Cardiac Markers:   Recent Labs   Lab 08/25/22 2337   BNP 13     Lactic Acid:   Recent Labs   Lab 08/25/22 2337 08/26/22  0337 08/26/22  1404   LACTATE 3.0* 2.0 1.3     Lipase:   Recent Labs   Lab 08/26/22  1404   LIPASE 11     Magnesium:   Recent Labs   Lab 08/25/22  2337   MG  2.0     Respiratory Culture: No results for input(s): GSRESP, RESPIRATORYC in the last 48 hours.  Troponin:   Recent Labs   Lab 08/25/22  2337   TROPONINI <0.006     Urine Culture: No results for input(s): LABURIN in the last 48 hours.  Urine Studies:   Recent Labs   Lab 08/26/22  0356   COLORU Yellow   APPEARANCEUA Clear   PHUR 6.0   SPECGRAV 1.015   PROTEINUA Negative   GLUCUA Negative   KETONESU Negative   BILIRUBINUA Negative   OCCULTUA Negative   NITRITE Negative   UROBILINOGEN Negative   LEUKOCYTESUR Negative       Significant Imaging: I have reviewed and interpreted all pertinent imaging results/findings within the past 24 hours.    Assessment/Plan:     * Acute respiratory failure with hypoxia and hypercarbia  With severe sepsis  Hx of drug overdose (buproprion and gabapentin), Polysubstance abuse (meth, heroin), nicotine abuse, chest compressions performed by girlfriend PTA.      Hypoxic to 60s on room air on EMS arrival, failed BiPAP 2/2 due to agitation, intubated at OSH. CTA with patchy perihilar opacities, leukocytosis, lactic acidosis, febrile to 101. Tox +ve for THC. D-dimer elevated but CTPA -ve for PE. Trops and EKG benign for cardiac cause. Covid, respiratory viral panel, Group A strep -ve. Lipase -ve. CXR -ve for pneumothorax.     Concern for aspiration vs community acquired pneumonia    - Consulted pulm crit care - appreciate recs  - Currently on vent support, 80% O2 and levophed   - Lactate improving (3>1.3)  - Repeat CXR - patchy opacities, small right pleural effusion  - Worsening leukocytosis (20>34), procal WNL  - Drop in H/H likely dilutional, no overt bleeding      Plan:   - Continue ventilatory support  - Continue IV Cefepime 2g qd, IV Vancomycin 1g qd and PO Azithromycin 500mg today  - Resp gram stain and culture, blood cultures pending   - Check Echo - pending read         History of drug overdose  Hx of drug overdose (7/11/2022, buproprion and gabapentin) and suicidal  ideation  Currently tox screen +THC    - Check urine etoh         Depression  Patient has persistent depression which is unknown and is currently controlled. Will hold anti-depressant medications. We will not consult psychiatry at this time. Patient does not display psychosis at this time. Continue to monitor closely and adjust plan of care as needed.    - Held home dose buproprion 150mg qd, divalproex ER 500mg 1g qd, quetiapine 200mg qd, risperiodone 2mg qd, will continue when patient is off sedation          VTE Risk Mitigation (From admission, onward)         Ordered     enoxaparin injection 40 mg  Daily         08/26/22 1422     IP VTE HIGH RISK PATIENT  Once         08/26/22 1422     Place sequential compression device  Until discontinued         08/26/22 1422     Place MARGRET hose  Until discontinued         08/26/22 1422              Critical due to severe sepsis, acute respiratory failture with hypoxia and hypercapnia likely pneumonia.    Critical care time spent on the evaluation and treatment of severe organ dysfunction, review of pertinent labs and imaging studies, discussions with consulting providers and discussions with patient/family: 40 minutes.       Kevin Dewitt NP  Department of [facility] Medicine   [facility] Intensive Care ([place])

## 2022-08-27 LAB
ADENOVIRUS: NOT DETECTED
ALLENS TEST: ABNORMAL
ANION GAP SERPL CALC-SCNC: 7 MMOL/L (ref 8–16)
BACTERIA #/AREA URNS HPF: ABNORMAL /HPF
BASOPHILS # BLD AUTO: 0.09 K/UL (ref 0–0.2)
BASOPHILS NFR BLD: 0.4 % (ref 0–1.9)
BILIRUB UR QL STRIP: ABNORMAL
BORDETELLA PARAPERTUSSIS (IS1001): NOT DETECTED
BORDETELLA PERTUSSIS (PTXP): NOT DETECTED
BUN SERPL-MCNC: 14 MG/DL (ref 6–20)
CALCIUM SERPL-MCNC: 8.6 MG/DL (ref 8.7–10.5)
CHLAMYDIA PNEUMONIAE: NOT DETECTED
CHLORIDE SERPL-SCNC: 101 MMOL/L (ref 95–110)
CLARITY UR: ABNORMAL
CO2 SERPL-SCNC: 28 MMOL/L (ref 23–29)
COLOR UR: YELLOW
CORONAVIRUS 229E, COMMON COLD VIRUS: NOT DETECTED
CORONAVIRUS HKU1, COMMON COLD VIRUS: NOT DETECTED
CORONAVIRUS NL63, COMMON COLD VIRUS: NOT DETECTED
CORONAVIRUS OC43, COMMON COLD VIRUS: NOT DETECTED
CREAT SERPL-MCNC: 0.8 MG/DL (ref 0.5–1.4)
CRP SERPL-MCNC: 158.3 MG/L (ref 0–8.2)
DIFFERENTIAL METHOD: ABNORMAL
EOSINOPHIL # BLD AUTO: 0.5 K/UL (ref 0–0.5)
EOSINOPHIL NFR BLD: 1.9 % (ref 0–8)
ERYTHROCYTE [DISTWIDTH] IN BLOOD BY AUTOMATED COUNT: 14.6 % (ref 11.5–14.5)
ERYTHROCYTE [SEDIMENTATION RATE] IN BLOOD: 15 MM/HR (ref 0–10)
EST. GFR  (NO RACE VARIABLE): >60 ML/MIN/1.73 M^2
FLUBV RNA NPH QL NAA+NON-PROBE: NOT DETECTED
GLUCOSE SERPL-MCNC: 126 MG/DL (ref 70–110)
GLUCOSE UR QL STRIP: ABNORMAL
HCO3 UR-SCNC: 32.6 MMOL/L (ref 24–28)
HCT VFR BLD AUTO: 37.3 % (ref 40–54)
HCT VFR BLD CALC: 39 %PCV (ref 36–54)
HGB BLD-MCNC: 12.2 G/DL (ref 14–18)
HGB BLD-MCNC: 13 G/DL
HGB UR QL STRIP: ABNORMAL
HPIV1 RNA NPH QL NAA+NON-PROBE: NOT DETECTED
HPIV2 RNA NPH QL NAA+NON-PROBE: NOT DETECTED
HPIV3 RNA NPH QL NAA+NON-PROBE: NOT DETECTED
HPIV4 RNA NPH QL NAA+NON-PROBE: NOT DETECTED
HUMAN METAPNEUMOVIRUS: NOT DETECTED
HYALINE CASTS #/AREA URNS LPF: 2 /LPF
IMM GRANULOCYTES # BLD AUTO: 0.11 K/UL (ref 0–0.04)
IMM GRANULOCYTES NFR BLD AUTO: 0.5 % (ref 0–0.5)
INFLUENZA A (SUBTYPES H1,H1-2009,H3): NOT DETECTED
KETONES UR QL STRIP: NEGATIVE
LEUKOCYTE ESTERASE UR QL STRIP: NEGATIVE
LYMPHOCYTES # BLD AUTO: 1.4 K/UL (ref 1–4.8)
LYMPHOCYTES NFR BLD: 5.9 % (ref 18–48)
MCH RBC QN AUTO: 31.3 PG (ref 27–31)
MCHC RBC AUTO-ENTMCNC: 32.7 G/DL (ref 32–36)
MCV RBC AUTO: 96 FL (ref 82–98)
MICROSCOPIC COMMENT: ABNORMAL
MONOCYTES # BLD AUTO: 1 K/UL (ref 0.3–1)
MONOCYTES NFR BLD: 4.2 % (ref 4–15)
MYCOPLASMA PNEUMONIAE: NOT DETECTED
NEUTROPHILS # BLD AUTO: 20.8 K/UL (ref 1.8–7.7)
NEUTROPHILS NFR BLD: 87.1 % (ref 38–73)
NITRITE UR QL STRIP: NEGATIVE
NRBC BLD-RTO: 0 /100 WBC
PCO2 BLDA: 69.4 MMHG (ref 35–45)
PH SMN: 7.28 [PH] (ref 7.35–7.45)
PH UR STRIP: 6 [PH] (ref 5–8)
PLATELET # BLD AUTO: 287 K/UL (ref 150–450)
PMV BLD AUTO: 9.4 FL (ref 9.2–12.9)
PO2 BLDA: 93 MMHG (ref 80–100)
POC BE: 6 MMOL/L
POC IONIZED CALCIUM: 1.26 MMOL/L (ref 1.06–1.42)
POC SATURATED O2: 96 % (ref 95–100)
POC TCO2: 35 MMOL/L (ref 23–27)
POTASSIUM BLD-SCNC: 4.2 MMOL/L (ref 3.5–5.1)
POTASSIUM SERPL-SCNC: 4.9 MMOL/L (ref 3.5–5.1)
PROT UR QL STRIP: ABNORMAL
RBC # BLD AUTO: 3.9 M/UL (ref 4.6–6.2)
RBC #/AREA URNS HPF: >100 /HPF (ref 0–4)
RESPIRATORY INFECTION PANEL SOURCE: NORMAL
RSV RNA NPH QL NAA+NON-PROBE: NOT DETECTED
RV+EV RNA NPH QL NAA+NON-PROBE: NOT DETECTED
SAMPLE: ABNORMAL
SARS-COV-2 RNA RESP QL NAA+PROBE: NOT DETECTED
SITE: ABNORMAL
SODIUM BLD-SCNC: 135 MMOL/L (ref 136–145)
SODIUM SERPL-SCNC: 136 MMOL/L (ref 136–145)
SP GR UR STRIP: >=1.03 (ref 1–1.03)
SQUAMOUS #/AREA URNS HPF: 5 /HPF
URN SPEC COLLECT METH UR: ABNORMAL
UROBILINOGEN UR STRIP-ACNC: NEGATIVE EU/DL
VANCOMYCIN TROUGH SERPL-MCNC: 7.8 UG/ML (ref 10–22)
WBC # BLD AUTO: 23.82 K/UL (ref 3.9–12.7)
WBC #/AREA URNS HPF: 11 /HPF (ref 0–5)

## 2022-08-27 PROCEDURE — 87040 BLOOD CULTURE FOR BACTERIA: CPT | Performed by: INTERNAL MEDICINE

## 2022-08-27 PROCEDURE — 99291 CRITICAL CARE FIRST HOUR: CPT | Mod: ,,, | Performed by: INTERNAL MEDICINE

## 2022-08-27 PROCEDURE — 86255 FLUORESCENT ANTIBODY SCREEN: CPT | Performed by: INTERNAL MEDICINE

## 2022-08-27 PROCEDURE — 25000003 PHARM REV CODE 250

## 2022-08-27 PROCEDURE — 85651 RBC SED RATE NONAUTOMATED: CPT | Performed by: INTERNAL MEDICINE

## 2022-08-27 PROCEDURE — S4991 NICOTINE PATCH NONLEGEND: HCPCS

## 2022-08-27 PROCEDURE — 99900026 HC AIRWAY MAINTENANCE (STAT)

## 2022-08-27 PROCEDURE — 94003 VENT MGMT INPAT SUBQ DAY: CPT

## 2022-08-27 PROCEDURE — 94761 N-INVAS EAR/PLS OXIMETRY MLT: CPT

## 2022-08-27 PROCEDURE — 87449 NOS EACH ORGANISM AG IA: CPT | Performed by: INTERNAL MEDICINE

## 2022-08-27 PROCEDURE — 63600175 PHARM REV CODE 636 W HCPCS: Performed by: INTERNAL MEDICINE

## 2022-08-27 PROCEDURE — 80202 ASSAY OF VANCOMYCIN: CPT | Performed by: INTERNAL MEDICINE

## 2022-08-27 PROCEDURE — 93005 ELECTROCARDIOGRAM TRACING: CPT

## 2022-08-27 PROCEDURE — 87086 URINE CULTURE/COLONY COUNT: CPT | Performed by: INTERNAL MEDICINE

## 2022-08-27 PROCEDURE — 63600175 PHARM REV CODE 636 W HCPCS: Performed by: NURSE PRACTITIONER

## 2022-08-27 PROCEDURE — 80048 BASIC METABOLIC PNL TOTAL CA: CPT

## 2022-08-27 PROCEDURE — 85025 COMPLETE CBC W/AUTO DIFF WBC: CPT

## 2022-08-27 PROCEDURE — 63700000 PHARM REV CODE 250 ALT 637 W/O HCPCS

## 2022-08-27 PROCEDURE — 63600175 PHARM REV CODE 636 W HCPCS

## 2022-08-27 PROCEDURE — 27100171 HC OXYGEN HIGH FLOW UP TO 24 HOURS

## 2022-08-27 PROCEDURE — 86038 ANTINUCLEAR ANTIBODIES: CPT | Performed by: INTERNAL MEDICINE

## 2022-08-27 PROCEDURE — 81000 URINALYSIS NONAUTO W/SCOPE: CPT | Performed by: INTERNAL MEDICINE

## 2022-08-27 PROCEDURE — 87899 AGENT NOS ASSAY W/OPTIC: CPT | Performed by: INTERNAL MEDICINE

## 2022-08-27 PROCEDURE — 93010 EKG 12-LEAD: ICD-10-PCS | Mod: ,,, | Performed by: INTERNAL MEDICINE

## 2022-08-27 PROCEDURE — 93010 ELECTROCARDIOGRAM REPORT: CPT | Mod: ,,, | Performed by: INTERNAL MEDICINE

## 2022-08-27 PROCEDURE — 86140 C-REACTIVE PROTEIN: CPT | Performed by: INTERNAL MEDICINE

## 2022-08-27 PROCEDURE — 20000000 HC ICU ROOM

## 2022-08-27 PROCEDURE — 25000003 PHARM REV CODE 250: Performed by: INTERNAL MEDICINE

## 2022-08-27 PROCEDURE — 99291 PR CRITICAL CARE, E/M 30-74 MINUTES: ICD-10-PCS | Mod: ,,, | Performed by: INTERNAL MEDICINE

## 2022-08-27 PROCEDURE — 63600175 PHARM REV CODE 636 W HCPCS: Performed by: STUDENT IN AN ORGANIZED HEALTH CARE EDUCATION/TRAINING PROGRAM

## 2022-08-27 RX ORDER — FENTANYL CITRATE-0.9 % NACL/PF 10 MCG/ML
0-300 PLASTIC BAG, INJECTION (ML) INTRAVENOUS CONTINUOUS
Status: DISCONTINUED | OUTPATIENT
Start: 2022-08-27 | End: 2022-08-28

## 2022-08-27 RX ORDER — HYDRALAZINE HYDROCHLORIDE 20 MG/ML
10 INJECTION INTRAMUSCULAR; INTRAVENOUS EVERY 8 HOURS PRN
Status: DISCONTINUED | OUTPATIENT
Start: 2022-08-27 | End: 2022-09-09

## 2022-08-27 RX ORDER — PROPOFOL 10 MG/ML
INJECTION, EMULSION INTRAVENOUS
Status: COMPLETED
Start: 2022-08-27 | End: 2022-08-27

## 2022-08-27 RX ORDER — FAMOTIDINE 10 MG/ML
20 INJECTION INTRAVENOUS 2 TIMES DAILY
Status: DISCONTINUED | OUTPATIENT
Start: 2022-08-27 | End: 2022-09-07

## 2022-08-27 RX ADMIN — SENNOSIDES AND DOCUSATE SODIUM 1 TABLET: 50; 8.6 TABLET ORAL at 09:08

## 2022-08-27 RX ADMIN — PROPOFOL 40 MCG/KG/MIN: 10 INJECTION, EMULSION INTRAVENOUS at 08:08

## 2022-08-27 RX ADMIN — CEFEPIME HYDROCHLORIDE 2 G: 2 INJECTION, SOLUTION INTRAVENOUS at 06:08

## 2022-08-27 RX ADMIN — FAMOTIDINE 20 MG: 10 INJECTION, SOLUTION INTRAVENOUS at 09:08

## 2022-08-27 RX ADMIN — FAMOTIDINE 20 MG: 10 INJECTION, SOLUTION INTRAVENOUS at 12:08

## 2022-08-27 RX ADMIN — ACETAMINOPHEN 650 MG: 325 TABLET, FILM COATED ORAL at 09:08

## 2022-08-27 RX ADMIN — CEFEPIME HYDROCHLORIDE 2 G: 2 INJECTION, SOLUTION INTRAVENOUS at 12:08

## 2022-08-27 RX ADMIN — POLYETHYLENE GLYCOL 3350 17 G: 17 POWDER, FOR SOLUTION ORAL at 09:08

## 2022-08-27 RX ADMIN — PROPOFOL 40 MCG/KG/MIN: 10 INJECTION, EMULSION INTRAVENOUS at 11:08

## 2022-08-27 RX ADMIN — Medication 200 MCG/HR: at 06:08

## 2022-08-27 RX ADMIN — PROPOFOL 45 MCG/KG/MIN: 10 INJECTION, EMULSION INTRAVENOUS at 03:08

## 2022-08-27 RX ADMIN — HYDRALAZINE HYDROCHLORIDE 10 MG: 20 INJECTION INTRAMUSCULAR; INTRAVENOUS at 10:08

## 2022-08-27 RX ADMIN — PROPOFOL 35 MCG/KG/MIN: 10 INJECTION, EMULSION INTRAVENOUS at 07:08

## 2022-08-27 RX ADMIN — VANCOMYCIN HYDROCHLORIDE 1250 MG: 1.25 INJECTION, POWDER, LYOPHILIZED, FOR SOLUTION INTRAVENOUS at 11:08

## 2022-08-27 RX ADMIN — Medication 150 MCG/HR: at 07:08

## 2022-08-27 RX ADMIN — PROPOFOL 50 MCG/KG/MIN: 10 INJECTION, EMULSION INTRAVENOUS at 04:08

## 2022-08-27 RX ADMIN — NICOTINE 1 PATCH: 14 PATCH TRANSDERMAL at 09:08

## 2022-08-27 RX ADMIN — VANCOMYCIN HYDROCHLORIDE 1000 MG: 1 INJECTION, POWDER, LYOPHILIZED, FOR SOLUTION INTRAVENOUS at 03:08

## 2022-08-27 RX ADMIN — Medication 200 MCG/HR: at 09:08

## 2022-08-27 RX ADMIN — PROPOFOL 50 MCG/KG/MIN: 10 INJECTION, EMULSION INTRAVENOUS at 11:08

## 2022-08-27 RX ADMIN — VANCOMYCIN HYDROCHLORIDE 1250 MG: 1.25 INJECTION, POWDER, LYOPHILIZED, FOR SOLUTION INTRAVENOUS at 04:08

## 2022-08-27 RX ADMIN — AZITHROMYCIN MONOHYDRATE 250 MG: 250 TABLET ORAL at 09:08

## 2022-08-27 RX ADMIN — ACETAMINOPHEN 650 MG: 325 TABLET, FILM COATED ORAL at 04:08

## 2022-08-27 RX ADMIN — CEFEPIME HYDROCHLORIDE 2 G: 2 INJECTION, SOLUTION INTRAVENOUS at 03:08

## 2022-08-27 RX ADMIN — ENOXAPARIN SODIUM 40 MG: 100 INJECTION SUBCUTANEOUS at 04:08

## 2022-08-27 NOTE — NURSING
Pt is O2 is 98% of FiO2 of 80%; Tv 520; Peep 8.0; RR 18. Pt is resting with eye closed, Cough and gag reflexes are noted. Pt is responsive to pain. Non-violent restraints are in place. Levo Running at 0.04mcg; Propofol and Fent are running at this time. Mom and sister visited patient and requested that girlfriend not visit the patient. Adequate urine output noted 1800 ml noted.

## 2022-08-27 NOTE — PROGRESS NOTES
"42M admit to [facility] for presumed narcotic overdose. Treating for hypoxemic/hypercapnic respiratory failure requiring intubation and diffuse bilateral pulmonary opacities concerning for multifocal PNA.    Active Hospital Problems    Diagnosis  POA    *Acute respiratory failure with hypoxia and hypercarbia [J96.01, J96.02]  Yes    History of drug overdose [Z91.89]  Not Applicable    Depression [F32.A]  Yes      Resolved Hospital Problems    Diagnosis Date Resolved POA    Severe sepsis [A41.9, R65.20] 08/26/2022 Yes     Temp: 99.6 °F (37.6 °C) (08/27/22 0715)  Pulse: 97 (08/27/22 0800)  Resp: (!) 21 (08/27/22 0800)  BP: 139/67 (08/27/22 0745)  SpO2: (!) 92 % (08/27/22 0800)  Weight: 86.2 kg (190 lb) (08/26/22 1346)  Height: 5' 9" (175.3 cm) (08/26/22 1346)    Subjective  Evaluated this AM, remains intubated at 75% FiO2 and PEEP 8 and sedation with propofol and fentanyl. Spontaneous movements noted with physical stimulation. Tmax 100.9 overnight with leukocytosis of 23.8. Remains on empiric vanc/cefepime/azithro with negative Bcx and sputum Cx to date.     Objective:  General Appearance:  General patient appearance: intubated, sedated.    Vital signs: (most recent): Blood pressure (!) 149/67, pulse 98, temperature 99.6 °F (37.6 °C), temperature source Axillary, resp. rate 20, height 5' 9" (1.753 m), weight 86.2 kg (190 lb), SpO2 96 %.  Fever.    Output: Producing urine.    HEENT: (ETT and OGT in place)    Lungs:  He is not in respiratory distress.  There are rales (diffuse, bilateral).  No wheezes.  (Intubated)  Heart: Normal rate.  No murmur.   Abdomen: Abdomen is soft and non-distended.  There is no abdominal tenderness.     Extremities: There is no deformity or dependent edema.    Pulses: Distal pulses are intact.    Neurological: (Sedated, occasional spontaneous movements with gentle physical stimulation noted).    Skin:  Warm and dry.  (Numerous tattoos all over body)      Assessment & Plan  42M with history " "of drug use (heroin, methamphetamine, "whatever he can get his hands on") presents with hypoxemic and hypercapnic respiratory failure presumably secondary to drug overdose requiring intubation. UDS positive only for THC, but mass spec not completed for full analysis. Hypercapnia improved after intubation, but remains severely hypoxemic with diffuse bilateral pulmonary infiltrates seen on CT chest. Spiking fevers with leukocytosis, empiric coverage with vanc/cefepime/azithro ongoing. Sputum cultures and blood cultures remain NGTD. HIV negative. Likely needs several more days of supportive care, then can work on SAT/SBT and extubation.    Acute hypoxic and hypercapnic respiratory failure  In setting of presumed drug overdose (fentanyl?)  With diffuse bilateral pulmonary infiltrates on CT chest, +fevers and leukocytosis  Sputum and Bcx remain NGTD  ESR/CRP, JOVANA, and ANCA ordered for further eval  Repeat Bcx ordered due to concern for septic emboli  Continue with current vent settings, wean FiO2 as able to maintain O2 saturations >90% (avoid over oxygenation)  No SAT/SBT for now given high O2 requirements  Continue empiric Abx for now; consider de-escalation to rocephin/azithro if no MRSA/pseudomonas grows  Consider trial of diuresis if oxygen requirements do not improve throughout the day  Continue to wean sedation as tolerated to target RASS 0 to -2  Repeat VBG this AM to ensure respiratory acidosis has resolved  Multifocal PNA  Aspiration vs. Inhalation injury/pneumonitis vs. Septic emboli vs. Vasculitis?  Lab and culture workup as above  Continue with Abx as stated above; de-escalate as able  Urine legionella and strep Ag ordered today  HIV negative, viral PCR negative  Will consider bronchoscopy with BAL if does not improve in the next few days    Code: FULL    DVT: Lovenox  GI: Famotidine  Feeds: TF running  Sedation: propofol and fentanyl    Patient seen and discussed with Dr. Panchal. Please reach out with any " questions or concerns.    Jorge L Phelan MD  Pulmonary / Critical Care, PGY-6    Jorge L Phelan MD  8/27/2022

## 2022-08-27 NOTE — CONSULTS
[facility] ([place])  Adult Nutrition  Consult Note    SUMMARY     Recommendations    Recommendation/Intervention:   1. TF recommendations:  Isosource 1.5    Start with rate of 20ml/hr.  Increase by 10mls q 8 hours until goal rate of 50ml/hr is reached (as tolerated)    2. Monitor labs.   3. Monitor weights    4. Collaboration with medical providers.    Goals: Patient to receive adequate nutrition prior to discharge.    Nutrition Goal Status: progressing towards goal    Communication of RD Recs: other (comment) (POC, Consults, Sticky notes)    Assessment and Plan    Nutrition Problem  Inability to consume oral intake     Related to (etiology):   Condition associated with diagnosis:respiratory failure    Signs and Symptoms (as evidenced by):   NPO, mechanical ventilation, and need for alternate source of nutrition     Interventions/Recommendations (treatment strategy):  1. TF recommendations:  Isosource 1.5    Start with rate of 20ml/hr.  Increase by 10mls q 8 hours until goal rate of 50ml/hr is reached (as tolerated)    2. Monitor labs.   3. Monitor weights    4. Collaboration with medical providers.    Nutrition Diagnosis Status:   Continues      Malnutrition Assessment                                       Reason for Assessment    Reason For Assessment: consult, new tube feeding, other (see comments) (Vent)    Diagnosis: psychological disorder, other (see comments) (respiratory failure, overdose)  Patient Active Problem List   Diagnosis    Depression    Suicidal ideation    Substance abuse    Nicotine abuse    Elevated CPK    Suicide attempt    Acute respiratory failure with hypoxia and hypercarbia    History of drug overdose     Past Medical History:   Diagnosis Date    Anxiety     Depression     Hepatitis C     Substance abuse     METH AND HEROIN       Interdisciplinary Rounds: did not attend    General Information Comments:   8/27/2022: Patient with overdose and intubated at this time.  Patient  "already started on tubefeeing via OG tube.  RD to recommend current formula of Isosource 1.5 with goal rate of 50ml/hr.  Propofol running with rate of 25.9ml/hr.  Previous 24 hour total of propofol is 454mls (499kcals).  LBM not noted at this time.  NKFA.  RD to continue to monitor tube feeding tolerance and labs. Current labs reviewed.  (RD remote)    Nutrition Discharge Planning: Patient to tolerate appropriate nutrition prior to discharge.    Nutrition Risk Screen    Nutrition Risk Screen: no indicators present, tube feeding or parenteral nutrition    Nutrition/Diet History    Spiritual, Cultural Beliefs, Confucianist Practices, Values that Affect Care: no  Food Allergies: NKFA  Factors Affecting Nutritional Intake: NPO, on mechanical ventilation    Anthropometrics    Temp: 99.6 °F (37.6 °C)  Height Method: Estimated  Height: 5' 9" (175.3 cm)  Height (inches): 69 in  Weight Method: Bed Scale  Weight: 86.2 kg (190 lb)  Weight (lb): 190 lb  Ideal Body Weight (IBW), Male: 160 lb  % Ideal Body Weight, Male (lb): 118.75 %  BMI (Calculated): 28  BMI Grade: 25 - 29.9 - overweight     Wt Readings from Last 3 Encounters:   08/27/22 86.2 kg (190 lb)   08/26/22 76.2 kg (168 lb)   08/26/22 76.2 kg (168 lb)       Lab/Procedures/Meds    Pertinent Labs Reviewed: reviewed  Pertinent Medications Reviewed: reviewed  BMP  Lab Results   Component Value Date     08/27/2022    K 4.9 08/27/2022     08/27/2022    CO2 28 08/27/2022    BUN 14 08/27/2022    CREATININE 0.8 08/27/2022    CALCIUM 8.6 (L) 08/27/2022    ANIONGAP 7 (L) 08/27/2022    ESTGFRAFRICA >60 07/16/2022    EGFRNONAA >60 07/16/2022     Lab Results   Component Value Date     08/27/2022    K 4.9 08/27/2022     08/27/2022    CO2 28 08/27/2022     Lab Results   Component Value Date    CREATININE 0.8 08/27/2022    BUN 14 08/27/2022     08/27/2022    K 4.9 08/27/2022     08/27/2022    CO2 28 08/27/2022     Lab Results   Component Value Date    " CALCIUM 8.6 (L) 08/27/2022    PHOS 3.8 08/25/2022     Lab Results   Component Value Date    LABPROT 11.3 08/25/2022    ALBUMIN 3.5 08/25/2022     Scheduled Meds:   azithromycin  250 mg Oral Daily    ceFEPime (MAXIPIME) IVPB  2 g Intravenous Q8H    enoxaparin  40 mg Subcutaneous Daily    famotidine (PF)  20 mg Intravenous BID    fentanyl  50 mcg Intravenous Once    nicotine  1 patch Transdermal Daily    polyethylene glycol  17 g Oral Daily    propofoL        senna-docusate 8.6-50 mg  1 tablet Oral BID    vancomycin (VANCOCIN) IVPB  1,000 mg Intravenous Q12H     Continuous Infusions:   fentanyl 150 mcg/hr (08/27/22 0702)    propofoL 50 mcg/kg/min (08/27/22 1129)     PRN Meds:.acetaminophen, acetaminophen, HYDROcodone-acetaminophen, ondansetron, prochlorperazine, sodium chloride 0.9%, Pharmacy to dose Vancomycin consult **AND** vancomycin - pharmacy to dose    Physical Findings/Assessment         Estimated/Assessed Needs    Weight Used For Calorie Calculations: 86.2 kg (190 lb 0.6 oz)  Energy Calorie Requirements (kcal): 20-25kcals (1724-2155kcals per day)  Energy Need Method: Kcal/kg  Protein Requirements: 1.0-1.2g/kg (86-104g/day)  Weight Used For Protein Calculations: 86.2 kg (190 lb 0.6 oz)  Fluid Requirements (mL): 2155mls  Estimated Fluid Requirement Method: RDA Method  RDA Method (mL): 20  CHO Requirement: 216-269      Nutrition Prescription Ordered    Current Diet Order: NPO  Current Nutrition Support Formula Ordered: Isosource 1.5  Current Nutrition Support Rate Ordered: 20 (ml) (progressing to goal rate of 50ml/hr)    Evaluation of Received Nutrient/Fluid Intake    Enteral Calories (kcal): 720  Enteral Protein (gm): 33  Enteral (Free Water) Fluid (mL): 367  Other Calories (kcal): 499 (propofol kcals)  Total Calories (kcal): 1219  % Kcal Needs: 61%  % Protein Needs: 40%  Energy Calories Required: not meeting needs  Protein Required: not meeting needs  Fluid Required: meeting needs  Comments: RD to continue  progress of tube feeding to recommended goal rate  Tolerance: tolerating  % Intake of Estimated Energy Needs: 50 - 75 %  % Meal Intake: NPO  Intake/Output - Last 3 Shifts         08/25 0700 08/26 0659 08/26 0700 08/27 0659 08/27 0700 08/28 0659    I.V. (mL/kg)  1031.2 (12) 38.3 (0.4)    NG/GT  314 20    IV Piggyback  591.7     Total Intake(mL/kg)  1936.9 (22.5) 58.3 (0.7)    Urine (mL/kg/hr)  1855 50 (0.1)    Drains  0     Total Output  1855 50    Net  +81.9 +8.3                   Nutrition Risk    Level of Risk/Frequency of Follow-up: moderate       Monitor and Evaluation    Food and Nutrient Intake: enteral nutrition intake  Food and Nutrient Adminstration: enteral and parenteral nutrition administration  Knowledge/Beliefs/Attitudes: other (specify)  Physical Activity and Function: nutrition-related ADLs and IADLs, factors affecting access to physical activity  Anthropometric Measurements: height/length, weight, weight change, body mass index  Biochemical Data, Medical Tests and Procedures: inflammatory profile, glucose/endocrine profile, gastrointestinal profile, electrolyte and renal panel, lipid profile       Nutrition Follow-Up    RD Follow-up?: Yes

## 2022-08-27 NOTE — RESPIRATORY THERAPY
ABG  done with pt on AC/VC  RR 25     PEEP 10 FiO2 100%     PH 7.28  PCO2 69  PO2  93   HCO3  32.6

## 2022-08-27 NOTE — PLAN OF CARE
Nutrition Plan of Care:    Recommendation/Intervention:   1. TF recommendations:  Isosource 1.5    Start with rate of 20ml/hr.  Increase by 10mls q 8 hours until goal rate of 50ml/hr is reached (as tolerated)    2. Monitor labs.   3. Monitor weights    4. Collaboration with medical providers.     Goals: Patient to receive adequate nutrition prior to discharge.     Nutrition Goal Status: progressing towards goal     Communication of RD Recs: other (comment) (POC, Consults, Sticky notes)    Marianna Parmar MS, RDN, LDN

## 2022-08-27 NOTE — PLAN OF CARE
Intubated ETT 7.5, 25 at lip. Sedated on Propofol and Fentanyl and titrated for vent synchrony and comfort (coughing and gagging on ETT). Opens eyes to pain. B/P, HR and SpO2 WNL. NSR to monitor. Febrile overnight, max temp 100.9 F, Tylenol given c relief. IV abx given. Tube feeds, Isosource 1.5, started this shift at 10 cc/hr with 150 cc q4 water bolus per order. Restraints monitored and maintained. UO output adequate (see flowsheet). Weight shifting provided q2. Bd alarm set. No distress noted this shift.     Problem: Adult Inpatient Plan of Care  Goal: Plan of Care Review  Outcome: Ongoing, Progressing  Goal: Absence of Hospital-Acquired Illness or Injury  Outcome: Ongoing, Progressing  Goal: Optimal Comfort and Wellbeing  Outcome: Ongoing, Progressing     Problem: Communication Impairment (Mechanical Ventilation, Invasive)  Goal: Effective Communication  Outcome: Ongoing, Progressing     Problem: Device-Related Complication Risk (Mechanical Ventilation, Invasive)  Goal: Optimal Device Function  Outcome: Ongoing, Progressing     Problem: Nutrition Impairment (Mechanical Ventilation, Invasive)  Goal: Optimal Nutrition Delivery  Outcome: Ongoing, Progressing     Problem: Skin and Tissue Injury (Artificial Airway)  Goal: Absence of Device-Related Skin or Tissue Injury  Outcome: Ongoing, Progressing     Problem: Noninvasive Ventilation Acute  Goal: Effective Unassisted Ventilation and Oxygenation  Outcome: Ongoing, Progressing     Problem: Infection  Goal: Absence of Infection Signs and Symptoms  Outcome: Ongoing, Progressing     Problem: Adjustment to Illness (Sepsis/Septic Shock)  Goal: Optimal Coping  Outcome: Ongoing, Progressing     Problem: Skin Injury Risk Increased  Goal: Skin Health and Integrity  Outcome: Ongoing, Progressing     Problem: Fall Injury Risk  Goal: Absence of Fall and Fall-Related Injury  Outcome: Ongoing, Progressing     Problem: Restraint, Nonbehavioral (Nonviolent)  Goal: Absence of Harm or  Injury  Outcome: Ongoing, Progressing

## 2022-08-27 NOTE — PROGRESS NOTES
Pharmacokinetic Assessment Follow Up: IV Vancomycin    Vancomycin serum concentration assessment(s):    The trough level was drawn incorrectly but can be used to guide therapy at this time. The measurement is below the desired definitive target range of 10 to 20 mcg/mL.    Vancomycin Regimen Plan:    Change regimen to Vancomycin 1250 mg IV every 8 hours with next serum trough concentration measured at 1500 prior to 4th dose on 8/28/22    Drug levels (last 3 results):  Recent Labs   Lab Result Units 08/27/22  1020   Vancomycin-Trough ug/mL 7.8*       Pharmacy will continue to follow and monitor vancomycin.    Please contact pharmacy at extension [phone] for questions regarding this assessment.    Thank you for the consult,   Marilynn Remy       Patient brief summary:  Leighton Carrlol is a 42 y.o. male initiated on antimicrobial therapy with IV Vancomycin for treatment of  pneumonia    The patient's current regimen is vancomycin IV 1000 mg q12h which resulted in a subtherapeutic trough, see changes to regimen above    Drug Allergies:   Review of patient's allergies indicates:  No Known Allergies    Actual Body Weight:   86.2 kg    Renal Function:   Estimated Creatinine Clearance: 129.5 mL/min (based on SCr of 0.8 mg/dL).,     Dialysis Method (if applicable):  N/A    CBC (last 72 hours):  Recent Labs   Lab Result Units 08/25/22 2337 08/26/22  1404 08/27/22  0505   WBC K/uL 20.07* 34.58* 23.82*   Hemoglobin g/dL 14.1 12.6* 12.2*   Hematocrit % 43.3 39.1* 37.3*   Platelets K/uL 403 379 287   Gran % % 94.6* 90.2* 87.1*   Lymph % % 2.2* 5.0* 5.9*   Mono % % 2.6* 3.9* 4.2   Eosinophil % % 0.1 0.1 1.9   Basophil % % 0.2 0.3 0.4   Differential Method  Automated Automated Automated       Metabolic Panel (last 72 hours):  Recent Labs   Lab Result Units 08/25/22  2337 08/26/22  0356 08/26/22  0357 08/27/22  0505   Sodium mmol/L 141  --   --  136   Potassium mmol/L 4.0  --   --  4.9   Chloride mmol/L 107  --   --  101   CO2 mmol/L  21*  --   --  28   Glucose mg/dL 71  --   --  126*   Glucose, UA   --  Negative  --   --    BUN mg/dL 14  --   --  14   Creatinine mg/dL 1.3  --   --  0.8   Creatinine, Urine mg/dL  --   --  108.3  --    Albumin g/dL 3.5  --   --   --    Total Bilirubin mg/dL 0.3  --   --   --    Alkaline Phosphatase U/L 49*  --   --   --    AST U/L 51*  --   --   --    ALT U/L 52*  --   --   --    Magnesium mg/dL 2.0  --   --   --    Phosphorus mg/dL 3.8  --   --   --        Vancomycin Administrations:  vancomycin given in the last 96 hours                     vancomycin in dextrose 5 % 1 gram/250 mL IVPB 1,000 mg (mg) 1,000 mg New Bag 08/27/22 0335     1,000 mg New Bag 08/26/22 1549    vancomycin 2 g in 0.9% sodium chloride 500 mL IVPB (mg) 2,000 mg New Bag 08/26/22 0017                    Microbiologic Results:  Microbiology Results (last 7 days)       Procedure Component Value Units Date/Time    Blood culture [[id number]] Collected: 08/27/22 1328    Order Status: Sent Specimen: Blood Updated: 08/27/22 1328    Blood culture [[id number]] Collected: 08/27/22 1328    Order Status: Sent Specimen: Blood Updated: 08/27/22 1328    Culture, Respiratory with Gram Stain [[id number]] Collected: 08/26/22 0144    Order Status: Completed Specimen: Respiratory from Sputum Updated: 08/27/22 0211     Gram Stain (Respiratory) <10 epithelial cells per low power field.     Gram Stain (Respiratory) Rare WBC's     Gram Stain (Respiratory) Rare Gram positive cocci     Gram Stain (Respiratory) Rare Gram negative rods    Respiratory Infection Panel (PCR), Nasopharyngeal [[id number]] Collected: 08/26/22 1803    Order Status: Completed Specimen: Nasopharyngeal Swab Updated: 08/27/22 0027     Respiratory Infection Panel Source NP Swab     Adenovirus Not Detected     Coronavirus 229E, Common Cold Virus Not Detected     Coronavirus HKU1, Common Cold Virus Not Detected     Coronavirus NL63, Common Cold Virus Not Detected     Coronavirus OC43, Common Cold Virus Not  Detected     Comment: The Coronavirus strains detected in this test cause the common cold.  These strains are not the COVID-19 (novel Coronavirus)strain   associated with the respiratory disease outbreak.          SARS-CoV2 (COVID-19) Qualitative PCR Not Detected     Human Metapneumovirus Not Detected     Human Rhinovirus/Enterovirus Not Detected     Influenza A (subtypes H1, H1-2009,H3) Not Detected     Influenza B Not Detected     Parainfluenza Virus 1 Not Detected     Parainfluenza Virus 2 Not Detected     Parainfluenza Virus 3 Not Detected     Parainfluenza Virus 4 Not Detected     Respiratory Syncytial Virus Not Detected     Bordetella Parapertussis (UQ3029) Not Detected     Bordetella pertussis (ptxP) Not Detected     Chlamydia pneumoniae Not Detected     Mycoplasma pneumoniae Not Detected    Narrative:      For all other respiratory sources, order [id number] -  Respiratory Viral Panel by PCR

## 2022-08-28 PROBLEM — R65.21 SEPTIC SHOCK: Status: ACTIVE | Noted: 2022-08-26

## 2022-08-28 PROBLEM — R31.9 HEMATURIA: Status: ACTIVE | Noted: 2022-08-28

## 2022-08-28 PROBLEM — R78.81 GRAM-POSITIVE COCCI BACTEREMIA: Status: ACTIVE | Noted: 2022-08-28

## 2022-08-28 LAB
ALBUMIN SERPL BCP-MCNC: 2.2 G/DL (ref 3.5–5.2)
ALP SERPL-CCNC: 49 U/L (ref 55–135)
ALT SERPL W/O P-5'-P-CCNC: 38 U/L (ref 10–44)
ANION GAP SERPL CALC-SCNC: 10 MMOL/L (ref 8–16)
AST SERPL-CCNC: 41 U/L (ref 10–40)
BASOPHILS # BLD AUTO: 0.08 K/UL (ref 0–0.2)
BASOPHILS NFR BLD: 0.3 % (ref 0–1.9)
BILIRUB SERPL-MCNC: 0.6 MG/DL (ref 0.1–1)
BUN SERPL-MCNC: 14 MG/DL (ref 6–20)
CALCIUM SERPL-MCNC: 8.8 MG/DL (ref 8.7–10.5)
CHLORIDE SERPL-SCNC: 100 MMOL/L (ref 95–110)
CO2 SERPL-SCNC: 28 MMOL/L (ref 23–29)
CREAT SERPL-MCNC: 0.7 MG/DL (ref 0.5–1.4)
DIFFERENTIAL METHOD: ABNORMAL
EOSINOPHIL # BLD AUTO: 0 K/UL (ref 0–0.5)
EOSINOPHIL NFR BLD: 0.1 % (ref 0–8)
ERYTHROCYTE [DISTWIDTH] IN BLOOD BY AUTOMATED COUNT: 14.3 % (ref 11.5–14.5)
EST. GFR  (NO RACE VARIABLE): >60 ML/MIN/1.73 M^2
GLUCOSE SERPL-MCNC: 125 MG/DL (ref 70–110)
HCT VFR BLD AUTO: 37.2 % (ref 40–54)
HGB BLD-MCNC: 11.7 G/DL (ref 14–18)
IMM GRANULOCYTES # BLD AUTO: 0.28 K/UL (ref 0–0.04)
IMM GRANULOCYTES NFR BLD AUTO: 1.1 % (ref 0–0.5)
LYMPHOCYTES # BLD AUTO: 0.7 K/UL (ref 1–4.8)
LYMPHOCYTES NFR BLD: 2.8 % (ref 18–48)
MCH RBC QN AUTO: 29.6 PG (ref 27–31)
MCHC RBC AUTO-ENTMCNC: 31.5 G/DL (ref 32–36)
MCV RBC AUTO: 94 FL (ref 82–98)
MONOCYTES # BLD AUTO: 1.1 K/UL (ref 0.3–1)
MONOCYTES NFR BLD: 4.2 % (ref 4–15)
NEUTROPHILS # BLD AUTO: 24 K/UL (ref 1.8–7.7)
NEUTROPHILS NFR BLD: 91.5 % (ref 38–73)
NRBC BLD-RTO: 0 /100 WBC
PLATELET # BLD AUTO: 275 K/UL (ref 150–450)
PMV BLD AUTO: 9 FL (ref 9.2–12.9)
POTASSIUM SERPL-SCNC: 4.6 MMOL/L (ref 3.5–5.1)
PROT SERPL-MCNC: 5.9 G/DL (ref 6–8.4)
RBC # BLD AUTO: 3.95 M/UL (ref 4.6–6.2)
SODIUM SERPL-SCNC: 138 MMOL/L (ref 136–145)
VANCOMYCIN TROUGH SERPL-MCNC: 12.2 UG/ML (ref 10–22)
WBC # BLD AUTO: 26.26 K/UL (ref 3.9–12.7)

## 2022-08-28 PROCEDURE — 27100171 HC OXYGEN HIGH FLOW UP TO 24 HOURS

## 2022-08-28 PROCEDURE — 63600175 PHARM REV CODE 636 W HCPCS: Performed by: STUDENT IN AN ORGANIZED HEALTH CARE EDUCATION/TRAINING PROGRAM

## 2022-08-28 PROCEDURE — S4991 NICOTINE PATCH NONLEGEND: HCPCS

## 2022-08-28 PROCEDURE — 99291 CRITICAL CARE FIRST HOUR: CPT | Mod: ,,, | Performed by: INTERNAL MEDICINE

## 2022-08-28 PROCEDURE — 80202 ASSAY OF VANCOMYCIN: CPT | Performed by: INTERNAL MEDICINE

## 2022-08-28 PROCEDURE — 63600175 PHARM REV CODE 636 W HCPCS: Performed by: INTERNAL MEDICINE

## 2022-08-28 PROCEDURE — 85025 COMPLETE CBC W/AUTO DIFF WBC: CPT | Performed by: INTERNAL MEDICINE

## 2022-08-28 PROCEDURE — 87040 BLOOD CULTURE FOR BACTERIA: CPT | Performed by: INTERNAL MEDICINE

## 2022-08-28 PROCEDURE — 25000003 PHARM REV CODE 250: Performed by: INTERNAL MEDICINE

## 2022-08-28 PROCEDURE — 20000000 HC ICU ROOM

## 2022-08-28 PROCEDURE — 80053 COMPREHEN METABOLIC PANEL: CPT | Performed by: INTERNAL MEDICINE

## 2022-08-28 PROCEDURE — 99900035 HC TECH TIME PER 15 MIN (STAT)

## 2022-08-28 PROCEDURE — 63700000 PHARM REV CODE 250 ALT 637 W/O HCPCS

## 2022-08-28 PROCEDURE — 94761 N-INVAS EAR/PLS OXIMETRY MLT: CPT

## 2022-08-28 PROCEDURE — 63600175 PHARM REV CODE 636 W HCPCS

## 2022-08-28 PROCEDURE — 99900026 HC AIRWAY MAINTENANCE (STAT)

## 2022-08-28 PROCEDURE — 25000003 PHARM REV CODE 250

## 2022-08-28 PROCEDURE — 94003 VENT MGMT INPAT SUBQ DAY: CPT

## 2022-08-28 PROCEDURE — 36415 COLL VENOUS BLD VENIPUNCTURE: CPT | Performed by: INTERNAL MEDICINE

## 2022-08-28 PROCEDURE — 99291 PR CRITICAL CARE, E/M 30-74 MINUTES: ICD-10-PCS | Mod: ,,, | Performed by: INTERNAL MEDICINE

## 2022-08-28 RX ORDER — MIDAZOLAM HYDROCHLORIDE 1 MG/ML
2 INJECTION INTRAMUSCULAR; INTRAVENOUS EVERY 4 HOURS PRN
Status: DISCONTINUED | OUTPATIENT
Start: 2022-08-28 | End: 2022-08-29

## 2022-08-28 RX ORDER — FENTANYL CITRATE-0.9 % NACL/PF 10 MCG/ML
0-300 PLASTIC BAG, INJECTION (ML) INTRAVENOUS CONTINUOUS
Status: DISCONTINUED | OUTPATIENT
Start: 2022-08-28 | End: 2022-09-03

## 2022-08-28 RX ORDER — FUROSEMIDE 10 MG/ML
40 INJECTION INTRAMUSCULAR; INTRAVENOUS DAILY
Status: DISCONTINUED | OUTPATIENT
Start: 2022-08-28 | End: 2022-08-29

## 2022-08-28 RX ADMIN — VANCOMYCIN HYDROCHLORIDE 1250 MG: 1.25 INJECTION, POWDER, LYOPHILIZED, FOR SOLUTION INTRAVENOUS at 03:08

## 2022-08-28 RX ADMIN — SENNOSIDES AND DOCUSATE SODIUM 1 TABLET: 50; 8.6 TABLET ORAL at 09:08

## 2022-08-28 RX ADMIN — Medication 237.5 MCG/HR: at 03:08

## 2022-08-28 RX ADMIN — PROPOFOL 40 MCG/KG/MIN: 10 INJECTION, EMULSION INTRAVENOUS at 08:08

## 2022-08-28 RX ADMIN — FAMOTIDINE 20 MG: 10 INJECTION, SOLUTION INTRAVENOUS at 08:08

## 2022-08-28 RX ADMIN — NICOTINE 1 PATCH: 14 PATCH TRANSDERMAL at 09:08

## 2022-08-28 RX ADMIN — CEFEPIME HYDROCHLORIDE 2 G: 2 INJECTION, SOLUTION INTRAVENOUS at 11:08

## 2022-08-28 RX ADMIN — FAMOTIDINE 20 MG: 10 INJECTION, SOLUTION INTRAVENOUS at 09:08

## 2022-08-28 RX ADMIN — SENNOSIDES AND DOCUSATE SODIUM 1 TABLET: 50; 8.6 TABLET ORAL at 08:08

## 2022-08-28 RX ADMIN — PROPOFOL 45 MCG/KG/MIN: 10 INJECTION, EMULSION INTRAVENOUS at 11:08

## 2022-08-28 RX ADMIN — PROPOFOL 40 MCG/KG/MIN: 10 INJECTION, EMULSION INTRAVENOUS at 04:08

## 2022-08-28 RX ADMIN — ENOXAPARIN SODIUM 40 MG: 100 INJECTION SUBCUTANEOUS at 04:08

## 2022-08-28 RX ADMIN — PROPOFOL 30 MCG/KG/MIN: 10 INJECTION, EMULSION INTRAVENOUS at 03:08

## 2022-08-28 RX ADMIN — CEFEPIME HYDROCHLORIDE 2 G: 2 INJECTION, SOLUTION INTRAVENOUS at 08:08

## 2022-08-28 RX ADMIN — VANCOMYCIN HYDROCHLORIDE 1250 MG: 1.25 INJECTION, POWDER, LYOPHILIZED, FOR SOLUTION INTRAVENOUS at 09:08

## 2022-08-28 RX ADMIN — CEFEPIME HYDROCHLORIDE 2 G: 2 INJECTION, SOLUTION INTRAVENOUS at 03:08

## 2022-08-28 RX ADMIN — FUROSEMIDE 40 MG: 10 INJECTION, SOLUTION INTRAMUSCULAR; INTRAVENOUS at 03:08

## 2022-08-28 RX ADMIN — POLYETHYLENE GLYCOL 3350 17 G: 17 POWDER, FOR SOLUTION ORAL at 09:08

## 2022-08-28 RX ADMIN — VANCOMYCIN HYDROCHLORIDE 1250 MG: 1.25 INJECTION, POWDER, LYOPHILIZED, FOR SOLUTION INTRAVENOUS at 11:08

## 2022-08-28 RX ADMIN — Medication 225 MCG/HR: at 05:08

## 2022-08-28 RX ADMIN — AZITHROMYCIN MONOHYDRATE 250 MG: 250 TABLET ORAL at 09:08

## 2022-08-28 NOTE — NURSING
Pt is Chemically sedated at this time. On Vent FiO2 is at 80% at this time with O2 of 91%. HME was recently Changes By Resp, ABG obtained pt was noted to have increased breathing. MD notify Vent setting adjusted. Pt is noted to have a Temp 101.1; Ice packs and Tylenol was uses. Pt is also noted to Have -105 MD notified. EKG preformed. Tube feeding placement verified via X-ray. Report given to night nurse.

## 2022-08-28 NOTE — PLAN OF CARE
Remains intubated and sedated. Propofol weaned down. Fentanyl titrated. ST noted to monitor, currently SR. B/P monitored. PRN IV hydralazine order obtained and given. Temp monitored, PRN tylenol given. Weight shifted throughout shift. Cano in place c dark damien cloudy urine c sediment noted. Tube feeding continued c fwf.     Problem: Adult Inpatient Plan of Care  Goal: Plan of Care Review  Outcome: Ongoing, Progressing  Goal: Patient-Specific Goal (Individualized)  Outcome: Ongoing, Progressing  Goal: Absence of Hospital-Acquired Illness or Injury  Outcome: Ongoing, Progressing  Goal: Optimal Comfort and Wellbeing  Outcome: Ongoing, Progressing  Goal: Readiness for Transition of Care  Outcome: Ongoing, Progressing     Problem: Communication Impairment (Mechanical Ventilation, Invasive)  Goal: Effective Communication  Outcome: Ongoing, Progressing     Problem: Device-Related Complication Risk (Mechanical Ventilation, Invasive)  Goal: Optimal Device Function  Outcome: Ongoing, Progressing     Problem: Inability to Wean (Mechanical Ventilation, Invasive)  Goal: Mechanical Ventilation Liberation  Outcome: Ongoing, Progressing     Problem: Nutrition Impairment (Mechanical Ventilation, Invasive)  Goal: Optimal Nutrition Delivery  Outcome: Ongoing, Progressing     Problem: Skin and Tissue Injury (Mechanical Ventilation, Invasive)  Goal: Absence of Device-Related Skin and Tissue Injury  Outcome: Ongoing, Progressing     Problem: Ventilator-Induced Lung Injury (Mechanical Ventilation, Invasive)  Goal: Absence of Ventilator-Induced Lung Injury  Outcome: Ongoing, Progressing     Problem: Communication Impairment (Artificial Airway)  Goal: Effective Communication  Outcome: Ongoing, Progressing     Problem: Device-Related Complication Risk (Artificial Airway)  Goal: Optimal Device Function  Outcome: Ongoing, Progressing     Problem: Skin and Tissue Injury (Artificial Airway)  Goal: Absence of Device-Related Skin or Tissue  Injury  Outcome: Ongoing, Progressing     Problem: Infection  Goal: Absence of Infection Signs and Symptoms  Outcome: Ongoing, Progressing     Problem: Adjustment to Illness (Sepsis/Septic Shock)  Goal: Optimal Coping  Outcome: Ongoing, Progressing     Problem: Bleeding (Sepsis/Septic Shock)  Goal: Absence of Bleeding  Outcome: Ongoing, Progressing     Problem: Glycemic Control Impaired (Sepsis/Septic Shock)  Goal: Blood Glucose Level Within Desired Range  Outcome: Ongoing, Progressing     Problem: Infection Progression (Sepsis/Septic Shock)  Goal: Absence of Infection Signs and Symptoms  Outcome: Ongoing, Progressing     Problem: Nutrition Impaired (Sepsis/Septic Shock)  Goal: Optimal Nutrition Intake  Outcome: Ongoing, Progressing     Problem: Skin Injury Risk Increased  Goal: Skin Health and Integrity  Outcome: Ongoing, Progressing     Problem: Fall Injury Risk  Goal: Absence of Fall and Fall-Related Injury  Outcome: Ongoing, Progressing     Problem: Restraint, Nonbehavioral (Nonviolent)  Goal: Absence of Harm or Injury  Outcome: Ongoing, Progressing

## 2022-08-28 NOTE — ASSESSMENT & PLAN NOTE
Gross hematuria, confirmed on UA, culture is pending  Possible rheumatologic condition?? JOVANA and anca pending

## 2022-08-28 NOTE — PROGRESS NOTES
"[facility] - Intensive Care [facility] Medicine  Progress Note    Patient Name: Leighton Carroll  MRN: [id number]  Patient Class: IP- Inpatient   Admission Date: 8/26/2022  Length of Stay: 2 days  Attending Physician: Nolan Torres MD  Primary Care Provider: Primary Doctor No        Subjective:     Principal Problem:Acute respiratory failure with hypoxia and hypercarbia        HPI:  Patient's HPI is adapted from notes of Dr. Schuler, Dr. Morin and Dr. Hall ([facility]).   Patient arrived in [facility] ICU intubated, pt's girlfriend's number not on file, unable to verify events PTA.     Leighton Carroll is a 42 year old man with a PMHx of depression, hx of drug overdose (7/11/2022, buproprion and gabapentin), polysubstance abuse (meth, heroin) and nicotine dependence.    He was presented to [facility] on 8/25/2022 via EMS with shortness of breath and dry cough. He was reportedly found down by his girlfriend at home yesterday evening (8/25/2022) and was given multiple rounds of chest compressions. He woke up and began having shortness of breath and called EMS, which found him saturating at 66% on room air, which increased to 90s with nonrebreather mask. Patient denied subjective fever, chills, sick contacts, chest pain, palpitations, abdominal pain.     At [facility], patient had a fever to 101, with leukocytosis (20) and lactic acidosis (3.0) with a normal procal. CTA chest showed patchy perihilar opacities bilaterally most pronounced in right lower lobe. Covid, Group A strep, respiratory influenza panel -ve. D-dimer was elevated (3.68), but CTPA negative for PE. Tox postive for THC only. CXR was -ve for pneumothorax. Patient was started on IV Cefepime and IV Vancomycin.     Patient was initially on BiPAP but became agitated and was intubated. Patient reportedly had "red spit". Patient was difficult to sedate requiring propofol and precedex and multiple doses of versed and ketamine, thus became hypotensive after " intubation and was started on levophed.     Patient is transferred to [facility] ICU, [facility] medicine, for management of acute respiratory failure with hypoxia and hypercapnia.             Overview/Hospital Course:  No notes on file    Interval History: sedated and intubated. No major events overnight    Review of Systems   Reason unable to perform ROS: Patient is on ventilatory support and sedated.   Objective:     Vital Signs (Most Recent):  Temp: 98.3 °F (36.8 °C) (08/28/22 0800)  Pulse: 80 (08/28/22 0915)  Resp: (!) 36 (08/28/22 1130)  BP: 136/60 (08/28/22 0915)  SpO2: (!) 94 % (08/28/22 0915)   Vital Signs (24h Range):  Temp:  [98.3 °F (36.8 °C)-101.1 °F (38.4 °C)] 98.3 °F (36.8 °C)  Pulse:  [] 80  Resp:  [23-36] 36  SpO2:  [87 %-98 %] 94 %  BP: (120-192)/(55-87) 136/60     Weight: 86.2 kg (190 lb)  Body mass index is 28.06 kg/m².    Intake/Output Summary (Last 24 hours) at 8/28/2022 1139  Last data filed at 8/28/2022 1032  Gross per 24 hour   Intake 2539.26 ml   Output 590 ml   Net 1949.26 ml      Physical Exam  Vitals and nursing note reviewed.   HENT:      Head: Normocephalic and atraumatic.   Eyes:      General: No scleral icterus.        Right eye: No discharge.         Left eye: No discharge.   Cardiovascular:      Rate and Rhythm: Normal rate and regular rhythm.      Pulses: Normal pulses.      Heart sounds: Normal heart sounds.   Pulmonary:      Comments: On ventilatory support, decreased lung sounds over the right anterior basal chest   Abdominal:      General: Bowel sounds are normal.      Palpations: Abdomen is soft.   Genitourinary:     Comments: On salomon catheter, draining yellow urine    Musculoskeletal:      Right lower leg: No edema.      Left lower leg: No edema.   Skin:     General: Skin is warm and dry.      Findings: No rash.      Comments: Dry healed scabs on chest and abdomen   Neurological:      Comments: Withdraws to painful stimuli         Significant Labs: All pertinent labs  within the past 24 hours have been reviewed.    Significant Imaging: I have reviewed all pertinent imaging results/findings within the past 24 hours.      Assessment/Plan:      * Acute respiratory failure with hypoxia and hypercarbia  Due to bilateral pneumonia  Hx of drug overdose (buproprion and gabapentin), Polysubstance abuse (meth, heroin), nicotine abuse, chest compressions performed by girlfriend PTA.      Hypoxic to 60s on room air on EMS arrival, failed BiPAP 2/2 due to agitation, intubated at OSH. CTA with patchy perihilar opacities, leukocytosis, lactic acidosis, febrile to 101. Tox +ve for THC. D-dimer elevated but CTPA -ve for PE. Trops and EKG benign for cardiac cause. Covid, respiratory viral panel, Group A strep -ve. Lipase -ve. CXR -ve for pneumothorax.     Plan:   - Continue ventilatory support  - Continue IV Cefepime 2g qd, IV Vancomycin 1g qd and PO Azithromycin    - TTE with EF 50%, no valvular vegetations        Septic shock  Remains on pressors, management per PCM.      Gram-positive cocci bacteremia  Gram negative bacteremia    -initial cultures with GNR and repeat cultures now with GPC. CXR suspicious for septic emboli/endocarditis  -vanc trough has been low, will repeat cultures tomorrow as pharmacy adjusts dosing for vanc. Consider change to dapto  -will likely require IMELDA, possible bronch based on results of his cultures this week.      Hematuria  Gross hematuria, confirmed on UA, culture is pending  Possible rheumatologic condition?? JOVANA and anca pending      History of drug overdose  Hx of drug overdose (7/11/2022, buproprion and gabapentin) and suicidal ideation  Currently tox screen +THC        Depression  Patient has persistent depression which is unknown and is currently controlled. Will hold anti-depressant medications. We will not consult psychiatry at this time. Patient does not display psychosis at this time. Continue to monitor closely and adjust plan of care as needed.    -  Held home dose buproprion 150mg qd, divalproex ER 500mg 1g qd, quetiapine 200mg qd, risperiodone 2mg qd, will continue when patient is off sedation          VTE Risk Mitigation (From admission, onward)         Ordered     enoxaparin injection 40 mg  Daily         08/26/22 1422     IP VTE HIGH RISK PATIENT  Once         08/26/22 1422     Place sequential compression device  Until discontinued         08/26/22 1422     Place MARGRET hose  Until discontinued         08/26/22 1422                Discharge Planning   CHETAN:      Code Status: Full Code   Is the patient medically ready for discharge?:     Reason for patient still in hospital (select all that apply): Treatment                     Nolan Torres MD  Department of Hospital Medicine   [facility] - Intensive Care ([place])

## 2022-08-28 NOTE — CARE UPDATE
Pt recieved intubated on  ventilator with the following settings;. AC/VC  / RR 30/ Peep/ +10/ FI02 60%  Pt is slightly labored using abdominal muscles, PT is diminished on the RUL and clear on the SAVANAH.  Alarms are set and functioning, Ambu bag at bedside, Pt without distress RT will continue to monitor and wean as tolerated.

## 2022-08-28 NOTE — PROGRESS NOTES
Pharmacokinetic Assessment Follow Up: IV Vancomycin    Vancomycin serum concentration assessment(s):    The trough level was drawn correctly and can be used to guide therapy at this time. The measurement is within the desired definitive target range of 10 to 20 mcg/mL.    Vancomycin Regimen Plan:    Continue regimen to Vancomycin 1250 mg IV every 8 hours with next serum trough concentration measured at 1500 prior to 1530 dose on 8/29/22    Drug levels (last 3 results):  Recent Labs   Lab Result Units 08/27/22  1020 08/28/22  1538   Vancomycin-Trough ug/mL 7.8* 12.2       Pharmacy will continue to follow and monitor vancomycin.    Please contact pharmacy at extension [phone] for questions regarding this assessment.    Thank you for the consult,   Marilynn Remy       Patient brief summary:  Leighton Carroll is a 42 y.o. male initiated on antimicrobial therapy with IV Vancomycin for treatment of  pneumonia    The patient's current regimen is vancomycin IV 1250 mg q8h    Drug Allergies:   Review of patient's allergies indicates:  No Known Allergies    Actual Body Weight:   86.2 kg    Renal Function:   Estimated Creatinine Clearance: 148 mL/min (based on SCr of 0.7 mg/dL).,     Dialysis Method (if applicable):  N/A    CBC (last 72 hours):  Recent Labs   Lab Result Units 08/25/22  2337 08/26/22  1404 08/27/22  0505 08/28/22  0300   WBC K/uL 20.07* 34.58* 23.82* 26.26*   Hemoglobin g/dL 14.1 12.6* 12.2* 11.7*   Hematocrit % 43.3 39.1* 37.3* 37.2*   Platelets K/uL 403 379 287 275   Gran % % 94.6* 90.2* 87.1* 91.5*   Lymph % % 2.2* 5.0* 5.9* 2.8*   Mono % % 2.6* 3.9* 4.2 4.2   Eosinophil % % 0.1 0.1 1.9 0.1   Basophil % % 0.2 0.3 0.4 0.3   Differential Method  Automated Automated Automated Automated       Metabolic Panel (last 72 hours):  Recent Labs   Lab Result Units 08/25/22  2337 08/26/22  0356 08/26/22  0357 08/27/22  0505 08/27/22  1907 08/28/22  0300   Sodium mmol/L 141  --   --  136  --  138   Potassium mmol/L 4.0  --    --  4.9  --  4.6   Chloride mmol/L 107  --   --  101  --  100   CO2 mmol/L 21*  --   --  28  --  28   Glucose mg/dL 71  --   --  126*  --  125*   Glucose, UA   --  Negative  --   --  Trace*  --    BUN mg/dL 14  --   --  14  --  14   Creatinine mg/dL 1.3  --   --  0.8  --  0.7   Creatinine, Urine mg/dL  --   --  108.3  --   --   --    Albumin g/dL 3.5  --   --   --   --  2.2*   Total Bilirubin mg/dL 0.3  --   --   --   --  0.6   Alkaline Phosphatase U/L 49*  --   --   --   --  49*   AST U/L 51*  --   --   --   --  41*   ALT U/L 52*  --   --   --   --  38   Magnesium mg/dL 2.0  --   --   --   --   --    Phosphorus mg/dL 3.8  --   --   --   --   --        Vancomycin Administrations:  vancomycin given in the last 96 hours                     vancomycin 1.25 g in dextrose 5% 250 mL IVPB (ready to mix) (mg) 1,250 mg New Bag 08/28/22 1546     1,250 mg New Bag  0953     1,250 mg New Bag 08/27/22 2358     1,250 mg New Bag  1637    vancomycin in dextrose 5 % 1 gram/250 mL IVPB 1,000 mg (mg) 1,000 mg New Bag 08/27/22 0335     1,000 mg New Bag 08/26/22 1549    vancomycin 2 g in 0.9% sodium chloride 500 mL IVPB (mg) 2,000 mg New Bag 08/26/22 0017                    Microbiologic Results:  Microbiology Results (last 7 days)       Procedure Component Value Units Date/Time    Blood culture [[id number]] Collected: 08/28/22 1653    Order Status: Sent Specimen: Blood Updated: 08/28/22 1657    Blood culture [[id number]] Collected: 08/28/22 1650    Order Status: Sent Specimen: Blood Updated: 08/28/22 1656    Culture, Respiratory with Gram Stain [[id number]] Collected: 08/26/22 0144    Order Status: Completed Specimen: Respiratory from Sputum Updated: 08/28/22 0955     Respiratory Culture No Growth     Gram Stain (Respiratory) <10 epithelial cells per low power field.     Gram Stain (Respiratory) Rare WBC's     Gram Stain (Respiratory) Rare Gram positive cocci     Gram Stain (Respiratory) Rare Gram negative rods    Blood culture [[id number]]  Collected: 08/27/22 1328    Order Status: Completed Specimen: Blood Updated: 08/28/22 0939     Blood Culture, Routine Gram stain aer bottle: Gram positive rods      Results called to and read back by: Naveen Jensen RN  08/28/2022  09:39    Blood culture [[id number]] Collected: 08/27/22 1328    Order Status: Completed Specimen: Blood Updated: 08/28/22 0715     Blood Culture, Routine No Growth to date    Urine culture [[id number]] Collected: 08/27/22 1907    Order Status: No result Specimen: Urine Updated: 08/27/22 1931    Respiratory Infection Panel (PCR), Nasopharyngeal [[id number]] Collected: 08/26/22 1809    Order Status: Completed Specimen: Nasopharyngeal Swab Updated: 08/27/22 0027     Respiratory Infection Panel Source NP Swab     Adenovirus Not Detected     Coronavirus 229E, Common Cold Virus Not Detected     Coronavirus HKU1, Common Cold Virus Not Detected     Coronavirus NL63, Common Cold Virus Not Detected     Coronavirus OC43, Common Cold Virus Not Detected     Comment: The Coronavirus strains detected in this test cause the common cold.  These strains are not the COVID-19 (novel Coronavirus)strain   associated with the respiratory disease outbreak.          SARS-CoV2 (COVID-19) Qualitative PCR Not Detected     Human Metapneumovirus Not Detected     Human Rhinovirus/Enterovirus Not Detected     Influenza A (subtypes H1, H1-2009,H3) Not Detected     Influenza B Not Detected     Parainfluenza Virus 1 Not Detected     Parainfluenza Virus 2 Not Detected     Parainfluenza Virus 3 Not Detected     Parainfluenza Virus 4 Not Detected     Respiratory Syncytial Virus Not Detected     Bordetella Parapertussis (FF3197) Not Detected     Bordetella pertussis (ptxP) Not Detected     Chlamydia pneumoniae Not Detected     Mycoplasma pneumoniae Not Detected    Narrative:      For all other respiratory sources, order [id number] -  Respiratory Viral Panel by PCR

## 2022-08-28 NOTE — ASSESSMENT & PLAN NOTE
Gram negative bacteremia    -initial cultures with GNR and repeat cultures now with GPC. CXR suspicious for septic emboli/endocarditis  -vanc trough has been low, will repeat cultures tomorrow as pharmacy adjusts dosing for vanc. Consider change to dapto  -will likely require IMELDA, possible bronch based on results of his cultures this week.

## 2022-08-28 NOTE — ASSESSMENT & PLAN NOTE
Due to bilateral pneumonia  Hx of drug overdose (buproprion and gabapentin), Polysubstance abuse (meth, heroin), nicotine abuse, chest compressions performed by girlfriend PTA.      Hypoxic to 60s on room air on EMS arrival, failed BiPAP 2/2 due to agitation, intubated at OSH. CTA with patchy perihilar opacities, leukocytosis, lactic acidosis, febrile to 101. Tox +ve for THC. D-dimer elevated but CTPA -ve for PE. Trops and EKG benign for cardiac cause. Covid, respiratory viral panel, Group A strep -ve. Lipase -ve. CXR -ve for pneumothorax.     Plan:   - Continue ventilatory support  - Continue IV Cefepime 2g qd, IV Vancomycin 1g qd and PO Azithromycin    - TTE with EF 50%, no valvular vegetations

## 2022-08-28 NOTE — NURSING
Nurse and charge nurse noted to be very agitated and moving extremities around sedation requirement have increased. Per MD who was at bedside during episode.

## 2022-08-28 NOTE — SUBJECTIVE & OBJECTIVE
Interval History: sedated and intubated. No major events overnight    Review of Systems   Reason unable to perform ROS: Patient is on ventilatory support and sedated.   Objective:     Vital Signs (Most Recent):  Temp: 98.3 °F (36.8 °C) (08/28/22 0800)  Pulse: 80 (08/28/22 0915)  Resp: (!) 36 (08/28/22 1130)  BP: 136/60 (08/28/22 0915)  SpO2: (!) 94 % (08/28/22 0915)   Vital Signs (24h Range):  Temp:  [98.3 °F (36.8 °C)-101.1 °F (38.4 °C)] 98.3 °F (36.8 °C)  Pulse:  [] 80  Resp:  [23-36] 36  SpO2:  [87 %-98 %] 94 %  BP: (120-192)/(55-87) 136/60     Weight: 86.2 kg (190 lb)  Body mass index is 28.06 kg/m².    Intake/Output Summary (Last 24 hours) at 8/28/2022 1139  Last data filed at 8/28/2022 1032  Gross per 24 hour   Intake 2539.26 ml   Output 590 ml   Net 1949.26 ml      Physical Exam  Vitals and nursing note reviewed.   HENT:      Head: Normocephalic and atraumatic.   Eyes:      General: No scleral icterus.        Right eye: No discharge.         Left eye: No discharge.   Cardiovascular:      Rate and Rhythm: Normal rate and regular rhythm.      Pulses: Normal pulses.      Heart sounds: Normal heart sounds.   Pulmonary:      Comments: On ventilatory support, decreased lung sounds over the right anterior basal chest   Abdominal:      General: Bowel sounds are normal.      Palpations: Abdomen is soft.   Genitourinary:     Comments: On salomon catheter, draining yellow urine    Musculoskeletal:      Right lower leg: No edema.      Left lower leg: No edema.   Skin:     General: Skin is warm and dry.      Findings: No rash.      Comments: Dry healed scabs on chest and abdomen   Neurological:      Comments: Withdraws to painful stimuli         Significant Labs: All pertinent labs within the past 24 hours have been reviewed.    Significant Imaging: I have reviewed all pertinent imaging results/findings within the past 24 hours.

## 2022-08-28 NOTE — PROGRESS NOTES
"[facility] - Intensive Care [facility] Medicine  Progress Note    Patient Name: Leighton Carroll  MRN: [id number]  Patient Class: IP- Inpatient   Admission Date: 8/26/2022  Length of Stay: 2 days  Attending Physician: Nolan Torres MD  Primary Care Provider: Primary Doctor No        Subjective:     Principal Problem:Acute respiratory failure with hypoxia and hypercarbia        HPI:  Patient's HPI is adapted from notes of Dr. Schuler, Dr. Morin and Dr. Hall ([facility]).   Patient arrived in [facility] ICU intubated, pt's girlfriend's number not on file, unable to verify events PTA.     Leighton Carorll is a 42 year old man with a PMHx of depression, hx of drug overdose (7/11/2022, buproprion and gabapentin), polysubstance abuse (meth, heroin) and nicotine dependence.    He was presented to [facility] on 8/25/2022 via EMS with shortness of breath and dry cough. He was reportedly found down by his girlfriend at home yesterday evening (8/25/2022) and was given multiple rounds of chest compressions. He woke up and began having shortness of breath and called EMS, which found him saturating at 66% on room air, which increased to 90s with nonrebreather mask. Patient denied subjective fever, chills, sick contacts, chest pain, palpitations, abdominal pain.     At [facility], patient had a fever to 101, with leukocytosis (20) and lactic acidosis (3.0) with a normal procal. CTA chest showed patchy perihilar opacities bilaterally most pronounced in right lower lobe. Covid, Group A strep, respiratory influenza panel -ve. D-dimer was elevated (3.68), but CTPA negative for PE. Tox postive for THC only. CXR was -ve for pneumothorax. Patient was started on IV Cefepime and IV Vancomycin.     Patient was initially on BiPAP but became agitated and was intubated. Patient reportedly had "red spit". Patient was difficult to sedate requiring propofol and precedex and multiple doses of versed and ketamine, thus became hypotensive after " intubation and was started on levophed.     Patient is transferred to [facility] ICU, [facility] medicine, for management of acute respiratory failure with hypoxia and hypercapnia.             Overview/Hospital Course:  No notes on file    Interval History: sedated and intubated, responded to painful stimuli    Review of Systems   Reason unable to perform ROS: Patient is on ventilatory support and sedated.   Objective:     Vital Signs (Most Recent):  Temp: 98.6 °F (37 °C) (08/28/22 0445)  Pulse: 82 (08/28/22 0700)  Resp: (!) 24 (08/28/22 0700)  BP: (!) 125/59 (08/28/22 0700)  SpO2: 96 % (08/28/22 0700)   Vital Signs (24h Range):  Temp:  [98.6 °F (37 °C)-101.1 °F (38.4 °C)] 98.6 °F (37 °C)  Pulse:  [] 82  Resp:  [20-30] 24  SpO2:  [87 %-98 %] 96 %  BP: (122-192)/(55-87) 125/59     Weight: 86.2 kg (190 lb)  Body mass index is 28.06 kg/m².    Intake/Output Summary (Last 24 hours) at 8/28/2022 0745  Last data filed at 8/28/2022 0600  Gross per 24 hour   Intake 2325.02 ml   Output 945 ml   Net 1380.02 ml      Physical Exam  Vitals and nursing note reviewed.   HENT:      Head: Normocephalic and atraumatic.   Eyes:      General: No scleral icterus.        Right eye: No discharge.         Left eye: No discharge.   Cardiovascular:      Rate and Rhythm: Normal rate and regular rhythm.      Pulses: Normal pulses.      Heart sounds: Normal heart sounds.   Pulmonary:      Comments: On ventilatory support, decreased lung sounds over the right anterior basal chest   Abdominal:      General: Bowel sounds are normal.      Palpations: Abdomen is soft.   Genitourinary:     Comments: On salomon catheter, draining yellow urine    Musculoskeletal:      Right lower leg: No edema.      Left lower leg: No edema.   Skin:     General: Skin is warm and dry.      Findings: No rash.      Comments: Dry healed scabs on chest and abdomen   Neurological:      Comments: Unable to assess, sedated         Significant Labs: All pertinent labs within the  past 24 hours have been reviewed.    Significant Imaging: I have reviewed all pertinent imaging results/findings within the past 24 hours.      Assessment/Plan:      * Acute respiratory failure with hypoxia and hypercarbia  With severe sepsis  Hx of drug overdose (buproprion and gabapentin), Polysubstance abuse (meth, heroin), nicotine abuse, chest compressions performed by girlfriend PTA.      Hypoxic to 60s on room air on EMS arrival, failed BiPAP 2/2 due to agitation, intubated at OSH. CTA with patchy perihilar opacities, leukocytosis, lactic acidosis, febrile to 101. Tox +ve for THC. D-dimer elevated but CTPA -ve for PE. Trops and EKG benign for cardiac cause. Covid, respiratory viral panel, Group A strep -ve. Lipase -ve. CXR -ve for pneumothorax.     Concern for aspiration vs community acquired pneumonia    - Consulted pulm crit care - appreciate recs  - Currently on vent support, 80% O2 and levophed   - Lactate improving (3>1.3)  - Cr elevated 1.3 from baseline of 0.8 (7/16/2022), judicious fluids for now >4L of fluids given in [facility] [place] with right small pleural effusion   - Repeat CXR - patchy opacities, small right pleural effusion  - Worsening leukocytosis (20>34), procal WNL  - Drop in H/H likely dilutional, no overt bleeding      Plan:   - Continue ventilatory support  - Continue IV Cefepime 2g qd, IV Vancomycin 1g qd and PO Azithromycin   - Resp gram stain and culture, blood cultures pending   - echo with EF 50%, no valvular vegetations        History of drug overdose  Hx of drug overdose (7/11/2022, buproprion and gabapentin) and suicidal ideation  Currently tox screen +THC    - Check urine etoh         Depression  Patient has persistent depression which is unknown and is currently controlled. Will hold anti-depressant medications. We will not consult psychiatry at this time. Patient does not display psychosis at this time. Continue to monitor closely and adjust plan of care as needed.    - Held home dose  buproprion 150mg qd, divalproex ER 500mg 1g qd, quetiapine 200mg qd, risperiodone 2mg qd, will continue when patient is off sedation          VTE Risk Mitigation (From admission, onward)         Ordered     enoxaparin injection 40 mg  Daily         08/26/22 1422     IP VTE HIGH RISK PATIENT  Once         08/26/22 1422     Place sequential compression device  Until discontinued         08/26/22 1422     Place MARGRET hose  Until discontinued         08/26/22 1422                Discharge Planning   CHETAN:      Code Status: Full Code   Is the patient medically ready for discharge?:     Reason for patient still in hospital (select all that apply): Treatment                     Nolan Torres MD  Department of Hospital Medicine   [facility] - Intensive Care ([place])

## 2022-08-28 NOTE — PLAN OF CARE
Problem: Adult Inpatient Plan of Care  Goal: Plan of Care Review  Outcome: Ongoing, Progressing  Goal: Patient-Specific Goal (Individualized)  Outcome: Ongoing, Progressing  Goal: Absence of Hospital-Acquired Illness or Injury  Outcome: Ongoing, Progressing  Goal: Optimal Comfort and Wellbeing  Outcome: Ongoing, Progressing  Goal: Readiness for Transition of Care  Outcome: Ongoing, Progressing     Problem: Communication Impairment (Mechanical Ventilation, Invasive)  Goal: Effective Communication  Outcome: Ongoing, Progressing     Problem: Device-Related Complication Risk (Mechanical Ventilation, Invasive)  Goal: Optimal Device Function  Outcome: Ongoing, Progressing     Problem: Inability to Wean (Mechanical Ventilation, Invasive)  Goal: Mechanical Ventilation Liberation  Outcome: Ongoing, Progressing     Problem: Nutrition Impairment (Mechanical Ventilation, Invasive)  Goal: Optimal Nutrition Delivery  Outcome: Ongoing, Progressing     Problem: Skin and Tissue Injury (Mechanical Ventilation, Invasive)  Goal: Absence of Device-Related Skin and Tissue Injury  Outcome: Ongoing, Progressing     Problem: Ventilator-Induced Lung Injury (Mechanical Ventilation, Invasive)  Goal: Absence of Ventilator-Induced Lung Injury  Outcome: Ongoing, Progressing     Problem: Communication Impairment (Artificial Airway)  Goal: Effective Communication  Outcome: Ongoing, Progressing     Problem: Device-Related Complication Risk (Artificial Airway)  Goal: Optimal Device Function  Outcome: Ongoing, Progressing     Problem: Skin and Tissue Injury (Artificial Airway)  Goal: Absence of Device-Related Skin or Tissue Injury  Outcome: Ongoing, Progressing     Problem: Noninvasive Ventilation Acute  Goal: Effective Unassisted Ventilation and Oxygenation  Outcome: Ongoing, Progressing     Problem: Infection  Goal: Absence of Infection Signs and Symptoms  Outcome: Ongoing, Progressing     Problem: Adjustment to Illness (Sepsis/Septic  Shock)  Goal: Optimal Coping  Outcome: Ongoing, Progressing     Problem: Glycemic Control Impaired (Sepsis/Septic Shock)  Goal: Blood Glucose Level Within Desired Range  Outcome: Ongoing, Progressing     Problem: Infection Progression (Sepsis/Septic Shock)  Goal: Absence of Infection Signs and Symptoms  Outcome: Ongoing, Progressing     Problem: Nutrition Impaired (Sepsis/Septic Shock)  Goal: Optimal Nutrition Intake  Outcome: Ongoing, Progressing     Problem: Skin Injury Risk Increased  Goal: Skin Health and Integrity  Outcome: Ongoing, Progressing     Problem: Fall Injury Risk  Goal: Absence of Fall and Fall-Related Injury  Outcome: Ongoing, Progressing     Problem: Restraint, Nonbehavioral (Nonviolent)  Goal: Absence of Harm or Injury  Outcome: Ongoing, Progressing

## 2022-08-28 NOTE — PROGRESS NOTES
"42M admit to [facility] for presumed narcotic overdose. Treating for hypoxemic/hypercapnic respiratory failure requiring intubation and diffuse bilateral pulmonary opacities concerning for multifocal PNA.    Active Hospital Problems    Diagnosis  POA    *Acute respiratory failure with hypoxia and hypercarbia [J96.01, J96.02]  Yes    Gram-positive cocci bacteremia [R78.81]  Yes    Hematuria [R31.9]  Yes    History of drug overdose [Z91.89]  Not Applicable    Septic shock [A41.9, R65.21]  Yes    Depression [F32.A]  Yes      Resolved Hospital Problems   No resolved problems to display.     Temp: 98.3 °F (36.8 °C) (08/28/22 0800)  Pulse: 96 (08/28/22 1200)  Resp: (!) 30 (08/28/22 1200)  BP: (!) 162/64 (08/28/22 1200)  SpO2: (!) 91 % (08/28/22 1200)  Weight: 86.2 kg (190 lb) (08/27/22 1414)  Height: 5' 9" (175.3 cm) (08/27/22 1420)    Subjective  No significant events overnight. Remains intubated/sedated with significant hypoxemia. Repeat Bcx now growing GPC (first set had GNRs). On empiric Abx. CRP extremely elevated, still pending JOVANA/ANCA workup. Attempted to call family for bronchoscopy consent but unable to contact them.     Objective:  General Appearance:  General patient appearance: intubated, sedated.    Vital signs: (most recent): Blood pressure (!) 162/64, pulse 96, temperature 98.3 °F (36.8 °C), temperature source Axillary, resp. rate (!) 30, height 5' 9" (1.753 m), weight 86.2 kg (190 lb), SpO2 (!) 91 %.  Fever.    Output: Producing urine.    HEENT: (ETT and OGT in place)    Lungs:  He is not in respiratory distress.  There are rales (diffuse, bilateral).  No wheezes.  (Intubated)  Heart: Normal rate.  No murmur.   Abdomen: Abdomen is soft and non-distended.  There is no abdominal tenderness.     Extremities: There is no deformity or dependent edema.    Pulses: Distal pulses are intact.    Neurological: (Sedated, occasional spontaneous movements with gentle physical stimulation noted).    Skin:  Warm and " "dry.  (Numerous tattoos all over body)      Assessment & Plan  42M with history of drug use (heroin, methamphetamine, "whatever he can get his hands on") presents with hypoxemic and hypercapnic respiratory failure presumably secondary to drug overdose requiring intubation. UDS positive only for THC, but mass spec not completed for full analysis. Hypercapnia improved after intubation, but remains severely hypoxemic with diffuse bilateral pulmonary infiltrates seen on CT chest. Spiking fevers with leukocytosis, empiric coverage with vanc/cefepime/azithro ongoing. Sputum cultures and blood cultures remain NGTD. HIV negative. Likely needs several more days of supportive care, then can work on SAT/SBT and extubation.    Acute hypoxic and hypercapnic respiratory failure  In setting of presumed drug overdose (fentanyl?)  With diffuse bilateral pulmonary infiltrates on CT chest, +fevers and leukocytosis  Sputum NGTD  1st Bcx with GNR; 2nd Bcx with GPC; pending further speciation  F/u JOVANA and ANCA profile  Continue with current vent settings, wean FiO2 as able to maintain O2 saturations >90% (avoid over oxygenation)  No SAT/SBT for now given high O2 requirements  Continue empiric Abx until Bcx speciate and clear  Can discontinue Azithro after 5d therapy  Lasix 40mg IV qday ordered to keep net even fluid balance  Continue to wean sedation as tolerated to target RASS 0 to -2  Multifocal PNA  Aspiration vs. Inhalation injury/pneumonitis vs. Septic emboli vs. Vasculitis?  Lab and culture workup as above  Continue with Abx as stated above  Urine legionella and strep Ag still pending  HIV negative, viral PCR negative  Consider bronchoscopy with BAL if does not improve early next week    Code: FULL    DVT: Lovenox  GI: Famotidine  Feeds: TF running  Sedation: propofol and fentanyl    Patient seen and discussed with Dr. Panchal. Please reach out with any questions or concerns.    Jorge L Phelan MD  Pulmonary / Critical Care, " PGY-6    Jorge L Phelan MD  8/28/2022

## 2022-08-28 NOTE — SUBJECTIVE & OBJECTIVE
Interval History: sedated and intubated, responded to painful stimuli    Review of Systems   Reason unable to perform ROS: Patient is on ventilatory support and sedated.   Objective:     Vital Signs (Most Recent):  Temp: 98.6 °F (37 °C) (08/28/22 0445)  Pulse: 82 (08/28/22 0700)  Resp: (!) 24 (08/28/22 0700)  BP: (!) 125/59 (08/28/22 0700)  SpO2: 96 % (08/28/22 0700)   Vital Signs (24h Range):  Temp:  [98.6 °F (37 °C)-101.1 °F (38.4 °C)] 98.6 °F (37 °C)  Pulse:  [] 82  Resp:  [20-30] 24  SpO2:  [87 %-98 %] 96 %  BP: (122-192)/(55-87) 125/59     Weight: 86.2 kg (190 lb)  Body mass index is 28.06 kg/m².    Intake/Output Summary (Last 24 hours) at 8/28/2022 0745  Last data filed at 8/28/2022 0600  Gross per 24 hour   Intake 2325.02 ml   Output 945 ml   Net 1380.02 ml      Physical Exam  Vitals and nursing note reviewed.   HENT:      Head: Normocephalic and atraumatic.   Eyes:      General: No scleral icterus.        Right eye: No discharge.         Left eye: No discharge.   Cardiovascular:      Rate and Rhythm: Normal rate and regular rhythm.      Pulses: Normal pulses.      Heart sounds: Normal heart sounds.   Pulmonary:      Comments: On ventilatory support, decreased lung sounds over the right anterior basal chest   Abdominal:      General: Bowel sounds are normal.      Palpations: Abdomen is soft.   Genitourinary:     Comments: On salomon catheter, draining yellow urine    Musculoskeletal:      Right lower leg: No edema.      Left lower leg: No edema.   Skin:     General: Skin is warm and dry.      Findings: No rash.      Comments: Dry healed scabs on chest and abdomen   Neurological:      Comments: Unable to assess, sedated         Significant Labs: All pertinent labs within the past 24 hours have been reviewed.    Significant Imaging: I have reviewed all pertinent imaging results/findings within the past 24 hours.

## 2022-08-28 NOTE — ASSESSMENT & PLAN NOTE
Hx of drug overdose (7/11/2022, buproprion and gabapentin) and suicidal ideation  Currently tox screen +THC

## 2022-08-29 PROBLEM — J18.9 BILATERAL PNEUMONIA: Status: ACTIVE | Noted: 2022-08-29

## 2022-08-29 LAB
ALBUMIN SERPL BCP-MCNC: 2 G/DL (ref 3.5–5.2)
ALLENS TEST: ABNORMAL
ALP SERPL-CCNC: 51 U/L (ref 55–135)
ALT SERPL W/O P-5'-P-CCNC: 28 U/L (ref 10–44)
ANA SER QL IF: NORMAL
ANION GAP SERPL CALC-SCNC: 7 MMOL/L (ref 8–16)
AST SERPL-CCNC: 27 U/L (ref 10–40)
BACTERIA SPEC AEROBE CULT: NO GROWTH
BACTERIA UR CULT: NO GROWTH
BASOPHILS # BLD AUTO: 0.04 K/UL (ref 0–0.2)
BASOPHILS NFR BLD: 0.3 % (ref 0–1.9)
BILIRUB SERPL-MCNC: 0.5 MG/DL (ref 0.1–1)
BILIRUB UR QL STRIP: NEGATIVE
BUN SERPL-MCNC: 16 MG/DL (ref 6–20)
CALCIUM SERPL-MCNC: 8.6 MG/DL (ref 8.7–10.5)
CHLORIDE SERPL-SCNC: 99 MMOL/L (ref 95–110)
CLARITY UR: CLEAR
CO2 SERPL-SCNC: 30 MMOL/L (ref 23–29)
COLOR UR: YELLOW
CREAT SERPL-MCNC: 0.7 MG/DL (ref 0.5–1.4)
DELSYS: ABNORMAL
DIFFERENTIAL METHOD: ABNORMAL
EOSINOPHIL # BLD AUTO: 1.1 K/UL (ref 0–0.5)
EOSINOPHIL NFR BLD: 7.4 % (ref 0–8)
ERYTHROCYTE [DISTWIDTH] IN BLOOD BY AUTOMATED COUNT: 14 % (ref 11.5–14.5)
ERYTHROCYTE [SEDIMENTATION RATE] IN BLOOD BY WESTERGREN METHOD: 30 MM/H
EST. GFR  (NO RACE VARIABLE): >60 ML/MIN/1.73 M^2
FIO2: 60
GLUCOSE SERPL-MCNC: 134 MG/DL (ref 70–110)
GLUCOSE UR QL STRIP: NEGATIVE
GRAM STN SPEC: NORMAL
HCO3 UR-SCNC: 35.4 MMOL/L (ref 24–28)
HCT VFR BLD AUTO: 33.7 % (ref 40–54)
HCT VFR BLD CALC: 32 %PCV (ref 36–54)
HGB BLD-MCNC: 10.8 G/DL (ref 14–18)
HGB BLD-MCNC: 11 G/DL
HGB UR QL STRIP: NEGATIVE
IMM GRANULOCYTES # BLD AUTO: 0.1 K/UL (ref 0–0.04)
IMM GRANULOCYTES NFR BLD AUTO: 0.7 % (ref 0–0.5)
KETONES UR QL STRIP: NEGATIVE
LEUKOCYTE ESTERASE UR QL STRIP: NEGATIVE
LYMPHOCYTES # BLD AUTO: 0.8 K/UL (ref 1–4.8)
LYMPHOCYTES NFR BLD: 5.7 % (ref 18–48)
MCH RBC QN AUTO: 29.8 PG (ref 27–31)
MCHC RBC AUTO-ENTMCNC: 32 G/DL (ref 32–36)
MCV RBC AUTO: 93 FL (ref 82–98)
MIN VOL: 15.5
MODE: ABNORMAL
MONOCYTES # BLD AUTO: 1.1 K/UL (ref 0.3–1)
MONOCYTES NFR BLD: 7.7 % (ref 4–15)
NEUTROPHILS # BLD AUTO: 11.4 K/UL (ref 1.8–7.7)
NEUTROPHILS NFR BLD: 78.2 % (ref 38–73)
NITRITE UR QL STRIP: NEGATIVE
NRBC BLD-RTO: 0 /100 WBC
PCO2 BLDA: 52.3 MMHG (ref 35–45)
PEEP: 10
PH SMN: 7.44 [PH] (ref 7.35–7.45)
PH UR STRIP: 6 [PH] (ref 5–8)
PIP: 28
PLATELET # BLD AUTO: 254 K/UL (ref 150–450)
PMV BLD AUTO: 9.1 FL (ref 9.2–12.9)
PO2 BLDA: 81 MMHG (ref 80–100)
POC BE: 11 MMOL/L
POC IONIZED CALCIUM: 1.13 MMOL/L (ref 1.06–1.42)
POC SATURATED O2: 96 % (ref 95–100)
POC TCO2: 37 MMOL/L (ref 23–27)
POTASSIUM BLD-SCNC: 3.5 MMOL/L (ref 3.5–5.1)
POTASSIUM SERPL-SCNC: 3.9 MMOL/L (ref 3.5–5.1)
PROT SERPL-MCNC: 5.7 G/DL (ref 6–8.4)
PROT UR QL STRIP: NEGATIVE
RBC # BLD AUTO: 3.63 M/UL (ref 4.6–6.2)
SAMPLE: ABNORMAL
SITE: ABNORMAL
SODIUM BLD-SCNC: 136 MMOL/L (ref 136–145)
SODIUM SERPL-SCNC: 136 MMOL/L (ref 136–145)
SP GR UR STRIP: 1.01 (ref 1–1.03)
SP02: 94
URN SPEC COLLECT METH UR: NORMAL
UROBILINOGEN UR STRIP-ACNC: NEGATIVE EU/DL
VANCOMYCIN TROUGH SERPL-MCNC: 6.1 UG/ML (ref 10–22)
VT: 520
WBC # BLD AUTO: 14.58 K/UL (ref 3.9–12.7)

## 2022-08-29 PROCEDURE — 25000003 PHARM REV CODE 250: Performed by: STUDENT IN AN ORGANIZED HEALTH CARE EDUCATION/TRAINING PROGRAM

## 2022-08-29 PROCEDURE — 99900035 HC TECH TIME PER 15 MIN (STAT)

## 2022-08-29 PROCEDURE — S4991 NICOTINE PATCH NONLEGEND: HCPCS

## 2022-08-29 PROCEDURE — 94761 N-INVAS EAR/PLS OXIMETRY MLT: CPT

## 2022-08-29 PROCEDURE — 36415 COLL VENOUS BLD VENIPUNCTURE: CPT | Performed by: INTERNAL MEDICINE

## 2022-08-29 PROCEDURE — 27000221 HC OXYGEN, UP TO 24 HOURS

## 2022-08-29 PROCEDURE — 85025 COMPLETE CBC W/AUTO DIFF WBC: CPT | Performed by: INTERNAL MEDICINE

## 2022-08-29 PROCEDURE — 63600175 PHARM REV CODE 636 W HCPCS: Performed by: INTERNAL MEDICINE

## 2022-08-29 PROCEDURE — 25000003 PHARM REV CODE 250: Performed by: INTERNAL MEDICINE

## 2022-08-29 PROCEDURE — 20000000 HC ICU ROOM

## 2022-08-29 PROCEDURE — 99223 1ST HOSP IP/OBS HIGH 75: CPT | Mod: ,,, | Performed by: INTERNAL MEDICINE

## 2022-08-29 PROCEDURE — 94003 VENT MGMT INPAT SUBQ DAY: CPT

## 2022-08-29 PROCEDURE — 25000003 PHARM REV CODE 250

## 2022-08-29 PROCEDURE — 99900026 HC AIRWAY MAINTENANCE (STAT)

## 2022-08-29 PROCEDURE — 63700000 PHARM REV CODE 250 ALT 637 W/O HCPCS

## 2022-08-29 PROCEDURE — 80053 COMPREHEN METABOLIC PANEL: CPT | Performed by: INTERNAL MEDICINE

## 2022-08-29 PROCEDURE — 63600175 PHARM REV CODE 636 W HCPCS

## 2022-08-29 PROCEDURE — 99223 PR INITIAL HOSPITAL CARE,LEVL III: ICD-10-PCS | Mod: ,,, | Performed by: INTERNAL MEDICINE

## 2022-08-29 PROCEDURE — 80202 ASSAY OF VANCOMYCIN: CPT | Performed by: INTERNAL MEDICINE

## 2022-08-29 PROCEDURE — 63600175 PHARM REV CODE 636 W HCPCS: Performed by: STUDENT IN AN ORGANIZED HEALTH CARE EDUCATION/TRAINING PROGRAM

## 2022-08-29 PROCEDURE — 36600 WITHDRAWAL OF ARTERIAL BLOOD: CPT

## 2022-08-29 PROCEDURE — 81003 URINALYSIS AUTO W/O SCOPE: CPT | Performed by: INTERNAL MEDICINE

## 2022-08-29 RX ORDER — MIDAZOLAM HYDROCHLORIDE 1 MG/ML
2 INJECTION INTRAMUSCULAR; INTRAVENOUS
Status: DISCONTINUED | OUTPATIENT
Start: 2022-08-29 | End: 2022-09-04

## 2022-08-29 RX ORDER — FUROSEMIDE 10 MG/ML
40 INJECTION INTRAMUSCULAR; INTRAVENOUS
Status: DISCONTINUED | OUTPATIENT
Start: 2022-08-29 | End: 2022-08-30

## 2022-08-29 RX ADMIN — PROPOFOL 50 MCG/KG/MIN: 10 INJECTION, EMULSION INTRAVENOUS at 01:08

## 2022-08-29 RX ADMIN — FAMOTIDINE 20 MG: 10 INJECTION, SOLUTION INTRAVENOUS at 08:08

## 2022-08-29 RX ADMIN — PROPOFOL 50 MCG/KG/MIN: 10 INJECTION, EMULSION INTRAVENOUS at 10:08

## 2022-08-29 RX ADMIN — ENOXAPARIN SODIUM 40 MG: 100 INJECTION SUBCUTANEOUS at 04:08

## 2022-08-29 RX ADMIN — MIDAZOLAM 2 MG: 1 INJECTION INTRAMUSCULAR; INTRAVENOUS at 06:08

## 2022-08-29 RX ADMIN — MIDAZOLAM 2 MG: 1 INJECTION INTRAMUSCULAR; INTRAVENOUS at 01:08

## 2022-08-29 RX ADMIN — NICOTINE 1 PATCH: 14 PATCH TRANSDERMAL at 08:08

## 2022-08-29 RX ADMIN — Medication 300 MCG/HR: at 06:08

## 2022-08-29 RX ADMIN — MIDAZOLAM 2 MG: 1 INJECTION INTRAMUSCULAR; INTRAVENOUS at 12:08

## 2022-08-29 RX ADMIN — Medication 275 MCG/HR: at 09:08

## 2022-08-29 RX ADMIN — PROPOFOL 50 MCG/KG/MIN: 10 INJECTION, EMULSION INTRAVENOUS at 05:08

## 2022-08-29 RX ADMIN — PROPOFOL 40 MCG/KG/MIN: 10 INJECTION, EMULSION INTRAVENOUS at 09:08

## 2022-08-29 RX ADMIN — CEFEPIME HYDROCHLORIDE 2 G: 2 INJECTION, SOLUTION INTRAVENOUS at 04:08

## 2022-08-29 RX ADMIN — KETAMINE HYDROCHLORIDE 2.5 MCG/KG/MIN: 100 INJECTION INTRAMUSCULAR; INTRAVENOUS at 01:08

## 2022-08-29 RX ADMIN — SENNOSIDES AND DOCUSATE SODIUM 1 TABLET: 50; 8.6 TABLET ORAL at 08:08

## 2022-08-29 RX ADMIN — PROPOFOL 40 MCG/KG/MIN: 10 INJECTION, EMULSION INTRAVENOUS at 01:08

## 2022-08-29 RX ADMIN — Medication 275 MCG/HR: at 12:08

## 2022-08-29 RX ADMIN — CEFEPIME HYDROCHLORIDE 2 G: 2 INJECTION, SOLUTION INTRAVENOUS at 07:08

## 2022-08-29 RX ADMIN — MIDAZOLAM 2 MG: 1 INJECTION INTRAMUSCULAR; INTRAVENOUS at 09:08

## 2022-08-29 RX ADMIN — FUROSEMIDE 40 MG: 10 INJECTION, SOLUTION INTRAMUSCULAR; INTRAVENOUS at 08:08

## 2022-08-29 RX ADMIN — KETAMINE HYDROCHLORIDE 20 MCG/KG/MIN: 100 INJECTION INTRAMUSCULAR; INTRAVENOUS at 11:08

## 2022-08-29 RX ADMIN — KETAMINE HYDROCHLORIDE 20 MCG/KG/MIN: 100 INJECTION INTRAMUSCULAR; INTRAVENOUS at 06:08

## 2022-08-29 RX ADMIN — CEFEPIME HYDROCHLORIDE 2 G: 2 INJECTION, SOLUTION INTRAVENOUS at 11:08

## 2022-08-29 RX ADMIN — MIDAZOLAM 2 MG: 1 INJECTION INTRAMUSCULAR; INTRAVENOUS at 03:08

## 2022-08-29 RX ADMIN — VANCOMYCIN HYDROCHLORIDE 2000 MG: 500 INJECTION, POWDER, LYOPHILIZED, FOR SOLUTION INTRAVENOUS at 04:08

## 2022-08-29 RX ADMIN — ACETAMINOPHEN 650 MG: 325 TABLET, FILM COATED ORAL at 08:08

## 2022-08-29 RX ADMIN — AZITHROMYCIN MONOHYDRATE 250 MG: 250 TABLET ORAL at 08:08

## 2022-08-29 RX ADMIN — VANCOMYCIN HYDROCHLORIDE 1250 MG: 1.25 INJECTION, POWDER, LYOPHILIZED, FOR SOLUTION INTRAVENOUS at 08:08

## 2022-08-29 RX ADMIN — PROPOFOL 40 MCG/KG/MIN: 10 INJECTION, EMULSION INTRAVENOUS at 05:08

## 2022-08-29 NOTE — PROGRESS NOTES
"[facility] Pulmonary/Critical Care Fellow Progress Note:    Brief Hx: 41 yo M w/ PMHx of drug use (heroin, methamphetamine, others) admitted  to [facility] for hypoxemic and hypercapnic respiratory failure after being found down, presumably 2/2 drug overdose and in shock. He received CPR from his GF and did wake up when EMS found him and he presented to the ED with leukocytosis, lactic acidosis, febrile. CTA showed diffuse dense bilateral infiltrates. UDS+ for THC. Blood cultures grew acinetobacter pittii & pesudomonas luteola and an unspeciated gram(+) los.     Subjective:  Patient intubated and sedated    Objective:  Last 24 Hour Vital Signs:  BP  Min: 116/56  Max: 191/88  Temp  Av.3 °F (37.4 °C)  Min: 98.5 °F (36.9 °C)  Max: 99.8 °F (37.7 °C)  Pulse  Av.4  Min: 73  Max: 111  Resp  Av.8  Min: 19  Max: 38  SpO2  Av.6 %  Min: 91 %  Max: 98 %  Body mass index is 28.06 kg/m².  I/O last 3 completed shifts:  In: 4786.8 [I.V.:1719.9; NG/GT:1865; IV Piggyback:1201.9]  Out: 2275 [Urine:2275]      Physical Exam:  General: Sedated  HENT:  NCAT; anicteric sclera; (+)ETT in place  Cardio:  Regular rate and rhythm with normal S1 and S2; no murmurs or rubs  Resp:  CTAB; respirations unlabored; no wheezes, crackles or rhonchi  Abdom:  Soft, non-distended  Extrem:  WWP with no clubbing, cyanosis or edema, small cuts on hands and legs that are scabbed over  Pulses:  2+ and symmetric distally  Neuro:  AAOx0; purposeless movements of all 4 extremities    Assessment & Plan:     42M with history of drug use (heroin, methamphetamine, "whatever he can get his hands on") presents with hypoxemic and hypercapnic respiratory failure presumably secondary to drug overdose requiring intubation found to have bacteremia and diffuse lung infiltrates.      Acute hypoxic and hypercapnic respiratory failure  In setting of presumed drug overdose (fentanyl?)  With diffuse bilateral pulmonary infiltrates on CT chest, " +fevers and leukocytosis  Sputum NGTD, RIP negative  F/u JOVANA and ANCA profile - no new hemoptysis needed  Blood cultures positive - presume that lung infiltrates are related to this (see below)  Blood cultures with pseudomonas luteola and acinetobacter pitii -- patient with negative TTE for valve vegetations   will need an ID consult  Continue with current vent settings, wean FiO2 as able to maintain O2 saturations >90% (avoid over oxygenation)  Plan for SAT daily cautiously as patient requires a lot of sedation - propofol, fentanyl, versed pushes and now ketamine gtt in addition  DC azithro and vancomycin given findings in blood cultures  Lasix 40mg IV BID ordered to keep net even fluid balance  Continue to wean sedation as tolerated to target RASS 0 to -2  Multifocal PNA  Aspiration vs. Inhalation injury/pneumonitis vs. Septic emboli vs. Vasculitis?  Lab and culture workup as above  No new evidence of hemoptysis  Continue with Abx as stated above  Urine legionella and strep Ag still pending  HIV negative, viral PCR negative, Hep C ordered     Code: FULL     DVT: Lovenox  GI: Famotidine  Feeds: TF running  Sedation: propofol gtt, fentanyl gtt, ketamine gtt, versed push    Sujata Francois MD  Pulm/CC Fellow

## 2022-08-29 NOTE — SUBJECTIVE & OBJECTIVE
Interval History: no events overnight. FiO2 vent requirements decreasing, weaned off levo. Remains sedated and intubated    Review of Systems   Unable to perform ROS: Intubated   Objective:     Vital Signs (Most Recent):  Temp: 99 °F (37.2 °C) (08/29/22 0302)  Pulse: 76 (08/29/22 0915)  Resp: (!) 34 (08/29/22 0915)  BP: (!) 117/52 (08/29/22 0915)  SpO2: (!) 94 % (08/29/22 0915)   Vital Signs (24h Range):  Temp:  [98.5 °F (36.9 °C)-99.3 °F (37.4 °C)] 99 °F (37.2 °C)  Pulse:  [] 76  Resp:  [19-38] 34  SpO2:  [90 %-98 %] 94 %  BP: (116-238)/() 117/52     Weight: 86.2 kg (190 lb)  Body mass index is 28.06 kg/m².    Intake/Output Summary (Last 24 hours) at 8/29/2022 1054  Last data filed at 8/29/2022 0900  Gross per 24 hour   Intake 3488.09 ml   Output 1935 ml   Net 1553.09 ml      Physical Exam  Vitals and nursing note reviewed.   HENT:      Head: Normocephalic and atraumatic.   Eyes:      General: No scleral icterus.        Right eye: No discharge.         Left eye: No discharge.   Cardiovascular:      Rate and Rhythm: Normal rate and regular rhythm.      Pulses: Normal pulses.      Heart sounds: Normal heart sounds.   Pulmonary:      Comments: On ventilatory support, decreased lung sounds over the right anterior basal chest   Abdominal:      General: Bowel sounds are normal.      Palpations: Abdomen is soft.   Genitourinary:     Comments: On salomon catheter, draining yellow urine    Musculoskeletal:      Right lower leg: No edema.      Left lower leg: No edema.   Skin:     General: Skin is warm and dry.      Findings: No rash.      Comments: Dry healed scabs on chest and abdomen   Neurological:      Comments: Withdraws to painful stimuli         Significant Labs: All pertinent labs within the past 24 hours have been reviewed.  BMP:   Recent Labs   Lab 08/29/22  0345   *      K 3.9   CL 99   CO2 30*   BUN 16   CREATININE 0.7   CALCIUM 8.6*     CBC:   Recent Labs   Lab 08/27/22  0001  08/28/22  0300 08/29/22  0345   WBC  --  26.26* 14.58*   HGB  --  11.7* 10.8*   HCT 39 37.2* 33.7*   PLT  --  275 254       Significant Imaging: I have reviewed all pertinent imaging results/findings within the past 24 hours.

## 2022-08-29 NOTE — SUBJECTIVE & OBJECTIVE
Past Medical History:   Diagnosis Date    Anxiety     Depression     Hepatitis C     Substance abuse     METH AND HEROIN       No past surgical history on file.    Review of patient's allergies indicates:  No Known Allergies    Medications:  Medications Prior to Admission   Medication Sig    buPROPion (WELLBUTRIN XL) 150 MG TB24 tablet Take 1 tablet (150 mg total) by mouth once daily.    divalproex ER (DEPAKOTE) 500 MG Tb24 Take 2 tablets (1,000 mg total) by mouth once daily.    gabapentin (NEURONTIN) 300 MG capsule Take 2 capsules (600 mg total) by mouth 3 (three) times daily.    LORazepam (ATIVAN) 0.5 MG tablet Take 1 tablet at bedtime 7/18, one tablet in morning on 7/19 then stop    nicotine (NICODERM CQ) 14 mg/24 hr Place 1 patch onto the skin once daily.    QUEtiapine (SEROQUEL) 200 MG Tab Take 1 tablet (200 mg total) by mouth every evening.    risperiDONE (RISPERDAL) 2 MG tablet Take 1 tablet (2 mg total) by mouth once daily.     Antibiotics (From admission, onward)      Start     Stop Route Frequency Ordered    08/27/22 1530  vancomycin 1.25 g in dextrose 5% 250 mL IVPB (ready to mix)         -- IV Every 8 hours (non-standard times) 08/27/22 1521    08/27/22 0900  azithromycin tablet 250 mg        See Hyperspace for full Linked Orders Report.    08/31 0859 Oral Daily 08/26/22 1129    08/26/22 1700  cefepime in dextrose 5 % IVPB 2 g         -- IV Every 8 hours (non-standard times) 08/26/22 1426    08/26/22 1525  vancomycin - pharmacy to dose  (vancomycin IVPB)        See Hyperspace for full Linked Orders Report.    -- IV pharmacy to manage frequency 08/26/22 1426          Antifungals (From admission, onward)      None          Antivirals (From admission, onward)      None               There is no immunization history on file for this patient.    Family History    None       Social History     Socioeconomic History    Marital status: Single   Tobacco Use    Smoking status: Every Day     Packs/day: 0.50      "Types: Cigarettes    Smokeless tobacco: Never   Substance and Sexual Activity    Alcohol use: Yes     Comment: 5th whiskey or more daily    Drug use: Yes     Types: Methamphetamines, Marijuana     Comment: heroin , "pt reports he has been doing whatever he can get his hands on"      Social Determinants of Health     Financial Resource Strain: High Risk    Difficulty of Paying Living Expenses: Hard   Food Insecurity: Food Insecurity Present    Worried About Running Out of Food in the Last Year: Sometimes true    Ran Out of Food in the Last Year: Sometimes true   Transportation Needs: Unmet Transportation Needs    Lack of Transportation (Medical): Yes    Lack of Transportation (Non-Medical): Yes   Physical Activity: Inactive    Days of Exercise per Week: 0 days    Minutes of Exercise per Session: 0 min   Stress: Stress Concern Present    Feeling of Stress : Very much   Social Connections: Socially Isolated    Frequency of Communication with Friends and Family: More than three times a week    Frequency of Social Gatherings with Friends and Family: Three times a week    Attends [employer] Services: Never    Active Member of Clubs or Organizations: No    Attends Club or Organization Meetings: Never    Marital Status: Never    Housing Stability: High Risk    Unable to Pay for Housing in the Last Year: Yes    Unstable Housing in the Last Year: Yes     Review of Systems   Unable to perform ROS: Intubated   Objective:     Vital Signs (Most Recent):  Temp: 99.7 °F (37.6 °C) (08/29/22 1315)  Pulse: 81 (08/29/22 1415)  Resp: (!) 25 (08/29/22 1415)  BP: 127/60 (08/29/22 1415)  SpO2: (!) 94 % (08/29/22 1415)   Vital Signs (24h Range):  Temp:  [98.5 °F (36.9 °C)-99.8 °F (37.7 °C)] 99.7 °F (37.6 °C)  Pulse:  [] 81  Resp:  [22-38] 25  SpO2:  [91 %-98 %] 94 %  BP: (116-191)/(52-88) 127/60     Weight: 86.2 kg (190 lb)  Body mass index is 28.06 kg/m².    Estimated Creatinine Clearance: 148 mL/min (based on SCr of 0.7 " mg/dL).    Physical Exam  Vitals and nursing note reviewed.   HENT:      Head: Normocephalic and atraumatic.      Right Ear: External ear normal.      Left Ear: External ear normal.   Eyes:      Pupils: Pupils are equal, round, and reactive to light.   Cardiovascular:      Rate and Rhythm: Normal rate and regular rhythm.      Heart sounds: No murmur heard.  Pulmonary:      Breath sounds: No wheezing or rhonchi.   Abdominal:      Tenderness: There is no abdominal tenderness. There is no guarding or rebound.   Skin:     General: Skin is warm and dry.      Findings: No lesion or rash.      Comments: tattoos       Significant Labs: Blood Culture:   Recent Labs   Lab 08/25/22  2330 08/25/22  2337 08/27/22  1328 08/28/22  1650 08/28/22  1653   LABBLOO No Growth to date  No Growth to date  No Growth to date  No Growth to date Gram stain peds bottle: Gram negative rods  Results called to and read back by:Alessio Mckeon RN 08/27/2022  ACINETOBACTER SPECIES  Further identified as Acinetobacter pittii  *  PSEUDOMONAS LUTEOLA* Gram stain aer bottle: Gram positive rods  Results called to and read back by: Naveen Jensen RN  08/28/2022  09:39  BACILLUS CEREUS  Organism is a probable contaminant  *  No Growth to date  No Growth to date No Growth to date No Growth to date     CBC:   Recent Labs   Lab 08/28/22  0300 08/29/22  0345 08/29/22  1101   WBC 26.26* 14.58*  --    HGB 11.7* 10.8*  --    HCT 37.2* 33.7* 32*    254  --      CMP:   Recent Labs   Lab 08/28/22  0300 08/29/22  0345    136   K 4.6 3.9    99   CO2 28 30*   * 134*   BUN 14 16   CREATININE 0.7 0.7   CALCIUM 8.8 8.6*   PROT 5.9* 5.7*   ALBUMIN 2.2* 2.0*   BILITOT 0.6 0.5   ALKPHOS 49* 51*   AST 41* 27   ALT 38 28   ANIONGAP 10 7*     Respiratory Culture:   Recent Labs   Lab 08/26/22  0144   GSRESP <10 epithelial cells per low power field.  Rare WBC's  Rare Gram positive cocci  Rare Gram negative rods   RESPIRATORYC No growth      Urine Culture:   Recent Labs   Lab 08/27/22  1907   LABURIN No growth     Wound Culture: No results for input(s): LABAERO in the last 4320 hours.    Significant Imaging: I have reviewed all pertinent imaging results/findings within the past 24 hours.

## 2022-08-29 NOTE — CARE UPDATE
Pt recieved intubated on   ventilator with the following settings;. AC/VC  / RR 30/ Peep +10/ FI02 60% Alarms are set and functioning, Ambu bag at bedside, Pt without distress RT will continue to monitor and wean as tolerated.

## 2022-08-29 NOTE — NURSING
Pt gag, cough reflex's intact; vent setting  RR 30; Tv 520; O2 60%, Peep 10. Fent and Propofol titrated. Pt is noted to sweating, clothing changes and cooling measures applied. Pt has two episode of agitation during the shift. Mom was at pt bedside today.   Report given to nurse Miranda.

## 2022-08-29 NOTE — ASSESSMENT & PLAN NOTE
- unclear if infectious or not  - polymicrobial bacteremia is very unusual and I question the significance of the isolates  - Strep pneumo Ag x 2 is pending as is Legionella Ag  - nonetheless, given his history of IDU and intubation in the ICU, treatment is not unreasonable  - agree with cefepime (I do not feel strongly about continuing the azithromycin and vancomycin)  - trend WBC and repeat cultures  - PCT was negative -> will repeat

## 2022-08-29 NOTE — PROGRESS NOTES
Pharmacokinetic Assessment Follow Up: IV Vancomycin    Vancomycin serum concentration assessment(s):    The trough level was drawn correctly and can be used to guide therapy at this time. The measurement is below the desired definitive target range of 10 to 20 mcg/mL. Confirmed no missed dose. No early lab draw.    Vancomycin Regimen Plan:    Change regimen to Vancomycin 2000 mg IV every 8 hours with next serum trough concentration measured at 1630 prior to next dose on 8/30/22.    Drug levels (last 3 results):  Recent Labs   Lab Result Units 08/27/22  1020 08/28/22  1538 08/29/22  1430   Vancomycin-Trough ug/mL 7.8* 12.2 6.1*       Pharmacy will continue to follow and monitor vancomycin.    Please contact pharmacy at extension [phone] for questions regarding this assessment.    Thank you for the consult,   Antionette Zepeda       Patient brief summary:  Leighton Carroll is a 42 y.o. male initiated on antimicrobial therapy with IV Vancomycin for treatment of lower respiratory infection.    The patient's current regimen is 2000 mg every 8 hours.    Drug Allergies:   Review of patient's allergies indicates:  No Known Allergies    Actual Body Weight:   86.2 kg    Renal Function:   Estimated Creatinine Clearance: 148 mL/min (based on SCr of 0.7 mg/dL).,     Dialysis Method (if applicable):  N/A    CBC (last 72 hours):  Recent Labs   Lab Result Units 08/27/22  0505 08/28/22  0300 08/29/22  0345   WBC K/uL 23.82* 26.26* 14.58*   Hemoglobin g/dL 12.2* 11.7* 10.8*   Hematocrit % 37.3* 37.2* 33.7*   Platelets K/uL 287 275 254   Gran % % 87.1* 91.5* 78.2*   Lymph % % 5.9* 2.8* 5.7*   Mono % % 4.2 4.2 7.7   Eosinophil % % 1.9 0.1 7.4   Basophil % % 0.4 0.3 0.3   Differential Method  Automated Automated Automated       Metabolic Panel (last 72 hours):  Recent Labs   Lab Result Units 08/27/22  0505 08/27/22  1907 08/28/22  0300 08/29/22  0345 08/29/22  1046   Sodium mmol/L 136  --  138 136  --    Potassium mmol/L 4.9  --  4.6 3.9  --     Chloride mmol/L 101  --  100 99  --    CO2 mmol/L 28  --  28 30*  --    Glucose mg/dL 126*  --  125* 134*  --    Glucose, UA   --  Trace*  --   --  Negative   BUN mg/dL 14  --  14 16  --    Creatinine mg/dL 0.8  --  0.7 0.7  --    Albumin g/dL  --   --  2.2* 2.0*  --    Total Bilirubin mg/dL  --   --  0.6 0.5  --    Alkaline Phosphatase U/L  --   --  49* 51*  --    AST U/L  --   --  41* 27  --    ALT U/L  --   --  38 28  --        Vancomycin Administrations:  vancomycin given in the last 96 hours                     vancomycin 1.25 g in dextrose 5% 250 mL IVPB (ready to mix) (mg) 1,250 mg New Bag 08/29/22 0813     1,250 mg New Bag 08/28/22 2350     1,250 mg New Bag  1546     1,250 mg New Bag  0953     1,250 mg New Bag 08/27/22 2358     1,250 mg New Bag  1637    vancomycin in dextrose 5 % 1 gram/250 mL IVPB 1,000 mg (mg) 1,000 mg New Bag 08/27/22 0335     1,000 mg New Bag 08/26/22 1549    vancomycin 2 g in 0.9% sodium chloride 500 mL IVPB (mg) 2,000 mg New Bag 08/26/22 0017                    Microbiologic Results:  Microbiology Results (last 7 days)       Procedure Component Value Units Date/Time    Blood culture [[id number]]  (Abnormal) Collected: 08/27/22 1328    Order Status: Completed Specimen: Blood Updated: 08/29/22 1438     Blood Culture, Routine Gram stain aer bottle: Gram positive rods      Results called to and read back by: Naveen Jensen RN  08/28/2022  09:39      BACILLUS CEREUS  Organism is a probable contaminant      Urine culture [[id number]] Collected: 08/27/22 1907    Order Status: Completed Specimen: Urine Updated: 08/29/22 1046     Urine Culture, Routine No growth    Narrative:      Specimen Source->Urine    Culture, Respiratory with Gram Stain [[id number]] Collected: 08/26/22 0144    Order Status: Completed Specimen: Respiratory from Sputum Updated: 08/29/22 1031     Respiratory Culture No growth     Gram Stain (Respiratory) <10 epithelial cells per low power field.     Gram Stain (Respiratory)  Rare WBC's     Gram Stain (Respiratory) Rare Gram positive cocci     Gram Stain (Respiratory) Rare Gram negative rods    Blood culture [[id number]] Collected: 08/27/22 1328    Order Status: Completed Specimen: Blood Updated: 08/29/22 0613     Blood Culture, Routine No Growth to date      No Growth to date    Blood culture [[id number]] Collected: 08/28/22 1653    Order Status: Completed Specimen: Blood Updated: 08/29/22 0515     Blood Culture, Routine No Growth to date    Narrative:      Blood cultures from 2 different sites. 4 bottles total.  Please draw before starting antibiotics.    Blood culture [[id number]] Collected: 08/28/22 1650    Order Status: Completed Specimen: Blood Updated: 08/29/22 0515     Blood Culture, Routine No Growth to date    Narrative:      Blood cultures x 2 different sites. 4 bottles total. Please  draw cultures before administering antibiotics.    Respiratory Infection Panel (PCR), Nasopharyngeal [[id number]] Collected: 08/26/22 1809    Order Status: Completed Specimen: Nasopharyngeal Swab Updated: 08/27/22 0027     Respiratory Infection Panel Source NP Swab     Adenovirus Not Detected     Coronavirus 229E, Common Cold Virus Not Detected     Coronavirus HKU1, Common Cold Virus Not Detected     Coronavirus NL63, Common Cold Virus Not Detected     Coronavirus OC43, Common Cold Virus Not Detected     Comment: The Coronavirus strains detected in this test cause the common cold.  These strains are not the COVID-19 (novel Coronavirus)strain   associated with the respiratory disease outbreak.          SARS-CoV2 (COVID-19) Qualitative PCR Not Detected     Human Metapneumovirus Not Detected     Human Rhinovirus/Enterovirus Not Detected     Influenza A (subtypes H1, H1-2009,H3) Not Detected     Influenza B Not Detected     Parainfluenza Virus 1 Not Detected     Parainfluenza Virus 2 Not Detected     Parainfluenza Virus 3 Not Detected     Parainfluenza Virus 4 Not Detected     Respiratory Syncytial Virus  Not Detected     Bordetella Parapertussis (JJ4911) Not Detected     Bordetella pertussis (ptxP) Not Detected     Chlamydia pneumoniae Not Detected     Mycoplasma pneumoniae Not Detected    Narrative:      For all other respiratory sources, order [id number] -  Respiratory Viral Panel by PCR

## 2022-08-29 NOTE — ASSESSMENT & PLAN NOTE
Gram negative bacteremia    BCx 8/25: Acinetobacter, Pseudomonas luteola  BCx 8/27: Bacillus sp  BCx 8/28: no growth    Consult with ID for further management. Possible bronch with BAL vs IMELDA pending repeat cultures and ID input.

## 2022-08-29 NOTE — ASSESSMENT & PLAN NOTE
Gross hematuria, confirmed on UA, culture is pending, repeat UA  Possible rheumatologic condition?? JOVANA and anca pending

## 2022-08-29 NOTE — PROGRESS NOTES
"[facility] - Intensive Care [facility] Medicine  Progress Note    Patient Name: Leighton Carroll  MRN: [id number]  Patient Class: IP- Inpatient   Admission Date: 8/26/2022  Length of Stay: 3 days  Attending Physician: Nolan Torres MD  Primary Care Provider: Primary Doctor No        Subjective:     Principal Problem:Acute respiratory failure with hypoxia and hypercarbia        HPI:  Patient's HPI is adapted from notes of Dr. Schuler, Dr. Morin and Dr. [facility]   Patient arrived in [facility] ICU intubated, pt's girlfriend's number not on file, unable to verify events PTA.     Leighton Carroll is a 42 year old man with a PMHx of depression, hx of drug overdose (7/11/2022, buproprion and gabapentin), polysubstance abuse (meth, heroin) and nicotine dependence.    He was presented to [facility] on 8/25/2022 via EMS with shortness of breath and dry cough. He was reportedly found down by his girlfriend at home yesterday evening (8/25/2022) and was given multiple rounds of chest compressions. He woke up and began having shortness of breath and called EMS, which found him saturating at 66% on room air, which increased to 90s with nonrebreather mask. Patient denied subjective fever, chills, sick contacts, chest pain, palpitations, abdominal pain.     [facility] had a fever to 101, with leukocytosis (20) and lactic acidosis (3.0) with a normal procal. CTA chest showed patchy perihilar opacities bilaterally most pronounced in right lower lobe. Covid, Group A strep, respiratory influenza panel -ve. D-dimer was elevated (3.68), but CTPA negative for PE. Tox postive for THC only. CXR was -ve for pneumothorax. Patient was started on IV Cefepime and IV Vancomycin.     Patient was initially on BiPAP but became agitated and was intubated. Patient reportedly had "red spit". Patient was difficult to sedate requiring propofol and precedex and multiple doses of versed and ketamine, thus became hypotensive after " intubation and was started on levophed.     Patient is transferred to [facility] ICU, [facility] medicine, for management of acute respiratory failure with hypoxia and hypercapnia.             Overview/Hospital Course:  Patient arrived to ICU as a transfer intubated and sedated. CXR demonstrating bilateral patchy infiltrates and blood cultures have been positive for Acinetobacter sp, Pseudomonas luteola and now repeat cultures positive for a bacillus species (possible contaminant). Repeated cultures remain negative. Patient is on Vanc/Cefepime/azithromycin. PCC, ID consulted. TTE did not demonstrate vegetations, EF 50%.      Interval History: no events overnight. FiO2 vent requirements decreasing, weaned off levo. Remains sedated and intubated    Review of Systems   Unable to perform ROS: Intubated   Objective:     Vital Signs (Most Recent):  Temp: 99 °F (37.2 °C) (08/29/22 0302)  Pulse: 76 (08/29/22 0915)  Resp: (!) 34 (08/29/22 0915)  BP: (!) 117/52 (08/29/22 0915)  SpO2: (!) 94 % (08/29/22 0915)   Vital Signs (24h Range):  Temp:  [98.5 °F (36.9 °C)-99.3 °F (37.4 °C)] 99 °F (37.2 °C)  Pulse:  [] 76  Resp:  [19-38] 34  SpO2:  [90 %-98 %] 94 %  BP: (116-238)/() 117/52     Weight: 86.2 kg (190 lb)  Body mass index is 28.06 kg/m².    Intake/Output Summary (Last 24 hours) at 8/29/2022 1054  Last data filed at 8/29/2022 0900  Gross per 24 hour   Intake 3488.09 ml   Output 1935 ml   Net 1553.09 ml      Physical Exam  Vitals and nursing note reviewed.   HENT:      Head: Normocephalic and atraumatic.   Eyes:      General: No scleral icterus.        Right eye: No discharge.         Left eye: No discharge.   Cardiovascular:      Rate and Rhythm: Normal rate and regular rhythm.      Pulses: Normal pulses.      Heart sounds: Normal heart sounds.   Pulmonary:      Comments: On ventilatory support, decreased lung sounds over the right anterior basal chest   Abdominal:      General: Bowel sounds are normal.       Palpations: Abdomen is soft.   Genitourinary:     Comments: On salomon catheter, draining yellow urine    Musculoskeletal:      Right lower leg: No edema.      Left lower leg: No edema.   Skin:     General: Skin is warm and dry.      Findings: No rash.      Comments: Dry healed scabs on chest and abdomen   Neurological:      Comments: Withdraws to painful stimuli         Significant Labs: All pertinent labs within the past 24 hours have been reviewed.  BMP:   Recent Labs   Lab 08/29/22  0345   *      K 3.9   CL 99   CO2 30*   BUN 16   CREATININE 0.7   CALCIUM 8.6*     CBC:   Recent Labs   Lab 08/27/22  1723 08/28/22  0300 08/29/22  0345   WBC  --  26.26* 14.58*   HGB  --  11.7* 10.8*   HCT 39 37.2* 33.7*   PLT  --  275 254       Significant Imaging: I have reviewed all pertinent imaging results/findings within the past 24 hours.      Assessment/Plan:      * Acute respiratory failure with hypoxia and hypercarbia  Due to bilateral pneumonia  Hx of drug overdose (buproprion and gabapentin), Polysubstance abuse (meth, heroin), nicotine abuse, chest compressions performed by girlfriend PTA.      Hypoxic to 60s on room air on EMS arrival, failed BiPAP 2/2 due to agitation, intubated at OSH. CTA with patchy perihilar opacities, leukocytosis, lactic acidosis, febrile to 101. Tox +ve for THC. D-dimer elevated but CTPA -ve for PE. Trops and EKG benign for cardiac cause. Covid, respiratory viral panel, Group A strep -ve. Lipase -ve. CXR -ve for pneumothorax.     Plan:   - Continue ventilatory support, FiO2 requirements have decreased today  - Continue IV Cefepime 2g qd, IV Vancomycin 1g qd and PO Azithromycin    - TTE with EF 50%, no valvular vegetations        Gram-positive cocci bacteremia  Gram negative bacteremia    BCx 8/25: Acinetobacter, Pseudomonas luteola  BCx 8/27: Bacillus sp  BCx 8/28: no growth    Consult with ID for further management. Possible bronch with BAL vs IMELDA pending repeat cultures and ID  input.       Bilateral pneumonia  As above      Hematuria  Gross hematuria, confirmed on UA, culture is pending, repeat UA  Possible rheumatologic condition?? JOVANA and anca pending      Septic shock  Shock has resolved      History of drug overdose  Hx of drug overdose (7/11/2022, buproprion and gabapentin) and suicidal ideation  Currently tox screen +THC        Depression  Patient has persistent depression which is unknown and is currently controlled. Will hold anti-depressant medications. We will not consult psychiatry at this time. Patient does not display psychosis at this time. Continue to monitor closely and adjust plan of care as needed.    - Held home dose buproprion 150mg qd, divalproex ER 500mg 1g qd, quetiapine 200mg qd, risperiodone 2mg qd, will continue when patient is off sedation          VTE Risk Mitigation (From admission, onward)         Ordered     enoxaparin injection 40 mg  Daily         08/26/22 1422     IP VTE HIGH RISK PATIENT  Once         08/26/22 1422     Place sequential compression device  Until discontinued         08/26/22 1422     Place MARGRET hose  Until discontinued         08/26/22 1422                Discharge Planning   CHETAN:      Code Status: Full Code   Is the patient medically ready for discharge?:     Reason for patient still in hospital (select all that apply): Treatment                     Nolan Torres MD  Department of Hospital Medicine   [facility] - Intensive Care ([place])

## 2022-08-29 NOTE — PROGRESS NOTES
"Pulmonary/Critical Care  Patient seen and examined by me. I agree with the trainee's separate note except as modified.     42 year old with HCV, polysubstance abuse who was found down at home on 8/26 by his girlfriend. Woke up and complained of SOB. Hypoxic when EMS arrived. He was on BIPAP in the ER but intubated due to severe agitation. Work up has revealed nodular infiltrates and + blood cx's.      Major overnight events: None reported      Vitals: Afebrile, HR 70-110s, MAP 70-80s, 94% on 60% and 10 PEEP.      Significant lab findings: WBC 26 to 14, bicarb 30.   ABG 7.44/52/81/35 on 60%     Blood cx + for acinetobacter (2 species) and Pseudomonas luteola, repeat cx's are NGTD    Admission CT reviewed by me: diffuse bilateral roly-bronchovascular distribution of consolidation and ground glass. Relative sparing of the periphery is noted.     8/26 TTE: LVEF 50%, mild RV enlargement with mildly reduced RV function. moderate RA enlargement. RVSP "greater than 20"     Key exam findings: sedated on the vent. No peripheral stigmata of endocarditis     Ventilator:               Intubation day #: 4              Settings: AC/30/520/60/10              Peak/plateau:               P/F: 135  Spontaneous awaking trial (SAT) safety screen    Does the patient have any of the following contraindications to an SAT?    [] Active seizures  [] RASS > +1  [] On paralytics  [] Elevated intracranial pressure  [] Unstable respiratory status  [] Unstable cardiovascular status  [] Other _____________________    [x] No contraindications to SAT    SBT: not yet ready   RASS: -5  Sedation: fentanyl, propofol   Vasopressors: none  Other drips: none   Lines and invasive devices: PICC, ETT, salomon  Fluid Balance: 24 hr: +1.7L  Overall: +3.1L  Creatinine: 0.7  Antibiotics/Day #: azithromycin/4, cefepime/5, vanco/5  Bowels: none since admission   Nutrition: TFs  Glucose management: BS low 100s   Prophylaxis:               DVT: enoxaparin              " GI: H2 blocker   PT/OT: n/a   GOC/Family discussion: full code      Impression: most likely scenario is that he has multifocal GNR pneumonia. Does not have continued hemoptysis and has a stable Hgb so DAH is unlikely. Inhalational injury is also possible, but would not explain his bacteremia     Plan:  -would stop vanco and azithromycin  -no role for BAL at present   -repeat CXR  -IMELDA if blood cx's are persistently positive   -would consult ID due to unusual organisms in his blood cx of uncertain etiology   -f/u JOVANA, f/u ANCA. Check cryoglobulins as well for completeness   -check for Hep C   -remove Cano   -lighten sedation for goal RASS -2. Daily SAT    Critical care time (30min) spent personally by me on the following activities: development of treatment plan with patient or surrogate and bedside caregivers, discussions with consultants, evaluation of patient's response to treatment, examination of patient, ordering and performing treatments and interventions, ordering and review of laboratory studies, ordering and review of radiographic studies, pulse oximetry, re-evaluation of patient's condition. This critical care time did not overlap with that of any other provider or involve time for any procedures.

## 2022-08-29 NOTE — PLAN OF CARE
Plan of care reviewed with patient, patient intubated/sedated unable to verbalize understanding  Problem: Adult Inpatient Plan of Care  Goal: Plan of Care Review  Outcome: Ongoing, Progressing  Flowsheets (Taken 8/28/2022 0037)  Plan of Care Reviewed With: patient  Goal: Optimal Comfort and Wellbeing  Outcome: Ongoing, Progressing  Goal: Readiness for Transition of Care  Outcome: Ongoing, Progressing     Problem: Communication Impairment (Mechanical Ventilation, Invasive)  Goal: Effective Communication  Outcome: Ongoing, Progressing     Problem: Device-Related Complication Risk (Mechanical Ventilation, Invasive)  Goal: Optimal Device Function  Outcome: Ongoing, Progressing     Problem: Inability to Wean (Mechanical Ventilation, Invasive)  Goal: Mechanical Ventilation Liberation  Outcome: Ongoing, Progressing     Problem: Skin and Tissue Injury (Mechanical Ventilation, Invasive)  Goal: Absence of Device-Related Skin and Tissue Injury  Outcome: Ongoing, Progressing     Problem: Infection  Goal: Absence of Infection Signs and Symptoms  Outcome: Ongoing, Progressing     Problem: Adjustment to Illness (Sepsis/Septic Shock)  Goal: Optimal Coping  Outcome: Ongoing, Progressing

## 2022-08-29 NOTE — HOSPITAL COURSE
Admitted with acute hypoxemic respiratory failure.  Blood cultures demonstrated Acinetobacter, Pseudomonas luteola.  Pulmonology and ID consulted.  TTE performed without evidence of vegetation. Repeat blood culture showed bacillus in 1/4 bottles but suspected contaminant.  Opted for 14 day course of cefepime. Developed ARDS and started chemical paralysis/proning as well as ARDSnet protocol. Palliative consulted given the severity of his illness and likely extended recovery. Respiratory status was slow to improve but gradually had decreasing oxygenation/pressure requirements. Had repeat fevers and sputum culture showed Staph aureus; vancomycin added. Extubated on 9/7.  Stepped down to floor on 9/10/2022.  Patient persistently failed swallow evaluation by speech therapy and he continued NPO status.  Attempted NG placement with tube feedings has been difficult given patient's ultimately ends up removing NG overnight.  PT and OT are recommending rehab placement.  Repeat blood cultures from 09/10 remarkable for coag-negative staph, likely contaminant. Completed course of vancomycin for MRSA PNA. Placement sought.

## 2022-08-29 NOTE — ASSESSMENT & PLAN NOTE
Due to bilateral pneumonia  Hx of drug overdose (buproprion and gabapentin), Polysubstance abuse (meth, heroin), nicotine abuse, chest compressions performed by girlfriend PTA.      Hypoxic to 60s on room air on EMS arrival, failed BiPAP 2/2 due to agitation, intubated at OSH. CTA with patchy perihilar opacities, leukocytosis, lactic acidosis, febrile to 101. Tox +ve for THC. D-dimer elevated but CTPA -ve for PE. Trops and EKG benign for cardiac cause. Covid, respiratory viral panel, Group A strep -ve. Lipase -ve. CXR -ve for pneumothorax.     Plan:   - Continue ventilatory support, FiO2 requirements have decreased today  - Continue IV Cefepime 2g qd, IV Vancomycin 1g qd and PO Azithromycin    - TTE with EF 50%, no valvular vegetations

## 2022-08-29 NOTE — HPI
" 42 year old man with a PMHx of depression, hx of drug overdose (7/11/2022, buproprion and gabapentin), polysubstance abuse (meth, heroin) and nicotine dependence. ID is consulted for bacteremia. The patient presented to [facility] on 8/25/2022 via EMS with shortness of breath and dry cough. He was reportedly found down by his girlfriend at home yesterday evening (8/25/2022) and was given multiple rounds of chest compressions. He woke up and began having shortness of breath and called EMS, which found him saturating at 66% on room air, which increased to 90s with nonrebreather mask. Patient denied subjective fever, chills, sick contacts, chest pain, palpitations, abdominal pain. At [facility], patient had a fever to 101, with leukocytosis (20) and lactic acidosis (3.0) with a normal procal. CTA chest showed patchy perihilar opacities bilaterally most pronounced in right lower lobe. Covid, Group A strep, respiratory influenza panel -ve. D-dimer was elevated (3.68), but CTPA negative for PE. Tox postive for THC only. CXR was -ve for pneumothorax. Patient was started on IV Cefepime and IV Vancomycin. Patient was initially on BiPAP but became agitated and was intubated. Patient reportedly had "red spit". Patient was difficult to sedate requiring propofol and precedex and multiple doses of versed and ketamine, thus became hypotensive after intubation and was started on levophed. He was transferred to [facility]. Here, CXR demonstrated bilateral patchy infiltrates and blood cultures have been positive for Acinetobacter sp and Pseudomonas luteola. Repeat cultures growing B cereus (possible contaminant). Repeated cultures remain negative. Patient is on Vanc/Cefepime/azithromycin. PCC, ID consulted. TTE did not demonstrate vegetations and ID is consulted for above. The patient is intubated and sedated int he ICU.  "

## 2022-08-29 NOTE — PLAN OF CARE
Assessment complete with parent over phone as patient is intubated. Patient was living with girlfriend prior to admission. Mom unable to provide detailed information.   08/29/22 1450   Discharge Assessment   Assessment Type Discharge Planning Assessment   Source of Information family;health record   If unable to respond/provide information was family/caregiver contacted? Yes   Contact Name/Number Ana Maria Thomas [phone]   Reason For Admission OD   Lives With significant other   Facility Arrived From: [facility]   Do you expect to return to your current living situation? No   Prior to hospitilization cognitive status: Alert/Oriented   Current cognitive status: Coma/Sedated/Intubated   Walking or Climbing Stairs Difficulty none   Dressing/Bathing Difficulty none   Equipment Currently Used at Home none   Readmission within 30 days? No   Do you currently have service(s) that help you manage your care at home? No   Do you have prescription coverage? Yes   Do you have any problems affording any of your prescribed medications? TBD   Are you on dialysis? No   Do you take coumadin? No   Discharge Plan discussed with: Parent(s)   Discharge Barriers Identified None   Physical Activity   On average, how many days per week do you engage in moderate to strenuous exercise (like a brisk walk)? 0 days   On average, how many minutes do you engage in exercise at this level? 0 min   Financial Resource Strain   How hard is it for you to pay for the very basics like food, housing, medical care, and heating? Hard   Housing Stability   In the last 12 months, was there a time when you were not able to pay the mortgage or rent on time? Y   In the last 12 months, was there a time when you did not have a steady place to sleep or slept in a shelter (including now)? Y   Transportation Needs   In the past 12 months, has lack of transportation kept you from medical appointments or from getting medications? yes   In the past 12 months, has lack of  transportation kept you from meetings, work, or from getting things needed for daily living? Yes   Food Insecurity   Within the past 12 months, you worried that your food would run out before you got the money to buy more. Sometimes   Within the past 12 months, the food you bought just didn't last and you didn't have money to get more. Sometimes   Stress   Do you feel stress - tense, restless, nervous, or anxious, or unable to sleep at night because your mind is troubled all the time - these days? Very much   Social Connections   In a typical week, how many times do you talk on the phone with family, friends, or neighbors? More than 3   How often do you get together with friends or relatives? Three times   How often do you attend Buddhism or Scientologist services? Never   Do you belong to any clubs or organizations such as Buddhism groups, unions, fraternal or athletic groups, or school groups? No   How often do you attend meetings of the clubs or organizations you belong to? Never   Are you , , , , never , or living with a partner? Never marrie   Alcohol Use   Q1: How often do you have a drink containing alcohol? Never   Q2: How many drinks containing alcohol do you have on a typical day when you are drinking? None   Q3: How often do you have six or more drinks on one occasion? Never

## 2022-08-29 NOTE — CONSULTS
[facility] Intensive [facility] ([place])  Infectious Disease  Consult Note    Patient Name: Leighton Carroll  MRN: [id number]  Admission Date: 8/26/2022  Hospital Length of Stay: 3 days  Attending Physician: Nolan Torres MD  Primary Care Provider: Primary Doctor No     Isolation Status: No active isolations    Patient information was obtained from past medical records and ER records.      Inpatient consult to Infectious Diseases  Consult performed by: Saji Fajardo MD  Consult ordered by: Van Panchal MD        Assessment/Plan:     Bilateral pneumonia  - unclear if infectious or not  - polymicrobial bacteremia is very unusual and I question the clinical significance of all of the isolates  - Strep pneumo Ag x 2 is pending as is Legionella Ag  - nonetheless, given his history of IDU and intubation in the ICU, treatment is not unreasonable  - agree with cefepime (I do not feel strongly about continuing the azithromycin and vancomycin)  - trend WBC and repeat blood cultures  - PCT was negative -> will repeat    Thank you for your consult. I will follow-up with patient. Please contact us if you have any additional questions.    Saji Fajardo MD  Infectious Disease  [facility]ensive [facility] ([place])    Subjective:     Principal Problem: Acute respiratory failure with hypoxia and hypercarbia    HPI:  42 year old man with a PMHx of depression, hx of drug overdose (7/11/2022, buproprion and gabapentin), polysubstance abuse (meth, heroin) and nicotine dependence. ID is consulted for bacteremia. The patient presented to [facility] on 8/25/2022 via EMS with shortness of breath and dry cough. He was reportedly found down by his girlfriend at home yesterday evening (8/25/2022) and was given multiple rounds of chest compressions. He woke up and began having shortness of breath and called EMS, which found him saturating at 66% on room air, which increased to 90s with nonrebreather mask. Patient denied subjective  "fever, chills, sick contacts, chest pain, palpitations, abdominal pain. At [facility], patient had a fever to 101, with leukocytosis (20) and lactic acidosis (3.0) with a normal procal. CTA chest showed patchy perihilar opacities bilaterally most pronounced in right lower lobe. Covid, Group A strep, respiratory influenza panel -ve. D-dimer was elevated (3.68), but CTPA negative for PE. Tox postive for THC only. CXR was -ve for pneumothorax. Patient was started on IV Cefepime and IV Vancomycin. Patient was initially on BiPAP but became agitated and was intubated. Patient reportedly had "red spit". Patient was difficult to sedate requiring propofol and precedex and multiple doses of versed and ketamine, thus became hypotensive after intubation and was started on levophed. He was transferred to [facility] ICU. Here, CXR demonstrated bilateral patchy infiltrates and blood cultures have been positive for Acinetobacter sp and Pseudomonas luteola. Repeat cultures growing B cereus (possible contaminant). Repeated cultures remain negative. Patient is on Vanc/Cefepime/azithromycin. PCC, ID consulted. TTE did not demonstrate vegetations and ID is consulted for above. The patient is intubated and sedated int he ICU.      Past Medical History:   Diagnosis Date    Anxiety     Depression     Hepatitis C     Substance abuse     METH AND HEROIN       No past surgical history on file.    Review of patient's allergies indicates:  No Known Allergies    Medications:  Medications Prior to Admission   Medication Sig    buPROPion (WELLBUTRIN XL) 150 MG TB24 tablet Take 1 tablet (150 mg total) by mouth once daily.    divalproex ER (DEPAKOTE) 500 MG Tb24 Take 2 tablets (1,000 mg total) by mouth once daily.    gabapentin (NEURONTIN) 300 MG capsule Take 2 capsules (600 mg total) by mouth 3 (three) times daily.    LORazepam (ATIVAN) 0.5 MG tablet Take 1 tablet at bedtime 7/18, one tablet in morning on 7/19 then stop    nicotine (NICODERM " "CQ) 14 mg/24 hr Place 1 patch onto the skin once daily.    QUEtiapine (SEROQUEL) 200 MG Tab Take 1 tablet (200 mg total) by mouth every evening.    risperiDONE (RISPERDAL) 2 MG tablet Take 1 tablet (2 mg total) by mouth once daily.     Antibiotics (From admission, onward)      Start     Stop Route Frequency Ordered    08/27/22 1530  vancomycin 1.25 g in dextrose 5% 250 mL IVPB (ready to mix)         -- IV Every 8 hours (non-standard times) 08/27/22 1521    08/27/22 0900  azithromycin tablet 250 mg        See Hyperspace for full Linked Orders Report.    08/31 0859 Oral Daily 08/26/22 1129    08/26/22 1700  cefepime in dextrose 5 % IVPB 2 g         -- IV Every 8 hours (non-standard times) 08/26/22 1426    08/26/22 1525  vancomycin - pharmacy to dose  (vancomycin IVPB)        See Hyperspace for full Linked Orders Report.    -- IV pharmacy to manage frequency 08/26/22 1426          Antifungals (From admission, onward)      None          Antivirals (From admission, onward)      None               There is no immunization history on file for this patient.    Family History    None       Social History     Socioeconomic History    Marital status: Single   Tobacco Use    Smoking status: Every Day     Packs/day: 0.50     Types: Cigarettes    Smokeless tobacco: Never   Substance and Sexual Activity    Alcohol use: Yes     Comment: 5th whiskey or more daily    Drug use: Yes     Types: Methamphetamines, Marijuana     Comment: heroin , "pt reports he has been doing whatever he can get his hands on"      Social Determinants of Health     Financial Resource Strain: High Risk    Difficulty of Paying Living Expenses: Hard   Food Insecurity: Food Insecurity Present    Worried About Running Out of Food in the Last Year: Sometimes true    Ran Out of Food in the Last Year: Sometimes true   Transportation Needs: Unmet Transportation Needs    Lack of Transportation (Medical): Yes    Lack of Transportation (Non-Medical): Yes "   Physical Activity: Inactive    Days of Exercise per Week: 0 days    Minutes of Exercise per Session: 0 min   Stress: Stress Concern Present    Feeling of Stress : Very much   Social Connections: Socially Isolated    Frequency of Communication with Friends and Family: More than three times a week    Frequency of Social Gatherings with Friends and Family: Three times a week    Attends Yarsanism Services: Never    Active Member of Clubs or Organizations: No    Attends Club or Organization Meetings: Never    Marital Status: Never    Housing Stability: High Risk    Unable to Pay for Housing in the Last Year: Yes    Unstable Housing in the Last Year: Yes     Review of Systems   Unable to perform ROS: Intubated   Objective:     Vital Signs (Most Recent):  Temp: 99.7 °F (37.6 °C) (08/29/22 1315)  Pulse: 81 (08/29/22 1415)  Resp: (!) 25 (08/29/22 1415)  BP: 127/60 (08/29/22 1415)  SpO2: (!) 94 % (08/29/22 1415)   Vital Signs (24h Range):  Temp:  [98.5 °F (36.9 °C)-99.8 °F (37.7 °C)] 99.7 °F (37.6 °C)  Pulse:  [] 81  Resp:  [22-38] 25  SpO2:  [91 %-98 %] 94 %  BP: (116-191)/(52-88) 127/60     Weight: 86.2 kg (190 lb)  Body mass index is 28.06 kg/m².    Estimated Creatinine Clearance: 148 mL/min (based on SCr of 0.7 mg/dL).    Physical Exam  Vitals and nursing note reviewed.   HENT:      Head: Normocephalic and atraumatic.      Right Ear: External ear normal.      Left Ear: External ear normal.   Eyes:      Pupils: Pupils are equal, round, and reactive to light.   Cardiovascular:      Rate and Rhythm: Normal rate and regular rhythm.      Heart sounds: No murmur heard.  Pulmonary:      Breath sounds: No wheezing or rhonchi.   Abdominal:      Tenderness: There is no abdominal tenderness. There is no guarding or rebound.   Skin:     General: Skin is warm and dry.      Findings: No lesion or rash.      Comments: tattoos       Significant Labs: Blood Culture:   Recent Labs   Lab 08/25/22  7472  08/25/22  2337 08/27/22  1328 08/28/22  1650 08/28/22  1653   LABBLOO No Growth to date  No Growth to date  No Growth to date  No Growth to date Gram stain peds bottle: Gram negative rods  Results called to and read back by:Alessio Mckeon RN 08/27/2022  ACINETOBACTER SPECIES  Further identified as Acinetobacter pittii  *  PSEUDOMONAS LUTEOLA* Gram stain aer bottle: Gram positive rods  Results called to and read back by: Naveen Jensen RN  08/28/2022  09:39  BACILLUS CEREUS  Organism is a probable contaminant  *  No Growth to date  No Growth to date No Growth to date No Growth to date     CBC:   Recent Labs   Lab 08/28/22  0300 08/29/22  0345 08/29/22  1101   WBC 26.26* 14.58*  --    HGB 11.7* 10.8*  --    HCT 37.2* 33.7* 32*    254  --      CMP:   Recent Labs   Lab 08/28/22  0300 08/29/22  0345    136   K 4.6 3.9    99   CO2 28 30*   * 134*   BUN 14 16   CREATININE 0.7 0.7   CALCIUM 8.8 8.6*   PROT 5.9* 5.7*   ALBUMIN 2.2* 2.0*   BILITOT 0.6 0.5   ALKPHOS 49* 51*   AST 41* 27   ALT 38 28   ANIONGAP 10 7*     Respiratory Culture:   Recent Labs   Lab 08/26/22  0144   GSRESP <10 epithelial cells per low power field.  Rare WBC's  Rare Gram positive cocci  Rare Gram negative rods   RESPIRATORYC No growth     Urine Culture:   Recent Labs   Lab 08/27/22  1907   LABURIN No growth     Wound Culture: No results for input(s): LABAERO in the last 4320 hours.    Significant Imaging: I have reviewed all pertinent imaging results/findings within the past 24 hours.

## 2022-08-30 PROBLEM — J80 ACUTE RESPIRATORY DISTRESS SYNDROME (ARDS): Status: ACTIVE | Noted: 2022-08-30

## 2022-08-30 LAB
ALBUMIN SERPL BCP-MCNC: 2 G/DL (ref 3.5–5.2)
ALLENS TEST: ABNORMAL
ALLENS TEST: ABNORMAL
ALP SERPL-CCNC: 94 U/L (ref 55–135)
ALT SERPL W/O P-5'-P-CCNC: 27 U/L (ref 10–44)
ANION GAP SERPL CALC-SCNC: 8 MMOL/L (ref 8–16)
ANION GAP SERPL CALC-SCNC: 9 MMOL/L (ref 8–16)
AST SERPL-CCNC: 31 U/L (ref 10–40)
BACTERIA BLD CULT: ABNORMAL
BASOPHILS # BLD AUTO: 0.07 K/UL (ref 0–0.2)
BASOPHILS NFR BLD: 0.4 % (ref 0–1.9)
BILIRUB SERPL-MCNC: 0.6 MG/DL (ref 0.1–1)
BUN SERPL-MCNC: 14 MG/DL (ref 6–20)
BUN SERPL-MCNC: 17 MG/DL (ref 6–20)
CALCIUM SERPL-MCNC: 8.3 MG/DL (ref 8.7–10.5)
CALCIUM SERPL-MCNC: 8.6 MG/DL (ref 8.7–10.5)
CHLORIDE SERPL-SCNC: 94 MMOL/L (ref 95–110)
CHLORIDE SERPL-SCNC: 99 MMOL/L (ref 95–110)
CO2 SERPL-SCNC: 31 MMOL/L (ref 23–29)
CO2 SERPL-SCNC: 35 MMOL/L (ref 23–29)
CREAT SERPL-MCNC: 0.6 MG/DL (ref 0.5–1.4)
CREAT SERPL-MCNC: 0.7 MG/DL (ref 0.5–1.4)
DELSYS: ABNORMAL
DELSYS: ABNORMAL
DIFFERENTIAL METHOD: ABNORMAL
EOSINOPHIL # BLD AUTO: 1.1 K/UL (ref 0–0.5)
EOSINOPHIL NFR BLD: 6.4 % (ref 0–8)
ERYTHROCYTE [DISTWIDTH] IN BLOOD BY AUTOMATED COUNT: 14.2 % (ref 11.5–14.5)
ERYTHROCYTE [SEDIMENTATION RATE] IN BLOOD BY WESTERGREN METHOD: 26 MM/H
ERYTHROCYTE [SEDIMENTATION RATE] IN BLOOD BY WESTERGREN METHOD: 26 MM/H
EST. GFR  (NO RACE VARIABLE): >60 ML/MIN/1.73 M^2
EST. GFR  (NO RACE VARIABLE): >60 ML/MIN/1.73 M^2
FIO2: 60
FIO2: 60
GLUCOSE SERPL-MCNC: 112 MG/DL (ref 70–110)
GLUCOSE SERPL-MCNC: 118 MG/DL (ref 70–110)
HAV IGM SERPL QL IA: ABNORMAL
HBV CORE IGM SERPL QL IA: ABNORMAL
HBV SURFACE AG SERPL QL IA: ABNORMAL
HCO3 UR-SCNC: 27.4 MMOL/L (ref 24–28)
HCO3 UR-SCNC: 39.9 MMOL/L (ref 24–28)
HCT VFR BLD AUTO: 34.8 % (ref 40–54)
HCT VFR BLD CALC: 27 %PCV (ref 36–54)
HCT VFR BLD CALC: 36 %PCV (ref 36–54)
HCV AB SERPL QL IA: REACTIVE
HGB BLD-MCNC: 11.3 G/DL (ref 14–18)
HGB BLD-MCNC: 12 G/DL
HGB BLD-MCNC: 9 G/DL
IMM GRANULOCYTES # BLD AUTO: 0.13 K/UL (ref 0–0.04)
IMM GRANULOCYTES NFR BLD AUTO: 0.8 % (ref 0–0.5)
LYMPHOCYTES # BLD AUTO: 0.9 K/UL (ref 1–4.8)
LYMPHOCYTES NFR BLD: 5.1 % (ref 18–48)
MAGNESIUM SERPL-MCNC: 1.9 MG/DL (ref 1.6–2.6)
MCH RBC QN AUTO: 29.9 PG (ref 27–31)
MCHC RBC AUTO-ENTMCNC: 32.5 G/DL (ref 32–36)
MCV RBC AUTO: 92 FL (ref 82–98)
MODE: ABNORMAL
MODE: ABNORMAL
MONOCYTES # BLD AUTO: 1.7 K/UL (ref 0.3–1)
MONOCYTES NFR BLD: 10.5 % (ref 4–15)
NEUTROPHILS # BLD AUTO: 12.7 K/UL (ref 1.8–7.7)
NEUTROPHILS NFR BLD: 76.8 % (ref 38–73)
NRBC BLD-RTO: 0 /100 WBC
PCO2 BLDA: 53.5 MMHG (ref 35–45)
PCO2 BLDA: 63.5 MMHG (ref 35–45)
PEEP: 10
PEEP: 8
PH SMN: 7.32 [PH] (ref 7.35–7.45)
PH SMN: 7.41 [PH] (ref 7.35–7.45)
PLATELET # BLD AUTO: 258 K/UL (ref 150–450)
PMV BLD AUTO: 8.9 FL (ref 9.2–12.9)
PO2 BLDA: 72 MMHG (ref 80–100)
PO2 BLDA: 80 MMHG (ref 80–100)
POC BE: 1 MMOL/L
POC BE: 15 MMOL/L
POC IONIZED CALCIUM: 0.92 MMOL/L (ref 1.06–1.42)
POC SATURATED O2: 94 % (ref 95–100)
POC SATURATED O2: 94 % (ref 95–100)
POC TCO2: 29 MMOL/L (ref 23–27)
POC TCO2: 42 MMOL/L (ref 23–27)
POTASSIUM BLD-SCNC: 2.4 MMOL/L (ref 3.5–5.1)
POTASSIUM SERPL-SCNC: 4.1 MMOL/L (ref 3.5–5.1)
POTASSIUM SERPL-SCNC: 4.4 MMOL/L (ref 3.5–5.1)
PROCALCITONIN SERPL IA-MCNC: 0.66 NG/ML
PROT SERPL-MCNC: 6.2 G/DL (ref 6–8.4)
RBC # BLD AUTO: 3.78 M/UL (ref 4.6–6.2)
S PNEUM AG UR QL: NOT DETECTED
SAMPLE: ABNORMAL
SAMPLE: ABNORMAL
SITE: ABNORMAL
SITE: ABNORMAL
SODIUM BLD-SCNC: 147 MMOL/L (ref 136–145)
SODIUM SERPL-SCNC: 138 MMOL/L (ref 136–145)
SODIUM SERPL-SCNC: 138 MMOL/L (ref 136–145)
SP02: 94
SP02: 94
VT: 480
VT: 500
WBC # BLD AUTO: 16.52 K/UL (ref 3.9–12.7)

## 2022-08-30 PROCEDURE — 25000003 PHARM REV CODE 250: Performed by: STUDENT IN AN ORGANIZED HEALTH CARE EDUCATION/TRAINING PROGRAM

## 2022-08-30 PROCEDURE — 94640 AIRWAY INHALATION TREATMENT: CPT

## 2022-08-30 PROCEDURE — 94660 CPAP INITIATION&MGMT: CPT

## 2022-08-30 PROCEDURE — 63600175 PHARM REV CODE 636 W HCPCS

## 2022-08-30 PROCEDURE — 80074 ACUTE HEPATITIS PANEL: CPT | Performed by: STUDENT IN AN ORGANIZED HEALTH CARE EDUCATION/TRAINING PROGRAM

## 2022-08-30 PROCEDURE — 36415 COLL VENOUS BLD VENIPUNCTURE: CPT | Performed by: STUDENT IN AN ORGANIZED HEALTH CARE EDUCATION/TRAINING PROGRAM

## 2022-08-30 PROCEDURE — 25000003 PHARM REV CODE 250: Performed by: INTERNAL MEDICINE

## 2022-08-30 PROCEDURE — 20000000 HC ICU ROOM

## 2022-08-30 PROCEDURE — 83735 ASSAY OF MAGNESIUM: CPT | Performed by: STUDENT IN AN ORGANIZED HEALTH CARE EDUCATION/TRAINING PROGRAM

## 2022-08-30 PROCEDURE — 84145 PROCALCITONIN (PCT): CPT | Performed by: INTERNAL MEDICINE

## 2022-08-30 PROCEDURE — 94761 N-INVAS EAR/PLS OXIMETRY MLT: CPT

## 2022-08-30 PROCEDURE — 63600175 PHARM REV CODE 636 W HCPCS: Performed by: STUDENT IN AN ORGANIZED HEALTH CARE EDUCATION/TRAINING PROGRAM

## 2022-08-30 PROCEDURE — 25000242 PHARM REV CODE 250 ALT 637 W/ HCPCS: Performed by: STUDENT IN AN ORGANIZED HEALTH CARE EDUCATION/TRAINING PROGRAM

## 2022-08-30 PROCEDURE — 99291 CRITICAL CARE FIRST HOUR: CPT | Mod: ,,, | Performed by: INTERNAL MEDICINE

## 2022-08-30 PROCEDURE — 80053 COMPREHEN METABOLIC PANEL: CPT | Performed by: INTERNAL MEDICINE

## 2022-08-30 PROCEDURE — 36600 WITHDRAWAL OF ARTERIAL BLOOD: CPT

## 2022-08-30 PROCEDURE — 99291 PR CRITICAL CARE, E/M 30-74 MINUTES: ICD-10-PCS | Mod: ,,, | Performed by: INTERNAL MEDICINE

## 2022-08-30 PROCEDURE — 25000003 PHARM REV CODE 250

## 2022-08-30 PROCEDURE — 27100171 HC OXYGEN HIGH FLOW UP TO 24 HOURS

## 2022-08-30 PROCEDURE — 85025 COMPLETE CBC W/AUTO DIFF WBC: CPT | Performed by: INTERNAL MEDICINE

## 2022-08-30 PROCEDURE — 94002 VENT MGMT INPAT INIT DAY: CPT

## 2022-08-30 PROCEDURE — 80048 BASIC METABOLIC PNL TOTAL CA: CPT | Mod: XB | Performed by: STUDENT IN AN ORGANIZED HEALTH CARE EDUCATION/TRAINING PROGRAM

## 2022-08-30 PROCEDURE — 27000221 HC OXYGEN, UP TO 24 HOURS

## 2022-08-30 PROCEDURE — S4991 NICOTINE PATCH NONLEGEND: HCPCS

## 2022-08-30 PROCEDURE — 99900035 HC TECH TIME PER 15 MIN (STAT)

## 2022-08-30 PROCEDURE — 99900026 HC AIRWAY MAINTENANCE (STAT)

## 2022-08-30 PROCEDURE — 82803 BLOOD GASES ANY COMBINATION: CPT

## 2022-08-30 RX ORDER — FUROSEMIDE 10 MG/ML
40 INJECTION INTRAMUSCULAR; INTRAVENOUS EVERY 6 HOURS
Status: DISCONTINUED | OUTPATIENT
Start: 2022-08-30 | End: 2022-09-07

## 2022-08-30 RX ORDER — SODIUM CHLORIDE FOR INHALATION 3 %
4 VIAL, NEBULIZER (ML) INHALATION EVERY 12 HOURS
Status: DISCONTINUED | OUTPATIENT
Start: 2022-08-30 | End: 2022-09-13

## 2022-08-30 RX ORDER — QUETIAPINE FUMARATE 100 MG/1
200 TABLET, FILM COATED ORAL NIGHTLY
Status: DISPENSED | OUTPATIENT
Start: 2022-08-30 | End: 2022-09-03

## 2022-08-30 RX ORDER — BUPROPION HYDROCHLORIDE 75 MG/1
150 TABLET ORAL 2 TIMES DAILY
Status: DISCONTINUED | OUTPATIENT
Start: 2022-08-30 | End: 2022-09-15 | Stop reason: HOSPADM

## 2022-08-30 RX ORDER — CISATRACURIUM BESYLATE 2 MG/ML
0.2 INJECTION, SOLUTION INTRAVENOUS ONCE
Status: COMPLETED | OUTPATIENT
Start: 2022-08-30 | End: 2022-08-30

## 2022-08-30 RX ORDER — QUETIAPINE FUMARATE 100 MG/1
100 TABLET, FILM COATED ORAL NIGHTLY
Status: DISCONTINUED | OUTPATIENT
Start: 2022-08-30 | End: 2022-08-30

## 2022-08-30 RX ORDER — MUPIROCIN 20 MG/G
OINTMENT TOPICAL 2 TIMES DAILY
Status: COMPLETED | OUTPATIENT
Start: 2022-08-30 | End: 2022-09-03

## 2022-08-30 RX ADMIN — MIDAZOLAM 2 MG: 1 INJECTION INTRAMUSCULAR; INTRAVENOUS at 01:08

## 2022-08-30 RX ADMIN — ACETAMINOPHEN 1000 MG: 500 TABLET, COATED ORAL at 09:08

## 2022-08-30 RX ADMIN — KETAMINE HYDROCHLORIDE 20 MCG/KG/MIN: 100 INJECTION INTRAMUSCULAR; INTRAVENOUS at 10:08

## 2022-08-30 RX ADMIN — ENOXAPARIN SODIUM 40 MG: 100 INJECTION SUBCUTANEOUS at 04:08

## 2022-08-30 RX ADMIN — PROPOFOL 50 MCG/KG/MIN: 10 INJECTION, EMULSION INTRAVENOUS at 05:08

## 2022-08-30 RX ADMIN — DEXTROSE 1 MCG/KG/MIN: 50 INJECTION, SOLUTION INTRAVENOUS at 04:08

## 2022-08-30 RX ADMIN — MUPIROCIN: 20 OINTMENT TOPICAL at 12:08

## 2022-08-30 RX ADMIN — KETAMINE HYDROCHLORIDE 20 MCG/KG/MIN: 100 INJECTION INTRAMUSCULAR; INTRAVENOUS at 05:08

## 2022-08-30 RX ADMIN — NICOTINE 1 PATCH: 14 PATCH TRANSDERMAL at 09:08

## 2022-08-30 RX ADMIN — Medication 4 ML: at 07:08

## 2022-08-30 RX ADMIN — SENNOSIDES AND DOCUSATE SODIUM 1 TABLET: 50; 8.6 TABLET ORAL at 08:08

## 2022-08-30 RX ADMIN — MIDAZOLAM 2 MG: 1 INJECTION INTRAMUSCULAR; INTRAVENOUS at 09:08

## 2022-08-30 RX ADMIN — PROPOFOL 50 MCG/KG/MIN: 10 INJECTION, EMULSION INTRAVENOUS at 12:08

## 2022-08-30 RX ADMIN — MIDAZOLAM 0.5 MG/HR: 5 INJECTION INTRAMUSCULAR; INTRAVENOUS at 03:08

## 2022-08-30 RX ADMIN — CISATRACURIUM BESYLATE 17.2 MG: 2 INJECTION INTRAVENOUS at 05:08

## 2022-08-30 RX ADMIN — QUETIAPINE FUMARATE 200 MG: 100 TABLET ORAL at 08:08

## 2022-08-30 RX ADMIN — FUROSEMIDE 40 MG: 10 INJECTION, SOLUTION INTRAMUSCULAR; INTRAVENOUS at 09:08

## 2022-08-30 RX ADMIN — BUPROPION HYDROCHLORIDE 150 MG: 75 TABLET, FILM COATED ORAL at 10:08

## 2022-08-30 RX ADMIN — KETAMINE HYDROCHLORIDE 20 MCG/KG/MIN: 100 INJECTION INTRAMUSCULAR; INTRAVENOUS at 03:08

## 2022-08-30 RX ADMIN — MINERAL OIL AND WHITE PETROLATUM: 30; 940 OINTMENT OPHTHALMIC at 11:08

## 2022-08-30 RX ADMIN — Medication 300 MCG/HR: at 06:08

## 2022-08-30 RX ADMIN — MIDAZOLAM 2 MG: 1 INJECTION INTRAMUSCULAR; INTRAVENOUS at 06:08

## 2022-08-30 RX ADMIN — PROPOFOL 50 MCG/KG/MIN: 10 INJECTION, EMULSION INTRAVENOUS at 04:08

## 2022-08-30 RX ADMIN — FAMOTIDINE 20 MG: 10 INJECTION, SOLUTION INTRAVENOUS at 08:08

## 2022-08-30 RX ADMIN — CEFEPIME HYDROCHLORIDE 2 G: 2 INJECTION, SOLUTION INTRAVENOUS at 10:08

## 2022-08-30 RX ADMIN — PROPOFOL 50 MCG/KG/MIN: 10 INJECTION, EMULSION INTRAVENOUS at 09:08

## 2022-08-30 RX ADMIN — PROPOFOL 50 MCG/KG/MIN: 10 INJECTION, EMULSION INTRAVENOUS at 01:08

## 2022-08-30 RX ADMIN — POLYETHYLENE GLYCOL 3350 17 G: 17 POWDER, FOR SOLUTION ORAL at 08:08

## 2022-08-30 RX ADMIN — CEFEPIME HYDROCHLORIDE 2 G: 2 INJECTION, SOLUTION INTRAVENOUS at 08:08

## 2022-08-30 RX ADMIN — MUPIROCIN: 20 OINTMENT TOPICAL at 08:08

## 2022-08-30 RX ADMIN — CEFEPIME HYDROCHLORIDE 2 G: 2 INJECTION, SOLUTION INTRAVENOUS at 03:08

## 2022-08-30 RX ADMIN — Medication 300 MCG/HR: at 02:08

## 2022-08-30 RX ADMIN — MIDAZOLAM 2 MG: 1 INJECTION INTRAMUSCULAR; INTRAVENOUS at 03:08

## 2022-08-30 RX ADMIN — FUROSEMIDE 40 MG: 10 INJECTION, SOLUTION INTRAMUSCULAR; INTRAVENOUS at 08:08

## 2022-08-30 RX ADMIN — Medication 300 MCG/HR: at 10:08

## 2022-08-30 RX ADMIN — BUPROPION HYDROCHLORIDE 150 MG: 75 TABLET, FILM COATED ORAL at 12:08

## 2022-08-30 RX ADMIN — FUROSEMIDE 40 MG: 10 INJECTION, SOLUTION INTRAMUSCULAR; INTRAVENOUS at 06:08

## 2022-08-30 NOTE — PLAN OF CARE
Patient remains sedated and on ventilatory support. ABG was ordered by Dr. Francois and the P:F ratio was 120, meeting criteria for ARDS - proning. A drip of versed and nimbex were started.

## 2022-08-30 NOTE — PLAN OF CARE
Plan of care explained to patient, patient unable to express understanding on sedation medication  Problem: Adult Inpatient Plan of Care  Goal: Plan of Care Review  Outcome: Ongoing, Progressing  Goal: Absence of Hospital-Acquired Illness or Injury  Outcome: Ongoing, Progressing  Goal: Optimal Comfort and Wellbeing  Outcome: Ongoing, Progressing  Goal: Readiness for Transition of Care  Outcome: Ongoing, Progressing     Problem: Communication Impairment (Mechanical Ventilation, Invasive)  Goal: Effective Communication  Outcome: Ongoing, Progressing     Problem: Device-Related Complication Risk (Mechanical Ventilation, Invasive)  Goal: Optimal Device Function  Outcome: Ongoing, Progressing     Problem: Inability to Wean (Mechanical Ventilation, Invasive)  Goal: Mechanical Ventilation Liberation  Outcome: Ongoing, Progressing     Problem: Nutrition Impairment (Mechanical Ventilation, Invasive)  Goal: Optimal Nutrition Delivery  Outcome: Ongoing, Progressing

## 2022-08-30 NOTE — PROGRESS NOTES
Pulmonary/Critical Care      Patient seen and examined by me. I agree with the trainee's separate note except as modified.     Major overnight events: None reported      Vitals: Afebrile, HR 70-90s, MAP 70-90s, 92% on 60% and 10 PEEP this AM     Significant lab findings: WBC 14.5 to 16.5. bicarb 31. Procal 0.66. JOVANA negative. F/U blood cx NGTD     8/29 CXR reviewed by me: ETT in place. Interval increase in right lower lung consolidation, with relative improvement in left sided consolidation     Key exam findings: on the vent, sedated and unresponsive expect to vigorous stimuli    Ventilator:               Intubation day #: 5              Settings: AC/26/520/60/10              Peak/plateau:              P/F: 106 by S/F   Spontaneous awaking trial (SAT) safety screen    Does the patient have any of the following contraindications to an SAT?    [] Active seizures  [] RASS > +1  [] On paralytics  [] Elevated intracranial pressure  [x] Unstable respiratory status  [] Unstable cardiovascular status  [] Other _____________________    [] No contraindications to SAT    SBT: not ready due to high sedation needs and FiO2  RASS: -4  Sedation: fentanyl, ketamine, propofol, also getting versed pushes   Vasopressors: none  Other drips: none   Lines and invasive devices: PICC, salomon, ETT  Fluid Balance: 24 hr: -175cc  Overall: +3L  Creatinine: 0.7  Antibiotics/Day #: cefepime d5  Bowels: No BM since admission   Nutrition: TFs  Glucose management: BS low 100s  Prophylaxis:               DVT: enoxaparin              GI: H2 blocker  PT/OT: n/a   GOC/Family discussion: full code       Impression: clinically appears to have pneumonia, although the bacteria obtained from his blood cx's are unusual. No further signs of DAH. He reportedly was healthy and cutting grass before he became shortness of breath, so an inflammatory condition (SLE, myositis, vasculitis) seems clinically less likely    Plan:  -f/u ANCA, check myositis panel.   -f/u  hepatitis panel   -increase diuresis to q6  -tidal volume dropped to 500cc to be more lung protective   -ID following for abx advice, currently on cefepime   -add seroquel (outpatient med, may decrease other sedation needs) and wellbutrin. Will try to avoid versed. Goal RASS is -2 unless proning (see below)  -meets criteria for severe ARDS. Has not improved significantly with diuresis. Check ABG and if P/F<150, would start to use prone positioning as a treatment for ARDS. Would likely need paralysis to facilitate this.       Critical care time (30min) spent personally by me on the following activities: development of treatment plan with patient or surrogate and bedside caregivers, discussions with consultants, evaluation of patient's response to treatment, examination of patient, ordering and performing treatments and interventions, ordering and review of laboratory studies, ordering and review of radiographic studies, pulse oximetry, re-evaluation of patient's condition. This critical care time did not overlap with that of any other provider or involve time for any procedures.

## 2022-08-30 NOTE — PROCEDURES
Arterial Line Insertion Procedure Note    Procedure: Insertion of Arterial Catheter    Indications:  need for frequent blood draws, arterial blood gas monitoring    Procedure Details   Informed consent was obtained for the procedure, including sedation.  Risks of lung perforation, hemorrhage, arrhythmia, and adverse drug reaction were discussed.     Sterile technique was used including antiseptics, cap, gloves, hand hygiene, mask, and sterile towels .    Under sterile conditions the skin above the on the right radial artery was prepped with chlorhexadine and covered with a sterile drape. Local anesthesia was applied to the skin and subcutaneous tissues. A 20-gauge needle was used to cannulate the artery and a guide wire was then passed easily through the needle and the catheter was slid over. The needle and guidewire were withdrawn and the catheter was sutured into place.      Ultrasound/Sonosite was used during the procedure.    Findings:  There were no changes to vital signs. Patient did tolerate procedure well.    Sujata Francois MD  Pulm/CC Fellow  PGY-VI

## 2022-08-30 NOTE — PROGRESS NOTES
"[facility], [place], operated by [facility] - Intensive Care [facility] Medicine  Progress Note    Patient Name: Leighton Carroll  MRN: [id number]  Patient Class: IP- Inpatient   Admission Date: 8/26/2022  Length of Stay: 4 days  Attending Physician: GAVIOTA Sanon MD  Primary Care Provider: Primary Doctor No        Subjective:     Principal Problem:Acute respiratory failure with hypoxia and hypercarbia        HPI:  Patient's HPI is adapted from notes of Dr. Schuler, Dr. Morin and Dr. Hall ([facility]).   Patient arrived in [facility], [place], operated by [facility] ICU intubated, pt's girlfriend's number not on file, unable to verify events PTA.     Leighton Carroll is a 42 year old man with a PMHx of depression, hx of drug overdose (7/11/2022, buproprion and gabapentin), polysubstance abuse (meth, heroin) and nicotine dependence.    He was presented to [facility] on 8/25/2022 via EMS with shortness of breath and dry cough. He was reportedly found down by his girlfriend at home yesterday evening (8/25/2022) and was given multiple rounds of chest compressions. He woke up and began having shortness of breath and called EMS, which found him saturating at 66% on room air, which increased to 90s with nonrebreather mask. Patient denied subjective fever, chills, sick contacts, chest pain, palpitations, abdominal pain.     At [facility], patient had a fever to 101, with leukocytosis (20) and lactic acidosis (3.0) with a normal procal. CTA chest showed patchy perihilar opacities bilaterally most pronounced in right lower lobe. Covid, Group A strep, respiratory influenza panel -ve. D-dimer was elevated (3.68), but CTPA negative for PE. Tox postive for THC only. CXR was -ve for pneumothorax. Patient was started on IV Cefepime and IV Vancomycin.     Patient was initially on BiPAP but became agitated and was intubated. Patient reportedly had "red spit". Patient was difficult to sedate requiring propofol and precedex and multiple doses of versed and ketamine, thus became hypotensive " after intubation and was started on levophed.     Patient is transferred to [facility] ICU, [facility] medicine, for management of acute respiratory failure with hypoxia and hypercapnia.         Overview/Hospital Course:  Patient arrived to ICU as a transfer intubated and sedated. CXR demonstrating bilateral patchy infiltrates and blood cultures have been positive for Acinetobacter sp, Pseudomonas luteola and now repeat cultures positive for a bacillus species (possible contaminant). Repeated cultures remain negative. Patient is on Vanc/Cefepime/azithromycin. PCC, ID consulted. TTE did not demonstrate vegetations, EF 50%.      Interval History: No acute events overnight. Remains on vent, sedated.    Review of Systems   Unable to perform ROS: Intubated   Objective:     Vital Signs (Most Recent):  Temp: 99.2 °F (37.3 °C) (08/30/22 1901)  Pulse: 86 (08/30/22 2018)  Resp: (!) 28 (08/30/22 1925)  BP: (!) 140/66 (08/30/22 1945)  SpO2: (!) 94 % (08/30/22 2018)   Vital Signs (24h Range):  Temp:  [98.9 °F (37.2 °C)-100 °F (37.8 °C)] 99.2 °F (37.3 °C)  Pulse:  [77-92] 86  Resp:  [23-37] 28  SpO2:  [90 %-98 %] 94 %  BP: (112-156)/(50-69) 140/66  Arterial Line BP: (136-183)/() 142/62     Weight: 86.2 kg (190 lb)  Body mass index is 28.06 kg/m².    Intake/Output Summary (Last 24 hours) at 8/30/2022 2046  Last data filed at 8/30/2022 1900  Gross per 24 hour   Intake 3445.09 ml   Output 4975 ml   Net -1529.91 ml      Physical Exam  Vitals and nursing note reviewed.   Constitutional:       General: He is not in acute distress.     Appearance: He is well-developed.   HENT:      Head: Normocephalic and atraumatic.   Eyes:      General:         Right eye: No discharge.         Left eye: No discharge.      Conjunctiva/sclera: Conjunctivae normal.   Cardiovascular:      Rate and Rhythm: Normal rate.      Pulses: Normal pulses.   Pulmonary:      Effort: Pulmonary effort is normal. No respiratory distress.      Comments: Intubated,  mechanical breath sounds.  Abdominal:      Palpations: Abdomen is soft.      Tenderness: There is no abdominal tenderness.   Musculoskeletal:         General: Normal range of motion.      Right lower leg: No edema.      Left lower leg: No edema.   Skin:     General: Skin is warm and dry.   Neurological:      Comments: RASS -3, CAM/ICU N/A     Significant Labs:   CBC:  Recent Labs   Lab 08/28/22  0300 08/29/22  0345 08/29/22  1101 08/30/22  0343 08/30/22  1537 08/30/22 2016   WBC 26.26* 14.58*  --  16.52*  --   --    HGB 11.7* 10.8*  --  11.3*  --   --    HCT 37.2* 33.7*   < > 34.8* 36 27*    254  --  258  --   --    GRAN 91.5*  24.0* 78.2*  11.4*  --  76.8*  12.7*  --   --    LYMPH 2.8*  0.7* 5.7*  0.8*  --  5.1*  0.9*  --   --    MONO 4.2  1.1* 7.7  1.1*  --  10.5  1.7*  --   --    EOS 0.0 1.1*  --  1.1*  --   --    BASO 0.08 0.04  --  0.07  --   --     < > = values in this interval not displayed.   CMP:  Recent Labs   Lab 08/28/22  0300 08/29/22  0345 08/30/22  0343    136 138   K 4.6 3.9 4.1    99 99   CO2 28 30* 31*   BUN 14 16 14   CREATININE 0.7 0.7 0.7   * 134* 118*   CALCIUM 8.8 8.6* 8.6*   ALKPHOS 49* 51* 94   AST 41* 27 31   ALT 38 28 27   BILITOT 0.6 0.5 0.6   PROT 5.9* 5.7* 6.2   ALBUMIN 2.2* 2.0* 2.0*   ANIONGAP 10 7* 8      Significant Imaging:   No new imaging this morning.      Assessment/Plan:      * Acute respiratory failure with hypoxia and hypercarbia  Bilateral PNA, ARDS, septic shock, bacteremia, h/o drug overdose, polysubstance abuse  - Hypoxic to 60s on arrival and failed BiPAP 2/2 agitation and intubated at OSH. Tox positive for THC. D-dimer elevated but CTA negative for PE. COVID, RSV negative.  - Continuing to require significant sedation on ventilator. Continue fentanyl gtt, ketamine gtt, propofol gtt.  - Continue cefepime 2g IV q8hr. Blood cultures showed acinetobacter, pseudomonas luteola.  - Discussed with pulm/crit; rising concern for ARDS. Check  ABG, may require proning dependent on results.  - Appreciate pulm/crit, ID assistance.    Bilateral pneumonia  - As above.    Hematuria  - Gross hematuria; not present on repeat UA.    Gram-negative bacteremia  - As above.    Septic shock  - As above.    History of drug overdose  - As above.    Depression  - Hold antidepressants while intubated/sedated.  - Resume home dose buproprion 150mg PO daily, divalproex ER 500mg 1g PO daily, quetiapine 200mg PO daily, risperiodone 2mg PO daily when appropriate.    VTE Risk Mitigation (From admission, onward)           Ordered     enoxaparin injection 40 mg  Daily         08/26/22 1422     IP VTE HIGH RISK PATIENT  Once         08/26/22 1422     Place sequential compression device  Until discontinued         08/26/22 1422     Place MARGRET hose  Until discontinued         08/26/22 1422                    Discharge Planning   CHETAN:      Code Status: Full Code   Is the patient medically ready for discharge?:     Reason for patient still in hospital (select all that apply): Treatment           Critical due to resp failure.    Critical care time spent on the evaluation and treatment of severe organ dysfunction, review of pertinent labs and imaging studies, discussions with consulting providers and discussions with patient/family: 35 minutes.    SLICK Sanon MD  Department of Hospital Medicine   [facility] Intensive Care ([place])

## 2022-08-30 NOTE — PROGRESS NOTES
"[facility] Pulmonary/Critical Care Fellow Progress Note:    Brief Hx: 43 yo M w/ PMHx of drug use (heroin, methamphetamine, others) admitted  to [facility] for hypoxemic and hypercapnic respiratory failure after being found down, presumably 2/2 drug overdose and in shock. He received CPR from his GF and did wake up when EMS found him and he presented to the ED with leukocytosis, lactic acidosis, febrile. CTA showed diffuse dense bilateral infiltrates. UDS+ for THC. Blood cultures grew acinetobacter pittii & pesudomonas luteola and an unspeciated gram(+) los. CXR shows evolution of R sided PNA.     Subjective:  Patient intubated and sedated    Objective:  Last 24 Hour Vital Signs:  BP  Min: 110/58  Max: 156/65  Temp  Av.6 °F (37.6 °C)  Min: 98.9 °F (37.2 °C)  Max: 100 °F (37.8 °C)  Pulse  Av.6  Min: 77  Max: 92  Resp  Av.8  Min: 18  Max: 37  SpO2  Av.4 %  Min: 91 %  Max: 98 %  Body mass index is 28.06 kg/m².  I/O last 3 completed shifts:  In: 5581 [I.V.:2259.5; NG/GT:2080; IV Piggyback:1241.6]  Out: 4355 [Urine:4355]      Physical Exam:  General: Sedated  HENT:  NCAT; anicteric sclera; (+)ETT in place  Cardio:  Regular rate and rhythm with normal S1 and S2; no murmurs or rubs  Resp:  CTAB; respirations unlabored; no wheezes, crackles or rhonchi  Abdom:  Soft, non-distended  Extrem:  WWP with no clubbing, cyanosis or edema, small cuts on hands and legs that are scabbed over  Pulses:  2+ and symmetric distally  Neuro:  AAOx0; purposeless movements of all 4 extremities    Assessment & Plan:     42M with history of drug use (heroin, methamphetamine, "whatever he can get his hands on") presents with hypoxemic and hypercapnic respiratory failure presumably secondary to drug overdose requiring intubation found to have bacteremia and diffuse lung infiltrates.      Acute hypoxic and hypercapnic respiratory failure  In setting of presumed drug overdose (fentanyl?)  With diffuse bilateral " pulmonary infiltrates on CT chest, +fevers and leukocytosis  Sputum NGTD, RIP negative  F/u JOVANA and ANCA profile - no new hemoptysis needed  Blood cultures positive - presume that lung infiltrates are related to this (see below)  Blood cultures with pseudomonas luteola and acinetobacter pitii -- patient with negative TTE for valve vegetations   Follow up ID recommendations  Continue with current vent settings, wean FiO2 as able to maintain O2 saturations >90% (avoid over oxygenation)  Plan for SAT daily cautiously as patient requires a lot of sedation - propofol, fentanyl, versed pushes and now ketamine gtt in addition, added seroquel 200mg QHS & bupropion 150mg BID (was on 300mg XR outpatient) to help reduce need for versed  Lasix 40mg IV QID ordered to keep net even fluid balance  Continue to wean sedation as tolerated to target RASS 0 to -2  Multifocal PNA  Aspiration vs. Inhalation injury/pneumonitis vs. Septic emboli vs. Vasculitis (less likely)  Lab and culture workup as above  No new evidence of hemoptysis  Continue cefepime  Urine legionella and strep Ag still pending  HIV negative, viral PCR negative, Hep panel ordered and pending     Code: FULL     DVT: Lovenox  GI: Famotidine  Feeds: TF running  Sedation: propofol gtt, fentanyl gtt, ketamine gtt, versed push    Sujata Francois MD  Pulm/CC Fellow

## 2022-08-30 NOTE — CARE UPDATE
Patient proned @1800hrs. Vent settings lowered to Vt 480, FiO2 50%, PEEP 8. Blood gas for 2000hrs, plan to continue to wean with goals SpO2 92% or higher.     Plan to prone in AM around 0800hrs.     Sujata Francois MD  Pulm/CC Fellow

## 2022-08-31 LAB
ALBUMIN SERPL BCP-MCNC: 1.9 G/DL (ref 3.5–5.2)
ALLENS TEST: ABNORMAL
ALP SERPL-CCNC: 120 U/L (ref 55–135)
ALT SERPL W/O P-5'-P-CCNC: 24 U/L (ref 10–44)
ANCA AB TITR SER IF: NORMAL TITER
ANION GAP SERPL CALC-SCNC: 10 MMOL/L (ref 8–16)
AST SERPL-CCNC: 32 U/L (ref 10–40)
BACTERIA BLD CULT: NORMAL
BASOPHILS # BLD AUTO: 0.06 K/UL (ref 0–0.2)
BASOPHILS NFR BLD: 0.4 % (ref 0–1.9)
BILIRUB SERPL-MCNC: 0.3 MG/DL (ref 0.1–1)
BUN SERPL-MCNC: 16 MG/DL (ref 6–20)
CALCIUM SERPL-MCNC: 8.6 MG/DL (ref 8.7–10.5)
CHLORIDE SERPL-SCNC: 92 MMOL/L (ref 95–110)
CO2 SERPL-SCNC: 35 MMOL/L (ref 23–29)
CREAT SERPL-MCNC: 0.6 MG/DL (ref 0.5–1.4)
DELSYS: ABNORMAL
DIFFERENTIAL METHOD: ABNORMAL
EOSINOPHIL # BLD AUTO: 1 K/UL (ref 0–0.5)
EOSINOPHIL NFR BLD: 6.7 % (ref 0–8)
ERYTHROCYTE [DISTWIDTH] IN BLOOD BY AUTOMATED COUNT: 14.3 % (ref 11.5–14.5)
ERYTHROCYTE [SEDIMENTATION RATE] IN BLOOD BY WESTERGREN METHOD: 26 MM/H
EST. GFR  (NO RACE VARIABLE): >60 ML/MIN/1.73 M^2
FIO2: 55
GLUCOSE SERPL-MCNC: 118 MG/DL (ref 70–110)
HCO3 UR-SCNC: 47 MMOL/L (ref 24–28)
HCT VFR BLD AUTO: 33.9 % (ref 40–54)
HCT VFR BLD CALC: 37 %PCV (ref 36–54)
HGB BLD-MCNC: 11.1 G/DL (ref 14–18)
HGB BLD-MCNC: 13 G/DL
IMM GRANULOCYTES # BLD AUTO: 0.31 K/UL (ref 0–0.04)
IMM GRANULOCYTES NFR BLD AUTO: 2.1 % (ref 0–0.5)
L PNEUMO AG UR QL IA: NEGATIVE
LYMPHOCYTES # BLD AUTO: 1 K/UL (ref 1–4.8)
LYMPHOCYTES NFR BLD: 6.5 % (ref 18–48)
MCH RBC QN AUTO: 30.6 PG (ref 27–31)
MCHC RBC AUTO-ENTMCNC: 32.7 G/DL (ref 32–36)
MCV RBC AUTO: 93 FL (ref 82–98)
MIN VOL: 12.7
MODE: ABNORMAL
MONOCYTES # BLD AUTO: 1.7 K/UL (ref 0.3–1)
MONOCYTES NFR BLD: 11.2 % (ref 4–15)
NEUTROPHILS # BLD AUTO: 10.9 K/UL (ref 1.8–7.7)
NEUTROPHILS NFR BLD: 73.1 % (ref 38–73)
NRBC BLD-RTO: 0 /100 WBC
P-ANCA TITR SER IF: NORMAL TITER
PCO2 BLDA: 80.4 MMHG (ref 35–45)
PEEP: 12
PH SMN: 7.38 [PH] (ref 7.35–7.45)
PIP: 29
PLATELET # BLD AUTO: 264 K/UL (ref 150–450)
PMV BLD AUTO: 9.4 FL (ref 9.2–12.9)
PO2 BLDA: 69 MMHG (ref 80–100)
POC BE: 22 MMOL/L
POC IONIZED CALCIUM: 1.18 MMOL/L (ref 1.06–1.42)
POC SATURATED O2: 92 % (ref 95–100)
POC TCO2: 49 MMOL/L (ref 23–27)
POTASSIUM BLD-SCNC: 4.2 MMOL/L (ref 3.5–5.1)
POTASSIUM SERPL-SCNC: 4.1 MMOL/L (ref 3.5–5.1)
PROT SERPL-MCNC: 7 G/DL (ref 6–8.4)
RBC # BLD AUTO: 3.63 M/UL (ref 4.6–6.2)
SAMPLE: ABNORMAL
SITE: ABNORMAL
SODIUM BLD-SCNC: 139 MMOL/L (ref 136–145)
SODIUM SERPL-SCNC: 137 MMOL/L (ref 136–145)
SP02: 91
VT: 480
WBC # BLD AUTO: 14.85 K/UL (ref 3.9–12.7)

## 2022-08-31 PROCEDURE — 25000003 PHARM REV CODE 250

## 2022-08-31 PROCEDURE — 25000003 PHARM REV CODE 250: Performed by: STUDENT IN AN ORGANIZED HEALTH CARE EDUCATION/TRAINING PROGRAM

## 2022-08-31 PROCEDURE — 80053 COMPREHEN METABOLIC PANEL: CPT | Performed by: INTERNAL MEDICINE

## 2022-08-31 PROCEDURE — 27100171 HC OXYGEN HIGH FLOW UP TO 24 HOURS

## 2022-08-31 PROCEDURE — 63600175 PHARM REV CODE 636 W HCPCS

## 2022-08-31 PROCEDURE — 99900035 HC TECH TIME PER 15 MIN (STAT)

## 2022-08-31 PROCEDURE — 83516 IMMUNOASSAY NONANTIBODY: CPT | Mod: 59 | Performed by: STUDENT IN AN ORGANIZED HEALTH CARE EDUCATION/TRAINING PROGRAM

## 2022-08-31 PROCEDURE — 25000242 PHARM REV CODE 250 ALT 637 W/ HCPCS: Performed by: STUDENT IN AN ORGANIZED HEALTH CARE EDUCATION/TRAINING PROGRAM

## 2022-08-31 PROCEDURE — 25000003 PHARM REV CODE 250: Performed by: INTERNAL MEDICINE

## 2022-08-31 PROCEDURE — 94761 N-INVAS EAR/PLS OXIMETRY MLT: CPT

## 2022-08-31 PROCEDURE — 27000221 HC OXYGEN, UP TO 24 HOURS

## 2022-08-31 PROCEDURE — 85025 COMPLETE CBC W/AUTO DIFF WBC: CPT | Performed by: INTERNAL MEDICINE

## 2022-08-31 PROCEDURE — 94003 VENT MGMT INPAT SUBQ DAY: CPT

## 2022-08-31 PROCEDURE — 99291 CRITICAL CARE FIRST HOUR: CPT | Mod: ,,, | Performed by: INTERNAL MEDICINE

## 2022-08-31 PROCEDURE — 63600175 PHARM REV CODE 636 W HCPCS: Performed by: STUDENT IN AN ORGANIZED HEALTH CARE EDUCATION/TRAINING PROGRAM

## 2022-08-31 PROCEDURE — S4991 NICOTINE PATCH NONLEGEND: HCPCS

## 2022-08-31 PROCEDURE — 20000000 HC ICU ROOM

## 2022-08-31 PROCEDURE — 99291 PR CRITICAL CARE, E/M 30-74 MINUTES: ICD-10-PCS | Mod: ,,, | Performed by: INTERNAL MEDICINE

## 2022-08-31 PROCEDURE — 37799 UNLISTED PX VASCULAR SURGERY: CPT

## 2022-08-31 PROCEDURE — 99900026 HC AIRWAY MAINTENANCE (STAT)

## 2022-08-31 PROCEDURE — 94640 AIRWAY INHALATION TREATMENT: CPT

## 2022-08-31 RX ADMIN — PROPOFOL 50 MCG/KG/MIN: 10 INJECTION, EMULSION INTRAVENOUS at 07:08

## 2022-08-31 RX ADMIN — FUROSEMIDE 40 MG: 10 INJECTION, SOLUTION INTRAMUSCULAR; INTRAVENOUS at 11:08

## 2022-08-31 RX ADMIN — FUROSEMIDE 40 MG: 10 INJECTION, SOLUTION INTRAMUSCULAR; INTRAVENOUS at 06:08

## 2022-08-31 RX ADMIN — KETAMINE HYDROCHLORIDE 20 MCG/KG/MIN: 100 INJECTION INTRAMUSCULAR; INTRAVENOUS at 06:08

## 2022-08-31 RX ADMIN — SENNOSIDES AND DOCUSATE SODIUM 1 TABLET: 50; 8.6 TABLET ORAL at 08:08

## 2022-08-31 RX ADMIN — DEXTROSE 1 MCG/KG/MIN: 50 INJECTION, SOLUTION INTRAVENOUS at 08:08

## 2022-08-31 RX ADMIN — KETAMINE HYDROCHLORIDE 20 MCG/KG/MIN: 100 INJECTION INTRAMUSCULAR; INTRAVENOUS at 02:08

## 2022-08-31 RX ADMIN — Medication 4 ML: at 09:08

## 2022-08-31 RX ADMIN — FUROSEMIDE 40 MG: 10 INJECTION, SOLUTION INTRAMUSCULAR; INTRAVENOUS at 05:08

## 2022-08-31 RX ADMIN — MINERAL OIL AND WHITE PETROLATUM: 30; 940 OINTMENT OPHTHALMIC at 09:08

## 2022-08-31 RX ADMIN — MINERAL OIL AND WHITE PETROLATUM: 30; 940 OINTMENT OPHTHALMIC at 02:08

## 2022-08-31 RX ADMIN — CEFEPIME HYDROCHLORIDE 2 G: 2 INJECTION, SOLUTION INTRAVENOUS at 11:08

## 2022-08-31 RX ADMIN — KETAMINE HYDROCHLORIDE 20 MCG/KG/MIN: 100 INJECTION INTRAMUSCULAR; INTRAVENOUS at 01:08

## 2022-08-31 RX ADMIN — MUPIROCIN: 20 OINTMENT TOPICAL at 09:08

## 2022-08-31 RX ADMIN — FAMOTIDINE 20 MG: 10 INJECTION, SOLUTION INTRAVENOUS at 08:08

## 2022-08-31 RX ADMIN — PROPOFOL 50 MCG/KG/MIN: 10 INJECTION, EMULSION INTRAVENOUS at 06:08

## 2022-08-31 RX ADMIN — PROPOFOL 50 MCG/KG/MIN: 10 INJECTION, EMULSION INTRAVENOUS at 11:08

## 2022-08-31 RX ADMIN — PROPOFOL 50 MCG/KG/MIN: 10 INJECTION, EMULSION INTRAVENOUS at 03:08

## 2022-08-31 RX ADMIN — CEFEPIME HYDROCHLORIDE 2 G: 2 INJECTION, SOLUTION INTRAVENOUS at 08:08

## 2022-08-31 RX ADMIN — Medication 300 MCG/HR: at 11:08

## 2022-08-31 RX ADMIN — MIDAZOLAM 3 MG/HR: 5 INJECTION INTRAMUSCULAR; INTRAVENOUS at 12:08

## 2022-08-31 RX ADMIN — MINERAL OIL AND WHITE PETROLATUM: 30; 940 OINTMENT OPHTHALMIC at 06:08

## 2022-08-31 RX ADMIN — ENOXAPARIN SODIUM 40 MG: 100 INJECTION SUBCUTANEOUS at 05:08

## 2022-08-31 RX ADMIN — Medication 4 ML: at 07:08

## 2022-08-31 RX ADMIN — BUPROPION HYDROCHLORIDE 150 MG: 75 TABLET, FILM COATED ORAL at 08:08

## 2022-08-31 RX ADMIN — CEFEPIME HYDROCHLORIDE 2 G: 2 INJECTION, SOLUTION INTRAVENOUS at 04:08

## 2022-08-31 RX ADMIN — KETAMINE HYDROCHLORIDE 20 MCG/KG/MIN: 100 INJECTION INTRAMUSCULAR; INTRAVENOUS at 08:08

## 2022-08-31 RX ADMIN — PROPOFOL 50 MCG/KG/MIN: 10 INJECTION, EMULSION INTRAVENOUS at 12:08

## 2022-08-31 RX ADMIN — FUROSEMIDE 40 MG: 10 INJECTION, SOLUTION INTRAMUSCULAR; INTRAVENOUS at 12:08

## 2022-08-31 RX ADMIN — PROPOFOL 50 MCG/KG/MIN: 10 INJECTION, EMULSION INTRAVENOUS at 04:08

## 2022-08-31 RX ADMIN — POLYETHYLENE GLYCOL 3350 17 G: 17 POWDER, FOR SOLUTION ORAL at 08:08

## 2022-08-31 RX ADMIN — Medication 300 MCG/HR: at 06:08

## 2022-08-31 RX ADMIN — Medication 300 MCG/HR: at 03:08

## 2022-08-31 RX ADMIN — NICOTINE 1 PATCH: 14 PATCH TRANSDERMAL at 09:08

## 2022-08-31 RX ADMIN — KETAMINE HYDROCHLORIDE 20 MCG/KG/MIN: 100 INJECTION INTRAMUSCULAR; INTRAVENOUS at 11:08

## 2022-08-31 NOTE — PLAN OF CARE
Nutrition Plan of Care:    Recommendations     Recommendation/Intervention:   1. TF recommendations:  Isosource 1.5    Start with rate of 20ml/hr.  Increase by 10mls q 8 hours until goal rate of 50ml/hr is reached (as tolerated)    2. Monitor labs.   3. Monitor weights    4. Collaboration with medical providers.     Goals: Patient to receive adequate nutrition prior to discharge.     Nutrition Goal Status: progressing towards goal     Communication of RD Recs: other (comment) (POC, Consults, Sticky notes)    Marianna Parmar MS, RDN, LDN

## 2022-08-31 NOTE — SUBJECTIVE & OBJECTIVE
Interval History: No acute events overnight. Supinating at 0800.    Review of Systems   Unable to perform ROS: Intubated   Objective:     Vital Signs (Most Recent):  Temp: 99.3 °F (37.4 °C) (08/31/22 1600)  Pulse: 102 (08/31/22 1600)  Resp: (!) 28 (08/31/22 1600)  BP: (!) 148/71 (08/31/22 1600)  SpO2: (!) 91 % (08/31/22 1600)   Vital Signs (24h Range):  Temp:  [97.6 °F (36.4 °C)-100.2 °F (37.9 °C)] 99.3 °F (37.4 °C)  Pulse:  [] 102  Resp:  [26-29] 28  SpO2:  [89 %-96 %] 91 %  BP: (112-148)/(50-74) 148/71  Arterial Line BP: (119-183)/() 152/71     Weight: 86.2 kg (190 lb)  Body mass index is 28.06 kg/m².    Intake/Output Summary (Last 24 hours) at 8/31/2022 1650  Last data filed at 8/31/2022 1505  Gross per 24 hour   Intake 2736.14 ml   Output 4470 ml   Net -1733.86 ml        Physical Exam  Vitals and nursing note reviewed.   Constitutional:       General: He is not in acute distress.     Appearance: He is well-developed.   HENT:      Head: Normocephalic and atraumatic.   Eyes:      General:         Right eye: No discharge.         Left eye: No discharge.      Conjunctiva/sclera: Conjunctivae normal.   Cardiovascular:      Rate and Rhythm: Normal rate.      Pulses: Normal pulses.   Pulmonary:      Effort: Pulmonary effort is normal. No respiratory distress.      Comments: Intubated, mechanical breath sounds.  Abdominal:      Palpations: Abdomen is soft.      Tenderness: There is no abdominal tenderness.   Musculoskeletal:         General: Normal range of motion.      Right lower leg: No edema.      Left lower leg: No edema.   Skin:     General: Skin is warm and dry.   Neurological:      Comments: RASS -5, CAM/ICU N/A.     Significant Labs:   CBC:  Recent Labs   Lab 08/29/22  0345 08/29/22  1101 08/30/22  0343 08/30/22  1537 08/30/22 2016 08/31/22  0509 08/31/22  1153   WBC 14.58*  --  16.52*  --   --  14.85*  --    HGB 10.8*  --  11.3*  --   --  11.1*  --    HCT 33.7*   < > 34.8*   < > 27* 33.9* 37   PLT  254  --  258  --   --  264  --    GRAN 78.2*  11.4*  --  76.8*  12.7*  --   --  73.1*  10.9*  --    LYMPH 5.7*  0.8*  --  5.1*  0.9*  --   --  6.5*  1.0  --    MONO 7.7  1.1*  --  10.5  1.7*  --   --  11.2  1.7*  --    EOS 1.1*  --  1.1*  --   --  1.0*  --    BASO 0.04  --  0.07  --   --  0.06  --     < > = values in this interval not displayed.     CMP:  Recent Labs   Lab 08/29/22  0345 08/30/22  0343 08/30/22  2310 08/31/22  0509    138 138 137   K 3.9 4.1 4.4 4.1   CL 99 99 94* 92*   CO2 30* 31* 35* 35*   BUN 16 14 17 16   CREATININE 0.7 0.7 0.6 0.6   * 118* 112* 118*   CALCIUM 8.6* 8.6* 8.3* 8.6*   MG  --   --  1.9  --    ALKPHOS 51* 94  --  120   AST 27 31  --  32   ALT 28 27  --  24   BILITOT 0.5 0.6  --  0.3   PROT 5.7* 6.2  --  7.0   ALBUMIN 2.0* 2.0*  --  1.9*   ANIONGAP 7* 8 9 10        Significant Imaging:   No new imaging this morning.

## 2022-08-31 NOTE — ASSESSMENT & PLAN NOTE
- Hold antidepressants while intubated/sedated.  - Resume home dose buproprion 150mg PO daily, divalproex ER 500mg 1g PO daily, quetiapine 200mg PO daily, risperiodone 2mg PO daily when appropriate.

## 2022-08-31 NOTE — CARE UPDATE
Pt recieved intubated on  ventilator with the following settings;. VC/AC/ 480 TV/ 26 RR/ +8 Peep/ 50% FI02.  Pt due to Supine for 8:00 this morning.  Alarms are set and functioning, Ambu bag at bedside, Pt without distress RT will continue to monitor and wean as tolerated.     0917  RT called to bedside to supine Pt.  Pt tolerated procedure and is stable RT will continue to monitor.    1630  RT called to bedside to prone Pt.  Pt tolerated procedure well and is stable.  RT will continue to monitor

## 2022-08-31 NOTE — PROGRESS NOTES
"[facility] - Intensive Care [facility] Medicine  Progress Note    Patient Name: Leighton Carroll  MRN: [id number]  Patient Class: IP- Inpatient   Admission Date: 8/26/2022  Length of Stay: 5 days  Attending Physician: GAVIOTA Sanon MD  Primary Care Provider: Primary Doctor No        Subjective:     Principal Problem:Acute respiratory failure with hypoxia and hypercarbia        HPI:  Patient's HPI is adapted from notes of Dr. Schuler, Dr. Morin and Dr. Hall ([facility]).   Patient arrived in [facility] ICU intubated, pt's girlfriend's number not on file, unable to verify events PTA.     Leighton Carroll is a 42 year old man with a PMHx of depression, hx of drug overdose (7/11/2022, buproprion and gabapentin), polysubstance abuse (meth, heroin) and nicotine dependence.    He was presented to [facility] on 8/25/2022 via EMS with shortness of breath and dry cough. He was reportedly found down by his girlfriend at home yesterday evening (8/25/2022) and was given multiple rounds of chest compressions. He woke up and began having shortness of breath and called EMS, which found him saturating at 66% on room air, which increased to 90s with nonrebreather mask. Patient denied subjective fever, chills, sick contacts, chest pain, palpitations, abdominal pain.     At [facility], patient had a fever to 101, with leukocytosis (20) and lactic acidosis (3.0) with a normal procal. CTA chest showed patchy perihilar opacities bilaterally most pronounced in right lower lobe. Covid, Group A strep, respiratory influenza panel -ve. D-dimer was elevated (3.68), but CTPA negative for PE. Tox postive for THC only. CXR was -ve for pneumothorax. Patient was started on IV Cefepime and IV Vancomycin.     Patient was initially on BiPAP but became agitated and was intubated. Patient reportedly had "red spit". Patient was difficult to sedate requiring propofol and precedex and multiple doses of versed and ketamine, thus became hypotensive " after intubation and was started on levophed.     Patient is transferred to [facility], [place], operated by [facility] ICU, [facility] medicine, for management of acute respiratory failure with hypoxia and hypercapnia.             Overview/Hospital Course:  Patient arrived to ICU as a transfer intubated and sedated. CXR demonstrating bilateral patchy infiltrates and blood cultures have been positive for Acinetobacter sp, Pseudomonas luteola and now repeat cultures positive for a bacillus species (possible contaminant). Repeated cultures remain negative. Patient is on Vanc/Cefepime/azithromycin. PCC, ID consulted. TTE did not demonstrate vegetations, EF 50%.      Interval History: No acute events overnight. Supinating at 0800.    Review of Systems   Unable to perform ROS: Intubated   Objective:     Vital Signs (Most Recent):  Temp: 99.3 °F (37.4 °C) (08/31/22 1600)  Pulse: 102 (08/31/22 1600)  Resp: (!) 28 (08/31/22 1600)  BP: (!) 148/71 (08/31/22 1600)  SpO2: (!) 91 % (08/31/22 1600)   Vital Signs (24h Range):  Temp:  [97.6 °F (36.4 °C)-100.2 °F (37.9 °C)] 99.3 °F (37.4 °C)  Pulse:  [] 102  Resp:  [26-29] 28  SpO2:  [89 %-96 %] 91 %  BP: (112-148)/(50-74) 148/71  Arterial Line BP: (119-183)/() 152/71     Weight: 86.2 kg (190 lb)  Body mass index is 28.06 kg/m².    Intake/Output Summary (Last 24 hours) at 8/31/2022 1650  Last data filed at 8/31/2022 1505  Gross per 24 hour   Intake 2736.14 ml   Output 4470 ml   Net -1733.86 ml        Physical Exam  Vitals and nursing note reviewed.   Constitutional:       General: He is not in acute distress.     Appearance: He is well-developed.   HENT:      Head: Normocephalic and atraumatic.   Eyes:      General:         Right eye: No discharge.         Left eye: No discharge.      Conjunctiva/sclera: Conjunctivae normal.   Cardiovascular:      Rate and Rhythm: Normal rate.      Pulses: Normal pulses.   Pulmonary:      Effort: Pulmonary effort is normal. No respiratory distress.      Comments: Intubated,  mechanical breath sounds.  Abdominal:      Palpations: Abdomen is soft.      Tenderness: There is no abdominal tenderness.   Musculoskeletal:         General: Normal range of motion.      Right lower leg: No edema.      Left lower leg: No edema.   Skin:     General: Skin is warm and dry.   Neurological:      Comments: RASS -5, CAM/ICU N/A.     Significant Labs:   CBC:  Recent Labs   Lab 08/29/22  0345 08/29/22  1101 08/30/22  0343 08/30/22  1537 08/30/22  2016 08/31/22  0509 08/31/22  1153   WBC 14.58*  --  16.52*  --   --  14.85*  --    HGB 10.8*  --  11.3*  --   --  11.1*  --    HCT 33.7*   < > 34.8*   < > 27* 33.9* 37     --  258  --   --  264  --    GRAN 78.2*  11.4*  --  76.8*  12.7*  --   --  73.1*  10.9*  --    LYMPH 5.7*  0.8*  --  5.1*  0.9*  --   --  6.5*  1.0  --    MONO 7.7  1.1*  --  10.5  1.7*  --   --  11.2  1.7*  --    EOS 1.1*  --  1.1*  --   --  1.0*  --    BASO 0.04  --  0.07  --   --  0.06  --     < > = values in this interval not displayed.     CMP:  Recent Labs   Lab 08/29/22  0345 08/30/22  0343 08/30/22  2310 08/31/22  0509    138 138 137   K 3.9 4.1 4.4 4.1   CL 99 99 94* 92*   CO2 30* 31* 35* 35*   BUN 16 14 17 16   CREATININE 0.7 0.7 0.6 0.6   * 118* 112* 118*   CALCIUM 8.6* 8.6* 8.3* 8.6*   MG  --   --  1.9  --    ALKPHOS 51* 94  --  120   AST 27 31  --  32   ALT 28 27  --  24   BILITOT 0.5 0.6  --  0.3   PROT 5.7* 6.2  --  7.0   ALBUMIN 2.0* 2.0*  --  1.9*   ANIONGAP 7* 8 9 10        Significant Imaging:   No new imaging this morning.      Assessment/Plan:      * Acute respiratory failure with hypoxia and hypercarbia  Bilateral PNA, ARDS, septic shock, bacteremia, h/o drug overdose, polysubstance abuse  - Hypoxic to 60s on arrival and failed BiPAP 2/2 agitation and intubated at OSH. Tox positive for THC. D-dimer elevated but CTA negative for PE. COVID, RSV negative.  - Continue fentanyl gtt, ketamine gtt, midazolam gtt, propofol gtt, cisatracurium gtt.  -  Continue cefepime 2g IV q8hr. Blood cultures showed acinetobacter, pseudomonas luteola.  - ARDSnet protocol; proning/supinating.   - Appreciate pulm/crit, ID assistance.    Bilateral pneumonia  - As above.    Hematuria  - Gross hematuria; not present on repeat UA.    Gram-negative bacteremia  - As above.    Septic shock  - As above.    History of drug overdose  - As above.    Depression  - Hold antidepressants while intubated/sedated.  - Resume home dose buproprion 150mg PO daily, divalproex ER 500mg 1g PO daily, quetiapine 200mg PO daily, risperiodone 2mg PO daily when appropriate.    VTE Risk Mitigation (From admission, onward)         Ordered     enoxaparin injection 40 mg  Daily         08/26/22 1422     IP VTE HIGH RISK PATIENT  Once         08/26/22 1422     Place sequential compression device  Until discontinued         08/26/22 1422     Place MARGRET hose  Until discontinued         08/26/22 1422                Discharge Planning   CHETAN:      Code Status: Full Code   Is the patient medically ready for discharge?:     Reason for patient still in hospital (select all that apply): Treatment           Critical due to resp failure, ARDS.    Critical care time spent on the evaluation and treatment of severe organ dysfunction, review of pertinent labs and imaging studies, discussions with consulting providers and discussions with patient/family: 35 minutes.          SLICK Sanon MD  Department of Hospital Medicine   [facility] Intensive Care [facility]

## 2022-08-31 NOTE — ASSESSMENT & PLAN NOTE
Bilateral PNA, ARDS, septic shock, bacteremia, h/o drug overdose, polysubstance abuse  - Hypoxic to 60s on arrival and failed BiPAP 2/2 agitation and intubated at OSH. Tox positive for THC. D-dimer elevated but CTA negative for PE. COVID, RSV negative.  - Continue fentanyl gtt, ketamine gtt, midazolam gtt, propofol gtt, cisatracurium gtt.  - Continue cefepime 2g IV q8hr. Blood cultures showed acinetobacter, pseudomonas luteola.  - ARDSnet protocol; proning/supinating.   - Appreciate pulm/crit, ID assistance.

## 2022-08-31 NOTE — SUBJECTIVE & OBJECTIVE
Interval History: No acute events overnight. Remains on vent, sedated.    Review of Systems   Unable to perform ROS: Intubated   Objective:     Vital Signs (Most Recent):  Temp: 99.2 °F (37.3 °C) (08/30/22 1901)  Pulse: 86 (08/30/22 2018)  Resp: (!) 28 (08/30/22 1925)  BP: (!) 140/66 (08/30/22 1945)  SpO2: (!) 94 % (08/30/22 2018)   Vital Signs (24h Range):  Temp:  [98.9 °F (37.2 °C)-100 °F (37.8 °C)] 99.2 °F (37.3 °C)  Pulse:  [77-92] 86  Resp:  [23-37] 28  SpO2:  [90 %-98 %] 94 %  BP: (112-156)/(50-69) 140/66  Arterial Line BP: (136-183)/() 142/62     Weight: 86.2 kg (190 lb)  Body mass index is 28.06 kg/m².    Intake/Output Summary (Last 24 hours) at 8/30/2022 2046  Last data filed at 8/30/2022 1900  Gross per 24 hour   Intake 3445.09 ml   Output 4975 ml   Net -1529.91 ml      Physical Exam  Vitals and nursing note reviewed.   Constitutional:       General: He is not in acute distress.     Appearance: He is well-developed.   HENT:      Head: Normocephalic and atraumatic.   Eyes:      General:         Right eye: No discharge.         Left eye: No discharge.      Conjunctiva/sclera: Conjunctivae normal.   Cardiovascular:      Rate and Rhythm: Normal rate.      Pulses: Normal pulses.   Pulmonary:      Effort: Pulmonary effort is normal. No respiratory distress.      Comments: Intubated, mechanical breath sounds.  Abdominal:      Palpations: Abdomen is soft.      Tenderness: There is no abdominal tenderness.   Musculoskeletal:         General: Normal range of motion.      Right lower leg: No edema.      Left lower leg: No edema.   Skin:     General: Skin is warm and dry.   Neurological:      Comments: RASS -3, CAM/ICU N/A     Significant Labs:   CBC:  Recent Labs   Lab 08/28/22  0300 08/29/22  0345 08/29/22  1101 08/30/22  0343 08/30/22  1537 08/30/22 2016   WBC 26.26* 14.58*  --  16.52*  --   --    HGB 11.7* 10.8*  --  11.3*  --   --    HCT 37.2* 33.7*   < > 34.8* 36 27*    254  --  258  --   --     GRAN 91.5*  24.0* 78.2*  11.4*  --  76.8*  12.7*  --   --    LYMPH 2.8*  0.7* 5.7*  0.8*  --  5.1*  0.9*  --   --    MONO 4.2  1.1* 7.7  1.1*  --  10.5  1.7*  --   --    EOS 0.0 1.1*  --  1.1*  --   --    BASO 0.08 0.04  --  0.07  --   --     < > = values in this interval not displayed.   CMP:  Recent Labs   Lab 08/28/22  0300 08/29/22  0345 08/30/22  0343    136 138   K 4.6 3.9 4.1    99 99   CO2 28 30* 31*   BUN 14 16 14   CREATININE 0.7 0.7 0.7   * 134* 118*   CALCIUM 8.8 8.6* 8.6*   ALKPHOS 49* 51* 94   AST 41* 27 31   ALT 38 28 27   BILITOT 0.6 0.5 0.6   PROT 5.9* 5.7* 6.2   ALBUMIN 2.2* 2.0* 2.0*   ANIONGAP 10 7* 8      Significant Imaging:   No new imaging this morning.

## 2022-08-31 NOTE — PHYSICIAN QUERY
PT Name: Leighton Carroll  MR #: [id number]     DOCUMENTATION CLARIFICATION      CDS: Jermain Mayorga RN CCDS               Contact information: [email or web address]    This form is a permanent document in the medical record.    Query Date: August 31, 2022    By submitting this query, we are merely seeking further clarification of documentation to reflect the severity of illness of your patient. Please utilize your independent clinical judgment when addressing the question(s) below.     The Medical Record contains the following:   Indicators   Supporting Clinical Findings Location in Medical Record    x Documentation of Sepsis, Septic Shock Septic shock 8/30/2022 Hospital Medicine Progress notes    x Blood Culture blood cultures have been positive for Acinetobacter sp, Pseudomonas luteola and now repeat cultures positive for a bacillus species (possible contaminant).     Blood culture x 1 positive for:  ACINETOBACTER SPECIES, Further identified as Acinetobacter pittii   PSEUDOMONAS LUTEOLA       Susceptibility     Acinetobacter species Pseudomonas luteola    CULTURE, BLOOD CULTURE, BLOOD   Cefepime 4 mcg/mL Sensitive <=2 mcg/mL Sensitive     Blood culture x 1 positive for BACILLUS CEREUS   Organism is a probable contaminant 8/30/2022 Hospital Medicine Progress notes        8/25/2022 Microbiology report                      8/27/2022 Microbiology report    Respiratory Culture      Urine Culture      Other Culture      x Acute/Chronic Illness Bilateral PNA  Gram-negative bacteremia  ARDS 8/30/2022 Hospital Medicine Progress notes    x Medication/Treatment cefepime 2 g IVPB q8h  azithromycin 250 mg PO daily  vancomycin 1 gram IVPB q12h  vancomycin 1.25 g IVPB q8h  vancomycin 2 g IVPB x 1 8/26/2022- Current Medication  8/26-8/29/2022 Medication  8/26-8/27/2022 Medication  8/27-8/29/2022 Medication  8/29/2022 Medication    x Other Bilateral pneumonia  - unclear if infectious or not  - polymicrobial bacteremia is very unusual  and I question the clinical significance of all of the isolates  - Strep pneumo Ag x 2 is pending as is Legionella Ag  - nonetheless, given his history of IDU and intubation in the ICU, treatment is not unreasonable  - agree with cefepime (I do not feel strongly about continuing the azithromycin and vancomycin)     08/26/22 18:09   S. pneumoniae Antigen, Urine NOT DETECTED        08/27/22 10:23   L. pneumo. Ur Antigen Negative    8/29/2022 ID consult                    Lab values      Provider, please further specify the Septic Shock diagnosis:     [   ] Due to Acinetobacter and Pseudomonas    [   ] Due to Bacillus Cereus   [   ] Due to Acinetobacter, Pseudomonas and Bacillus Cereus   [   ] Due to (please specify): __________________________________   [   ] Other specification (please specify): ____________________   [ x  ] Clinically Undetermined         Please document in your progress notes daily for the duration of treatment until resolved, and include in your discharge summary.  Form No. [id number]

## 2022-08-31 NOTE — ASSESSMENT & PLAN NOTE
Bilateral PNA, ARDS, septic shock, bacteremia, h/o drug overdose, polysubstance abuse  - Hypoxic to 60s on arrival and failed BiPAP 2/2 agitation and intubated at OSH. Tox positive for THC. D-dimer elevated but CTA negative for PE. COVID, RSV negative.  - Continuing to require significant sedation on ventilator. Continue fentanyl gtt, ketamine gtt, propofol gtt.  - Continue cefepime 2g IV q8hr. Blood cultures showed acinetobacter, pseudomonas luteola.  - Discussed with pulm/crit; rising concern for ARDS. Check ABG, may require proning dependent on results.  - Appreciate pulm/crit, ID assistance.

## 2022-08-31 NOTE — NURSING
Spoke w Dr. Francois; confirmed will supinate pt at 0800 tomorrow AM. Pt will be proned overnight. Also BMP and Mg ordered to collect now

## 2022-08-31 NOTE — PLAN OF CARE
Intubated, Sedated, Medically paralyzed. Remains proned overnight. Max temp 100.2. B/p monitored. Weight shifting overnight.   Problem: Adult Inpatient Plan of Care  Goal: Plan of Care Review  Outcome: Ongoing, Progressing  Goal: Patient-Specific Goal (Individualized)  Outcome: Ongoing, Progressing  Goal: Absence of Hospital-Acquired Illness or Injury  Outcome: Ongoing, Progressing  Goal: Optimal Comfort and Wellbeing  Outcome: Ongoing, Progressing  Goal: Readiness for Transition of Care  Outcome: Ongoing, Progressing     Problem: Communication Impairment (Mechanical Ventilation, Invasive)  Goal: Effective Communication  Outcome: Ongoing, Progressing     Problem: Device-Related Complication Risk (Mechanical Ventilation, Invasive)  Goal: Optimal Device Function  Outcome: Ongoing, Progressing     Problem: Inability to Wean (Mechanical Ventilation, Invasive)  Goal: Mechanical Ventilation Liberation  Outcome: Ongoing, Progressing     Problem: Nutrition Impairment (Mechanical Ventilation, Invasive)  Goal: Optimal Nutrition Delivery  Outcome: Ongoing, Progressing     Problem: Skin and Tissue Injury (Mechanical Ventilation, Invasive)  Goal: Absence of Device-Related Skin and Tissue Injury  Outcome: Ongoing, Progressing     Problem: Ventilator-Induced Lung Injury (Mechanical Ventilation, Invasive)  Goal: Absence of Ventilator-Induced Lung Injury  Outcome: Ongoing, Progressing     Problem: Communication Impairment (Artificial Airway)  Goal: Effective Communication  Outcome: Ongoing, Progressing     Problem: Device-Related Complication Risk (Artificial Airway)  Goal: Optimal Device Function  Outcome: Ongoing, Progressing     Problem: Skin and Tissue Injury (Artificial Airway)  Goal: Absence of Device-Related Skin or Tissue Injury  Outcome: Ongoing, Progressing     Problem: Noninvasive Ventilation Acute  Goal: Effective Unassisted Ventilation and Oxygenation  Outcome: Ongoing, Progressing     Problem: Infection  Goal: Absence  of Infection Signs and Symptoms  Outcome: Ongoing, Progressing     Problem: Adjustment to Illness (Sepsis/Septic Shock)  Goal: Optimal Coping  Outcome: Ongoing, Progressing     Problem: Bleeding (Sepsis/Septic Shock)  Goal: Absence of Bleeding  Outcome: Ongoing, Progressing     Problem: Glycemic Control Impaired (Sepsis/Septic Shock)  Goal: Blood Glucose Level Within Desired Range  Outcome: Ongoing, Progressing     Problem: Infection Progression (Sepsis/Septic Shock)  Goal: Absence of Infection Signs and Symptoms  Outcome: Ongoing, Progressing     Problem: Nutrition Impaired (Sepsis/Septic Shock)  Goal: Optimal Nutrition Intake  Outcome: Ongoing, Progressing     Problem: Skin Injury Risk Increased  Goal: Skin Health and Integrity  Outcome: Ongoing, Progressing     Problem: Fall Injury Risk  Goal: Absence of Fall and Fall-Related Injury  Outcome: Ongoing, Progressing     Problem: Restraint, Nonbehavioral (Nonviolent)  Goal: Absence of Harm or Injury  Outcome: Ongoing, Progressing     Problem: Fluid Imbalance (Pneumonia)  Goal: Fluid Balance  Outcome: Ongoing, Progressing     Problem: Infection (Pneumonia)  Goal: Resolution of Infection Signs and Symptoms  Outcome: Ongoing, Progressing     Problem: Respiratory Compromise (Pneumonia)  Goal: Effective Oxygenation and Ventilation  Outcome: Ongoing, Progressing     Problem: ARDS (Acute Respiratory Distress Syndrome)  Goal: Effective Oxygenation  Outcome: Ongoing, Progressing

## 2022-08-31 NOTE — PROGRESS NOTES
Pulmonary and Critical Care    I have seen and examined the patient. Relevant studies/notes reviewed.     Major overnight events: none reported. Tolerated prone position with mildly improved saturations. Now supine.     Vitals: Tmax 100.2, HR 70-100s, 90% on 55% and 10 PEEP    Significant lab findings: WBC 16 to 14. Bicarb 35.   8/28 blood cxs are NGTD. HCV reactive     Key exam findings: noted to be diaphoretic. Sedated and paralyzed     Ventilator:    Intubation day #: 6  Settings: AC/26/480/55/10   Peak/plateau: 29/25   P/F: <150 by S/F   Spontaneous awaking trial (SAT) safety screen    Does the patient have any of the following contraindications to an SAT?    [] Active seizures  [] RASS > +1  [] On paralytics  [] Elevated intracranial pressure  [x] Unstable respiratory status  [] Unstable cardiovascular status  [] Other _____________________    [] No contraindications to SAT    SBT: n/a   RASS: -5  Sedation: fentanyl, versed, propofol, ketamine  Vasopressors: none  Other drips: nimbex   Lines and invasive devices: PICC, salomon, ETT  Fluid Balance: 24 hr: -2.3L  Overall: +700cc  Creatinine: 0.6  Antibiotics: cefepime d6  Nutrition: TFs  Glucose management: BS low 100s  Prophylaxis:    DVT: enoxaparin   GI: H2 blocker   PT/OT: n/a  GOC/Family discussion: full code, daily family updates    Impression: most likely clinical scenario is pneumonia with bacteremia and ARDS     Major recommendations:   -cont low tidal volume ventilation   -PEEP increased to 12 with narrowing of driving pressure from 15 to 13, plateau pressure 25  -anticipate that he will be re-proned later this afternoon   -would cont abx to cover GNR bacteremia for 14 days, assuming his f/u cx's stay negative   -f/u myositis panel and ANCA      Critical care time (35min) spent personally by me on the following activities: development of treatment plan with patient or surrogate and bedside caregivers, discussions with consultants, evaluation of patient's  response to treatment, examination of patient, ordering and performing treatments and interventions, ordering and review of laboratory studies, ordering and review of radiographic studies, pulse oximetry, re-evaluation of patient's condition. This critical care time did not overlap with that of any other provider or involve time for any procedures.

## 2022-08-31 NOTE — PLAN OF CARE
Plan of care reviewed with pt and pt's mother. Unable to assess understanding. Pt sedated, intubated and paralyzed. NSR on monitor. No acute distress noted. Pt was turned to supine position. Pt diaphoretic, ice packs applied.  O2 sat frequently decreased to 88%. FiO2 increased to 65%. Pt SpO2 improved to 92%. MD notified. Pt turned to prone position.  Side rails X2, bed in lowest position, call bell within reach. Maintain bed alarm for pt safety.

## 2022-09-01 PROBLEM — Z51.5 ENCOUNTER FOR PALLIATIVE CARE: Status: ACTIVE | Noted: 2022-09-01

## 2022-09-01 LAB
ALBUMIN SERPL BCP-MCNC: 2 G/DL (ref 3.5–5.2)
ALP SERPL-CCNC: 138 U/L (ref 55–135)
ALT SERPL W/O P-5'-P-CCNC: 32 U/L (ref 10–44)
ANION GAP SERPL CALC-SCNC: 9 MMOL/L (ref 8–16)
AST SERPL-CCNC: 48 U/L (ref 10–40)
BASOPHILS # BLD AUTO: 0.06 K/UL (ref 0–0.2)
BASOPHILS NFR BLD: 0.4 % (ref 0–1.9)
BILIRUB SERPL-MCNC: 0.3 MG/DL (ref 0.1–1)
BUN SERPL-MCNC: 22 MG/DL (ref 6–20)
CALCIUM SERPL-MCNC: 9.5 MG/DL (ref 8.7–10.5)
CHLORIDE SERPL-SCNC: 94 MMOL/L (ref 95–110)
CO2 SERPL-SCNC: 41 MMOL/L (ref 23–29)
CREAT SERPL-MCNC: 0.7 MG/DL (ref 0.5–1.4)
DIFFERENTIAL METHOD: ABNORMAL
EOSINOPHIL # BLD AUTO: 0.4 K/UL (ref 0–0.5)
EOSINOPHIL NFR BLD: 2.6 % (ref 0–8)
ERYTHROCYTE [DISTWIDTH] IN BLOOD BY AUTOMATED COUNT: 14.2 % (ref 11.5–14.5)
EST. GFR  (NO RACE VARIABLE): >60 ML/MIN/1.73 M^2
GLUCOSE SERPL-MCNC: 148 MG/DL (ref 70–110)
HCT VFR BLD AUTO: 37.9 % (ref 40–54)
HGB BLD-MCNC: 11.6 G/DL (ref 14–18)
IMM GRANULOCYTES # BLD AUTO: 0.51 K/UL (ref 0–0.04)
IMM GRANULOCYTES NFR BLD AUTO: 3.1 % (ref 0–0.5)
LYMPHOCYTES # BLD AUTO: 0.7 K/UL (ref 1–4.8)
LYMPHOCYTES NFR BLD: 4.2 % (ref 18–48)
MCH RBC QN AUTO: 28.8 PG (ref 27–31)
MCHC RBC AUTO-ENTMCNC: 30.6 G/DL (ref 32–36)
MCV RBC AUTO: 94 FL (ref 82–98)
MONOCYTES # BLD AUTO: 1.8 K/UL (ref 0.3–1)
MONOCYTES NFR BLD: 11.3 % (ref 4–15)
NEUTROPHILS # BLD AUTO: 12.8 K/UL (ref 1.8–7.7)
NEUTROPHILS NFR BLD: 78.4 % (ref 38–73)
NRBC BLD-RTO: 0 /100 WBC
PLATELET # BLD AUTO: 293 K/UL (ref 150–450)
PMV BLD AUTO: 9.2 FL (ref 9.2–12.9)
POCT GLUCOSE: 111 MG/DL (ref 70–110)
POTASSIUM SERPL-SCNC: 4.5 MMOL/L (ref 3.5–5.1)
PROT SERPL-MCNC: 7.2 G/DL (ref 6–8.4)
RBC # BLD AUTO: 4.03 M/UL (ref 4.6–6.2)
SODIUM SERPL-SCNC: 144 MMOL/L (ref 136–145)
WBC # BLD AUTO: 16.35 K/UL (ref 3.9–12.7)

## 2022-09-01 PROCEDURE — 85025 COMPLETE CBC W/AUTO DIFF WBC: CPT | Performed by: INTERNAL MEDICINE

## 2022-09-01 PROCEDURE — 82803 BLOOD GASES ANY COMBINATION: CPT

## 2022-09-01 PROCEDURE — 94761 N-INVAS EAR/PLS OXIMETRY MLT: CPT

## 2022-09-01 PROCEDURE — 63600175 PHARM REV CODE 636 W HCPCS

## 2022-09-01 PROCEDURE — 25000003 PHARM REV CODE 250: Performed by: INTERNAL MEDICINE

## 2022-09-01 PROCEDURE — 27100171 HC OXYGEN HIGH FLOW UP TO 24 HOURS

## 2022-09-01 PROCEDURE — 99223 1ST HOSP IP/OBS HIGH 75: CPT | Mod: ,,, | Performed by: FAMILY MEDICINE

## 2022-09-01 PROCEDURE — S4991 NICOTINE PATCH NONLEGEND: HCPCS

## 2022-09-01 PROCEDURE — 99900035 HC TECH TIME PER 15 MIN (STAT)

## 2022-09-01 PROCEDURE — 94640 AIRWAY INHALATION TREATMENT: CPT

## 2022-09-01 PROCEDURE — 36620 INSERTION CATHETER ARTERY: CPT

## 2022-09-01 PROCEDURE — 94003 VENT MGMT INPAT SUBQ DAY: CPT

## 2022-09-01 PROCEDURE — 25000242 PHARM REV CODE 250 ALT 637 W/ HCPCS: Performed by: STUDENT IN AN ORGANIZED HEALTH CARE EDUCATION/TRAINING PROGRAM

## 2022-09-01 PROCEDURE — 99900026 HC AIRWAY MAINTENANCE (STAT)

## 2022-09-01 PROCEDURE — 80053 COMPREHEN METABOLIC PANEL: CPT | Performed by: INTERNAL MEDICINE

## 2022-09-01 PROCEDURE — 99291 PR CRITICAL CARE, E/M 30-74 MINUTES: ICD-10-PCS | Mod: ,,, | Performed by: INTERNAL MEDICINE

## 2022-09-01 PROCEDURE — 63600175 PHARM REV CODE 636 W HCPCS: Performed by: STUDENT IN AN ORGANIZED HEALTH CARE EDUCATION/TRAINING PROGRAM

## 2022-09-01 PROCEDURE — 99223 PR INITIAL HOSPITAL CARE,LEVL III: ICD-10-PCS | Mod: ,,, | Performed by: FAMILY MEDICINE

## 2022-09-01 PROCEDURE — 25000003 PHARM REV CODE 250: Performed by: STUDENT IN AN ORGANIZED HEALTH CARE EDUCATION/TRAINING PROGRAM

## 2022-09-01 PROCEDURE — 20000000 HC ICU ROOM

## 2022-09-01 PROCEDURE — 99291 CRITICAL CARE FIRST HOUR: CPT | Mod: ,,, | Performed by: INTERNAL MEDICINE

## 2022-09-01 PROCEDURE — 25000003 PHARM REV CODE 250

## 2022-09-01 RX ORDER — DEXAMETHASONE SODIUM PHOSPHATE 4 MG/ML
10 INJECTION, SOLUTION INTRA-ARTICULAR; INTRALESIONAL; INTRAMUSCULAR; INTRAVENOUS; SOFT TISSUE DAILY
Status: COMPLETED | OUTPATIENT
Start: 2022-09-06 | End: 2022-09-10

## 2022-09-01 RX ORDER — DEXAMETHASONE SODIUM PHOSPHATE 4 MG/ML
20 INJECTION, SOLUTION INTRA-ARTICULAR; INTRALESIONAL; INTRAMUSCULAR; INTRAVENOUS; SOFT TISSUE DAILY
Status: COMPLETED | OUTPATIENT
Start: 2022-09-01 | End: 2022-09-05

## 2022-09-01 RX ADMIN — FUROSEMIDE 40 MG: 10 INJECTION, SOLUTION INTRAMUSCULAR; INTRAVENOUS at 12:09

## 2022-09-01 RX ADMIN — CEFEPIME HYDROCHLORIDE 2 G: 2 INJECTION, SOLUTION INTRAVENOUS at 10:09

## 2022-09-01 RX ADMIN — MINERAL OIL AND WHITE PETROLATUM: 30; 940 OINTMENT OPHTHALMIC at 10:09

## 2022-09-01 RX ADMIN — CEFEPIME HYDROCHLORIDE 2 G: 2 INJECTION, SOLUTION INTRAVENOUS at 03:09

## 2022-09-01 RX ADMIN — KETAMINE HYDROCHLORIDE 20 MCG/KG/MIN: 100 INJECTION INTRAMUSCULAR; INTRAVENOUS at 05:09

## 2022-09-01 RX ADMIN — MINERAL OIL AND WHITE PETROLATUM: 30; 940 OINTMENT OPHTHALMIC at 05:09

## 2022-09-01 RX ADMIN — QUETIAPINE FUMARATE 200 MG: 100 TABLET ORAL at 08:09

## 2022-09-01 RX ADMIN — KETAMINE HYDROCHLORIDE 15 MCG/KG/MIN: 100 INJECTION INTRAMUSCULAR; INTRAVENOUS at 10:09

## 2022-09-01 RX ADMIN — BUPROPION HYDROCHLORIDE 150 MG: 75 TABLET, FILM COATED ORAL at 09:09

## 2022-09-01 RX ADMIN — BUPROPION HYDROCHLORIDE 150 MG: 75 TABLET, FILM COATED ORAL at 08:09

## 2022-09-01 RX ADMIN — SENNOSIDES AND DOCUSATE SODIUM 1 TABLET: 50; 8.6 TABLET ORAL at 08:09

## 2022-09-01 RX ADMIN — DEXTROSE 6 MCG/KG/MIN: 50 INJECTION, SOLUTION INTRAVENOUS at 07:09

## 2022-09-01 RX ADMIN — FUROSEMIDE 40 MG: 10 INJECTION, SOLUTION INTRAMUSCULAR; INTRAVENOUS at 11:09

## 2022-09-01 RX ADMIN — CEFEPIME HYDROCHLORIDE 2 G: 2 INJECTION, SOLUTION INTRAVENOUS at 07:09

## 2022-09-01 RX ADMIN — FUROSEMIDE 40 MG: 10 INJECTION, SOLUTION INTRAMUSCULAR; INTRAVENOUS at 05:09

## 2022-09-01 RX ADMIN — POLYETHYLENE GLYCOL 3350 17 G: 17 POWDER, FOR SOLUTION ORAL at 09:09

## 2022-09-01 RX ADMIN — DEXAMETHASONE SODIUM PHOSPHATE 20 MG: 4 INJECTION INTRA-ARTICULAR; INTRALESIONAL; INTRAMUSCULAR; INTRAVENOUS; SOFT TISSUE at 10:09

## 2022-09-01 RX ADMIN — MUPIROCIN: 20 OINTMENT TOPICAL at 09:09

## 2022-09-01 RX ADMIN — ENOXAPARIN SODIUM 40 MG: 100 INJECTION SUBCUTANEOUS at 05:09

## 2022-09-01 RX ADMIN — FAMOTIDINE 20 MG: 10 INJECTION, SOLUTION INTRAVENOUS at 09:09

## 2022-09-01 RX ADMIN — PROPOFOL 50 MCG/KG/MIN: 10 INJECTION, EMULSION INTRAVENOUS at 06:09

## 2022-09-01 RX ADMIN — NICOTINE 1 PATCH: 14 PATCH TRANSDERMAL at 09:09

## 2022-09-01 RX ADMIN — Medication 4 ML: at 08:09

## 2022-09-01 RX ADMIN — MINERAL OIL AND WHITE PETROLATUM: 30; 940 OINTMENT OPHTHALMIC at 02:09

## 2022-09-01 RX ADMIN — PROPOFOL 50 MCG/KG/MIN: 10 INJECTION, EMULSION INTRAVENOUS at 10:09

## 2022-09-01 RX ADMIN — MIDAZOLAM 2.5 MG/HR: 5 INJECTION INTRAMUSCULAR; INTRAVENOUS at 09:09

## 2022-09-01 RX ADMIN — KETAMINE HYDROCHLORIDE 15 MCG/KG/MIN: 100 INJECTION INTRAMUSCULAR; INTRAVENOUS at 11:09

## 2022-09-01 RX ADMIN — Medication 4 ML: at 07:09

## 2022-09-01 RX ADMIN — PROPOFOL 50 MCG/KG/MIN: 10 INJECTION, EMULSION INTRAVENOUS at 02:09

## 2022-09-01 RX ADMIN — PROPOFOL 45 MCG/KG/MIN: 10 INJECTION, EMULSION INTRAVENOUS at 09:09

## 2022-09-01 RX ADMIN — Medication 300 MCG/HR: at 12:09

## 2022-09-01 RX ADMIN — PROPOFOL 50 MCG/KG/MIN: 10 INJECTION, EMULSION INTRAVENOUS at 05:09

## 2022-09-01 RX ADMIN — FAMOTIDINE 20 MG: 10 INJECTION, SOLUTION INTRAVENOUS at 08:09

## 2022-09-01 RX ADMIN — Medication 300 MCG/HR: at 09:09

## 2022-09-01 RX ADMIN — MUPIROCIN: 20 OINTMENT TOPICAL at 08:09

## 2022-09-01 RX ADMIN — SENNOSIDES AND DOCUSATE SODIUM 1 TABLET: 50; 8.6 TABLET ORAL at 09:09

## 2022-09-01 NOTE — PROGRESS NOTES
"[employer] Pulmonary/Critical Care Fellow Progress Note:    Brief Hx: 41 yo M w/ PMHx of drug use (heroin, methamphetamine, others) admitted  to [facility] for hypoxemic and hypercapnic respiratory failure after being found down, presumably 2/2 drug overdose and in shock. He received CPR from his GF and did wake up when EMS found him and he presented to the ED with leukocytosis, lactic acidosis, febrile. CTA showed diffuse dense bilateral infiltrates. UDS+ for THC. Blood cultures grew acinetobacter pittii & pesudomonas luteola and bacillus cereus. CXR shows evolution of R sided PNA. He started proning on  and started receiving steroids for ARDS dosing on .     Subjective:  Patient intubated and sedated    Objective:  Last 24 Hour Vital Signs:  BP  Min: 140/74  Max: 155/72  Temp  Av.8 °F (37.7 °C)  Min: 99.3 °F (37.4 °C)  Max: 100.7 °F (38.2 °C)  Pulse  Av.3  Min: 86  Max: 103  Resp  Av.5  Min: 23  Max: 28  SpO2  Av.5 %  Min: 89 %  Max: 97 %  Body mass index is 28.06 kg/m².  I/O last 3 completed shifts:  In: 4146.4 [I.V.:2882.7; NG/GT:1094; IV Piggyback:169.6]  Out: 6405 [Urine:6405]      Physical Exam:  General: Sedated  HENT:  NCAT; anicteric sclera; (+)ETT in place  Cardio:  Regular rate and rhythm with normal S1 and S2; no murmurs or rubs  Resp:  CTAB; respirations unlabored; no wheezes, crackles or rhonchi  Abdom:  Soft, non-distended  Extrem:  WWP with no clubbing, cyanosis or edema, small cuts on hands and legs that are scabbed over  Pulses:  2+ and symmetric distally  Neuro:  AAOx0; purposeless movements of all 4 extremities    Assessment & Plan:     42M with history of drug use (heroin, methamphetamine, "whatever he can get his hands on") presents with hypoxemic and hypercapnic respiratory failure presumably secondary to drug overdose requiring intubation found to have bacteremia and diffuse lung infiltrates.      Acute hypoxic and hypercapnic respiratory failure  In " setting of presumed drug overdose (fentanyl?)  With diffuse bilateral pulmonary infiltrates on CT chest, +fevers and leukocytosis  Sputum NGTD, RIP negative  JOVANA/ANCA negative -no new hemoptysis noted  Blood cultures positive - presume that lung infiltrates are related to this (see below)  Blood cultures with pseudomonas luteola and acinetobacter pitii -- patient with negative TTE for valve vegetations  - plan for 14d treatment given acuity of illness    Continue with current vent settings, wean FiO2 as able to maintain O2 saturations >90% (avoid over oxygenation)  Hold off on SAT - patient requires a lot of sedation - propofol, fentanyl, versed gtt, ketamine gtt in addition, added seroquel 200mg QHS & bupropion 150mg BID (was on 300mg XR outpatient) to help reduce need for sedation - would wean versed first   Lasix 40mg IV QID ordered to keep net even fluid balance  Continue to wean sedation as tolerated to target RASS -2  Multifocal PNA & ARDS  Started prone 8/30 - continue for now, plan for 1600hrs prone, 0800hrs supination, please hold nimbex when supine  Started steroids for ARDS 9/1 - 20mg for 5 days and then 10mg for 5 days  Aspiration vs. Inhalation injury/pneumonitis vs. Septic emboli vs. Vasculitis (less likely)  Lab and culture workup as above  No new evidence of hemoptysis  Continue cefepime  Urine legionella negative, strep Ag still pending  HIV negative, viral PCR negative, Hep panel negative     Code: FULL     DVT: Lovenox  GI: Famotidine  Feeds: TF running  Sedation: propofol gtt, fentanyl gtt, ketamine gtt, versed gtt, paralysis when proned    Sujata Francois MD  Pulm/CC Fellow

## 2022-09-01 NOTE — SUBJECTIVE & OBJECTIVE
"Interval History: Intubated and sedated     Past Medical History:   Diagnosis Date    Anxiety     Depression     Hepatitis C     Substance abuse     METH AND HEROIN       No past surgical history on file.    Review of patient's allergies indicates:  No Known Allergies    Medications:  Continuous Infusions:   cisatracurium (NIMBEX) infusion 1 mcg/kg/min (09/01/22 0905)    fentanyl 300 mcg/hr (09/01/22 0905)    ketamine (KETALAR) 5 mg/mL infusion (titrating) 17.5 mcg/kg/min (09/01/22 0905)    midazolam 3 mg/hr (09/01/22 0905)    propofoL 50 mcg/kg/min (09/01/22 1034)     Scheduled Meds:   buPROPion  150 mg Oral BID    ceFEPime (MAXIPIME) IVPB  2 g Intravenous Q8H    dexamethasone  20 mg Intravenous Daily    Followed by    [START ON 9/6/2022] dexamethasone  10 mg Intravenous Daily    enoxaparin  40 mg Subcutaneous Daily    famotidine (PF)  20 mg Intravenous BID    furosemide (LASIX) injection  40 mg Intravenous Q6H    mupirocin   Nasal BID    nicotine  1 patch Transdermal Daily    polyethylene glycol  17 g Oral Daily    QUEtiapine  200 mg Oral QHS    senna-docusate 8.6-50 mg  1 tablet Oral BID    sodium chloride 3%  4 mL Nebulization Q12H    white petrolatum-mineral oiL   Both Eyes Q8H     PRN Meds:acetaminophen, hydrALAZINE, midazolam, ondansetron, prochlorperazine, sodium chloride 0.9%    Family History    None       Tobacco Use    Smoking status: Every Day     Packs/day: 0.50     Types: Cigarettes    Smokeless tobacco: Never   Substance and Sexual Activity    Alcohol use: Yes     Comment: 5th whiskey or more daily    Drug use: Yes     Types: Methamphetamines, Marijuana     Comment: heroin , "pt reports he has been doing whatever he can get his hands on"     Sexual activity: Not on file       Review of Systems   Unable to perform ROS: Intubated   Objective:     Vital Signs (Most Recent):  Temp: 99.9 °F (37.7 °C) (09/01/22 0705)  Pulse: 94 (09/01/22 0905)  Resp: (!) 27 (09/01/22 0905)  BP: (!) 150/67 (09/01/22 " 0805)  SpO2: (!) 91 % (09/01/22 0905)   Vital Signs (24h Range):  Temp:  [99.3 °F (37.4 °C)-100.7 °F (38.2 °C)] 99.9 °F (37.7 °C)  Pulse:  [] 94  Resp:  [23-28] 27  SpO2:  [89 %-97 %] 91 %  BP: (140-155)/(67-74) 150/67  Arterial Line BP: (124-157)/(59-75) 136/64     Weight: 86.2 kg (190 lb)  Body mass index is 28.06 kg/m².    Physical Exam  Constitutional:       Interventions: He is intubated.      Comments: ETT in place    Cardiovascular:      Rate and Rhythm: Normal rate.   Pulmonary:      Effort: No respiratory distress. He is intubated.   Neurological:      Comments: Unable to assess due to being intubated and sedated        Review of Symptoms      Symptom Assessment (ESAS 0-10 Scale)  Unable to complete assessment due to Intubated         Pain Assessment in Advanced Demential Scale (PAINAD)   Breathing - Independent of vocalization:  0  Negative vocalization:  0  Facial expression:  0  Body language:  0  Consolability:  0  Total:  0    Living Arrangements:  Lives with friend      Advance Care Planning   Advance Directives:     Decision Making:  Family answered questions       Significant Labs: All pertinent labs within the past 24 hours have been reviewed.  CBC:   Recent Labs   Lab 09/01/22 0411   WBC 16.35*   HGB 11.6*   HCT 37.9*   MCV 94        BMP:  Recent Labs   Lab 09/01/22 0411   *      K 4.5   CL 94*   CO2 41*   BUN 22*   CREATININE 0.7   CALCIUM 9.5     LFT:  Lab Results   Component Value Date    AST 48 (H) 09/01/2022    ALKPHOS 138 (H) 09/01/2022    BILITOT 0.3 09/01/2022     Albumin:   Albumin   Date Value Ref Range Status   09/01/2022 2.0 (L) 3.5 - 5.2 g/dL Final     Protein:   Total Protein   Date Value Ref Range Status   09/01/2022 7.2 6.0 - 8.4 g/dL Final     Lactic acid:   Lab Results   Component Value Date    LACTATE 1.3 08/26/2022    LACTATE 2.0 08/26/2022       Significant Imaging: I have reviewed all pertinent imaging results/findings within the past 24 hours.

## 2022-09-01 NOTE — HPI
"Per H&P: "HPI: Patient's HPI is adapted from notes of Dr. Schuler, Dr. Morin and Dr. Hall ([facility]).   Patient arrived in [facility], operated by [facility] ICU intubated, pt's girlfriend's number not on file, unable to verify events PTAJannette Carroll is a 42 year old man with a PMHx of depression, hx of drug overdose (7/11/2022, buproprion and gabapentin), polysubstance abuse (meth, heroin) and nicotine dependence.     He was presented to [facility] on 8/25/2022 via EMS with shortness of breath and dry cough. He was reportedly found down by his girlfriend at home yesterday evening (8/25/2022) and was given multiple rounds of chest compressions. He woke up and began having shortness of breath and called EMS, which found him saturating at 66% on room air, which increased to 90s with nonrebreather mask. Patient denied subjective fever, chills, sick contacts, chest pain, palpitations, abdominal pain.      At [facility], patient had a fever to 101, with leukocytosis (20) and lactic acidosis (3.0) with a normal procal. CTA chest showed patchy perihilar opacities bilaterally most pronounced in right lower lobe. Covid, Group A strep, respiratory influenza panel -ve. D-dimer was elevated (3.68), but CTPA negative for PE. Tox postive for THC only. CXR was -ve for pneumothorax. Patient was started on IV Cefepime and IV Vancomycin.      Patient was initially on BiPAP but became agitated and was intubated. Patient reportedly had "red spit". Patient was difficult to sedate requiring propofol and precedex and multiple doses of versed and ketamine, thus became hypotensive after intubation and was started on levophed.      Patient is transferred to [facility], operated by [facility] ICU, hospital medicine, for management of acute respiratory failure with hypoxia and hypercapnia."    At time of initial consult, patient intubated and sedated, unable to provide any history.   "

## 2022-09-01 NOTE — ASSESSMENT & PLAN NOTE
-  Blood cultures grew acinetobacter pittii & pesudomonas luteola and bacillus cereus. ID consulted

## 2022-09-01 NOTE — PROGRESS NOTES
Pulmonary and Critical Care    I have seen and examined the patient and agree with the trainee's separate note except as modified. Relevant studies/notes reviewed.     Major overnight events: Proned overnight as planned. Now supine. Needing more oxygen today.     Vitals: Tmax 100.7, HR 80-100s, MAP 80s, 91% on 70% and 14 PEEP this AM     Significant lab findings: WBC 14 to 16, bicarb up to 41.   ANCA negative   8/28 blood cx NGTD    Key exam findings: limited cardiac ultrasound done by us (subxiphoid view only) showed a grossly normal LVEF, dilated RA but normal appearing RV function    Ventilator:    Intubation day #: 7   Peak/plateau: 28/24   P/F: <150 by S/F  Spontaneous awaking trial (SAT) safety screen    Does the patient have any of the following contraindications to an SAT?    [] Active seizures  [] RASS > +1  [] On paralytics  [] Elevated intracranial pressure  [x] Unstable respiratory status  [] Unstable cardiovascular status  [] Other _____________________    [] No contraindications to SAT    SBT: not ready   RASS: -5  Sedation: fentanyl, ketamine, versed, propofol  Vasopressors: none  Other drips: nimbex  Lines and invasive devices: PICC, salomon, ETT   Fluid Balance: 24 hr: -826 cc  Overall: -150cc  Creatinine: 0.7  Antibiotics: cefepime d7  Bowels: Last Bowel Movement: 09/01/22  Nutrition: TFs  Glucose management: BS low to mid 100s   Prophylaxis:    DVT: enoxaparin  GI: H2 blocker   PT/OT: n/a  GOC/Family discussion: full code, will continue to update mother     Impression: severe ARDS due to GNR pneumonia/bacteremia, worsened gas exchange over the past 2 days     Major recommendations:   -add dexamethasone for severe ARDS  -cont low tidal volume ventilation  -prone positioning  -cont cefepime  -cont seroquel and buproprion  -cont lasix with goal even to negative fluid balance           Critical care time (30min) spent personally by me on the following activities: development of treatment plan with  patient or surrogate and bedside caregivers, discussions with consultants, evaluation of patient's response to treatment, examination of patient, ordering and performing treatments and interventions, ordering and review of laboratory studies, ordering and review of radiographic studies, pulse oximetry, re-evaluation of patient's condition. This critical care time did not overlap with that of any other provider or involve time for any procedures.

## 2022-09-01 NOTE — PLAN OF CARE
"  ICU PLAN OF CARE NOTE    SHIFT EVENTS:  VSS / No acute events this shift. Patient and/or family updated on plan of care; questions and concerns addressed. See flow sheets for full assessment details. Will continue to monitor patient closely.      Dx: Acute respiratory failure with hypoxia and hypercarbia    Vital Signs: BP (!) 148/70 (BP Location: Right arm, Patient Position: Lying)   Pulse 93   Temp (!) 100.8 °F (38.2 °C) (Oral)   Resp (!) 26   Ht 5' 9" (1.753 m)   Wt 86.2 kg (190 lb)   SpO2 97%   BMI 28.06 kg/m²     Neuro: Sedated and Unresponsive    Respiratory: Ventilator AC/VC. Maintain SAT's > 92%    Cardiac: NSR/ST    Diet: NPO and Tube Feeds    Gtts: Propfol, Fentanyl, and Cisatricurium    Urine Output: Urinary Catheter 1200 cc/shift    Drains:     NG/OG Tube 894 cc /  shift (intake)       Labs/Accuchecks: daily labs/0 accuchecks.    SKIN NOTE:  Skin: new skin concerns times 2 noted. Abrasion to anterior scrotum, edematous tongue noted as well as abrasion under ETT donohue sticky pads. Wound care consulted and rounded on pt.     Skin precautions maintained including:  Sacrum and heels with foam dressing in place for pressure protection. Frequent weight shift encouraged / assistance provided / continuous rotational turning bed feature utilized; Patient turned Q2 hr to prevent further breakdown. Bed plugged in and mattress inflated. Adhesive use limited. Heels elevated off bed. Positioned off wounds. Pressure points protected and positioning supports utilized.  Skin-to-device areas padded. Skin-to-skin areas padded.    New Orders:    Wound care consult   Palliative care consult   Maintain SAT's > 92%  Dexamethasone   CXR  ABG    Shift Events:     increase O2 demands noted. Pt went from 65% at start of shift to 70%        "

## 2022-09-01 NOTE — ASSESSMENT & PLAN NOTE
- Patient presented to the ED with report of SOB. Patient agitated and restless. Patient intubated  - Pulm crit managing   - CXR shows evolution of R sided PNA.   - Patient has been proned, now on steroids

## 2022-09-01 NOTE — PLAN OF CARE
Remains intubated, sedated and medically paralyzed. TOF, BIS and VS monitored. NSR/ST noted to monitor. Has episode of diaphoresis. Max temp 100.7 F. Tube feeding held due to residual of >200. SCDs in place. Passive range of motion performed. Safety monitored.  Problem: Adult Inpatient Plan of Care  Goal: Plan of Care Review  Outcome: Ongoing, Progressing  Goal: Patient-Specific Goal (Individualized)  Outcome: Ongoing, Progressing  Goal: Absence of Hospital-Acquired Illness or Injury  Outcome: Ongoing, Progressing  Goal: Optimal Comfort and Wellbeing  Outcome: Ongoing, Progressing  Goal: Readiness for Transition of Care  Outcome: Ongoing, Progressing     Problem: Communication Impairment (Mechanical Ventilation, Invasive)  Goal: Effective Communication  Outcome: Ongoing, Progressing     Problem: Device-Related Complication Risk (Mechanical Ventilation, Invasive)  Goal: Optimal Device Function  Outcome: Ongoing, Progressing     Problem: Inability to Wean (Mechanical Ventilation, Invasive)  Goal: Mechanical Ventilation Liberation  Outcome: Ongoing, Progressing     Problem: Nutrition Impairment (Mechanical Ventilation, Invasive)  Goal: Optimal Nutrition Delivery  Outcome: Ongoing, Progressing     Problem: Skin and Tissue Injury (Mechanical Ventilation, Invasive)  Goal: Absence of Device-Related Skin and Tissue Injury  Outcome: Ongoing, Progressing     Problem: Ventilator-Induced Lung Injury (Mechanical Ventilation, Invasive)  Goal: Absence of Ventilator-Induced Lung Injury  Outcome: Ongoing, Progressing     Problem: Communication Impairment (Artificial Airway)  Goal: Effective Communication  Outcome: Ongoing, Progressing     Problem: Device-Related Complication Risk (Artificial Airway)  Goal: Optimal Device Function  Outcome: Ongoing, Progressing     Problem: Skin and Tissue Injury (Artificial Airway)  Goal: Absence of Device-Related Skin or Tissue Injury  Outcome: Ongoing, Progressing     Problem: Noninvasive  Ventilation Acute  Goal: Effective Unassisted Ventilation and Oxygenation  Outcome: Ongoing, Progressing     Problem: Infection  Goal: Absence of Infection Signs and Symptoms  Outcome: Ongoing, Progressing     Problem: Adjustment to Illness (Sepsis/Septic Shock)  Goal: Optimal Coping  Outcome: Ongoing, Progressing     Problem: Bleeding (Sepsis/Septic Shock)  Goal: Absence of Bleeding  Outcome: Ongoing, Progressing     Problem: Infection Progression (Sepsis/Septic Shock)  Goal: Absence of Infection Signs and Symptoms  Outcome: Ongoing, Progressing     Problem: Nutrition Impaired (Sepsis/Septic Shock)  Goal: Optimal Nutrition Intake  Outcome: Ongoing, Progressing     Problem: Skin Injury Risk Increased  Goal: Skin Health and Integrity  Outcome: Ongoing, Progressing     Problem: Fall Injury Risk  Goal: Absence of Fall and Fall-Related Injury  Outcome: Ongoing, Progressing     Problem: Fluid Imbalance (Pneumonia)  Goal: Fluid Balance  Outcome: Ongoing, Progressing     Problem: Infection (Pneumonia)  Goal: Resolution of Infection Signs and Symptoms  Outcome: Ongoing, Progressing     Problem: Respiratory Compromise (Pneumonia)  Goal: Effective Oxygenation and Ventilation  Outcome: Ongoing, Progressing     Problem: ARDS (Acute Respiratory Distress Syndrome)  Goal: Effective Oxygenation  Outcome: Ongoing, Progressing

## 2022-09-01 NOTE — PROGRESS NOTES
"[facility], [place] by [facility] - Intensive Care [facility] Medicine  Progress Note    Patient Name: Leighton Carroll  MRN: [id number]  Patient Class: IP- Inpatient   Admission Date: 8/26/2022  Length of Stay: 6 days  Attending Physician: GAVIOTA Sanon MD  Primary Care Provider: Primary Doctor No        Subjective:     Principal Problem:Acute respiratory failure with hypoxia and hypercarbia        HPI:  Patient's HPI is adapted from notes of Dr. Schuler, Dr. Morin and Dr. Hall ([facility]).   Patient arrived in [facility], [place] [facility] ICU intubated, pt's girlfriend's number not on file, unable to verify events PTA.     Leighton Carroll is a 42 year old man with a PMHx of depression, hx of drug overdose (7/11/2022, buproprion and gabapentin), polysubstance abuse (meth, heroin) and nicotine dependence.    He was presented to [facility] on 8/25/2022 via EMS with shortness of breath and dry cough. He was reportedly found down by his girlfriend at home yesterday evening (8/25/2022) and was given multiple rounds of chest compressions. He woke up and began having shortness of breath and called EMS, which found him saturating at 66% on room air, which increased to 90s with nonrebreather mask. Patient denied subjective fever, chills, sick contacts, chest pain, palpitations, abdominal pain.     At [facility], patient had a fever to 101, with leukocytosis (20) and lactic acidosis (3.0) with a normal procal. CTA chest showed patchy perihilar opacities bilaterally most pronounced in right lower lobe. Covid, Group A strep, respiratory influenza panel -ve. D-dimer was elevated (3.68), but CTPA negative for PE. Tox postive for THC only. CXR was -ve for pneumothorax. Patient was started on IV Cefepime and IV Vancomycin.     Patient was initially on BiPAP but became agitated and was intubated. Patient reportedly had "red spit". Patient was difficult to sedate requiring propofol and precedex and multiple doses of versed and ketamine, thus became hypotensive " after intubation and was started on levophed.     Patient is transferred to [facility] ICU, [facility] medicine, for management of acute respiratory failure with hypoxia and hypercapnia.             Overview/Hospital Course:  Patient arrived to ICU as a transfer intubated and sedated. CXR demonstrating bilateral patchy infiltrates and blood cultures have been positive for Acinetobacter sp, Pseudomonas luteola and now repeat cultures positive for a bacillus species (possible contaminant). Repeated cultures remain negative. Patient is on Vanc/Cefepime/azithromycin. PCC, ID consulted. TTE did not demonstrate vegetations, EF 50%.      Interval History: No acute events overnight. Continuing ARDS protocol and proning/supination. Continues to have high vent requirements.    Review of Systems   Unable to perform ROS: Intubated   Objective:     Vital Signs (Most Recent):  Temp: 99.3 °F (37.4 °C) (09/01/22 1805)  Pulse: 95 (09/01/22 2029)  Resp: (!) 30 (09/01/22 2029)  BP: (!) 148/70 (09/01/22 1205)  SpO2: 97 % (09/01/22 2029)   Vital Signs (24h Range):  Temp:  [99.3 °F (37.4 °C)-100.8 °F (38.2 °C)] 99.3 °F (37.4 °C)  Pulse:  [0-105] 95  Resp:  [25-31] 30  SpO2:  [91 %-97 %] 97 %  BP: (144-150)/(67-70) 148/70  Arterial Line BP: (124-157)/(53-68) 134/59     Weight: 86.2 kg (190 lb)  Body mass index is 28.06 kg/m².    Intake/Output Summary (Last 24 hours) at 9/1/2022 2144  Last data filed at 9/1/2022 1923  Gross per 24 hour   Intake 3262.49 ml   Output 3285 ml   Net -22.51 ml        Physical Exam  Vitals and nursing note reviewed.   Constitutional:       General: He is not in acute distress.     Appearance: He is well-developed.   HENT:      Head: Normocephalic and atraumatic.   Eyes:      General:         Right eye: No discharge.         Left eye: No discharge.      Conjunctiva/sclera: Conjunctivae normal.   Cardiovascular:      Rate and Rhythm: Normal rate.      Pulses: Normal pulses.   Pulmonary:      Effort: Pulmonary effort is  normal. No respiratory distress.      Comments: Intubated, mechanical breath sounds.  Abdominal:      Palpations: Abdomen is soft.      Tenderness: There is no abdominal tenderness.   Musculoskeletal:         General: Normal range of motion.      Right lower leg: No edema.      Left lower leg: No edema.   Skin:     General: Skin is warm and dry.   Neurological:      Comments: RASS -5, CAM/ICU N/A.     Significant Labs:   CBC:  Recent Labs   Lab 08/30/22  0343 08/30/22  1537 08/31/22  0509 08/31/22  1153 09/01/22  0411   WBC 16.52*  --  14.85*  --  16.35*   HGB 11.3*  --  11.1*  --  11.6*   HCT 34.8*   < > 33.9* 37 37.9*     --  264  --  293   GRAN 76.8*  12.7*  --  73.1*  10.9*  --  78.4*  12.8*   LYMPH 5.1*  0.9*  --  6.5*  1.0  --  4.2*  0.7*   MONO 10.5  1.7*  --  11.2  1.7*  --  11.3  1.8*   EOS 1.1*  --  1.0*  --  0.4   BASO 0.07  --  0.06  --  0.06    < > = values in this interval not displayed.     CMP:  Recent Labs   Lab 08/30/22  0343 08/30/22  2310 08/31/22  0509 09/01/22  0411    138 137 144   K 4.1 4.4 4.1 4.5   CL 99 94* 92* 94*   CO2 31* 35* 35* 41*   BUN 14 17 16 22*   CREATININE 0.7 0.6 0.6 0.7   * 112* 118* 148*   CALCIUM 8.6* 8.3* 8.6* 9.5   MG  --  1.9  --   --    ALKPHOS 94  --  120 138*   AST 31  --  32 48*   ALT 27  --  24 32   BILITOT 0.6  --  0.3 0.3   PROT 6.2  --  7.0 7.2   ALBUMIN 2.0*  --  1.9* 2.0*   ANIONGAP 8 9 10 9        Significant Imaging:   No new imaging this morning.      Assessment/Plan:      * Acute respiratory failure with hypoxia and hypercarbia  Bilateral PNA, ARDS, septic shock (resolved), bacteremia, h/o drug overdose, polysubstance abuse  - Hypoxic to 60s on arrival and failed BiPAP 2/2 agitation and intubated at OSH. Tox positive for THC. D-dimer elevated but CTA negative for PE. COVID, RSV negative.  - Continue fentanyl gtt, ketamine gtt, midazolam gtt, propofol gtt, cisatracurium gtt.  - Continue cefepime 2g IV q8hr for 14 day total  course. Blood cultures showed acinetobacter, pseudomonas luteola.  - ARDSnet protocol; proning/supinating.   - Continue furosemide 40mg IV q6hr, goal mildly net negative. Continue dexamethasone 20mg IV daily.  - Appreciate pulm/crit, ID assistance.  - Palliative consult given persistent vent requirements; has been ventilated nearly a week at this point and may end up requiring trach depending on vent weaning. Regardless anticipate that he will be quite weak after extubation and require extended care.    Bilateral pneumonia  - As above.    Hematuria  - Gross hematuria; not present on repeat UA.    Gram-negative bacteremia  - As above.    Septic shock  - As above.    History of drug overdose  - As above.    Depression  - Continue buproprion 150mg per OG daily, quetiapine 200mg per OG qHS.  - Resume divalproex ER 500mg 1g PO daily, risperiodone 2mg PO daily when appropriate.    VTE Risk Mitigation (From admission, onward)         Ordered     enoxaparin injection 40 mg  Daily         08/26/22 1422     IP VTE HIGH RISK PATIENT  Once         08/26/22 1422     Place sequential compression device  Until discontinued         08/26/22 1422     Place MARGRET hose  Until discontinued         08/26/22 1422              Critical due to resp failure.    Critical care time spent on the evaluation and treatment of severe organ dysfunction, review of pertinent labs and imaging studies, discussions with consulting providers and discussions with patient/family: 35 minutes.    Discharge Planning   CHETAN:      Code Status: Full Code   Is the patient medically ready for discharge?:     Reason for patient still in hospital (select all that apply): Treatment                     D Rai Sanon MD  Department of Hospital Medicine   [facility] Intensive [facility]

## 2022-09-01 NOTE — NURSING
Pt proned at 1600 with RN x's 3 and RTT x's 2 at bedside. Pt tolerated well. Noted increase in O2 SAT's with proning.

## 2022-09-01 NOTE — CARE UPDATE
Upon changing tube donohue,noticed red area on cheek bone. Placed liquid filled Gell barrier. RN notified.

## 2022-09-01 NOTE — ASSESSMENT & PLAN NOTE
- Consult for advance care planning/ goals of care in young male admitted for acute respiratory failure, who developed ARDS. Patient supposedly was found down at home and his girlfriend performed CPR on him. When he awoke he reported feeling SOB. Patient has been intubated for 6 days  - Met patient at the bedside, he was supinated, he is not chronically ill appearing. He is intubated and sedated.  - His very supportive mother, Ana Maria, was at the bedside. We reflected on patient outside of the hospital. He was living with his girlfriend. He has been working as a . He was in long term some time ago and has been having difficulty with drug use since getting out of long term. The patient has 2 sisters who have been visiting. 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 She showed me a recent tattoo that he did on his mothers leg of his mothers grandson who is in the .  Ana Maria is understandably very worried about her son. She understands he is very sick, she presented with several very good questions about the process of being weaned from the vent, does he have any brain damage, are his other organs affected, and ultimately what if he can't come off the ventilator. We discussed this at length and discussed our hope is in the next week his lungs will recover and he will be able to come off the ventilator, I also discussed that we are using steroids and antibiotics to help his lungs recover and he may just need some time. I did encourage her that in the event he would be unable to be weaned from the ventilator then we would sit down with family to discuss where he is currently in his disease process. She was very understanding and insightful. She is very hopeful that he will get better. She did express concerns of if he is able to be extubated he may try to leave AMA. I discussed with her that being he has been  "on the ventilator for almost a week he will likely physically be very weak. She also is hopeful that being on the ventilator has helped him "detox and he won't want to use drugs". Emotional support provided.   - Surrogate decision maker: Mother, Ana Maria Thomas.  - Care preferences are consistent with continuing current level of care, full code.   "

## 2022-09-01 NOTE — PLAN OF CARE
Based on blood gas results from this AM while supine, will need to prone ~1600hrs 9/1 and plan to supinate ~0800hrs 9/2. Please hold nimbex until 1500hrs while supine and if weaning sedation, wean versed first.    Sujata Francois MD  Pulm/CC Fellow

## 2022-09-01 NOTE — CONSULTS
"[facility] - Intensive Care ([place])  Wound Care    Patient Name:  Leighton Carroll   MRN:  [id number]  Date: 9/1/2022  Diagnosis: Acute respiratory failure with hypoxia and hypercarbia    History:     Past Medical History:   Diagnosis Date    Anxiety     Depression     Hepatitis C     Substance abuse     METH AND HEROIN       Social History     Socioeconomic History    Marital status: Single   Tobacco Use    Smoking status: Every Day     Packs/day: 0.50     Types: Cigarettes    Smokeless tobacco: Never   Substance and Sexual Activity    Alcohol use: Yes     Comment: 5th whiskey or more daily    Drug use: Yes     Types: Methamphetamines, Marijuana     Comment: heroin , "pt reports he has been doing whatever he can get his hands on"      Social Determinants of Health     Financial Resource Strain: High Risk    Difficulty of Paying Living Expenses: Hard   Food Insecurity: Food Insecurity Present    Worried About Running Out of Food in the Last Year: Sometimes true    Ran Out of Food in the Last Year: Sometimes true   Transportation Needs: Unmet Transportation Needs    Lack of Transportation (Medical): Yes    Lack of Transportation (Non-Medical): Yes   Physical Activity: Inactive    Days of Exercise per Week: 0 days    Minutes of Exercise per Session: 0 min   Stress: Stress Concern Present    Feeling of Stress : Very much   Social Connections: Socially Isolated    Frequency of Communication with Friends and Family: More than three times a week    Frequency of Social Gatherings with Friends and Family: Three times a week    Attends Taoist Services: Never    Active Member of Clubs or Organizations: No    Attends Club or Organization Meetings: Never    Marital Status: Never    Housing Stability: High Risk    Unable to Pay for Housing in the Last Year: Yes    Unstable Housing in the Last Year: Yes       Precautions:     Allergies as of 08/26/2022    (No Known Allergies)       WOC Assessment Details/Treatment     Wound " care consult received from RN for assessment of scrotum, tongue and cheek. Patient known to wound care team from this admission. Patient with many present on admit abrasions and scabbed areas all over his body.     Upon assessment noted swollen tongue. Encourage routine repositioning of bite block.     Scrotum with what appears to be an old scratch/abrasion that is closed. Due to location and area  being dry, it does not appear to be pressure or moisture related. Likely related to other scratches/abrasions that were present on admit. Applied 3M Cavilon skin barrier film to affected area to protect against friction/ trauma and keep area dry.     Unable to assess right cheek due to ET tube donohue in place. Respiratory assessed and described it as a reddened area of skin. RRT placed a liquicell cushion before changing the ET donohue device to help reduce shear and friction of the skin.     Nursing and MD team notified. Orders placed. Nursing to maintain pressure injury prevention interventions. Wound care to continue to assist with pt prn.     Scrotum- abrasion- no open wound, intact dry skin        09/01/2022

## 2022-09-01 NOTE — ASSESSMENT & PLAN NOTE
- Patients mother notes long standing history of drug use. Chart review reveals the patient has presented to the ED in the past for SI, hallucinations.

## 2022-09-01 NOTE — CONSULTS
[facility] - Intensive Care ([place])  Palliative Medicine  Consult Note    Patient Name: Leighton Carroll  MRN: [id number]  Admission Date: 8/26/2022  Hospital Length of Stay: 6 days  Code Status: Full Code   Attending Provider: GAVIOTA Sanon MD  Consulting Provider: Bria Burrell DNP  Primary Care Physician: Primary Doctor No  Principal Problem:Acute respiratory failure with hypoxia and hypercarbia    Patient information was obtained from parent and primary team.      Inpatient consult to Palliative Care  Consult performed by: Bria Burrell DNP  Consult ordered by: GAVIOTA Sanon MD        Assessment/Plan:     * Acute respiratory failure with hypoxia and hypercarbia  - Patient presented to the ED with report of SOB. Patient agitated and restless. Patient intubated  - Pulm crit managing   - CXR shows evolution of R sided PNA.   - Patient has been proned, now on steroids     Encounter for palliative care  - Consult for advance care planning/ goals of care in young male admitted for acute respiratory failure, who developed ARDS. Patient supposedly was found down at home and his girlfriend performed CPR on him. When he awoke he reported feeling SOB. Patient has been intubated for 6 days  - Met patient at the bedside, he was supinated, he is not chronically ill appearing. He is intubated and sedated.  - His very supportive mother, Ana Maria, was at the bedside. We reflected on patient outside of the hospital. He was living with his girlfriend. He has been working as a . He was in [employer] some time ago and has been having difficulty with drug use since getting out of [employer]. The patient has 2 sisters who have been visiting. 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 She showed me a recent tattoo that he did on his mothers leg of his mothers grandson who is in the .  Ana Maria is understandably  "very worried about her son. She understands he is very sick, she presented with several very good questions about the process of being weaned from the vent, does he have any brain damage, are his other organs affected, and ultimately what if he can't come off the ventilator. We discussed this at length and discussed our hope is in the next week his lungs will recover and he will be able to come off the ventilator, I also discussed that we are using steroids and antibiotics to help his lungs recover and he may just need some time. I did encourage her that in the event he would be unable to be weaned from the ventilator then we would sit down with family to discuss where he is currently in his disease process. She was very understanding and insightful. She is very hopeful that he will get better. She did express concerns of if he is able to be extubated he may try to leave AMA. I discussed with her that being he has been on the ventilator for almost a week he will likely physically be very weak. She also is hopeful that being on the ventilator has helped him "detox and he won't want to use drugs". Emotional support provided.   - Surrogate decision maker: Mother, Ana Maria Thomas.  - Care preferences are consistent with continuing current level of care, full code.     ARDS (adult respiratory distress syndrome)  - Patient remains intubated and is being proned, patient currently supinated. PCC managing     Gram-negative bacteremia  -  Blood cultures grew acinetobacter pittii & pesudomonas luteola and bacillus cereus. ID consulted     History of drug overdose  - Patients mother notes long standing history of drug use. Chart review reveals the patient has presented to the ED in the past for SI, hallucinations.     Depression  - Noted prior history         Thank you for your consult. I will follow-up with patient. Please contact us if you have any additional questions.    Subjective:     HPI:   Per H&P: "HPI: Patient's HPI is " "adapted from notes of Dr. Schuler, Dr. Morin and Dr. Hall ([facility]).   Patient arrived in [facility] ICU intubated, pt's girlfriend's number not on file, unable to verify events PTAJannette Carroll is a 42 year old man with a PMHx of depression, hx of drug overdose (7/11/2022, buproprion and gabapentin), polysubstance abuse (meth, heroin) and nicotine dependence.     He was presented to [facility] on 8/25/2022 via EMS with shortness of breath and dry cough. He was reportedly found down by his girlfriend at home yesterday evening (8/25/2022) and was given multiple rounds of chest compressions. He woke up and began having shortness of breath and called EMS, which found him saturating at 66% on room air, which increased to 90s with nonrebreather mask. Patient denied subjective fever, chills, sick contacts, chest pain, palpitations, abdominal pain.      At [facility], patient had a fever to 101, with leukocytosis (20) and lactic acidosis (3.0) with a normal procal. CTA chest showed patchy perihilar opacities bilaterally most pronounced in right lower lobe. Covid, Group A strep, respiratory influenza panel -ve. D-dimer was elevated (3.68), but CTPA negative for PE. Tox postive for THC only. CXR was -ve for pneumothorax. Patient was started on IV Cefepime and IV Vancomycin.      Patient was initially on BiPAP but became agitated and was intubated. Patient reportedly had "red spit". Patient was difficult to sedate requiring propofol and precedex and multiple doses of versed and ketamine, thus became hypotensive after intubation and was started on levophed.      Patient is transferred to [facility] ICU, hospital medicine, for management of acute respiratory failure with hypoxia and hypercapnia."    At time of initial consult, patient intubated and sedated, unable to provide any history.       Hospital Course:  No notes on file    Interval History: Intubated and sedated     Past Medical History:   Diagnosis Date " "   Anxiety     Depression     Hepatitis C     Substance abuse     METH AND HEROIN       No past surgical history on file.    Review of patient's allergies indicates:  No Known Allergies    Medications:  Continuous Infusions:   cisatracurium (NIMBEX) infusion 1 mcg/kg/min (09/01/22 0905)    fentanyl 300 mcg/hr (09/01/22 0905)    ketamine (KETALAR) 5 mg/mL infusion (titrating) 17.5 mcg/kg/min (09/01/22 0905)    midazolam 3 mg/hr (09/01/22 0905)    propofoL 50 mcg/kg/min (09/01/22 1034)     Scheduled Meds:   buPROPion  150 mg Oral BID    ceFEPime (MAXIPIME) IVPB  2 g Intravenous Q8H    dexamethasone  20 mg Intravenous Daily    Followed by    [START ON 9/6/2022] dexamethasone  10 mg Intravenous Daily    enoxaparin  40 mg Subcutaneous Daily    famotidine (PF)  20 mg Intravenous BID    furosemide (LASIX) injection  40 mg Intravenous Q6H    mupirocin   Nasal BID    nicotine  1 patch Transdermal Daily    polyethylene glycol  17 g Oral Daily    QUEtiapine  200 mg Oral QHS    senna-docusate 8.6-50 mg  1 tablet Oral BID    sodium chloride 3%  4 mL Nebulization Q12H    white petrolatum-mineral oiL   Both Eyes Q8H     PRN Meds:acetaminophen, hydrALAZINE, midazolam, ondansetron, prochlorperazine, sodium chloride 0.9%    Family History    None       Tobacco Use    Smoking status: Every Day     Packs/day: 0.50     Types: Cigarettes    Smokeless tobacco: Never   Substance and Sexual Activity    Alcohol use: Yes     Comment: 5th whiskey or more daily    Drug use: Yes     Types: Methamphetamines, Marijuana     Comment: heroin , "pt reports he has been doing whatever he can get his hands on"     Sexual activity: Not on file       Review of Systems   Unable to perform ROS: Intubated   Objective:     Vital Signs (Most Recent):  Temp: 99.9 °F (37.7 °C) (09/01/22 0705)  Pulse: 94 (09/01/22 0905)  Resp: (!) 27 (09/01/22 0905)  BP: (!) 150/67 (09/01/22 0805)  SpO2: (!) 91 % (09/01/22 0905)   Vital Signs (24h " Range):  Temp:  [99.3 °F (37.4 °C)-100.7 °F (38.2 °C)] 99.9 °F (37.7 °C)  Pulse:  [] 94  Resp:  [23-28] 27  SpO2:  [89 %-97 %] 91 %  BP: (140-155)/(67-74) 150/67  Arterial Line BP: (124-157)/(59-75) 136/64     Weight: 86.2 kg (190 lb)  Body mass index is 28.06 kg/m².    Physical Exam  Constitutional:       Interventions: He is intubated.      Comments: ETT in place    Cardiovascular:      Rate and Rhythm: Normal rate.   Pulmonary:      Effort: No respiratory distress. He is intubated.   Neurological:      Comments: Unable to assess due to being intubated and sedated        Review of Symptoms      Symptom Assessment (ESAS 0-10 Scale)  Unable to complete assessment due to Intubated         Pain Assessment in Advanced Demential Scale (PAINAD)   Breathing - Independent of vocalization:  0  Negative vocalization:  0  Facial expression:  0  Body language:  0  Consolability:  0  Total:  0    Living Arrangements:  Lives with friend      Advance Care Planning   Advance Directives:     Decision Making:  Family answered questions       Significant Labs: All pertinent labs within the past 24 hours have been reviewed.  CBC:   Recent Labs   Lab 09/01/22 0411   WBC 16.35*   HGB 11.6*   HCT 37.9*   MCV 94        BMP:  Recent Labs   Lab 09/01/22 0411   *      K 4.5   CL 94*   CO2 41*   BUN 22*   CREATININE 0.7   CALCIUM 9.5     LFT:  Lab Results   Component Value Date    AST 48 (H) 09/01/2022    ALKPHOS 138 (H) 09/01/2022    BILITOT 0.3 09/01/2022     Albumin:   Albumin   Date Value Ref Range Status   09/01/2022 2.0 (L) 3.5 - 5.2 g/dL Final     Protein:   Total Protein   Date Value Ref Range Status   09/01/2022 7.2 6.0 - 8.4 g/dL Final     Lactic acid:   Lab Results   Component Value Date    LACTATE 1.3 08/26/2022    LACTATE 2.0 08/26/2022       Significant Imaging: I have reviewed all pertinent imaging results/findings within the past 24 hours.    Discussed with PCC and Dr. Sanon    > 50% of 70 min  visit spent in chart review, face to face discussion of goals of care,  symptom assessment, coordination of care and emotional support.    Bria Burrell DNP  Palliative Medicine  [facility] - Intensive Care ([place])

## 2022-09-02 LAB
ALBUMIN SERPL BCP-MCNC: 2 G/DL (ref 3.5–5.2)
ALLENS TEST: ABNORMAL
ALP SERPL-CCNC: 118 U/L (ref 55–135)
ALT SERPL W/O P-5'-P-CCNC: 38 U/L (ref 10–44)
ANION GAP SERPL CALC-SCNC: 10 MMOL/L (ref 8–16)
AST SERPL-CCNC: 56 U/L (ref 10–40)
BACTERIA BLD CULT: NORMAL
BASOPHILS # BLD AUTO: 0.04 K/UL (ref 0–0.2)
BASOPHILS NFR BLD: 0.3 % (ref 0–1.9)
BILIRUB SERPL-MCNC: 0.2 MG/DL (ref 0.1–1)
BUN SERPL-MCNC: 34 MG/DL (ref 6–20)
CALCIUM SERPL-MCNC: 9.7 MG/DL (ref 8.7–10.5)
CHLORIDE SERPL-SCNC: 91 MMOL/L (ref 95–110)
CO2 SERPL-SCNC: 45 MMOL/L (ref 23–29)
CREAT SERPL-MCNC: 0.7 MG/DL (ref 0.5–1.4)
DELSYS: ABNORMAL
DIFFERENTIAL METHOD: ABNORMAL
EOSINOPHIL # BLD AUTO: 0 K/UL (ref 0–0.5)
EOSINOPHIL NFR BLD: 0 % (ref 0–8)
ERYTHROCYTE [DISTWIDTH] IN BLOOD BY AUTOMATED COUNT: 13.9 % (ref 11.5–14.5)
ERYTHROCYTE [SEDIMENTATION RATE] IN BLOOD BY WESTERGREN METHOD: 26 MM/H
EST. GFR  (NO RACE VARIABLE): >60 ML/MIN/1.73 M^2
FIO2: 65
FIO2: 70
FIO2: 70
GLUCOSE SERPL-MCNC: 151 MG/DL (ref 70–110)
HCO3 UR-SCNC: 49.9 MMOL/L (ref 24–28)
HCO3 UR-SCNC: 54.7 MMOL/L (ref 24–28)
HCO3 UR-SCNC: 55 MMOL/L (ref 24–28)
HCT VFR BLD AUTO: 34.5 % (ref 40–54)
HGB BLD-MCNC: 10.8 G/DL (ref 14–18)
IMM GRANULOCYTES # BLD AUTO: 0.38 K/UL (ref 0–0.04)
IMM GRANULOCYTES NFR BLD AUTO: 2.5 % (ref 0–0.5)
LYMPHOCYTES # BLD AUTO: 0.7 K/UL (ref 1–4.8)
LYMPHOCYTES NFR BLD: 4.7 % (ref 18–48)
MCH RBC QN AUTO: 29.7 PG (ref 27–31)
MCHC RBC AUTO-ENTMCNC: 31.3 G/DL (ref 32–36)
MCV RBC AUTO: 95 FL (ref 82–98)
MIN VOL: 12.3
MODE: ABNORMAL
MONOCYTES # BLD AUTO: 1 K/UL (ref 0.3–1)
MONOCYTES NFR BLD: 6.7 % (ref 4–15)
NEUTROPHILS # BLD AUTO: 13 K/UL (ref 1.8–7.7)
NEUTROPHILS NFR BLD: 85.8 % (ref 38–73)
NRBC BLD-RTO: 0 /100 WBC
PCO2 BLDA: 85.7 MMHG (ref 35–45)
PCO2 BLDA: 95.8 MMHG (ref 35–45)
PCO2 BLDA: 98.6 MMHG (ref 35–45)
PEEP: 14
PH SMN: 7.35 [PH] (ref 7.35–7.45)
PH SMN: 7.36 [PH] (ref 7.35–7.45)
PH SMN: 7.37 [PH] (ref 7.35–7.45)
PIP: 30
PLATELET # BLD AUTO: 329 K/UL (ref 150–450)
PMV BLD AUTO: 9.3 FL (ref 9.2–12.9)
PO2 BLDA: 183 MMHG (ref 80–100)
PO2 BLDA: 78 MMHG (ref 80–100)
PO2 BLDA: 83 MMHG (ref 80–100)
POC BE: 25 MMOL/L
POC BE: 29 MMOL/L
POC BE: 29 MMOL/L
POC SATURATED O2: 94 % (ref 95–100)
POC SATURATED O2: 94 % (ref 95–100)
POC SATURATED O2: 99 % (ref 95–100)
POC TCO2: >50 MMOL/L (ref 23–27)
POTASSIUM SERPL-SCNC: 4.8 MMOL/L (ref 3.5–5.1)
PROT SERPL-MCNC: 7.4 G/DL (ref 6–8.4)
RBC # BLD AUTO: 3.64 M/UL (ref 4.6–6.2)
SAMPLE: ABNORMAL
SITE: ABNORMAL
SODIUM SERPL-SCNC: 146 MMOL/L (ref 136–145)
SP02: 94
VT: 480
WBC # BLD AUTO: 15.13 K/UL (ref 3.9–12.7)

## 2022-09-02 PROCEDURE — 93005 ELECTROCARDIOGRAM TRACING: CPT

## 2022-09-02 PROCEDURE — 94640 AIRWAY INHALATION TREATMENT: CPT

## 2022-09-02 PROCEDURE — S4991 NICOTINE PATCH NONLEGEND: HCPCS

## 2022-09-02 PROCEDURE — 99291 PR CRITICAL CARE, E/M 30-74 MINUTES: ICD-10-PCS | Mod: ,,, | Performed by: INTERNAL MEDICINE

## 2022-09-02 PROCEDURE — 82803 BLOOD GASES ANY COMBINATION: CPT

## 2022-09-02 PROCEDURE — 93010 ELECTROCARDIOGRAM REPORT: CPT | Mod: ,,, | Performed by: INTERNAL MEDICINE

## 2022-09-02 PROCEDURE — 93010 EKG 12-LEAD: ICD-10-PCS | Mod: ,,, | Performed by: INTERNAL MEDICINE

## 2022-09-02 PROCEDURE — 63600175 PHARM REV CODE 636 W HCPCS: Performed by: STUDENT IN AN ORGANIZED HEALTH CARE EDUCATION/TRAINING PROGRAM

## 2022-09-02 PROCEDURE — 80053 COMPREHEN METABOLIC PANEL: CPT | Performed by: INTERNAL MEDICINE

## 2022-09-02 PROCEDURE — 25000003 PHARM REV CODE 250: Performed by: INTERNAL MEDICINE

## 2022-09-02 PROCEDURE — 85025 COMPLETE CBC W/AUTO DIFF WBC: CPT | Performed by: INTERNAL MEDICINE

## 2022-09-02 PROCEDURE — 20000000 HC ICU ROOM

## 2022-09-02 PROCEDURE — 63600175 PHARM REV CODE 636 W HCPCS

## 2022-09-02 PROCEDURE — 27000221 HC OXYGEN, UP TO 24 HOURS

## 2022-09-02 PROCEDURE — 37799 UNLISTED PX VASCULAR SURGERY: CPT

## 2022-09-02 PROCEDURE — 94761 N-INVAS EAR/PLS OXIMETRY MLT: CPT

## 2022-09-02 PROCEDURE — 99900035 HC TECH TIME PER 15 MIN (STAT)

## 2022-09-02 PROCEDURE — 25000003 PHARM REV CODE 250

## 2022-09-02 PROCEDURE — 99900026 HC AIRWAY MAINTENANCE (STAT)

## 2022-09-02 PROCEDURE — 94003 VENT MGMT INPAT SUBQ DAY: CPT

## 2022-09-02 PROCEDURE — 99291 CRITICAL CARE FIRST HOUR: CPT | Mod: ,,, | Performed by: INTERNAL MEDICINE

## 2022-09-02 PROCEDURE — 25000242 PHARM REV CODE 250 ALT 637 W/ HCPCS: Performed by: STUDENT IN AN ORGANIZED HEALTH CARE EDUCATION/TRAINING PROGRAM

## 2022-09-02 PROCEDURE — 25000003 PHARM REV CODE 250: Performed by: STUDENT IN AN ORGANIZED HEALTH CARE EDUCATION/TRAINING PROGRAM

## 2022-09-02 RX ORDER — METOLAZONE 5 MG/1
5 TABLET ORAL DAILY
Status: DISCONTINUED | OUTPATIENT
Start: 2022-09-02 | End: 2022-09-05

## 2022-09-02 RX ADMIN — SENNOSIDES AND DOCUSATE SODIUM 1 TABLET: 50; 8.6 TABLET ORAL at 08:09

## 2022-09-02 RX ADMIN — Medication 300 MCG/HR: at 01:09

## 2022-09-02 RX ADMIN — DEXAMETHASONE SODIUM PHOSPHATE 20 MG: 4 INJECTION INTRA-ARTICULAR; INTRALESIONAL; INTRAMUSCULAR; INTRAVENOUS; SOFT TISSUE at 05:09

## 2022-09-02 RX ADMIN — BUPROPION HYDROCHLORIDE 150 MG: 75 TABLET, FILM COATED ORAL at 08:09

## 2022-09-02 RX ADMIN — FUROSEMIDE 40 MG: 10 INJECTION, SOLUTION INTRAMUSCULAR; INTRAVENOUS at 12:09

## 2022-09-02 RX ADMIN — Medication 300 MCG/HR: at 04:09

## 2022-09-02 RX ADMIN — PROPOFOL 45 MCG/KG/MIN: 10 INJECTION, EMULSION INTRAVENOUS at 05:09

## 2022-09-02 RX ADMIN — PROPOFOL 40 MCG/KG/MIN: 10 INJECTION, EMULSION INTRAVENOUS at 11:09

## 2022-09-02 RX ADMIN — CEFEPIME HYDROCHLORIDE 2 G: 2 INJECTION, SOLUTION INTRAVENOUS at 02:09

## 2022-09-02 RX ADMIN — KETAMINE HYDROCHLORIDE 15 MCG/KG/MIN: 100 INJECTION INTRAMUSCULAR; INTRAVENOUS at 06:09

## 2022-09-02 RX ADMIN — Medication 4 ML: at 08:09

## 2022-09-02 RX ADMIN — FUROSEMIDE 40 MG: 10 INJECTION, SOLUTION INTRAMUSCULAR; INTRAVENOUS at 06:09

## 2022-09-02 RX ADMIN — METOLAZONE 5 MG: 5 TABLET ORAL at 08:09

## 2022-09-02 RX ADMIN — KETAMINE HYDROCHLORIDE 13 MCG/KG/MIN: 100 INJECTION INTRAMUSCULAR; INTRAVENOUS at 01:09

## 2022-09-02 RX ADMIN — FUROSEMIDE 40 MG: 10 INJECTION, SOLUTION INTRAMUSCULAR; INTRAVENOUS at 11:09

## 2022-09-02 RX ADMIN — NICOTINE 1 PATCH: 14 PATCH TRANSDERMAL at 09:09

## 2022-09-02 RX ADMIN — Medication 300 MCG/HR: at 08:09

## 2022-09-02 RX ADMIN — QUETIAPINE FUMARATE 200 MG: 100 TABLET ORAL at 08:09

## 2022-09-02 RX ADMIN — DEXTROSE 7 MCG/KG/MIN: 50 INJECTION, SOLUTION INTRAVENOUS at 12:09

## 2022-09-02 RX ADMIN — FAMOTIDINE 20 MG: 10 INJECTION, SOLUTION INTRAVENOUS at 08:09

## 2022-09-02 RX ADMIN — ENOXAPARIN SODIUM 40 MG: 100 INJECTION SUBCUTANEOUS at 06:09

## 2022-09-02 RX ADMIN — MINERAL OIL AND WHITE PETROLATUM: 30; 940 OINTMENT OPHTHALMIC at 05:09

## 2022-09-02 RX ADMIN — CEFEPIME HYDROCHLORIDE 2 G: 2 INJECTION, SOLUTION INTRAVENOUS at 08:09

## 2022-09-02 RX ADMIN — MUPIROCIN: 20 OINTMENT TOPICAL at 08:09

## 2022-09-02 RX ADMIN — POLYETHYLENE GLYCOL 3350 17 G: 17 POWDER, FOR SOLUTION ORAL at 08:09

## 2022-09-02 RX ADMIN — CEFEPIME HYDROCHLORIDE 2 G: 2 INJECTION, SOLUTION INTRAVENOUS at 12:09

## 2022-09-02 RX ADMIN — PROPOFOL 45 MCG/KG/MIN: 10 INJECTION, EMULSION INTRAVENOUS at 12:09

## 2022-09-02 RX ADMIN — MINERAL OIL AND WHITE PETROLATUM: 30; 940 OINTMENT OPHTHALMIC at 10:09

## 2022-09-02 RX ADMIN — MUPIROCIN: 20 OINTMENT TOPICAL at 09:09

## 2022-09-02 RX ADMIN — MINERAL OIL AND WHITE PETROLATUM: 30; 940 OINTMENT OPHTHALMIC at 01:09

## 2022-09-02 RX ADMIN — PROPOFOL 40 MCG/KG/MIN: 10 INJECTION, EMULSION INTRAVENOUS at 01:09

## 2022-09-02 RX ADMIN — DEXTROSE 6.5 MCG/KG/MIN: 50 INJECTION, SOLUTION INTRAVENOUS at 06:09

## 2022-09-02 RX ADMIN — Medication 4 ML: at 07:09

## 2022-09-02 RX ADMIN — FUROSEMIDE 40 MG: 10 INJECTION, SOLUTION INTRAMUSCULAR; INTRAVENOUS at 05:09

## 2022-09-02 NOTE — PROGRESS NOTES
Pulmonary/Critical Care      Patient seen and examined by me. I agree with the trainee's separate note except as modified.    Major overnight events: None reported. Oxygenation slightly better     Vitals: Tmax 100.8, HR 90s, MAP 80s, 93% on 65% this AM with 14 PEEP    Significant lab findings: WBC 16 to 15, Na 146, bicarb 45, BUN 34.   7.36/96/183/55/99% on 70%     CXR reviewed by me: improved R>L opacities. ETT in place.      Key exam findings: intubated, sedated, paralyzed     Ventilator:               Intubation day # 8              Settings:AC/26/500/60/14              Peak/plateau:              P/F: 261 (proned)   Spontaneous awaking trial (SAT) safety screen    Does the patient have any of the following contraindications to an SAT?    [] Active seizures  [] RASS > +1  [x] On paralytics  [] Elevated intracranial pressure  [] Unstable respiratory status  [] Unstable cardiovascular status  [] Other _____________________    [] No contraindications to SAT    SBT: n/a  RASS: -5  Sedation: fentanyl, ketamine, versed, propofol   Vasopressors: none  Other drips: nimbex  Lines and invasive devices: PICC, salomon, ETT  Fluid Balance: 24 hr: +950cc  Overall: +790cc  Creatinine: cefepime d 8  Antibiotics/Day #: 0.7  Bowels: Last Bowel Movement: 09/01/22  Nutrition: TFs  Glucose management: BS low 100s  Prophylaxis:               DVT: enoxaparin              GI: H2 blocker   PT/OT: n/a   GOC/Family discussion: full code, mother updated at bedside     Plan:  -cont dexamethasone for ARDS  -prone positioning   -cont cefepime for 14 days   -goal I=O with diuresis   -paralytic holiday, try to wean off versed  -increase respiratory rate to 28     Critical care time (30min) spent personally by me on the following activities: development of treatment plan with patient or surrogate and bedside caregivers, discussions with consultants, evaluation of patient's response to treatment, examination of patient, ordering and performing  treatments and interventions, ordering and review of laboratory studies, ordering and review of radiographic studies, pulse oximetry, re-evaluation of patient's condition. This critical care time did not overlap with that of any other provider or involve time for any procedures.

## 2022-09-02 NOTE — ASSESSMENT & PLAN NOTE
- Continue buproprion 150mg per OG daily, quetiapine 200mg per OG qHS.  - Resume divalproex ER 500mg 1g PO daily, risperiodone 2mg PO daily when appropriate.

## 2022-09-02 NOTE — PROGRESS NOTES
"[facility] - Intensive Care [facility] Medicine  Progress Note    Patient Name: Leighton Carroll  MRN: [id number]  Patient Class: IP- Inpatient   Admission Date: 8/26/2022  Length of Stay: 7 days  Attending Physician: GAVIOTA Sanon MD  Primary Care Provider: Primary Doctor No        Subjective:     Principal Problem:Acute respiratory failure with hypoxia and hypercarbia        HPI:  Patient's HPI is adapted from notes of Dr. Schuler, Dr. Morin and Dr. Hall ([facility]).   Patient arrived in [facility] ICU intubated, pt's girlfriend's number not on file, unable to verify events PTA.     Leighton Carroll is a 42 year old man with a PMHx of depression, hx of drug overdose (7/11/2022, buproprion and gabapentin), polysubstance abuse (meth, heroin) and nicotine dependence.    He was presented to [facility] on 8/25/2022 via EMS with shortness of breath and dry cough. He was reportedly found down by his girlfriend at home yesterday evening (8/25/2022) and was given multiple rounds of chest compressions. He woke up and began having shortness of breath and called EMS, which found him saturating at 66% on room air, which increased to 90s with nonrebreather mask. Patient denied subjective fever, chills, sick contacts, chest pain, palpitations, abdominal pain.     At [facility], patient had a fever to 101, with leukocytosis (20) and lactic acidosis (3.0) with a normal procal. CTA chest showed patchy perihilar opacities bilaterally most pronounced in right lower lobe. Covid, Group A strep, respiratory influenza panel -ve. D-dimer was elevated (3.68), but CTPA negative for PE. Tox postive for THC only. CXR was -ve for pneumothorax. Patient was started on IV Cefepime and IV Vancomycin.     Patient was initially on BiPAP but became agitated and was intubated. Patient reportedly had "red spit". Patient was difficult to sedate requiring propofol and precedex and multiple doses of versed and ketamine, thus became hypotensive " after intubation and was started on levophed.     Patient is transferred to [facility] [place], operated by [facility] ICU, [facility] medicine, for management of acute respiratory failure with hypoxia and hypercapnia.             Overview/Hospital Course:  Patient arrived to ICU as a transfer intubated and sedated. CXR demonstrating bilateral patchy infiltrates and blood cultures have been positive for Acinetobacter sp, Pseudomonas luteola and now repeat cultures positive for a bacillus species (possible contaminant). Repeated cultures remain negative. Patient is on Vanc/Cefepime/azithromycin. PCC, ID consulted. TTE did not demonstrate vegetations, EF 50%.      Interval History: No acute events overnight. Continuing ARDS protocol and proning/supination. Continues to have high vent requirements.    Review of Systems   Unable to perform ROS: Intubated   Objective:     Vital Signs (Most Recent):  Temp: 98.9 °F (37.2 °C) (09/02/22 2015)  Pulse: 87 (09/02/22 2015)  Resp: (!) 28 (09/02/22 2015)  BP: 132/64 (09/02/22 2000)  SpO2: 96 % (09/02/22 2015)   Vital Signs (24h Range):  Temp:  [98.9 °F (37.2 °C)-99.6 °F (37.6 °C)] 98.9 °F (37.2 °C)  Pulse:  [] 87  Resp:  [25-28] 28  SpO2:  [92 %-98 %] 96 %  BP: (132-158)/(60-71) 132/64  Arterial Line BP: (116-154)/(58-72) 130/59     Weight: 86.2 kg (190 lb)  Body mass index is 28.06 kg/m².    Intake/Output Summary (Last 24 hours) at 9/2/2022 2104  Last data filed at 9/2/2022 1805  Gross per 24 hour   Intake 3832.72 ml   Output 2675 ml   Net 1157.72 ml        Physical Exam  Vitals and nursing note reviewed.   Constitutional:       General: He is not in acute distress.     Appearance: He is well-developed.   HENT:      Head: Normocephalic and atraumatic.   Eyes:      General:         Right eye: No discharge.         Left eye: No discharge.      Conjunctiva/sclera: Conjunctivae normal.   Cardiovascular:      Rate and Rhythm: Normal rate.      Pulses: Normal pulses.   Pulmonary:      Effort: Pulmonary effort is  normal. No respiratory distress.      Comments: Intubated, mechanical breath sounds.  Abdominal:      Palpations: Abdomen is soft.      Tenderness: There is no abdominal tenderness.   Musculoskeletal:         General: Normal range of motion.      Right lower leg: No edema.      Left lower leg: No edema.   Skin:     General: Skin is warm and dry.   Neurological:      Comments: RASS -5, CAM/ICU N/A.     Significant Labs:   CBC:  Recent Labs   Lab 08/31/22  0509 08/31/22  1153 09/01/22  0411 09/02/22  0349   WBC 14.85*  --  16.35* 15.13*   HGB 11.1*  --  11.6* 10.8*   HCT 33.9* 37 37.9* 34.5*     --  293 329   GRAN 73.1*  10.9*  --  78.4*  12.8* 85.8*  13.0*   LYMPH 6.5*  1.0  --  4.2*  0.7* 4.7*  0.7*   MONO 11.2  1.7*  --  11.3  1.8* 6.7  1.0   EOS 1.0*  --  0.4 0.0   BASO 0.06  --  0.06 0.04     CMP:  Recent Labs   Lab 08/30/22  2310 08/31/22  0509 09/01/22  0411 09/02/22  0349    137 144 146*   K 4.4 4.1 4.5 4.8   CL 94* 92* 94* 91*   CO2 35* 35* 41* 45*   BUN 17 16 22* 34*   CREATININE 0.6 0.6 0.7 0.7   * 118* 148* 151*   CALCIUM 8.3* 8.6* 9.5 9.7   MG 1.9  --   --   --    ALKPHOS  --  120 138* 118   AST  --  32 48* 56*   ALT  --  24 32 38   BILITOT  --  0.3 0.3 0.2   PROT  --  7.0 7.2 7.4   ALBUMIN  --  1.9* 2.0* 2.0*   ANIONGAP 9 10 9 10        Significant Imaging:   EXAMINATION:  XR CHEST 1 VIEW     CLINICAL HISTORY:  ARDS follow up;     TECHNIQUE:  Single frontal view of the chest was performed.     COMPARISON:  08/29/2022     FINDINGS:  Lines and tubes:  Tip of the endotracheal tube is above the messi.  Tip of the enteric tube extends below the left hemidiaphragm, beyond the field of view.  Right upper extremity PICC tip projects over the SVC.     Lung volumes are stable.  Consolidation at the right lung base is improving.  Nodular opacities in both lungs persist.  No new consolidation.  Cardiac silhouette is stable in size.     Impression:     Improving pneumonia.         Electronically signed by: Bria Eddy  Date:                                            09/01/2022  Time:                                           16:47      Assessment/Plan:      * Acute respiratory failure with hypoxia and hypercarbia  Bilateral PNA, ARDS, septic shock (resolved), bacteremia, h/o drug overdose, polysubstance abuse  - Hypoxic to 60s on arrival and failed BiPAP 2/2 agitation and intubated at OSH. Tox positive for THC. D-dimer elevated but CTA negative for PE. COVID, RSV negative.  - Continue fentanyl gtt, ketamine gtt, midazolam gtt, propofol gtt, cisatracurium gtt.  - Continue cefepime 2g IV q8hr for 14 day total course. Blood cultures showed acinetobacter, pseudomonas luteola.  - ARDSnet protocol; proning/supinating.   - Continue furosemide 40mg IV q6hr, goal mildly net negative. Still mildly positive - metolazone added. Continue dexamethasone 20mg IV daily.  - Appreciate pulm/crit, ID assistance.  - Palliative consulted given persistent vent requirements; appreciate assistance.    Bilateral pneumonia  - As above.    Hematuria  - Gross hematuria; not present on repeat UA.    Gram-negative bacteremia  - As above.    Septic shock  - As above.    History of drug overdose  - As above.    Depression  - Continue buproprion 150mg per OG daily, quetiapine 200mg per OG qHS.  - Resume divalproex ER 500mg 1g PO daily, risperiodone 2mg PO daily when appropriate.    VTE Risk Mitigation (From admission, onward)         Ordered     enoxaparin injection 40 mg  Daily         08/26/22 1422     IP VTE HIGH RISK PATIENT  Once         08/26/22 1422     Place sequential compression device  Until discontinued         08/26/22 1422     Place MARGRET hose  Until discontinued         08/26/22 1422                Discharge Planning   CHETAN:      Code Status: Full Code   Is the patient medically ready for discharge?:     Reason for patient still in hospital (select all that apply): Treatment  Discharge Plan A: Skilled  Nursing Facility        Critical due to resp failure.    Critical care time spent on the evaluation and treatment of severe organ dysfunction, review of pertinent labs and imaging studies, discussions with consulting providers and discussions with patient/family: 35 minutes.    SLICK Sanon MD  Department of [facility] Medicine   [facility] - Intensive Care [facility]

## 2022-09-02 NOTE — PLAN OF CARE
09/02/22 1446   Discharge Assessment   Assessment Type Discharge Planning Assessment   Confirmed/corrected address, phone number and insurance Yes   Confirmed Demographics Correct on Facesheet   Source of Information family   Lives With significant other   Prior to hospitilization cognitive status: Alert/Oriented   Current cognitive status: Alert/Oriented   Walking or Climbing Stairs Difficulty none   Dressing/Bathing Difficulty none   Equipment Currently Used at Home none   Readmission within 30 days? No   Do you currently have service(s) that help you manage your care at home? No   Do you take prescription medications? Yes   Do you have prescription coverage? Yes   Do you have any problems affording any of your prescribed medications? TBD   Is the patient taking medications as prescribed? no   How do you get to doctors appointments? family or friend will provide   Are you on dialysis? No   Do you take coumadin? No   Discharge Plan A Skilled Nursing Facility   Discharge Plan B Rehab   DME Needed Upon Discharge  none   Discharge Plan discussed with: Parent(s)   Discharge Barriers Identified None

## 2022-09-02 NOTE — PLAN OF CARE
"  ICU PLAN OF CARE NOTE    SHIFT EVENTS:  VSS / No acute events this shift. Patient and/or family updated on plan of care; questions and concerns addressed. See flow sheets for full assessment details. Will continue to monitor patient closely.      Dx: Acute respiratory failure with hypoxia and hypercarbia    Vital Signs: /67 (BP Location: Right arm, Patient Position: Lying)   Pulse 90   Temp 99.2 °F (37.3 °C) (Oral)   Resp (!) 28   Ht 5' 9" (1.753 m)   Wt 86.2 kg (190 lb)   SpO2 (!) 93%   BMI 28.06 kg/m²     Neuro: Sedated and Unresponsive    Respiratory: Ventilator    Cardiac: NSR/ST- short DC    Diet: NPO and Tube Feeds    Gtts: Propfol, Fentanyl, Cisatricurium, and Abx    Urine Output: Urinary Catheter 1375 cc/shift    Drains:     NG/OG Tube 1300 cc /  shift     Labs/Accuchecks: daily labs, PRN ABG's, 0 accuchecks.    SKIN NOTE:  Skin: continues with abrasion to anterior scrotum, abrasion to right cheek and dependent edema. 0 other skin concerns noted at this time.    Skin precautions maintained including:  Sacrum and heels with foam dressing in place for pressure protection. Frequent weight shift encouraged / assistance provided / continuous rotational turning bed feature utilized; Patient turned Q2 hr to prevent further breakdown. Bed plugged in and mattress inflated. Adhesive use limited. Heels elevated off bed. Positioned off wounds. Pressure points protected and positioning supports utilized.  Skin-to-device areas padded. Skin-to-skin areas padded.    New Orders:      EKG- 12 lead   Metolazone 5mg     Shift Events:    O2 requirements decreased.    PCO2 increasing as well as HCO3      "

## 2022-09-02 NOTE — ASSESSMENT & PLAN NOTE
Bilateral PNA, ARDS, septic shock (resolved), bacteremia, h/o drug overdose, polysubstance abuse  - Hypoxic to 60s on arrival and failed BiPAP 2/2 agitation and intubated at OSH. Tox positive for THC. D-dimer elevated but CTA negative for PE. COVID, RSV negative.  - Continue fentanyl gtt, ketamine gtt, midazolam gtt, propofol gtt, cisatracurium gtt.  - Continue cefepime 2g IV q8hr for 14 day total course. Blood cultures showed acinetobacter, pseudomonas luteola.  - ARDSnet protocol; proning/supinating.   - Continue furosemide 40mg IV q6hr, goal mildly net negative. Continue dexamethasone 20mg IV daily.  - Appreciate pulm/crit, ID assistance.  - Palliative consult given persistent vent requirements; has been ventilated nearly a week at this point and may end up requiring trach depending on vent weaning. Regardless anticipate that he will be quite weak after extubation and require extended care.

## 2022-09-02 NOTE — PLAN OF CARE
Patient will continue current POC as ordered by attending physician. Mechanical ventilation and all paralytics and sedatives will continue to be administered. The patient is tolerating the medications and therapy well.MD will continue to review POC and make recommendations. The patient will also continue 1:1 observation

## 2022-09-02 NOTE — PROGRESS NOTES
"[facility] Pulmonary/Critical Care Fellow Progress Note:    Brief Hx: 43 yo M w/ PMHx of drug use (heroin, methamphetamine, others) admitted  to [facility] for hypoxemic and hypercapnic respiratory failure after being found down, presumably 2/2 drug overdose and in shock. He received CPR from his GF and did wake up when EMS found him and he presented to the ED with leukocytosis, lactic acidosis, febrile. CTA showed diffuse dense bilateral infiltrates. UDS+ for THC. Blood cultures grew acinetobacter pittii & pesudomonas luteola and bacillus cereus. CXR shows evolution of R sided PNA. He started proning on  and started receiving steroids for ARDS dosing on .     Subjective:  Patient intubated and sedated, weaned 5% on FiO2. Blood gas overnight on 70% FiO2 and 14 PEEP showed PaO2 183 while proned which is an improvement from the day prior.    Objective:  Last 24 Hour Vital Signs:  BP  Min: 137/60  Max: 152/71  Temp  Av.3 °F (37.4 °C)  Min: 97.4 °F (36.3 °C)  Max: 100.8 °F (38.2 °C)  Pulse  Av.3  Min: 0  Max: 105  Resp  Av.2  Min: 25  Max: 31  SpO2  Av.6 %  Min: 91 %  Max: 98 %  Body mass index is 28.06 kg/m².  I/O last 3 completed shifts:  In: 5466.3 [I.V.:2934.5; NG/GT:1690; IV Piggyback:841.8]  Out: 4935 [Urine:4875; Drains:60]      Physical Exam:  General: Sedated  HENT:  NCAT; anicteric sclera; (+)ETT in place, small wound on nasal septum  Cardio:  Regular rate and rhythm with normal S1 and S2; no murmurs or rubs  Resp:  CTAB; respirations unlabored; no wheezes, crackles or rhonchi  Abdom:  Soft, non-distended  Extrem:  WWP with no clubbing, cyanosis or edema, small cuts on hands and legs that are scabbed over  Pulses:  2+ and symmetric distally  Neuro:  AAOx0; paralyzed    Assessment & Plan:     42M with history of drug use (heroin, methamphetamine, "whatever he can get his hands on") presents with hypoxemic and hypercapnic respiratory failure presumably secondary to drug " overdose requiring intubation found to have bacteremia and diffuse lung infiltrates.      Acute hypoxic and hypercapnic respiratory failure  In setting of presumed drug overdose (fentanyl?)  With diffuse bilateral pulmonary infiltrates on CT chest, +fevers and leukocytosis  Sputum NGTD, RIP negative  JOVANA/ANCA negative -no new hemoptysis noted  Blood cultures positive - presume that lung infiltrates are related to this (see below)  Blood cultures with pseudomonas luteola and acinetobacter pitii -- patient with negative TTE for valve vegetations  - plan for 14d treatment given acuity of illness  Continue with current vent settings, wean FiO2 as able to maintain O2 saturations >90% (avoid over oxygenation) and titrate on ARDS ladder (currently low-peep)  Hold off on SAT - patient requires a lot of sedation - propofol, fentanyl, versed gtt, ketamine gtt in addition, added seroquel 200mg QHS & bupropion 150mg BID (was on 300mg XR outpatient) to help reduce need for sedation - would wean versed first   Lasix 40mg IV QID ordered to keep net even fluid balance - added metolazone 5mg as he is + today  Continue to wean sedation as tolerated to target RASS -3  Multifocal PNA & ARDS  Started prone 8/30 - continue for now, plan for 1600hrs prone, 0800hrs supination, please hold nimbex when supine  Started steroids for ARDS 9/1 - 20mg for 5 days and then 10mg for 5 days  Aspiration vs. Inhalation injury/pneumonitis vs. Septic emboli vs. Vasculitis (less likely)  Lab and culture workup as above  No new evidence of hemoptysis  Continue cefepime  Urine legionella negative, strep Ag still pending  HIV negative, viral PCR negative, Hep panel (+)hep C, negative acute hepatitis   Myositis panel pending, JOVANA & ANCA negative    Code: FULL     DVT: Lovenox  GI: Famotidine  Feeds: TF running  Sedation: propofol gtt, fentanyl gtt, ketamine gtt, versed gtt, nimbex when proned    Sujata Francois MD  Pulm/CC Fellow

## 2022-09-02 NOTE — EICU
EICU FOLLOWUP NOTE:    Called for:  Bicarb of 45 on BMP      Telemetry was reviewed. Medical records including notes, labs and imaging were reviewed.    DISCUSSED with bedside nurse.    ASSESSMENT AND PLAN:    Patient bicarb is progressively trending up and it is 45 today.  Loss ABG from 08/31 with pH of 7.37 and pCO2 80.4.  He is being treated for severe ARDS secondary to pneumonia and currently on mechanical ventilation with permissive hypercapnia and low tidal volume lung protective strategies.  I will order a blood gas to assess acid-base balance status.    Thank You for allowing EICU to participate in the care of the patient. Please call as needed    Tyree Meadows MD  [facility]  [phone]

## 2022-09-02 NOTE — PROGRESS NOTES
Pt received intubated and on the ventilator this AM;SPO2 adequate. Pt was supinated @ 0815;tolerated well. ABG was obtained at 0910;shown to Pulm CC. FIO2 was decreased to 60% and RR increased to 28. No other changes were made at this time. Will continue to monitor.

## 2022-09-02 NOTE — SUBJECTIVE & OBJECTIVE
Interval History: No acute events overnight. Continuing ARDS protocol and proning/supination. Continues to have high vent requirements.    Review of Systems   Unable to perform ROS: Intubated   Objective:     Vital Signs (Most Recent):  Temp: 99.3 °F (37.4 °C) (09/01/22 1805)  Pulse: 95 (09/01/22 2029)  Resp: (!) 30 (09/01/22 2029)  BP: (!) 148/70 (09/01/22 1205)  SpO2: 97 % (09/01/22 2029)   Vital Signs (24h Range):  Temp:  [99.3 °F (37.4 °C)-100.8 °F (38.2 °C)] 99.3 °F (37.4 °C)  Pulse:  [0-105] 95  Resp:  [25-31] 30  SpO2:  [91 %-97 %] 97 %  BP: (144-150)/(67-70) 148/70  Arterial Line BP: (124-157)/(53-68) 134/59     Weight: 86.2 kg (190 lb)  Body mass index is 28.06 kg/m².    Intake/Output Summary (Last 24 hours) at 9/1/2022 2144  Last data filed at 9/1/2022 1923  Gross per 24 hour   Intake 3262.49 ml   Output 3285 ml   Net -22.51 ml        Physical Exam  Vitals and nursing note reviewed.   Constitutional:       General: He is not in acute distress.     Appearance: He is well-developed.   HENT:      Head: Normocephalic and atraumatic.   Eyes:      General:         Right eye: No discharge.         Left eye: No discharge.      Conjunctiva/sclera: Conjunctivae normal.   Cardiovascular:      Rate and Rhythm: Normal rate.      Pulses: Normal pulses.   Pulmonary:      Effort: Pulmonary effort is normal. No respiratory distress.      Comments: Intubated, mechanical breath sounds.  Abdominal:      Palpations: Abdomen is soft.      Tenderness: There is no abdominal tenderness.   Musculoskeletal:         General: Normal range of motion.      Right lower leg: No edema.      Left lower leg: No edema.   Skin:     General: Skin is warm and dry.   Neurological:      Comments: RASS -5, CAM/ICU N/A.     Significant Labs:   CBC:  Recent Labs   Lab 08/30/22  0343 08/30/22  1537 08/31/22  0509 08/31/22  1153 09/01/22  0411   WBC 16.52*  --  14.85*  --  16.35*   HGB 11.3*  --  11.1*  --  11.6*   HCT 34.8*   < > 33.9* 37 37.9*   PLT  258  --  264  --  293   GRAN 76.8*  12.7*  --  73.1*  10.9*  --  78.4*  12.8*   LYMPH 5.1*  0.9*  --  6.5*  1.0  --  4.2*  0.7*   MONO 10.5  1.7*  --  11.2  1.7*  --  11.3  1.8*   EOS 1.1*  --  1.0*  --  0.4   BASO 0.07  --  0.06  --  0.06    < > = values in this interval not displayed.     CMP:  Recent Labs   Lab 08/30/22  0343 08/30/22  2310 08/31/22  0509 09/01/22  0411    138 137 144   K 4.1 4.4 4.1 4.5   CL 99 94* 92* 94*   CO2 31* 35* 35* 41*   BUN 14 17 16 22*   CREATININE 0.7 0.6 0.6 0.7   * 112* 118* 148*   CALCIUM 8.6* 8.3* 8.6* 9.5   MG  --  1.9  --   --    ALKPHOS 94  --  120 138*   AST 31  --  32 48*   ALT 27  --  24 32   BILITOT 0.6  --  0.3 0.3   PROT 6.2  --  7.0 7.2   ALBUMIN 2.0*  --  1.9* 2.0*   ANIONGAP 8 9 10 9        Significant Imaging:   No new imaging this morning.

## 2022-09-03 LAB
ALBUMIN SERPL BCP-MCNC: 2.2 G/DL (ref 3.5–5.2)
ALP SERPL-CCNC: 105 U/L (ref 55–135)
ALT SERPL W/O P-5'-P-CCNC: 62 U/L (ref 10–44)
ANION GAP SERPL CALC-SCNC: 10 MMOL/L (ref 8–16)
AST SERPL-CCNC: 93 U/L (ref 10–40)
BASOPHILS # BLD AUTO: 0.04 K/UL (ref 0–0.2)
BASOPHILS NFR BLD: 0.3 % (ref 0–1.9)
BILIRUB SERPL-MCNC: 0.2 MG/DL (ref 0.1–1)
BUN SERPL-MCNC: 53 MG/DL (ref 6–20)
CALCIUM SERPL-MCNC: 9.8 MG/DL (ref 8.7–10.5)
CHLORIDE SERPL-SCNC: 85 MMOL/L (ref 95–110)
CO2 SERPL-SCNC: 49 MMOL/L (ref 23–29)
CREAT SERPL-MCNC: 0.8 MG/DL (ref 0.5–1.4)
DIFFERENTIAL METHOD: ABNORMAL
EOSINOPHIL # BLD AUTO: 0.1 K/UL (ref 0–0.5)
EOSINOPHIL NFR BLD: 0.6 % (ref 0–8)
ERYTHROCYTE [DISTWIDTH] IN BLOOD BY AUTOMATED COUNT: 14 % (ref 11.5–14.5)
EST. GFR  (NO RACE VARIABLE): >60 ML/MIN/1.73 M^2
GLUCOSE SERPL-MCNC: 113 MG/DL (ref 70–110)
HCT VFR BLD AUTO: 34.7 % (ref 40–54)
HGB BLD-MCNC: 10.8 G/DL (ref 14–18)
IMM GRANULOCYTES # BLD AUTO: 0.29 K/UL (ref 0–0.04)
IMM GRANULOCYTES NFR BLD AUTO: 2 % (ref 0–0.5)
LYMPHOCYTES # BLD AUTO: 1.6 K/UL (ref 1–4.8)
LYMPHOCYTES NFR BLD: 11.5 % (ref 18–48)
MCH RBC QN AUTO: 29.3 PG (ref 27–31)
MCHC RBC AUTO-ENTMCNC: 31.1 G/DL (ref 32–36)
MCV RBC AUTO: 94 FL (ref 82–98)
MONOCYTES # BLD AUTO: 1.7 K/UL (ref 0.3–1)
MONOCYTES NFR BLD: 12.2 % (ref 4–15)
NEUTROPHILS # BLD AUTO: 10.4 K/UL (ref 1.8–7.7)
NEUTROPHILS NFR BLD: 73.4 % (ref 38–73)
NRBC BLD-RTO: 0 /100 WBC
PLATELET # BLD AUTO: 397 K/UL (ref 150–450)
PMV BLD AUTO: 9.2 FL (ref 9.2–12.9)
POTASSIUM SERPL-SCNC: 4.2 MMOL/L (ref 3.5–5.1)
PROT SERPL-MCNC: 7.6 G/DL (ref 6–8.4)
RBC # BLD AUTO: 3.69 M/UL (ref 4.6–6.2)
S PNEUM AG UR QL: NOT DETECTED
SODIUM SERPL-SCNC: 144 MMOL/L (ref 136–145)
WBC # BLD AUTO: 14.15 K/UL (ref 3.9–12.7)

## 2022-09-03 PROCEDURE — S4991 NICOTINE PATCH NONLEGEND: HCPCS

## 2022-09-03 PROCEDURE — 87205 SMEAR GRAM STAIN: CPT | Performed by: STUDENT IN AN ORGANIZED HEALTH CARE EDUCATION/TRAINING PROGRAM

## 2022-09-03 PROCEDURE — 99291 PR CRITICAL CARE, E/M 30-74 MINUTES: ICD-10-PCS | Mod: ,,, | Performed by: INTERNAL MEDICINE

## 2022-09-03 PROCEDURE — 36600 WITHDRAWAL OF ARTERIAL BLOOD: CPT

## 2022-09-03 PROCEDURE — 94640 AIRWAY INHALATION TREATMENT: CPT

## 2022-09-03 PROCEDURE — 20000000 HC ICU ROOM

## 2022-09-03 PROCEDURE — 63600175 PHARM REV CODE 636 W HCPCS: Performed by: STUDENT IN AN ORGANIZED HEALTH CARE EDUCATION/TRAINING PROGRAM

## 2022-09-03 PROCEDURE — 37799 UNLISTED PX VASCULAR SURGERY: CPT

## 2022-09-03 PROCEDURE — 63600175 PHARM REV CODE 636 W HCPCS

## 2022-09-03 PROCEDURE — 87070 CULTURE OTHR SPECIMN AEROBIC: CPT | Performed by: STUDENT IN AN ORGANIZED HEALTH CARE EDUCATION/TRAINING PROGRAM

## 2022-09-03 PROCEDURE — 25000003 PHARM REV CODE 250: Performed by: INTERNAL MEDICINE

## 2022-09-03 PROCEDURE — 63600175 PHARM REV CODE 636 W HCPCS: Performed by: INTERNAL MEDICINE

## 2022-09-03 PROCEDURE — 80053 COMPREHEN METABOLIC PANEL: CPT | Performed by: INTERNAL MEDICINE

## 2022-09-03 PROCEDURE — 85025 COMPLETE CBC W/AUTO DIFF WBC: CPT | Performed by: INTERNAL MEDICINE

## 2022-09-03 PROCEDURE — 25000003 PHARM REV CODE 250

## 2022-09-03 PROCEDURE — 87186 SC STD MICRODIL/AGAR DIL: CPT | Performed by: STUDENT IN AN ORGANIZED HEALTH CARE EDUCATION/TRAINING PROGRAM

## 2022-09-03 PROCEDURE — 82803 BLOOD GASES ANY COMBINATION: CPT

## 2022-09-03 PROCEDURE — 94003 VENT MGMT INPAT SUBQ DAY: CPT

## 2022-09-03 PROCEDURE — 87077 CULTURE AEROBIC IDENTIFY: CPT | Performed by: STUDENT IN AN ORGANIZED HEALTH CARE EDUCATION/TRAINING PROGRAM

## 2022-09-03 PROCEDURE — 99291 CRITICAL CARE FIRST HOUR: CPT | Mod: ,,, | Performed by: INTERNAL MEDICINE

## 2022-09-03 PROCEDURE — 99900035 HC TECH TIME PER 15 MIN (STAT)

## 2022-09-03 PROCEDURE — 99900026 HC AIRWAY MAINTENANCE (STAT)

## 2022-09-03 PROCEDURE — 94761 N-INVAS EAR/PLS OXIMETRY MLT: CPT

## 2022-09-03 PROCEDURE — 25000242 PHARM REV CODE 250 ALT 637 W/ HCPCS: Performed by: STUDENT IN AN ORGANIZED HEALTH CARE EDUCATION/TRAINING PROGRAM

## 2022-09-03 PROCEDURE — 87106 FUNGI IDENTIFICATION YEAST: CPT | Performed by: STUDENT IN AN ORGANIZED HEALTH CARE EDUCATION/TRAINING PROGRAM

## 2022-09-03 PROCEDURE — 25000003 PHARM REV CODE 250: Performed by: STUDENT IN AN ORGANIZED HEALTH CARE EDUCATION/TRAINING PROGRAM

## 2022-09-03 PROCEDURE — 27000221 HC OXYGEN, UP TO 24 HOURS

## 2022-09-03 RX ORDER — FENTANYL CITRATE-0.9 % NACL/PF 10 MCG/ML
0-300 PLASTIC BAG, INJECTION (ML) INTRAVENOUS CONTINUOUS
Status: DISCONTINUED | OUTPATIENT
Start: 2022-09-03 | End: 2022-09-06

## 2022-09-03 RX ORDER — PROPOFOL 10 MG/ML
0-50 INJECTION, EMULSION INTRAVENOUS CONTINUOUS
Status: DISCONTINUED | OUTPATIENT
Start: 2022-09-03 | End: 2022-09-06

## 2022-09-03 RX ADMIN — SENNOSIDES AND DOCUSATE SODIUM 1 TABLET: 50; 8.6 TABLET ORAL at 08:09

## 2022-09-03 RX ADMIN — PROPOFOL 50 MCG/KG/MIN: 10 INJECTION, EMULSION INTRAVENOUS at 07:09

## 2022-09-03 RX ADMIN — Medication 4 ML: at 07:09

## 2022-09-03 RX ADMIN — POLYETHYLENE GLYCOL 3350 17 G: 17 POWDER, FOR SOLUTION ORAL at 08:09

## 2022-09-03 RX ADMIN — KETAMINE HYDROCHLORIDE 10 MCG/KG/MIN: 100 INJECTION INTRAMUSCULAR; INTRAVENOUS at 01:09

## 2022-09-03 RX ADMIN — MIDAZOLAM HYDROCHLORIDE 4 MG/HR: 5 INJECTION, SOLUTION INTRAMUSCULAR; INTRAVENOUS at 08:09

## 2022-09-03 RX ADMIN — Medication 300 MCG/HR: at 10:09

## 2022-09-03 RX ADMIN — CEFEPIME HYDROCHLORIDE 2 G: 2 INJECTION, SOLUTION INTRAVENOUS at 07:09

## 2022-09-03 RX ADMIN — ENOXAPARIN SODIUM 40 MG: 100 INJECTION SUBCUTANEOUS at 05:09

## 2022-09-03 RX ADMIN — MUPIROCIN: 20 OINTMENT TOPICAL at 08:09

## 2022-09-03 RX ADMIN — KETAMINE HYDROCHLORIDE 7.5 MCG/KG/MIN: 50 INJECTION INTRAMUSCULAR; INTRAVENOUS at 02:09

## 2022-09-03 RX ADMIN — PROPOFOL 50 MCG/KG/MIN: 10 INJECTION, EMULSION INTRAVENOUS at 02:09

## 2022-09-03 RX ADMIN — MIDAZOLAM HYDROCHLORIDE 4 MG/HR: 5 INJECTION, SOLUTION INTRAMUSCULAR; INTRAVENOUS at 02:09

## 2022-09-03 RX ADMIN — PROPOFOL 45 MCG/KG/MIN: 10 INJECTION, EMULSION INTRAVENOUS at 08:09

## 2022-09-03 RX ADMIN — PROPOFOL 45 MCG/KG/MIN: 10 INJECTION, EMULSION INTRAVENOUS at 07:09

## 2022-09-03 RX ADMIN — FUROSEMIDE 40 MG: 10 INJECTION, SOLUTION INTRAMUSCULAR; INTRAVENOUS at 11:09

## 2022-09-03 RX ADMIN — NICOTINE 1 PATCH: 14 PATCH TRANSDERMAL at 08:09

## 2022-09-03 RX ADMIN — KETAMINE HYDROCHLORIDE 7.5 MCG/KG/MIN: 50 INJECTION INTRAMUSCULAR; INTRAVENOUS at 08:09

## 2022-09-03 RX ADMIN — DEXTROSE 3.5 MCG/KG/MIN: 50 INJECTION, SOLUTION INTRAVENOUS at 03:09

## 2022-09-03 RX ADMIN — FAMOTIDINE 20 MG: 10 INJECTION, SOLUTION INTRAVENOUS at 08:09

## 2022-09-03 RX ADMIN — BUPROPION HYDROCHLORIDE 150 MG: 75 TABLET, FILM COATED ORAL at 08:09

## 2022-09-03 RX ADMIN — PROPOFOL 45 MCG/KG/MIN: 10 INJECTION, EMULSION INTRAVENOUS at 11:09

## 2022-09-03 RX ADMIN — Medication 300 MCG/HR: at 02:09

## 2022-09-03 RX ADMIN — Medication 300 MCG/HR: at 08:09

## 2022-09-03 RX ADMIN — MINERAL OIL AND WHITE PETROLATUM: 30; 940 OINTMENT OPHTHALMIC at 05:09

## 2022-09-03 RX ADMIN — Medication 300 MCG/HR: at 04:09

## 2022-09-03 RX ADMIN — MINERAL OIL AND WHITE PETROLATUM: 30; 940 OINTMENT OPHTHALMIC at 09:09

## 2022-09-03 RX ADMIN — METOLAZONE 5 MG: 5 TABLET ORAL at 08:09

## 2022-09-03 RX ADMIN — MINERAL OIL AND WHITE PETROLATUM: 30; 940 OINTMENT OPHTHALMIC at 02:09

## 2022-09-03 RX ADMIN — PROPOFOL 45 MCG/KG/MIN: 10 INJECTION, EMULSION INTRAVENOUS at 04:09

## 2022-09-03 RX ADMIN — FUROSEMIDE 40 MG: 10 INJECTION, SOLUTION INTRAMUSCULAR; INTRAVENOUS at 05:09

## 2022-09-03 RX ADMIN — DEXTROSE 5 MCG/KG/MIN: 50 INJECTION, SOLUTION INTRAVENOUS at 05:09

## 2022-09-03 RX ADMIN — CEFEPIME HYDROCHLORIDE 2 G: 2 INJECTION, SOLUTION INTRAVENOUS at 03:09

## 2022-09-03 RX ADMIN — CEFEPIME HYDROCHLORIDE 2 G: 2 INJECTION, SOLUTION INTRAVENOUS at 11:09

## 2022-09-03 RX ADMIN — DEXAMETHASONE SODIUM PHOSPHATE 20 MG: 4 INJECTION INTRA-ARTICULAR; INTRALESIONAL; INTRAMUSCULAR; INTRAVENOUS; SOFT TISSUE at 05:09

## 2022-09-03 NOTE — PLAN OF CARE
"  ICU PLAN OF CARE NOTE    SHIFT EVENTS:  VSS / No acute events this shift. Patient and/or family updated on plan of care; questions and concerns addressed. See flow sheets for full assessment details. Will continue to monitor patient closely.      Dx: Acute respiratory failure with hypoxia and hypercarbia    Vital Signs: /78   Pulse 102   Temp (!) 100.8 °F (38.2 °C) (Oral)   Resp (!) 28   Ht 5' 9" (1.753 m)   Wt 86.2 kg (190 lb)   SpO2 (!) 93%   BMI 28.06 kg/m²     Neuro: Sedated and Unresponsive    Respiratory: Ventilator    Cardiac: NSR/ST    Diet: NPO and Tube Feeds    Gtts: Propfol and Fentanyl, nimbex, ketamine, versed    Urine Output: Urinary Catheter 2375 cc/shift    Drains:     NG/OG Tube 1100 cc /  shift (intake)       Labs/Accuchecks: daily labs, ABG's, 0 accuchecks.    SKIN NOTE:  Skin: continues with same skin concerns. 0 new skin concerns noted.     Skin precautions maintained including:  Sacrum and heels with foam dressing in place for pressure protection. Frequent weight shift encouraged / assistance provided / continuous rotational turning bed feature utilized; Patient turned Q2 hr to prevent further breakdown. Bed plugged in and mattress inflated. Adhesive use limited. Heels elevated off bed. Positioned off wounds. Pressure points protected and positioning supports utilized.  Skin-to-device areas padded. Skin-to-skin areas padded.    New Orders:    ABG   Respiratory culture     Shift Events: pt proned at 1615 tolerated well. RN's x 4 as well as one RTT at bedside.       "

## 2022-09-03 NOTE — SUBJECTIVE & OBJECTIVE
Interval History: No acute events overnight. Continuing ARDS protocol and proning/supination. Continues to have high vent requirements.    Review of Systems   Unable to perform ROS: Intubated   Objective:     Vital Signs (Most Recent):  Temp: 98.9 °F (37.2 °C) (09/02/22 2015)  Pulse: 87 (09/02/22 2015)  Resp: (!) 28 (09/02/22 2015)  BP: 132/64 (09/02/22 2000)  SpO2: 96 % (09/02/22 2015)   Vital Signs (24h Range):  Temp:  [98.9 °F (37.2 °C)-99.6 °F (37.6 °C)] 98.9 °F (37.2 °C)  Pulse:  [] 87  Resp:  [25-28] 28  SpO2:  [92 %-98 %] 96 %  BP: (132-158)/(60-71) 132/64  Arterial Line BP: (116-154)/(58-72) 130/59     Weight: 86.2 kg (190 lb)  Body mass index is 28.06 kg/m².    Intake/Output Summary (Last 24 hours) at 9/2/2022 2104  Last data filed at 9/2/2022 1805  Gross per 24 hour   Intake 3832.72 ml   Output 2675 ml   Net 1157.72 ml        Physical Exam  Vitals and nursing note reviewed.   Constitutional:       General: He is not in acute distress.     Appearance: He is well-developed.   HENT:      Head: Normocephalic and atraumatic.   Eyes:      General:         Right eye: No discharge.         Left eye: No discharge.      Conjunctiva/sclera: Conjunctivae normal.   Cardiovascular:      Rate and Rhythm: Normal rate.      Pulses: Normal pulses.   Pulmonary:      Effort: Pulmonary effort is normal. No respiratory distress.      Comments: Intubated, mechanical breath sounds.  Abdominal:      Palpations: Abdomen is soft.      Tenderness: There is no abdominal tenderness.   Musculoskeletal:         General: Normal range of motion.      Right lower leg: No edema.      Left lower leg: No edema.   Skin:     General: Skin is warm and dry.   Neurological:      Comments: RASS -5, CAM/ICU N/A.     Significant Labs:   CBC:  Recent Labs   Lab 08/31/22  0509 08/31/22  1153 09/01/22  0411 09/02/22  0349   WBC 14.85*  --  16.35* 15.13*   HGB 11.1*  --  11.6* 10.8*   HCT 33.9* 37 37.9* 34.5*     --  293 329   GRAN 73.1*   10.9*  --  78.4*  12.8* 85.8*  13.0*   LYMPH 6.5*  1.0  --  4.2*  0.7* 4.7*  0.7*   MONO 11.2  1.7*  --  11.3  1.8* 6.7  1.0   EOS 1.0*  --  0.4 0.0   BASO 0.06  --  0.06 0.04     CMP:  Recent Labs   Lab 08/30/22  2310 08/31/22  0509 09/01/22  0411 09/02/22  0349    137 144 146*   K 4.4 4.1 4.5 4.8   CL 94* 92* 94* 91*   CO2 35* 35* 41* 45*   BUN 17 16 22* 34*   CREATININE 0.6 0.6 0.7 0.7   * 118* 148* 151*   CALCIUM 8.3* 8.6* 9.5 9.7   MG 1.9  --   --   --    ALKPHOS  --  120 138* 118   AST  --  32 48* 56*   ALT  --  24 32 38   BILITOT  --  0.3 0.3 0.2   PROT  --  7.0 7.2 7.4   ALBUMIN  --  1.9* 2.0* 2.0*   ANIONGAP 9 10 9 10        Significant Imaging:   EXAMINATION:  XR CHEST 1 VIEW     CLINICAL HISTORY:  ARDS follow up;     TECHNIQUE:  Single frontal view of the chest was performed.     COMPARISON:  08/29/2022     FINDINGS:  Lines and tubes:  Tip of the endotracheal tube is above the messi.  Tip of the enteric tube extends below the left hemidiaphragm, beyond the field of view.  Right upper extremity PICC tip projects over the SVC.     Lung volumes are stable.  Consolidation at the right lung base is improving.  Nodular opacities in both lungs persist.  No new consolidation.  Cardiac silhouette is stable in size.     Impression:     Improving pneumonia.        Electronically signed by: Bria Eddy  Date:                                            09/01/2022  Time:                                           16:47

## 2022-09-03 NOTE — PROGRESS NOTES
"[facility] - Intensive Care [facility] Medicine  Progress Note    Patient Name: Leighton Carroll  MRN: [id number]  Patient Class: IP- Inpatient   Admission Date: 8/26/2022  Length of Stay: 8 days  Attending Physician: GAVIOTA Sanon MD  Primary Care Provider: Primary Doctor No        Subjective:     Principal Problem:Acute respiratory failure with hypoxia and hypercarbia        HPI:  Patient's HPI is adapted from notes of Dr. Schuler, Dr. Morin and Dr. Hall ([facility]).   Patient arrived in [facility] ICU intubated, pt's girlfriend's number not on file, unable to verify events PTA.     Leighton Carroll is a 42 year old man with a PMHx of depression, hx of drug overdose (7/11/2022, buproprion and gabapentin), polysubstance abuse (meth, heroin) and nicotine dependence.    He was presented to [facility] on 8/25/2022 via EMS with shortness of breath and dry cough. He was reportedly found down by his girlfriend at home yesterday evening (8/25/2022) and was given multiple rounds of chest compressions. He woke up and began having shortness of breath and called EMS, which found him saturating at 66% on room air, which increased to 90s with nonrebreather mask. Patient denied subjective fever, chills, sick contacts, chest pain, palpitations, abdominal pain.     At [facility], patient had a fever to 101, with leukocytosis (20) and lactic acidosis (3.0) with a normal procal. CTA chest showed patchy perihilar opacities bilaterally most pronounced in right lower lobe. Covid, Group A strep, respiratory influenza panel -ve. D-dimer was elevated (3.68), but CTPA negative for PE. Tox postive for THC only. CXR was -ve for pneumothorax. Patient was started on IV Cefepime and IV Vancomycin.     Patient was initially on BiPAP but became agitated and was intubated. Patient reportedly had "red spit". Patient was difficult to sedate requiring propofol and precedex and multiple doses of versed and ketamine, thus became hypotensive " after intubation and was started on levophed.     Patient is transferred to [facility] ICU, [facility] medicine, for management of acute respiratory failure with hypoxia and hypercapnia.             Overview/Hospital Course:  Patient arrived to ICU as a transfer intubated and sedated. CXR demonstrating bilateral patchy infiltrates and blood cultures have been positive for Acinetobacter sp, Pseudomonas luteola and now repeat cultures positive for a bacillus species (possible contaminant). Repeated cultures remain negative. Patient is on Vanc/Cefepime/azithromycin. PCC, ID consulted. TTE did not demonstrate vegetations, EF 50%.      Interval History: No acute events overnight. Continuing ARDS protocol and proning/supination. Very mild improvement in FiO2.    Review of Systems   Unable to perform ROS: Intubated   Objective:     Vital Signs (Most Recent):  Temp: 98.2 °F (36.8 °C) (09/03/22 2000)  Pulse: 91 (09/03/22 2015)  Resp: (!) 28 (09/03/22 2015)  BP: 125/70 (09/03/22 2000)  SpO2: 98 % (09/03/22 2015)   Vital Signs (24h Range):  Temp:  [98.2 °F (36.8 °C)-100.8 °F (38.2 °C)] 98.2 °F (36.8 °C)  Pulse:  [] 91  Resp:  [28] 28  SpO2:  [91 %-98 %] 98 %  BP: (122-143)/(67-78) 125/70  Arterial Line BP: (102-165)/(57-91) 122/61     Weight: 86.2 kg (190 lb)  Body mass index is 28.06 kg/m².    Intake/Output Summary (Last 24 hours) at 9/3/2022 2022  Last data filed at 9/3/2022 2011  Gross per 24 hour   Intake 4639.85 ml   Output 5670 ml   Net -1030.15 ml        Physical Exam  Vitals and nursing note reviewed.   Constitutional:       General: He is not in acute distress.     Appearance: He is well-developed.   HENT:      Head: Normocephalic and atraumatic.   Eyes:      General:         Right eye: No discharge.         Left eye: No discharge.      Conjunctiva/sclera: Conjunctivae normal.   Cardiovascular:      Rate and Rhythm: Normal rate.      Pulses: Normal pulses.   Pulmonary:      Effort: Pulmonary effort is normal. No  respiratory distress.      Comments: Intubated, mechanical breath sounds.  Abdominal:      Palpations: Abdomen is soft.      Tenderness: There is no abdominal tenderness.   Musculoskeletal:         General: Normal range of motion.      Right lower leg: No edema.      Left lower leg: No edema.   Skin:     General: Skin is warm and dry.   Neurological:      Comments: RASS -5, CAM/ICU N/A.     Significant Labs:   CBC:  Recent Labs   Lab 09/01/22  0411 09/02/22  0349 09/03/22  0345   WBC 16.35* 15.13* 14.15*   HGB 11.6* 10.8* 10.8*   HCT 37.9* 34.5* 34.7*    329 397   GRAN 78.4*  12.8* 85.8*  13.0* 73.4*  10.4*   LYMPH 4.2*  0.7* 4.7*  0.7* 11.5*  1.6   MONO 11.3  1.8* 6.7  1.0 12.2  1.7*   EOS 0.4 0.0 0.1   BASO 0.06 0.04 0.04     CMP:  Recent Labs   Lab 09/01/22  0411 09/02/22  0349 09/03/22  0345    146* 144   K 4.5 4.8 4.2   CL 94* 91* 85*   CO2 41* 45* 49*   BUN 22* 34* 53*   CREATININE 0.7 0.7 0.8   * 151* 113*   CALCIUM 9.5 9.7 9.8   ALKPHOS 138* 118 105   AST 48* 56* 93*   ALT 32 38 62*   BILITOT 0.3 0.2 0.2   PROT 7.2 7.4 7.6   ALBUMIN 2.0* 2.0* 2.2*   ANIONGAP 9 10 10        Significant Imaging:   No new imaging this morning.      Assessment/Plan:      * Acute respiratory failure with hypoxia and hypercarbia  Bilateral PNA, ARDS, septic shock (resolved), bacteremia, h/o drug overdose, polysubstance abuse  - Hypoxic to 60s on arrival and failed BiPAP 2/2 agitation and intubated at OSH. Tox positive for THC. D-dimer elevated but CTA negative for PE. COVID, RSV negative.  - Continue fentanyl gtt, ketamine gtt, midazolam gtt, propofol gtt, cisatracurium gtt.  - Continue cefepime 2g IV q8hr for 14 day total course. Blood cultures showed acinetobacter, pseudomonas luteola.  - ARDSnet protocol; proning/supinating.   - Continue furosemide 40mg IV q6hr, metolazone 5mg per OG daily, goal mildly net negative. Continue dexamethasone 20mg IV daily 5 day course.  - Appreciate pulm/crit, ID  assistance.  - Palliative consulted given persistent vent requirements; appreciate assistance.    Bilateral pneumonia  - As above.    Hematuria  - Gross hematuria; not present on repeat UA.    Gram-negative bacteremia  - As above.    Septic shock  - As above.    History of drug overdose  - As above.    Depression  - Continue buproprion 150mg per OG daily, quetiapine 200mg per OG qHS.  - Resume divalproex ER 500mg 1g PO daily, risperiodone 2mg PO daily when appropriate.    VTE Risk Mitigation (From admission, onward)         Ordered     enoxaparin injection 40 mg  Daily         08/26/22 1422     IP VTE HIGH RISK PATIENT  Once         08/26/22 1422     Place sequential compression device  Until discontinued         08/26/22 1422     Place MARGRET hose  Until discontinued         08/26/22 1422                Discharge Planning   CHETAN:      Code Status: Full Code   Is the patient medically ready for discharge?:     Reason for patient still in hospital (select all that apply): Treatment  Discharge Plan A: Skilled Nursing Facility        Critical due to resp failure.    Critical care time spent on the evaluation and treatment of severe organ dysfunction, review of pertinent labs and imaging studies, discussions with consulting providers and discussions with patient/family: 35 minutes.    SLICK Sanon MD  Department of Hospital Medicine   [facility] - Intensive Care ([place])

## 2022-09-03 NOTE — CARE UPDATE
Plan to prone ~1600hrs, can restart nimbex ~1500hrs 9/3. Supinate 9/4 ~0800hrs, can turn of nimbex during supination. PM blood gas ~2000hrs ordered.     Sujata Francois MD  Pulm/CC Fellow

## 2022-09-03 NOTE — ASSESSMENT & PLAN NOTE
Bilateral PNA, ARDS, septic shock (resolved), bacteremia, h/o drug overdose, polysubstance abuse  - Hypoxic to 60s on arrival and failed BiPAP 2/2 agitation and intubated at OSH. Tox positive for THC. D-dimer elevated but CTA negative for PE. COVID, RSV negative.  - Continue fentanyl gtt, ketamine gtt, midazolam gtt, propofol gtt, cisatracurium gtt.  - Continue cefepime 2g IV q8hr for 14 day total course. Blood cultures showed acinetobacter, pseudomonas luteola.  - ARDSnet protocol; proning/supinating.   - Continue furosemide 40mg IV q6hr, goal mildly net negative. Still mildly positive - metolazone added. Continue dexamethasone 20mg IV daily.  - Appreciate pulm/crit, ID assistance.  - Palliative consulted given persistent vent requirements; appreciate assistance.

## 2022-09-03 NOTE — PROGRESS NOTES
"[facility] Pulmonary/Critical Care Fellow Progress Note:    Brief Hx: 43 yo M w/ PMHx of drug use (heroin, methamphetamine, others) admitted  to [facility] for hypoxemic and hypercapnic respiratory failure after being found down, presumably 2/2 drug overdose and in shock. He received CPR from his GF and did wake up when EMS found him and he presented to the ED with leukocytosis, lactic acidosis, febrile. CTA showed diffuse dense bilateral infiltrates. UDS+ for THC. Blood cultures grew acinetobacter pittii & pesudomonas luteola and bacillus cereus. CXR shows evolution of R sided PNA. He started proning on  and started receiving steroids for ARDS dosing on .     Subjective:  Patient intubated and sedated, this AM had desaturation event and was placed on 80% and 16 with PaO2 of 70 on ABG.    Objective:  Last 24 Hour Vital Signs:  BP  Min: 132/64  Max: 143/71  Temp  Av.5 °F (37.5 °C)  Min: 98.9 °F (37.2 °C)  Max: 100.2 °F (37.9 °C)  Pulse  Av.9  Min: 84  Max: 108  Resp  Av  Min: 27  Max: 28  SpO2  Av.3 %  Min: 91 %  Max: 97 %  Body mass index is 28.06 kg/m².  I/O last 3 completed shifts:  In: 6763.1 [I.V.:2900; NG/GT:3245; IV Piggyback:618.1]  Out: 5995 [Urine:5915; Drains:80]      Physical Exam:  General: Sedated  HENT:  NCAT; anicteric sclera; (+)ETT in place, small wound on nasal septum  Cardio:  Regular rate and rhythm with normal S1 and S2; no murmurs or rubs  Resp:  CTAB; respirations unlabored; no wheezes, crackles or rhonchi  Abdom:  Soft, non-distended  Extrem:  WWP with no clubbing, cyanosis or edema, small cuts on hands and legs that are scabbed over  Pulses:  2+ and symmetric distally  Neuro:  AAOx0; paralyzed    Assessment & Plan:     42M with history of drug use (heroin, methamphetamine, "whatever he can get his hands on") presents with hypoxemic and hypercapnic respiratory failure presumably secondary to drug overdose requiring intubation found to have bacteremia and " diffuse lung infiltrates.      Acute hypoxic and hypercapnic respiratory failure  In setting of presumed drug overdose (fentanyl?)  With diffuse bilateral pulmonary infiltrates on CT chest, +fevers and leukocytosis  Sputum NGTD, RIP negative  JOVANA/ANCA negative -no new hemoptysis noted  Blood cultures positive - presume that lung infiltrates are related to this (see below)  Blood cultures with pseudomonas luteola and acinetobacter pitii -- patient with negative TTE for valve vegetations  - plan for 14d treatment given acuity of illness  Continue with current vent settings, wean FiO2 as able to maintain O2 saturations >90% (avoid over oxygenation) and titrate on ARDS ladder (currently low-peep)  Hold off on SAT - patient requires a lot of sedation - propofol, fentanyl, versed gtt, ketamine gtt in addition, added seroquel 200mg QHS & bupropion 150mg BID (was on 300mg XR outpatient) to help reduce need for sedation - would wean versed first   Lasix 40mg IV QID ordered to keep net even fluid balance - added metolazone 5mg as he is + today  Continue to wean sedation as tolerated to target RASS -3  Multifocal PNA & ARDS  Started prone 8/30 - continue for now, plan for 1600hrs prone, 0800hrs supination, please hold nimbex when supine  Respiratory culture & CXR ordered given new hypoxemia  Started steroids for ARDS 9/1 - 20mg for 5 days and then 10mg for 5 days  Aspiration vs. Inhalation injury/pneumonitis vs. Septic emboli vs. Vasculitis (less likely)  Lab and culture workup as above  No new evidence of hemoptysis  Continue cefepime  Urine legionella negative, strep Ag still pending  HIV negative, viral PCR negative, Hep panel (+)hep C, negative acute hepatitis   CK elevated on different admission, Myositis panel pending, JOVANA & ANCA negative    Code: FULL     DVT: Lovenox  GI: Famotidine  Feeds: TF running  Sedation: propofol gtt, fentanyl gtt, ketamine gtt, versed gtt, nimbex when proned    Sujata Francois MD  Pulm/CC  Fellow

## 2022-09-04 LAB
ANION GAP SERPL CALC-SCNC: 10 MMOL/L (ref 8–16)
BUN SERPL-MCNC: 63 MG/DL (ref 6–20)
CALCIUM SERPL-MCNC: 9.6 MG/DL (ref 8.7–10.5)
CHLORIDE SERPL-SCNC: 83 MMOL/L (ref 95–110)
CO2 SERPL-SCNC: 48 MMOL/L (ref 23–29)
CREAT SERPL-MCNC: 0.8 MG/DL (ref 0.5–1.4)
ERYTHROCYTE [DISTWIDTH] IN BLOOD BY AUTOMATED COUNT: 14.1 % (ref 11.5–14.5)
EST. GFR  (NO RACE VARIABLE): >60 ML/MIN/1.73 M^2
GLUCOSE SERPL-MCNC: 119 MG/DL (ref 70–110)
HCT VFR BLD AUTO: 35.1 % (ref 40–54)
HGB BLD-MCNC: 11.2 G/DL (ref 14–18)
MCH RBC QN AUTO: 29.3 PG (ref 27–31)
MCHC RBC AUTO-ENTMCNC: 31.9 G/DL (ref 32–36)
MCV RBC AUTO: 92 FL (ref 82–98)
PLATELET # BLD AUTO: 426 K/UL (ref 150–450)
PMV BLD AUTO: 9.3 FL (ref 9.2–12.9)
POTASSIUM SERPL-SCNC: 3.4 MMOL/L (ref 3.5–5.1)
RBC # BLD AUTO: 3.82 M/UL (ref 4.6–6.2)
SODIUM SERPL-SCNC: 141 MMOL/L (ref 136–145)
TRIGL SERPL-MCNC: 406 MG/DL (ref 30–150)
WBC # BLD AUTO: 14.26 K/UL (ref 3.9–12.7)

## 2022-09-04 PROCEDURE — S4991 NICOTINE PATCH NONLEGEND: HCPCS

## 2022-09-04 PROCEDURE — 99900035 HC TECH TIME PER 15 MIN (STAT)

## 2022-09-04 PROCEDURE — 63600175 PHARM REV CODE 636 W HCPCS: Performed by: STUDENT IN AN ORGANIZED HEALTH CARE EDUCATION/TRAINING PROGRAM

## 2022-09-04 PROCEDURE — 20000000 HC ICU ROOM

## 2022-09-04 PROCEDURE — 25000003 PHARM REV CODE 250

## 2022-09-04 PROCEDURE — 99900026 HC AIRWAY MAINTENANCE (STAT)

## 2022-09-04 PROCEDURE — 63600175 PHARM REV CODE 636 W HCPCS: Performed by: INTERNAL MEDICINE

## 2022-09-04 PROCEDURE — 80048 BASIC METABOLIC PNL TOTAL CA: CPT | Performed by: INTERNAL MEDICINE

## 2022-09-04 PROCEDURE — 82803 BLOOD GASES ANY COMBINATION: CPT

## 2022-09-04 PROCEDURE — 27000221 HC OXYGEN, UP TO 24 HOURS

## 2022-09-04 PROCEDURE — 25000242 PHARM REV CODE 250 ALT 637 W/ HCPCS: Performed by: STUDENT IN AN ORGANIZED HEALTH CARE EDUCATION/TRAINING PROGRAM

## 2022-09-04 PROCEDURE — 99291 PR CRITICAL CARE, E/M 30-74 MINUTES: ICD-10-PCS | Mod: ,,, | Performed by: INTERNAL MEDICINE

## 2022-09-04 PROCEDURE — 94003 VENT MGMT INPAT SUBQ DAY: CPT

## 2022-09-04 PROCEDURE — 25000003 PHARM REV CODE 250: Performed by: INTERNAL MEDICINE

## 2022-09-04 PROCEDURE — 25000003 PHARM REV CODE 250: Performed by: STUDENT IN AN ORGANIZED HEALTH CARE EDUCATION/TRAINING PROGRAM

## 2022-09-04 PROCEDURE — 94640 AIRWAY INHALATION TREATMENT: CPT

## 2022-09-04 PROCEDURE — 63600175 PHARM REV CODE 636 W HCPCS

## 2022-09-04 PROCEDURE — 99291 CRITICAL CARE FIRST HOUR: CPT | Mod: ,,, | Performed by: INTERNAL MEDICINE

## 2022-09-04 PROCEDURE — 94761 N-INVAS EAR/PLS OXIMETRY MLT: CPT

## 2022-09-04 PROCEDURE — 85027 COMPLETE CBC AUTOMATED: CPT | Performed by: INTERNAL MEDICINE

## 2022-09-04 PROCEDURE — 84478 ASSAY OF TRIGLYCERIDES: CPT | Performed by: INTERNAL MEDICINE

## 2022-09-04 PROCEDURE — 37799 UNLISTED PX VASCULAR SURGERY: CPT

## 2022-09-04 RX ORDER — POTASSIUM CHLORIDE 7.45 MG/ML
10 INJECTION INTRAVENOUS
Status: COMPLETED | OUTPATIENT
Start: 2022-09-04 | End: 2022-09-04

## 2022-09-04 RX ADMIN — Medication 4 ML: at 08:09

## 2022-09-04 RX ADMIN — KETAMINE HYDROCHLORIDE 10 MCG/KG/MIN: 50 INJECTION INTRAMUSCULAR; INTRAVENOUS at 10:09

## 2022-09-04 RX ADMIN — KETAMINE HYDROCHLORIDE 10 MCG/KG/MIN: 50 INJECTION INTRAMUSCULAR; INTRAVENOUS at 12:09

## 2022-09-04 RX ADMIN — BUPROPION HYDROCHLORIDE 150 MG: 75 TABLET, FILM COATED ORAL at 08:09

## 2022-09-04 RX ADMIN — POTASSIUM CHLORIDE 10 MEQ: 7.46 INJECTION, SOLUTION INTRAVENOUS at 10:09

## 2022-09-04 RX ADMIN — MINERAL OIL AND WHITE PETROLATUM: 30; 940 OINTMENT OPHTHALMIC at 05:09

## 2022-09-04 RX ADMIN — FUROSEMIDE 40 MG: 10 INJECTION, SOLUTION INTRAMUSCULAR; INTRAVENOUS at 11:09

## 2022-09-04 RX ADMIN — PROPOFOL 50 MCG/KG/MIN: 10 INJECTION, EMULSION INTRAVENOUS at 07:09

## 2022-09-04 RX ADMIN — CEFEPIME HYDROCHLORIDE 2 G: 2 INJECTION, SOLUTION INTRAVENOUS at 03:09

## 2022-09-04 RX ADMIN — ACETAMINOPHEN 650 MG: 325 TABLET, FILM COATED ORAL at 02:09

## 2022-09-04 RX ADMIN — FAMOTIDINE 20 MG: 10 INJECTION, SOLUTION INTRAVENOUS at 08:09

## 2022-09-04 RX ADMIN — MINERAL OIL AND WHITE PETROLATUM: 30; 940 OINTMENT OPHTHALMIC at 09:09

## 2022-09-04 RX ADMIN — DEXTROSE 6 MCG/KG/MIN: 50 INJECTION, SOLUTION INTRAVENOUS at 12:09

## 2022-09-04 RX ADMIN — FUROSEMIDE 40 MG: 10 INJECTION, SOLUTION INTRAMUSCULAR; INTRAVENOUS at 05:09

## 2022-09-04 RX ADMIN — ENOXAPARIN SODIUM 40 MG: 100 INJECTION SUBCUTANEOUS at 04:09

## 2022-09-04 RX ADMIN — Medication 300 MCG/HR: at 07:09

## 2022-09-04 RX ADMIN — SENNOSIDES AND DOCUSATE SODIUM 1 TABLET: 50; 8.6 TABLET ORAL at 08:09

## 2022-09-04 RX ADMIN — POTASSIUM CHLORIDE 10 MEQ: 7.46 INJECTION, SOLUTION INTRAVENOUS at 08:09

## 2022-09-04 RX ADMIN — POLYETHYLENE GLYCOL 3350 17 G: 17 POWDER, FOR SOLUTION ORAL at 08:09

## 2022-09-04 RX ADMIN — PROPOFOL 50 MCG/KG/MIN: 10 INJECTION, EMULSION INTRAVENOUS at 10:09

## 2022-09-04 RX ADMIN — MINERAL OIL AND WHITE PETROLATUM: 30; 940 OINTMENT OPHTHALMIC at 02:09

## 2022-09-04 RX ADMIN — POTASSIUM CHLORIDE 10 MEQ: 7.46 INJECTION, SOLUTION INTRAVENOUS at 09:09

## 2022-09-04 RX ADMIN — PROPOFOL 40 MCG/KG/MIN: 10 INJECTION, EMULSION INTRAVENOUS at 09:09

## 2022-09-04 RX ADMIN — KETAMINE HYDROCHLORIDE 14 MCG/KG/MIN: 50 INJECTION INTRAMUSCULAR; INTRAVENOUS at 09:09

## 2022-09-04 RX ADMIN — CEFEPIME HYDROCHLORIDE 2 G: 2 INJECTION, SOLUTION INTRAVENOUS at 08:09

## 2022-09-04 RX ADMIN — PROPOFOL 50 MCG/KG/MIN: 10 INJECTION, EMULSION INTRAVENOUS at 02:09

## 2022-09-04 RX ADMIN — MIDAZOLAM HYDROCHLORIDE 3 MG/HR: 5 INJECTION, SOLUTION INTRAMUSCULAR; INTRAVENOUS at 12:09

## 2022-09-04 RX ADMIN — CEFEPIME HYDROCHLORIDE 2 G: 2 INJECTION, SOLUTION INTRAVENOUS at 10:09

## 2022-09-04 RX ADMIN — Medication 300 MCG/HR: at 04:09

## 2022-09-04 RX ADMIN — Medication 4 ML: at 07:09

## 2022-09-04 RX ADMIN — PROPOFOL 45 MCG/KG/MIN: 10 INJECTION, EMULSION INTRAVENOUS at 03:09

## 2022-09-04 RX ADMIN — DEXAMETHASONE SODIUM PHOSPHATE 20 MG: 4 INJECTION INTRA-ARTICULAR; INTRALESIONAL; INTRAMUSCULAR; INTRAVENOUS; SOFT TISSUE at 05:09

## 2022-09-04 RX ADMIN — METOLAZONE 5 MG: 5 TABLET ORAL at 08:09

## 2022-09-04 RX ADMIN — PROPOFOL 50 MCG/KG/MIN: 10 INJECTION, EMULSION INTRAVENOUS at 05:09

## 2022-09-04 RX ADMIN — DEXTROSE 6 MCG/KG/MIN: 50 INJECTION, SOLUTION INTRAVENOUS at 06:09

## 2022-09-04 RX ADMIN — NICOTINE 1 PATCH: 14 PATCH TRANSDERMAL at 08:09

## 2022-09-04 RX ADMIN — POTASSIUM CHLORIDE 10 MEQ: 7.46 INJECTION, SOLUTION INTRAVENOUS at 07:09

## 2022-09-04 NOTE — PROGRESS NOTES
"[facility] - Intensive Care [facility] Medicine  Progress Note    Patient Name: Leighton Carroll  MRN: [id number]  Patient Class: IP- Inpatient   Admission Date: 8/26/2022  Length of Stay: 9 days  Attending Physician: GAVIOTA Sanon MD  Primary Care Provider: Primary Doctor No        Subjective:     Principal Problem:Acute respiratory failure with hypoxia and hypercarbia        HPI:  Patient's HPI is adapted from notes of Dr. Schuler, Dr. Morin and Dr. Hall ([facility]).   Patient arrived in [facility] ICU intubated, pt's girlfriend's number not on file, unable to verify events PTA.     Leighton Carroll is a 42 year old man with a PMHx of depression, hx of drug overdose (7/11/2022, buproprion and gabapentin), polysubstance abuse (meth, heroin) and nicotine dependence.    He was presented to [facility] on 8/25/2022 via EMS with shortness of breath and dry cough. He was reportedly found down by his girlfriend at home yesterday evening (8/25/2022) and was given multiple rounds of chest compressions. He woke up and began having shortness of breath and called EMS, which found him saturating at 66% on room air, which increased to 90s with nonrebreather mask. Patient denied subjective fever, chills, sick contacts, chest pain, palpitations, abdominal pain.     At [facility], patient had a fever to 101, with leukocytosis (20) and lactic acidosis (3.0) with a normal procal. CTA chest showed patchy perihilar opacities bilaterally most pronounced in right lower lobe. Covid, Group A strep, respiratory influenza panel -ve. D-dimer was elevated (3.68), but CTPA negative for PE. Tox postive for THC only. CXR was -ve for pneumothorax. Patient was started on IV Cefepime and IV Vancomycin.     Patient was initially on BiPAP but became agitated and was intubated. Patient reportedly had "red spit". Patient was difficult to sedate requiring propofol and precedex and multiple doses of versed and ketamine, thus became hypotensive " after intubation and was started on levophed.     Patient is transferred to [facility] ICU, [facility] medicine, for management of acute respiratory failure with hypoxia and hypercapnia.             Overview/Hospital Course:  Patient arrived to ICU as a transfer intubated and sedated. CXR demonstrating bilateral patchy infiltrates and blood cultures have been positive for Acinetobacter sp, Pseudomonas luteola and now repeat cultures positive for a bacillus species (possible contaminant). Repeated cultures remain negative. Patient is on Vanc/Cefepime/azithromycin. PCC, ID consulted. TTE did not demonstrate vegetations, EF 50%.      Interval History: No acute events overnight. Respiratory status gradually improving, decreasing FiO2 and PEEP requirements.    Review of Systems   Unable to perform ROS: Intubated   Objective:     Vital Signs (Most Recent):  Temp: 100.1 °F (37.8 °C) (09/04/22 1415)  Pulse: 94 (09/04/22 1545)  Resp: (!) 28 (09/04/22 1545)  BP: 116/66 (09/04/22 1200)  SpO2: (!) 94 % (09/04/22 1545)   Vital Signs (24h Range):  Temp:  [97.5 °F (36.4 °C)-100.1 °F (37.8 °C)] 100.1 °F (37.8 °C)  Pulse:  [] 94  Resp:  [24-28] 28  SpO2:  [90 %-100 %] 94 %  BP: (116-128)/(58-78) 116/66  Arterial Line BP: (110-162)/(57-72) 113/64     Weight: 86.2 kg (190 lb)  Body mass index is 28.06 kg/m².    Intake/Output Summary (Last 24 hours) at 9/4/2022 1559  Last data filed at 9/4/2022 1305  Gross per 24 hour   Intake 4159.16 ml   Output 4405 ml   Net -245.84 ml        Physical Exam  Vitals and nursing note reviewed.   Constitutional:       General: He is not in acute distress.     Appearance: He is well-developed.   HENT:      Head: Normocephalic and atraumatic.   Eyes:      General:         Right eye: No discharge.         Left eye: No discharge.      Conjunctiva/sclera: Conjunctivae normal.   Cardiovascular:      Rate and Rhythm: Normal rate.      Pulses: Normal pulses.   Pulmonary:      Effort: Pulmonary effort is normal.  No respiratory distress.      Comments: Intubated, mechanical breath sounds.  Abdominal:      Palpations: Abdomen is soft.      Tenderness: There is no abdominal tenderness.   Musculoskeletal:         General: Normal range of motion.      Right lower leg: No edema.      Left lower leg: No edema.   Skin:     General: Skin is warm and dry.   Neurological:      Comments: RASS -5, CAM/ICU N/A.     Significant Labs:   CBC:  Recent Labs   Lab 09/02/22 0349 09/03/22 0345 09/04/22  0514   WBC 15.13* 14.15* 14.26*   HGB 10.8* 10.8* 11.2*   HCT 34.5* 34.7* 35.1*    397 426   GRAN 85.8*  13.0* 73.4*  10.4*  --    LYMPH 4.7*  0.7* 11.5*  1.6  --    MONO 6.7  1.0 12.2  1.7*  --    EOS 0.0 0.1  --    BASO 0.04 0.04  --      CMP:  Recent Labs   Lab 09/02/22 0349 09/03/22 0345 09/04/22  0514   * 144 141   K 4.8 4.2 3.4*   CL 91* 85* 83*   CO2 45* 49* 48*   BUN 34* 53* 63*   CREATININE 0.7 0.8 0.8   * 113* 119*   CALCIUM 9.7 9.8 9.6   ALKPHOS 118 105  --    AST 56* 93*  --    ALT 38 62*  --    BILITOT 0.2 0.2  --    PROT 7.4 7.6  --    ALBUMIN 2.0* 2.2*  --    ANIONGAP 10 10 10        Significant Imaging:   No new imaging this morning.      Assessment/Plan:      * Acute respiratory failure with hypoxia and hypercarbia  Bilateral PNA, ARDS, septic shock (resolved), bacteremia, h/o drug overdose, polysubstance abuse  - Hypoxic to 60s on arrival and failed BiPAP 2/2 agitation and intubated at OSH. Tox positive for THC. D-dimer elevated but CTA negative for PE. COVID, RSV negative.  - Continue fentanyl gtt, ketamine gtt, midazolam gtt, propofol gtt.  - Continue cefepime 2g IV q8hr for 14 day total course. Blood cultures showed acinetobacter, pseudomonas luteola.  - ARDSnet protocol; proning/supinating. Improving O2; pulm/crit planning for no further proning at this time. Discontinue cisatracurium gtt.  - Continue furosemide 40mg IV q6hr, metolazone 5mg per OG daily, goal mildly net negative. Continue  dexamethasone 20mg IV daily 5 days followed by 10mg IV daily for 5 days.  - Appreciate pulm/crit assistance.  - Palliative consulted given persistent vent requirements; appreciate assistance.    Bilateral pneumonia  - As above.    Hematuria  - Gross hematuria; not present on repeat UA.    Gram-negative bacteremia  - As above.    Septic shock  - As above.    History of drug overdose  - As above.    Depression  - Continue buproprion 150mg per OG daily, quetiapine 200mg per OG qHS.  - Resume divalproex ER 500mg 1g PO daily, risperiodone 2mg PO daily when appropriate.    VTE Risk Mitigation (From admission, onward)         Ordered     enoxaparin injection 40 mg  Daily         08/26/22 1422     IP VTE HIGH RISK PATIENT  Once         08/26/22 1422     Place sequential compression device  Until discontinued         08/26/22 1422     Place MARGRET hose  Until discontinued         08/26/22 1422                Discharge Planning   CHETAN:      Code Status: Full Code   Is the patient medically ready for discharge?:     Reason for patient still in hospital (select all that apply): Treatment  Discharge Plan A: Skilled Nursing Facility      Critical due to resp failure.    Critical care time spent on the evaluation and treatment of severe organ dysfunction, review of pertinent labs and imaging studies, discussions with consulting providers and discussions with patient/family: 35 minutes.    SLICK Sanon MD  Department of Hospital Medicine   [facility] - Intensive Care [facility]

## 2022-09-04 NOTE — PLAN OF CARE
Patient will continue the sedation and intubation per MD order. The tube feed will also continue. The patient will continue to be proned and supined

## 2022-09-04 NOTE — EICU
EICU Physician Brief Note - Overnight Events    Called to order am labs. Chart reviewed briefly.  Ordered BMP, CBC, TG level (on Propofol) and ABG.  Also ordered stat CxR to eval ETT position as it was quite high (above the clavicles) on CxR 9/1.    Eicu is available should acute issues arise.    Addendum:  CxR will not be done stat as patient in prone position and not enough staff around Please do CxR as soon as able.  Extreme care needs to be taken when the patient is supinated, in case the ETT is still above the clavicles, the risk of dislodgement is very high.    2. ABG reviewed w RT. pH 7.53/PaCO2 improved to 70. Plan: decrease RR to 24 and f/up ABG at 08:00 (will likely need further adjustment in rate).

## 2022-09-04 NOTE — SUBJECTIVE & OBJECTIVE
Interval History: No acute events overnight. Continuing ARDS protocol and proning/supination. Very mild improvement in FiO2.    Review of Systems   Unable to perform ROS: Intubated   Objective:     Vital Signs (Most Recent):  Temp: 98.2 °F (36.8 °C) (09/03/22 2000)  Pulse: 91 (09/03/22 2015)  Resp: (!) 28 (09/03/22 2015)  BP: 125/70 (09/03/22 2000)  SpO2: 98 % (09/03/22 2015)   Vital Signs (24h Range):  Temp:  [98.2 °F (36.8 °C)-100.8 °F (38.2 °C)] 98.2 °F (36.8 °C)  Pulse:  [] 91  Resp:  [28] 28  SpO2:  [91 %-98 %] 98 %  BP: (122-143)/(67-78) 125/70  Arterial Line BP: (102-165)/(57-91) 122/61     Weight: 86.2 kg (190 lb)  Body mass index is 28.06 kg/m².    Intake/Output Summary (Last 24 hours) at 9/3/2022 2022  Last data filed at 9/3/2022 2011  Gross per 24 hour   Intake 4639.85 ml   Output 5670 ml   Net -1030.15 ml        Physical Exam  Vitals and nursing note reviewed.   Constitutional:       General: He is not in acute distress.     Appearance: He is well-developed.   HENT:      Head: Normocephalic and atraumatic.   Eyes:      General:         Right eye: No discharge.         Left eye: No discharge.      Conjunctiva/sclera: Conjunctivae normal.   Cardiovascular:      Rate and Rhythm: Normal rate.      Pulses: Normal pulses.   Pulmonary:      Effort: Pulmonary effort is normal. No respiratory distress.      Comments: Intubated, mechanical breath sounds.  Abdominal:      Palpations: Abdomen is soft.      Tenderness: There is no abdominal tenderness.   Musculoskeletal:         General: Normal range of motion.      Right lower leg: No edema.      Left lower leg: No edema.   Skin:     General: Skin is warm and dry.   Neurological:      Comments: RASS -5, CAM/ICU N/A.     Significant Labs:   CBC:  Recent Labs   Lab 09/01/22  0411 09/02/22  0349 09/03/22  0345   WBC 16.35* 15.13* 14.15*   HGB 11.6* 10.8* 10.8*   HCT 37.9* 34.5* 34.7*    329 397   GRAN 78.4*  12.8* 85.8*  13.0* 73.4*  10.4*   LYMPH 4.2*   0.7* 4.7*  0.7* 11.5*  1.6   MONO 11.3  1.8* 6.7  1.0 12.2  1.7*   EOS 0.4 0.0 0.1   BASO 0.06 0.04 0.04     CMP:  Recent Labs   Lab 09/01/22  0411 09/02/22  0349 09/03/22  0345    146* 144   K 4.5 4.8 4.2   CL 94* 91* 85*   CO2 41* 45* 49*   BUN 22* 34* 53*   CREATININE 0.7 0.7 0.8   * 151* 113*   CALCIUM 9.5 9.7 9.8   ALKPHOS 138* 118 105   AST 48* 56* 93*   ALT 32 38 62*   BILITOT 0.3 0.2 0.2   PROT 7.2 7.4 7.6   ALBUMIN 2.0* 2.0* 2.2*   ANIONGAP 9 10 10        Significant Imaging:   No new imaging this morning.

## 2022-09-04 NOTE — ASSESSMENT & PLAN NOTE
Bilateral PNA, ARDS, septic shock (resolved), bacteremia, h/o drug overdose, polysubstance abuse  - Hypoxic to 60s on arrival and failed BiPAP 2/2 agitation and intubated at OSH. Tox positive for THC. D-dimer elevated but CTA negative for PE. COVID, RSV negative.  - Continue fentanyl gtt, ketamine gtt, midazolam gtt, propofol gtt, cisatracurium gtt.  - Continue cefepime 2g IV q8hr for 14 day total course. Blood cultures showed acinetobacter, pseudomonas luteola.  - ARDSnet protocol; proning/supinating.   - Continue furosemide 40mg IV q6hr, metolazone 5mg per OG daily, goal mildly net negative. Continue dexamethasone 20mg IV daily 5 day course.  - Appreciate pulm/crit, ID assistance.  - Palliative consulted given persistent vent requirements; appreciate assistance.

## 2022-09-04 NOTE — SUBJECTIVE & OBJECTIVE
Interval History: No acute events overnight. Respiratory status gradually improving, decreasing FiO2 and PEEP requirements.    Review of Systems   Unable to perform ROS: Intubated   Objective:     Vital Signs (Most Recent):  Temp: 100.1 °F (37.8 °C) (09/04/22 1415)  Pulse: 94 (09/04/22 1545)  Resp: (!) 28 (09/04/22 1545)  BP: 116/66 (09/04/22 1200)  SpO2: (!) 94 % (09/04/22 1545)   Vital Signs (24h Range):  Temp:  [97.5 °F (36.4 °C)-100.1 °F (37.8 °C)] 100.1 °F (37.8 °C)  Pulse:  [] 94  Resp:  [24-28] 28  SpO2:  [90 %-100 %] 94 %  BP: (116-128)/(58-78) 116/66  Arterial Line BP: (110-162)/(57-72) 113/64     Weight: 86.2 kg (190 lb)  Body mass index is 28.06 kg/m².    Intake/Output Summary (Last 24 hours) at 9/4/2022 1559  Last data filed at 9/4/2022 1305  Gross per 24 hour   Intake 4159.16 ml   Output 4405 ml   Net -245.84 ml        Physical Exam  Vitals and nursing note reviewed.   Constitutional:       General: He is not in acute distress.     Appearance: He is well-developed.   HENT:      Head: Normocephalic and atraumatic.   Eyes:      General:         Right eye: No discharge.         Left eye: No discharge.      Conjunctiva/sclera: Conjunctivae normal.   Cardiovascular:      Rate and Rhythm: Normal rate.      Pulses: Normal pulses.   Pulmonary:      Effort: Pulmonary effort is normal. No respiratory distress.      Comments: Intubated, mechanical breath sounds.  Abdominal:      Palpations: Abdomen is soft.      Tenderness: There is no abdominal tenderness.   Musculoskeletal:         General: Normal range of motion.      Right lower leg: No edema.      Left lower leg: No edema.   Skin:     General: Skin is warm and dry.   Neurological:      Comments: RASS -5, CAM/ICU N/A.     Significant Labs:   CBC:  Recent Labs   Lab 09/02/22  0349 09/03/22  0345 09/04/22  0514   WBC 15.13* 14.15* 14.26*   HGB 10.8* 10.8* 11.2*   HCT 34.5* 34.7* 35.1*    397 426   GRAN 85.8*  13.0* 73.4*  10.4*  --    LYMPH 4.7*   0.7* 11.5*  1.6  --    MONO 6.7  1.0 12.2  1.7*  --    EOS 0.0 0.1  --    BASO 0.04 0.04  --      CMP:  Recent Labs   Lab 09/02/22  0349 09/03/22 0345 09/04/22  0514   * 144 141   K 4.8 4.2 3.4*   CL 91* 85* 83*   CO2 45* 49* 48*   BUN 34* 53* 63*   CREATININE 0.7 0.8 0.8   * 113* 119*   CALCIUM 9.7 9.8 9.6   ALKPHOS 118 105  --    AST 56* 93*  --    ALT 38 62*  --    BILITOT 0.2 0.2  --    PROT 7.4 7.6  --    ALBUMIN 2.0* 2.2*  --    ANIONGAP 10 10 10        Significant Imaging:   No new imaging this morning.

## 2022-09-04 NOTE — PROGRESS NOTES
"[facility] Pulmonary/Critical Care Fellow Progress Note:    Brief Hx: 43 yo M w/ PMHx of drug use (heroin, methamphetamine, others) admitted  to [facility] for hypoxemic and hypercapnic respiratory failure after being found down, presumably 2/2 drug overdose and in shock. He received CPR from his GF and did wake up when EMS found him and he presented to the ED with leukocytosis, lactic acidosis, febrile. CTA showed diffuse dense bilateral infiltrates. UDS+ for THC. Blood cultures grew acinetobacter pittii & pesudomonas luteola and bacillus cereus. CXR shows evolution of R sided PNA. He started proning on  and started receiving steroids for ARDS dosing on .     Subjective:  Patient intubated and sedated, this AM on 50% and 10 of PEEP and doing very well. Pplat 20s.     Objective:  Last 24 Hour Vital Signs:  BP  Min: 116/66  Max: 128/58  Temp  Av.1 °F (37.3 °C)  Min: 97.5 °F (36.4 °C)  Max: 100.8 °F (38.2 °C)  Pulse  Av.3  Min: 82  Max: 102  Resp  Av.3  Min: 24  Max: 28  SpO2  Av.3 %  Min: 90 %  Max: 100 %  Body mass index is 28.06 kg/m².  I/O last 3 completed shifts:  In: 7584.7 [I.V.:2814.5; NG/GT:4522; IV Piggyback:248.2]  Out: 7805 [Urine:7775; Drains:30]      Physical Exam:  General: Sedated  HENT:  NCAT; anicteric sclera; (+)ETT in place, small wound on nasal septum  Cardio:  Regular rate and rhythm with normal S1 and S2; no murmurs or rubs  Resp:  CTAB; respirations unlabored; no wheezes, crackles or rhonchi  Abdom:  Soft, non-distended  Extrem:  WWP with no clubbing, cyanosis or edema, small cuts on hands and legs that are scabbed over  Pulses:  2+ and symmetric distally  Neuro:  AAOx0; paralyzed    Assessment & Plan:     42M with history of drug use (heroin, methamphetamine, "whatever he can get his hands on") presents with hypoxemic and hypercapnic respiratory failure presumably secondary to drug overdose requiring intubation found to have bacteremia and diffuse lung " infiltrates.      Acute hypoxic and hypercapnic respiratory failure  In setting of presumed drug overdose (fentanyl?)  With diffuse bilateral pulmonary infiltrates on CT chest, +fevers and leukocytosis  Sputum NGTD, RIP negative  JOVANA/ANCA negative -no new hemoptysis noted  Blood cultures positive - presume that lung infiltrates are related to this (see below)  Blood cultures with pseudomonas luteola and acinetobacter pitii -- patient with negative TTE for valve vegetations  - plan for 14d treatment given acuity of illness  Continue with current vent settings, wean FiO2 as able to maintain O2 saturations >90% (avoid over oxygenation) and titrate on ARDS ladder (currently low-peep)  Hold off on SAT - patient requires a lot of sedation - propofol, fentanyl, versed gtt, ketamine gtt in addition, added seroquel 200mg QHS & bupropion 150mg BID (was on 300mg XR outpatient) to help reduce need for sedation - would wean versed first   Lasix 40mg IV QID ordered to keep net even fluid balance - added metolazone, can stop once fluid balance is evened out  Continue to wean sedation as tolerated to target RASS -3  Multifocal PNA & ARDS  Started prone 8/30 - hold  for now given patient oxygenating well on 50%/10 PEEP  Respiratory culture & CXR ordered given new hypoxemia  Started steroids for ARDS 9/1 - 20mg for 5 days and then 10mg for 5 days  Aspiration vs. Inhalation injury/pneumonitis vs. Septic emboli vs. Vasculitis (less likely)  Lab and culture workup as above  No new evidence of hemoptysis  Continue cefepime - 14d total  Urine legionella negative, strep Ag still pending  HIV negative, viral PCR negative, Hep panel (+)hep C, negative acute hepatitis   CK elevated on different admission, Myositis panel pending, JOVANA & ANCA negative    Code: FULL     DVT: Lovenox  GI: Famotidine  Feeds: TF running  Sedation: propofol gtt, fentanyl gtt, ketamine gtt, versed gtt    Sujata Francois MD  Pulm/CC Fellow

## 2022-09-04 NOTE — ASSESSMENT & PLAN NOTE
Bilateral PNA, ARDS, septic shock (resolved), bacteremia, h/o drug overdose, polysubstance abuse  - Hypoxic to 60s on arrival and failed BiPAP 2/2 agitation and intubated at OSH. Tox positive for THC. D-dimer elevated but CTA negative for PE. COVID, RSV negative.  - Continue fentanyl gtt, ketamine gtt, midazolam gtt, propofol gtt.  - Continue cefepime 2g IV q8hr for 14 day total course. Blood cultures showed acinetobacter, pseudomonas luteola.  - ARDSnet protocol; proning/supinating. Improving O2; pulm/crit planning for no further proning at this time. Discontinue cisatracurium gtt.  - Continue furosemide 40mg IV q6hr, metolazone 5mg per OG daily, goal mildly net negative. Continue dexamethasone 20mg IV daily 5 days followed by 10mg IV daily for 5 days.  - Appreciate pulm/crit assistance.  - Palliative consulted given persistent vent requirements; appreciate assistance.

## 2022-09-05 PROBLEM — J15.20 STAPHYLOCOCCAL PNEUMONIA: Status: ACTIVE | Noted: 2022-09-05

## 2022-09-05 PROBLEM — J95.851 VENTILATOR ASSOCIATED PNEUMONIA: Status: ACTIVE | Noted: 2022-09-05

## 2022-09-05 LAB
ALBUMIN SERPL BCP-MCNC: 2.5 G/DL (ref 3.5–5.2)
ALP SERPL-CCNC: 107 U/L (ref 55–135)
ALT SERPL W/O P-5'-P-CCNC: 112 U/L (ref 10–44)
ANION GAP SERPL CALC-SCNC: 13 MMOL/L (ref 8–16)
AST SERPL-CCNC: 102 U/L (ref 10–40)
BASOPHILS # BLD AUTO: 0.04 K/UL (ref 0–0.2)
BASOPHILS NFR BLD: 0.3 % (ref 0–1.9)
BILIRUB DIRECT SERPL-MCNC: 0.3 MG/DL (ref 0.1–0.3)
BILIRUB SERPL-MCNC: 0.4 MG/DL (ref 0.1–1)
BUN SERPL-MCNC: 73 MG/DL (ref 6–20)
CALCIUM SERPL-MCNC: 9.5 MG/DL (ref 8.7–10.5)
CHLORIDE SERPL-SCNC: 82 MMOL/L (ref 95–110)
CO2 SERPL-SCNC: 45 MMOL/L (ref 23–29)
CREAT SERPL-MCNC: 0.9 MG/DL (ref 0.5–1.4)
DIFFERENTIAL METHOD: ABNORMAL
EOSINOPHIL # BLD AUTO: 0.2 K/UL (ref 0–0.5)
EOSINOPHIL NFR BLD: 1.6 % (ref 0–8)
ERYTHROCYTE [DISTWIDTH] IN BLOOD BY AUTOMATED COUNT: 13.9 % (ref 11.5–14.5)
EST. GFR  (NO RACE VARIABLE): >60 ML/MIN/1.73 M^2
GLUCOSE SERPL-MCNC: 101 MG/DL (ref 70–110)
HCT VFR BLD AUTO: 36 % (ref 40–54)
HGB BLD-MCNC: 11.8 G/DL (ref 14–18)
IMM GRANULOCYTES # BLD AUTO: 0.22 K/UL (ref 0–0.04)
IMM GRANULOCYTES NFR BLD AUTO: 1.6 % (ref 0–0.5)
LYMPHOCYTES # BLD AUTO: 2.3 K/UL (ref 1–4.8)
LYMPHOCYTES NFR BLD: 16.6 % (ref 18–48)
MAGNESIUM SERPL-MCNC: 2.5 MG/DL (ref 1.6–2.6)
MCH RBC QN AUTO: 29.9 PG (ref 27–31)
MCHC RBC AUTO-ENTMCNC: 32.8 G/DL (ref 32–36)
MCV RBC AUTO: 91 FL (ref 82–98)
MONOCYTES # BLD AUTO: 1.3 K/UL (ref 0.3–1)
MONOCYTES NFR BLD: 9.5 % (ref 4–15)
NEUTROPHILS # BLD AUTO: 9.6 K/UL (ref 1.8–7.7)
NEUTROPHILS NFR BLD: 70.4 % (ref 38–73)
NRBC BLD-RTO: 0 /100 WBC
PHOSPHATE SERPL-MCNC: 3 MG/DL (ref 2.7–4.5)
PLATELET # BLD AUTO: 517 K/UL (ref 150–450)
PMV BLD AUTO: 9.8 FL (ref 9.2–12.9)
POTASSIUM SERPL-SCNC: 3.7 MMOL/L (ref 3.5–5.1)
PROT SERPL-MCNC: 7.9 G/DL (ref 6–8.4)
RBC # BLD AUTO: 3.94 M/UL (ref 4.6–6.2)
SODIUM SERPL-SCNC: 140 MMOL/L (ref 136–145)
WBC # BLD AUTO: 13.59 K/UL (ref 3.9–12.7)

## 2022-09-05 PROCEDURE — 80048 BASIC METABOLIC PNL TOTAL CA: CPT | Performed by: INTERNAL MEDICINE

## 2022-09-05 PROCEDURE — 25000003 PHARM REV CODE 250: Performed by: STUDENT IN AN ORGANIZED HEALTH CARE EDUCATION/TRAINING PROGRAM

## 2022-09-05 PROCEDURE — S4991 NICOTINE PATCH NONLEGEND: HCPCS

## 2022-09-05 PROCEDURE — 94003 VENT MGMT INPAT SUBQ DAY: CPT

## 2022-09-05 PROCEDURE — 84100 ASSAY OF PHOSPHORUS: CPT | Performed by: INTERNAL MEDICINE

## 2022-09-05 PROCEDURE — 94640 AIRWAY INHALATION TREATMENT: CPT

## 2022-09-05 PROCEDURE — 27100171 HC OXYGEN HIGH FLOW UP TO 24 HOURS

## 2022-09-05 PROCEDURE — 20000000 HC ICU ROOM

## 2022-09-05 PROCEDURE — 25000242 PHARM REV CODE 250 ALT 637 W/ HCPCS: Performed by: STUDENT IN AN ORGANIZED HEALTH CARE EDUCATION/TRAINING PROGRAM

## 2022-09-05 PROCEDURE — 94761 N-INVAS EAR/PLS OXIMETRY MLT: CPT

## 2022-09-05 PROCEDURE — 99900026 HC AIRWAY MAINTENANCE (STAT)

## 2022-09-05 PROCEDURE — 36415 COLL VENOUS BLD VENIPUNCTURE: CPT | Performed by: INTERNAL MEDICINE

## 2022-09-05 PROCEDURE — 25000003 PHARM REV CODE 250: Performed by: INTERNAL MEDICINE

## 2022-09-05 PROCEDURE — 85025 COMPLETE CBC W/AUTO DIFF WBC: CPT | Performed by: INTERNAL MEDICINE

## 2022-09-05 PROCEDURE — 87040 BLOOD CULTURE FOR BACTERIA: CPT | Mod: 59 | Performed by: INTERNAL MEDICINE

## 2022-09-05 PROCEDURE — 99291 PR CRITICAL CARE, E/M 30-74 MINUTES: ICD-10-PCS | Mod: ,,, | Performed by: INTERNAL MEDICINE

## 2022-09-05 PROCEDURE — 25000003 PHARM REV CODE 250

## 2022-09-05 PROCEDURE — 63600175 PHARM REV CODE 636 W HCPCS: Performed by: INTERNAL MEDICINE

## 2022-09-05 PROCEDURE — 99291 CRITICAL CARE FIRST HOUR: CPT | Mod: ,,, | Performed by: INTERNAL MEDICINE

## 2022-09-05 PROCEDURE — 80076 HEPATIC FUNCTION PANEL: CPT | Performed by: INTERNAL MEDICINE

## 2022-09-05 PROCEDURE — 63600175 PHARM REV CODE 636 W HCPCS

## 2022-09-05 PROCEDURE — 63600175 PHARM REV CODE 636 W HCPCS: Performed by: STUDENT IN AN ORGANIZED HEALTH CARE EDUCATION/TRAINING PROGRAM

## 2022-09-05 PROCEDURE — 27000221 HC OXYGEN, UP TO 24 HOURS

## 2022-09-05 PROCEDURE — 99900035 HC TECH TIME PER 15 MIN (STAT)

## 2022-09-05 PROCEDURE — 83735 ASSAY OF MAGNESIUM: CPT | Performed by: INTERNAL MEDICINE

## 2022-09-05 RX ADMIN — PROPOFOL 50 MCG/KG/MIN: 10 INJECTION, EMULSION INTRAVENOUS at 09:09

## 2022-09-05 RX ADMIN — PROPOFOL 50 MCG/KG/MIN: 10 INJECTION, EMULSION INTRAVENOUS at 11:09

## 2022-09-05 RX ADMIN — METHYLNALTREXONE BROMIDE 8 MG: 12 INJECTION, SOLUTION SUBCUTANEOUS at 10:09

## 2022-09-05 RX ADMIN — KETAMINE HYDROCHLORIDE 8 MCG/KG/MIN: 50 INJECTION INTRAMUSCULAR; INTRAVENOUS at 01:09

## 2022-09-05 RX ADMIN — BUPROPION HYDROCHLORIDE 150 MG: 75 TABLET, FILM COATED ORAL at 09:09

## 2022-09-05 RX ADMIN — DEXAMETHASONE SODIUM PHOSPHATE 20 MG: 4 INJECTION INTRA-ARTICULAR; INTRALESIONAL; INTRAMUSCULAR; INTRAVENOUS; SOFT TISSUE at 05:09

## 2022-09-05 RX ADMIN — KETAMINE HYDROCHLORIDE 12 MCG/KG/MIN: 50 INJECTION INTRAMUSCULAR; INTRAVENOUS at 05:09

## 2022-09-05 RX ADMIN — VANCOMYCIN HYDROCHLORIDE 1500 MG: 1.5 INJECTION, POWDER, LYOPHILIZED, FOR SOLUTION INTRAVENOUS at 11:09

## 2022-09-05 RX ADMIN — ENOXAPARIN SODIUM 40 MG: 100 INJECTION SUBCUTANEOUS at 04:09

## 2022-09-05 RX ADMIN — PROPOFOL 45 MCG/KG/MIN: 10 INJECTION, EMULSION INTRAVENOUS at 10:09

## 2022-09-05 RX ADMIN — VANCOMYCIN HYDROCHLORIDE 2000 MG: 500 INJECTION, POWDER, LYOPHILIZED, FOR SOLUTION INTRAVENOUS at 11:09

## 2022-09-05 RX ADMIN — Medication 300 MCG/HR: at 09:09

## 2022-09-05 RX ADMIN — BUPROPION HYDROCHLORIDE 150 MG: 75 TABLET, FILM COATED ORAL at 08:09

## 2022-09-05 RX ADMIN — PROPOFOL 45 MCG/KG/MIN: 10 INJECTION, EMULSION INTRAVENOUS at 07:09

## 2022-09-05 RX ADMIN — SENNOSIDES AND DOCUSATE SODIUM 1 TABLET: 50; 8.6 TABLET ORAL at 08:09

## 2022-09-05 RX ADMIN — FUROSEMIDE 40 MG: 10 INJECTION, SOLUTION INTRAMUSCULAR; INTRAVENOUS at 06:09

## 2022-09-05 RX ADMIN — FAMOTIDINE 20 MG: 10 INJECTION, SOLUTION INTRAVENOUS at 09:09

## 2022-09-05 RX ADMIN — MINERAL OIL AND WHITE PETROLATUM: 30; 940 OINTMENT OPHTHALMIC at 02:09

## 2022-09-05 RX ADMIN — MINERAL OIL AND WHITE PETROLATUM: 30; 940 OINTMENT OPHTHALMIC at 05:09

## 2022-09-05 RX ADMIN — Medication 300 MCG/HR: at 01:09

## 2022-09-05 RX ADMIN — PROPOFOL 50 MCG/KG/MIN: 10 INJECTION, EMULSION INTRAVENOUS at 04:09

## 2022-09-05 RX ADMIN — MINERAL OIL AND WHITE PETROLATUM: 30; 940 OINTMENT OPHTHALMIC at 10:09

## 2022-09-05 RX ADMIN — Medication 250 MCG/HR: at 05:09

## 2022-09-05 RX ADMIN — FUROSEMIDE 40 MG: 10 INJECTION, SOLUTION INTRAMUSCULAR; INTRAVENOUS at 11:09

## 2022-09-05 RX ADMIN — NICOTINE 1 PATCH: 14 PATCH TRANSDERMAL at 09:09

## 2022-09-05 RX ADMIN — Medication 4 ML: at 07:09

## 2022-09-05 RX ADMIN — CEFEPIME HYDROCHLORIDE 2 G: 2 INJECTION, SOLUTION INTRAVENOUS at 02:09

## 2022-09-05 RX ADMIN — PROPOFOL 50 MCG/KG/MIN: 10 INJECTION, EMULSION INTRAVENOUS at 05:09

## 2022-09-05 RX ADMIN — FUROSEMIDE 40 MG: 10 INJECTION, SOLUTION INTRAMUSCULAR; INTRAVENOUS at 05:09

## 2022-09-05 RX ADMIN — CEFEPIME HYDROCHLORIDE 2 G: 2 INJECTION, SOLUTION INTRAVENOUS at 07:09

## 2022-09-05 RX ADMIN — POLYETHYLENE GLYCOL 3350 17 G: 17 POWDER, FOR SOLUTION ORAL at 09:09

## 2022-09-05 RX ADMIN — ACETAMINOPHEN 650 MG: 325 TABLET, FILM COATED ORAL at 11:09

## 2022-09-05 RX ADMIN — PROPOFOL 50 MCG/KG/MIN: 10 INJECTION, EMULSION INTRAVENOUS at 01:09

## 2022-09-05 RX ADMIN — CEFEPIME HYDROCHLORIDE 2 G: 2 INJECTION, SOLUTION INTRAVENOUS at 11:09

## 2022-09-05 RX ADMIN — FAMOTIDINE 20 MG: 10 INJECTION, SOLUTION INTRAVENOUS at 08:09

## 2022-09-05 NOTE — CARE UPDATE
"Called stat to pt bedside via MIKHAIL Teresa stating "that pt sats 89%". Upon entering pt room pt nad sats 93% to 94%, pt lavage and sx, RN concern with presentation of ETT. RN educated that ETT is on top Meiplex on his face due to breakdown,  ETT  secure in place at this time and doesn't need changes out at this time. Witness via RT Tana at [facility].  "

## 2022-09-05 NOTE — PROGRESS NOTES
"[facility] Pulmonary/Critical Care Fellow Progress Note:    Brief Hx: 43 yo M w/ PMHx of drug use (heroin, methamphetamine, others) admitted  to [facility] for hypoxemic and hypercapnic respiratory failure after being found down, presumably 2/2 drug overdose and in shock. He received CPR from his GF and did wake up when EMS found him and he presented to the ED with leukocytosis, lactic acidosis, febrile. CTA showed diffuse dense bilateral infiltrates. UDS+ for THC. Blood cultures grew acinetobacter pittii & pesudomonas luteola and bacillus cereus. CXR shows evolution of R sided PNA. He started proning on  and started receiving steroids for ARDS dosing on .     Subjective:  Patient intubated and sedated, this AM on 50% and 10 of PEEP and doing very well.     Objective:  Last 24 Hour Vital Signs:  BP  Min: 105/64  Max: 123/74  Temp  Av.1 °F (37.8 °C)  Min: 98.6 °F (37 °C)  Max: 101.2 °F (38.4 °C)  Pulse  Av.3  Min: 81  Max: 118  Resp  Av.5  Min: 6  Max: 28  SpO2  Av.7 %  Min: 91 %  Max: 97 %  Body mass index is 28.06 kg/m².  I/O last 3 completed shifts:  In: 6852.8 [I.V.:2684.6; NG/GT:3673; IV Piggyback:495.2]  Out: 6390 [Urine:6340; Drains:50]      Physical Exam:  General: Sedated  HENT:  NCAT; anicteric sclera; (+)ETT in place, small wound on nasal septum  Cardio:  Regular rate and rhythm with normal S1 and S2; no murmurs or rubs  Resp:  CTAB; respirations unlabored; no wheezes, crackles or rhonchi  Abdom:  Soft, non-distended  Extrem:  WWP with no clubbing, cyanosis or edema, small cuts on hands and legs that are scabbed over  Pulses:  2+ and symmetric distally  Neuro:  AAOx0; paralyzed    Assessment & Plan:     42M with history of drug use (heroin, methamphetamine, "whatever he can get his hands on") presents with hypoxemic and hypercapnic respiratory failure presumably secondary to drug overdose requiring intubation found to have bacteremia and diffuse lung infiltrates.    "   Acute hypoxic and hypercapnic respiratory failure  In setting of presumed drug overdose (fentanyl?)  With diffuse bilateral pulmonary infiltrates on CT chest, +fevers and leukocytosis  Sputum (+) staph - pending susceptibility, RIP negative  JOVANA/ANCA negative -no new hemoptysis noted  Blood cultures positive - presume that lung infiltrates are related to this (see below)  Blood cultures with pseudomonas luteola and acinetobacter pitii -- patient with negative TTE for valve vegetations  - plan for 14d treatment given acuity of illness  Continue with current vent settings, wean FiO2 as able to maintain O2 saturations >90% (avoid over oxygenation) and titrate on ARDS ladder (currently low-peep)  Hold off on SAT - patient requires a lot of sedation - propofol, fentanyl, versed gtt, ketamine gtt in addition, added seroquel 200mg QHS & bupropion 150mg BID (was on 300mg XR outpatient) to help reduce need for sedation - would wean versed first   Lasix 40mg IV QID ordered to keep net even fluid balance - held metolazone as patient is off paralytic and coming off sedation drips  Continue to wean sedation as tolerated to target RASS -3  Multifocal PNA & ARDS  Started prone 8/30 - stopped after 9/3  given improvement in oxygenation  Respiratory culture with staph aureus - represents VAP - started on vancomycin 9/5  Started steroids for ARDS 9/1 - 20mg for 5 days and then 10mg for 5 days  Aspiration vs. Inhalation injury/pneumonitis vs. Septic emboli vs. Vasculitis (less likely)  Lab and culture workup as above  No new evidence of hemoptysis  Continue cefepime - 14d total, vancy 5d total   Urine legionella negative, strep Ag negative  HIV negative, viral PCR negative, Hep panel (+)hep C, negative acute hepatitis   CK elevated on different admission, Myositis panel pending, JOVANA & ANCA negative  Sedation weaning  Patient with significant sedation requirements and possibility for withdrawal  Wean off versed and then ketamine -  can start an oral benzodiazepine taper once patient comes off sedation with benzo he should still be on propofol so should be ok from a benzo withdrawal (BRITTANY)     Code: FULL     DVT: Lovenox  GI: Famotidine  Feeds: TF running  Sedation: propofol gtt, fentanyl gtt, ketamine gtt, versed gtt    Sujata Francois MD  Pulm/CC Fellow

## 2022-09-05 NOTE — PT/OT/SLP PROGRESS
Occupational Therapy      Patient Name:  Leighton Carroll   MRN:  [id number]    Patient not seen today secondary to Therapist assessment (current sedation goal of RASS -5 per med orders. Will follow and initiate therapy when patient passing MOVE screen or extubated per early mobility protocol).    LIT Middleton  9/5/2022

## 2022-09-05 NOTE — PT/OT/SLP PROGRESS
Physical Therapy  Not Seen    Patient Name:  Leighton Carroll   MRN:  [id number]    Patient not seen today secondary to Therapist assessment (current sedation goal of RASS -5 per med orders. Will follow and initiate therapy when patient passing MOVE screen or extubated per early mobility protocol.).

## 2022-09-05 NOTE — PROGRESS NOTES
Pharmacokinetic Initial Assessment: IV Vancomycin    Assessment/Plan:    Initiate intravenous vancomycin with loading dose of 2000 mg once followed by a maintenance dose of vancomycin 1500 mg IV every 12 hours.  Desired empiric serum trough concentration is 10 to 15 mcg/mL.  Draw vancomycin trough level 30 min prior to next dose on 9/7/22 at approximately 1030.  Pharmacy will continue to follow and monitor vancomycin.      Please contact pharmacy at extension [phone] with any questions regarding this assessment.     Thank you for the consult,   Antionette Zepeda       Patient brief summary:  Leighton Carroll is a 42 y.o. male initiated on antimicrobial therapy with IV Vancomycin for treatment of suspected lower respiratory infection.    Drug Allergies:   Review of patient's allergies indicates:  No Known Allergies    Actual Body Weight:   86.2 kg    Renal Function:   Estimated Creatinine Clearance: 115.1 mL/min (based on SCr of 0.9 mg/dL).,     Dialysis Method (if applicable):  N/A    CBC (last 72 hours):  Recent Labs   Lab Result Units 09/03/22 0345 09/04/22 0514 09/05/22  0354   WBC K/uL 14.15* 14.26* 13.59*   Hemoglobin g/dL 10.8* 11.2* 11.8*   Hematocrit % 34.7* 35.1* 36.0*   Platelets K/uL 397 426 517*   Gran % % 73.4*  --  70.4   Lymph % % 11.5*  --  16.6*   Mono % % 12.2  --  9.5   Eosinophil % % 0.6  --  1.6   Basophil % % 0.3  --  0.3   Differential Method  Automated  --  Automated       Metabolic Panel (last 72 hours):  Recent Labs   Lab Result Units 09/03/22 0345 09/04/22 0514 09/05/22  0354   Sodium mmol/L 144 141 140   Potassium mmol/L 4.2 3.4* 3.7   Chloride mmol/L 85* 83* 82*   CO2 mmol/L 49* 48* 45*   Glucose mg/dL 113* 119* 101   BUN mg/dL 53* 63* 73*   Creatinine mg/dL 0.8 0.8 0.9   Albumin g/dL 2.2*  --  2.5*   Total Bilirubin mg/dL 0.2  --  0.4   Alkaline Phosphatase U/L 105  --  107   AST U/L 93*  --  102*   ALT U/L 62*  --  112*   Magnesium mg/dL  --   --  2.5   Phosphorus mg/dL  --   --  3.0        Drug levels (last 3 results):  No results for input(s): VANCOMYCINRA, VANCORANDOM, VANCOMYCINPE, VANCOPEAK, VANCOMYCINTR, VANCOTROUGH in the last 72 hours.    Microbiologic Results:  Microbiology Results (last 7 days)       Procedure Component Value Units Date/Time    Culture, Respiratory with Gram Stain [[id number]]  (Abnormal) Collected: 09/03/22 1344    Order Status: Completed Specimen: Respiratory from Endotracheal Aspirate Updated: 09/05/22 0826     Respiratory Culture STAPHYLOCOCCUS AUREUS  Many  Susceptibility pending       Gram Stain (Respiratory) Few WBC's     Gram Stain (Respiratory) Rare Gram positive cocci    Blood culture [[id number]]     Order Status: No result Specimen: Blood     Blood culture [[id number]]     Order Status: No result Specimen: Blood     Blood culture [[id number]] Collected: 08/28/22 1653    Order Status: Completed Specimen: Blood Updated: 09/02/22 2312     Blood Culture, Routine No growth after 5 days.    Narrative:      Blood cultures from 2 different sites. 4 bottles total.  Please draw before starting antibiotics.    Blood culture [[id number]] Collected: 08/28/22 1650    Order Status: Completed Specimen: Blood Updated: 09/02/22 2312     Blood Culture, Routine No growth after 5 days.    Narrative:      Blood cultures x 2 different sites. 4 bottles total. Please  draw cultures before administering antibiotics.    Blood culture [[id number]] Collected: 08/27/22 1328    Order Status: Completed Specimen: Blood Updated: 09/02/22 0612     Blood Culture, Routine No growth after 5 days.    Blood culture [[id number]]  (Abnormal) Collected: 08/27/22 1328    Order Status: Completed Specimen: Blood Updated: 08/30/22 1150     Blood Culture, Routine Gram stain aer bottle: Gram positive rods      Results called to and read back by: Naveen Jensen RN  08/28/2022  09:39      BACILLUS CEREUS  Organism is a probable contaminant      Urine culture [[id number]] Collected: 08/27/22 1907    Order Status:  Completed Specimen: Urine Updated: 08/29/22 1046     Urine Culture, Routine No growth    Narrative:      Specimen Source->Urine    Culture, Respiratory with Gram Stain [[id number]] Collected: 08/26/22 0144    Order Status: Completed Specimen: Respiratory from Sputum Updated: 08/29/22 1031     Respiratory Culture No growth     Gram Stain (Respiratory) <10 epithelial cells per low power field.     Gram Stain (Respiratory) Rare WBC's     Gram Stain (Respiratory) Rare Gram positive cocci     Gram Stain (Respiratory) Rare Gram negative rods

## 2022-09-05 NOTE — PROGRESS NOTES
"[facility] - Intensive Care [place] [facility] Medicine  Progress Note    Patient Name: Leighton Carroll  MRN: [id number]  Patient Class: IP- Inpatient   Admission Date: 8/26/2022  Length of Stay: 10 days  Attending Physician: GAVIOTA Sanon MD  Primary Care Provider: Primary Doctor No        Subjective:     Principal Problem:Acute respiratory failure with hypoxia and hypercarbia        HPI:  Patient's HPI is adapted from notes of Dr. Schuler, Dr. Morin and Dr. Hall ([facility]).   Patient arrived in [facility] ICU intubated, pt's girlfriend's number not on file, unable to verify events PTA.     Leighton Carroll is a 42 year old man with a PMHx of depression, hx of drug overdose (7/11/2022, buproprion and gabapentin), polysubstance abuse (meth, heroin) and nicotine dependence.    He was presented to [facility] on 8/25/2022 via EMS with shortness of breath and dry cough. He was reportedly found down by his girlfriend at home yesterday evening (8/25/2022) and was given multiple rounds of chest compressions. He woke up and began having shortness of breath and called EMS, which found him saturating at 66% on room air, which increased to 90s with nonrebreather mask. Patient denied subjective fever, chills, sick contacts, chest pain, palpitations, abdominal pain.     At [facility], patient had a fever to 101, with leukocytosis (20) and lactic acidosis (3.0) with a normal procal. CTA chest showed patchy perihilar opacities bilaterally most pronounced in right lower lobe. Covid, Group A strep, respiratory influenza panel -ve. D-dimer was elevated (3.68), but CTPA negative for PE. Tox postive for THC only. CXR was -ve for pneumothorax. Patient was started on IV Cefepime and IV Vancomycin.     Patient was initially on BiPAP but became agitated and was intubated. Patient reportedly had "red spit". Patient was difficult to sedate requiring propofol and precedex and multiple doses of versed and ketamine, thus became hypotensive " after intubation and was started on levophed.     Patient is transferred to [facility] ICU, [facility] medicine, for management of acute respiratory failure with hypoxia and hypercapnia.             Overview/Hospital Course:  Admitted with acute hypoxemic respiratory failure.  Blood cultures demonstrated Acinetobacter, Pseudomonas luteola.  Pulmonology and ID consulted.  TTE performed without evidence of vegetation. Repeat blood culture showed bacillus in 1/4 bottles but suspected contaminant.  Opted for 14 day course of cefepime. Developed ARDS and started chemical paralysis/proning as well as ARDSnet protocol. Palliative consulted given the severity of his illness and likely extended recovery. Respiratory status was slow to improve but gradually had decreasing oxygenation/pressure requirements. Had repeat fevers and sputum culture showed staph aureus; vancomycin added.      Interval History: Febrile overnight and resp culture showing staph. Respiratory status gradually improving however with continued decreasing FiO2 and PEEP requirements.    Review of Systems   Unable to perform ROS: Intubated   Objective:     Vital Signs (Most Recent):  Temp: 98.1 °F (36.7 °C) (09/05/22 2130)  Pulse: 73 (09/05/22 2130)  Resp: (!) 28 (09/05/22 2130)  BP: 105/67 (09/05/22 2000)  SpO2: 95 % (09/05/22 2130)   Vital Signs (24h Range):  Temp:  [98.1 °F (36.7 °C)-100.9 °F (38.3 °C)] 98.1 °F (36.7 °C)  Pulse:  [] 73  Resp:  [23-28] 28  SpO2:  [86 %-97 %] 95 %  BP: ()/(58-75) 105/67  Arterial Line BP: ()/(51-78) 108/56     Weight: 86.2 kg (190 lb)  Body mass index is 28.06 kg/m².    Intake/Output Summary (Last 24 hours) at 9/5/2022 2201  Last data filed at 9/5/2022 1817  Gross per 24 hour   Intake 3188.36 ml   Output 3985 ml   Net -796.64 ml        Physical Exam  Vitals and nursing note reviewed.   Constitutional:       General: He is not in acute distress.     Appearance: He is well-developed.   HENT:      Head:  Normocephalic and atraumatic.   Eyes:      General:         Right eye: No discharge.         Left eye: No discharge.      Conjunctiva/sclera: Conjunctivae normal.   Cardiovascular:      Rate and Rhythm: Normal rate.      Pulses: Normal pulses.   Pulmonary:      Effort: Pulmonary effort is normal. No respiratory distress.      Comments: Intubated, mechanical breath sounds.  Abdominal:      Palpations: Abdomen is soft.      Tenderness: There is no abdominal tenderness.   Musculoskeletal:         General: Normal range of motion.      Right lower leg: No edema.      Left lower leg: No edema.   Skin:     General: Skin is warm and dry.   Neurological:      Comments: RASS -5, CAM/ICU N/A.     Significant Labs:   CBC:  Recent Labs   Lab 09/03/22 0345 09/04/22  0514 09/05/22  0354   WBC 14.15* 14.26* 13.59*   HGB 10.8* 11.2* 11.8*   HCT 34.7* 35.1* 36.0*    426 517*   GRAN 73.4*  10.4*  --  70.4  9.6*   LYMPH 11.5*  1.6  --  16.6*  2.3   MONO 12.2  1.7*  --  9.5  1.3*   EOS 0.1  --  0.2   BASO 0.04  --  0.04     CMP:  Recent Labs   Lab 09/03/22 0345 09/04/22  0514 09/05/22  0354    141 140   K 4.2 3.4* 3.7   CL 85* 83* 82*   CO2 49* 48* 45*   BUN 53* 63* 73*   CREATININE 0.8 0.8 0.9   * 119* 101   CALCIUM 9.8 9.6 9.5   MG  --   --  2.5   PHOS  --   --  3.0   ALKPHOS 105  --  107   AST 93*  --  102*   ALT 62*  --  112*   BILITOT 0.2  --  0.4   PROT 7.6  --  7.9   ALBUMIN 2.2*  --  2.5*   ANIONGAP 10 10 13        Significant Imaging:   No new imaging this morning.      Assessment/Plan:      * Acute respiratory failure with hypoxia and hypercarbia  Bilateral PNA, ARDS, septic shock (resolved), bacteremia, h/o drug overdose, polysubstance abuse  - Hypoxic to 60s on arrival and failed BiPAP 2/2 agitation and intubated at OSH. Tox positive for THC. D-dimer elevated but CTA negative for PE. COVID, RSV negative.  - Continue fentanyl gtt, ketamine gtt, midazolam gtt, propofol gtt.  - Continue cefepime 2g  IV q8hr for 14 day total course. Blood cultures showed acinetobacter, pseudomonas luteola. Repeat blood cultures with fever; add vancomycin for resp culture showing staph.  - ARDSnet protocol; proning/supinating. Improving O2; pulm/crit planning for no further proning at this time. Discontinue cisatracurium gtt.  - Continue furosemide 40mg IV q6hr, metolazone 5mg per OG daily, goal mildly net negative. Continue dexamethasone 20mg IV daily 5 days followed by 10mg IV daily for 5 days.  - Appreciate pulm/crit assistance.  - Palliative consulted given persistent vent requirements; appreciate assistance.    Bilateral pneumonia  - As above.    Hematuria  - Gross hematuria; not present on repeat UA.    Gram-negative bacteremia  - As above.    Septic shock  - As above.    History of drug overdose  - As above.    Depression  - Continue buproprion 150mg per OG daily, quetiapine 200mg per OG qHS.  - Resume divalproex ER 500mg 1g PO daily, risperiodone 2mg PO daily when appropriate.    VTE Risk Mitigation (From admission, onward)         Ordered     enoxaparin injection 40 mg  Daily         08/26/22 1422     IP VTE HIGH RISK PATIENT  Once         08/26/22 1422     Place sequential compression device  Until discontinued         08/26/22 1422     Place MARGRET hose  Until discontinued         08/26/22 1422                Discharge Planning   CHETAN:      Code Status: Full Code   Is the patient medically ready for discharge?:     Reason for patient still in hospital (select all that apply): Treatment  Discharge Plan A: Skilled Nursing Facility        Critical due to resp failure.    Critical care time spent on the evaluation and treatment of severe organ dysfunction, review of pertinent labs and imaging studies, discussions with consulting providers and discussions with patient/family: 35 minutes.    SLICK Sanon MD  Department of Hospital Medicine   [facility] Intensive [facility]

## 2022-09-06 PROBLEM — E87.6 HYPOKALEMIA: Status: ACTIVE | Noted: 2022-09-06

## 2022-09-06 LAB
ALLENS TEST: ABNORMAL
ANION GAP SERPL CALC-SCNC: 12 MMOL/L (ref 8–16)
BACTERIA SPEC AEROBE CULT: ABNORMAL
BASOPHILS # BLD AUTO: 0.05 K/UL (ref 0–0.2)
BASOPHILS NFR BLD: 0.4 % (ref 0–1.9)
BUN SERPL-MCNC: 75 MG/DL (ref 6–20)
CALCIUM SERPL-MCNC: 9.5 MG/DL (ref 8.7–10.5)
CHLORIDE SERPL-SCNC: 83 MMOL/L (ref 95–110)
CO2 SERPL-SCNC: 43 MMOL/L (ref 23–29)
CREAT SERPL-MCNC: 0.8 MG/DL (ref 0.5–1.4)
DELSYS: ABNORMAL
DIFFERENTIAL METHOD: ABNORMAL
EOSINOPHIL # BLD AUTO: 0.3 K/UL (ref 0–0.5)
EOSINOPHIL NFR BLD: 2.2 % (ref 0–8)
ERYTHROCYTE [DISTWIDTH] IN BLOOD BY AUTOMATED COUNT: 13.9 % (ref 11.5–14.5)
ERYTHROCYTE [SEDIMENTATION RATE] IN BLOOD BY WESTERGREN METHOD: 26 MM/H
ERYTHROCYTE [SEDIMENTATION RATE] IN BLOOD BY WESTERGREN METHOD: 28 MM/H
EST. GFR  (NO RACE VARIABLE): >60 ML/MIN/1.73 M^2
FIO2: 50
FIO2: 50
FIO2: 60
FIO2: 60
FIO2: 65
FIO2: 80
GLUCOSE SERPL-MCNC: 96 MG/DL (ref 70–110)
GRAM STN SPEC: ABNORMAL
GRAM STN SPEC: ABNORMAL
HCO3 UR-SCNC: 54.4 MMOL/L (ref 24–28)
HCO3 UR-SCNC: 55 MMOL/L (ref 24–28)
HCO3 UR-SCNC: 59.2 MMOL/L (ref 24–28)
HCO3 UR-SCNC: 59.9 MMOL/L (ref 24–28)
HCO3 UR-SCNC: 60.1 MMOL/L (ref 24–28)
HCO3 UR-SCNC: 60.1 MMOL/L (ref 24–28)
HCT VFR BLD AUTO: 34.9 % (ref 40–54)
HGB BLD-MCNC: 11.3 G/DL (ref 14–18)
IMM GRANULOCYTES # BLD AUTO: 0.21 K/UL (ref 0–0.04)
IMM GRANULOCYTES NFR BLD AUTO: 1.6 % (ref 0–0.5)
LYMPHOCYTES # BLD AUTO: 1.6 K/UL (ref 1–4.8)
LYMPHOCYTES NFR BLD: 12.4 % (ref 18–48)
MAGNESIUM SERPL-MCNC: 2.6 MG/DL (ref 1.6–2.6)
MCH RBC QN AUTO: 29.4 PG (ref 27–31)
MCHC RBC AUTO-ENTMCNC: 32.4 G/DL (ref 32–36)
MCV RBC AUTO: 91 FL (ref 82–98)
MIN VOL: 12.3
MIN VOL: 13.3
MODE: ABNORMAL
MONOCYTES # BLD AUTO: 1 K/UL (ref 0.3–1)
MONOCYTES NFR BLD: 7.7 % (ref 4–15)
NEUTROPHILS # BLD AUTO: 10 K/UL (ref 1.8–7.7)
NEUTROPHILS NFR BLD: 75.7 % (ref 38–73)
NRBC BLD-RTO: 0 /100 WBC
PCO2 BLDA: 70.8 MMHG (ref 35–45)
PCO2 BLDA: 75.7 MMHG (ref 35–45)
PCO2 BLDA: 75.7 MMHG (ref 35–45)
PCO2 BLDA: 81.2 MMHG (ref 35–45)
PCO2 BLDA: 94.9 MMHG (ref 35–45)
PCO2 BLDA: 98.6 MMHG (ref 35–45)
PEEP: 10
PEEP: 10
PEEP: 14
PEEP: 16
PH SMN: 7.35 [PH] (ref 7.35–7.45)
PH SMN: 7.41 [PH] (ref 7.35–7.45)
PH SMN: 7.43 [PH] (ref 7.35–7.45)
PH SMN: 7.51 [PH] (ref 7.35–7.45)
PH SMN: 7.51 [PH] (ref 7.35–7.45)
PH SMN: 7.53 [PH] (ref 7.35–7.45)
PHOSPHATE SERPL-MCNC: 2.9 MG/DL (ref 2.7–4.5)
PIP: 30
PIP: 36
PLATELET # BLD AUTO: 507 K/UL (ref 150–450)
PMV BLD AUTO: 10.1 FL (ref 9.2–12.9)
PO2 BLDA: 118 MMHG (ref 80–100)
PO2 BLDA: 128 MMHG (ref 80–100)
PO2 BLDA: 128 MMHG (ref 80–100)
PO2 BLDA: 70 MMHG (ref 80–100)
PO2 BLDA: 83 MMHG (ref 80–100)
PO2 BLDA: 90 MMHG (ref 80–100)
POC BE: 29 MMOL/L
POC BE: >30 MMOL/L
POC SATURATED O2: 92 % (ref 95–100)
POC SATURATED O2: 94 % (ref 95–100)
POC SATURATED O2: 97 % (ref 95–100)
POC SATURATED O2: 99 % (ref 95–100)
POC TCO2: >50 MMOL/L (ref 23–27)
POTASSIUM SERPL-SCNC: 2.8 MMOL/L (ref 3.5–5.1)
RBC # BLD AUTO: 3.84 M/UL (ref 4.6–6.2)
SAMPLE: ABNORMAL
SITE: ABNORMAL
SODIUM SERPL-SCNC: 138 MMOL/L (ref 136–145)
SP02: 90
SP02: 94
VT: 480
WBC # BLD AUTO: 13.15 K/UL (ref 3.9–12.7)

## 2022-09-06 PROCEDURE — 27100171 HC OXYGEN HIGH FLOW UP TO 24 HOURS

## 2022-09-06 PROCEDURE — 80048 BASIC METABOLIC PNL TOTAL CA: CPT | Performed by: INTERNAL MEDICINE

## 2022-09-06 PROCEDURE — 27000207 HC ISOLATION

## 2022-09-06 PROCEDURE — 20000000 HC ICU ROOM

## 2022-09-06 PROCEDURE — 94761 N-INVAS EAR/PLS OXIMETRY MLT: CPT

## 2022-09-06 PROCEDURE — 63600175 PHARM REV CODE 636 W HCPCS: Performed by: ANESTHESIOLOGY

## 2022-09-06 PROCEDURE — 63600175 PHARM REV CODE 636 W HCPCS: Performed by: STUDENT IN AN ORGANIZED HEALTH CARE EDUCATION/TRAINING PROGRAM

## 2022-09-06 PROCEDURE — 36600 WITHDRAWAL OF ARTERIAL BLOOD: CPT

## 2022-09-06 PROCEDURE — 99900035 HC TECH TIME PER 15 MIN (STAT)

## 2022-09-06 PROCEDURE — 99291 PR CRITICAL CARE, E/M 30-74 MINUTES: ICD-10-PCS | Mod: ,,, | Performed by: HOSPITALIST

## 2022-09-06 PROCEDURE — 25000003 PHARM REV CODE 250: Performed by: STUDENT IN AN ORGANIZED HEALTH CARE EDUCATION/TRAINING PROGRAM

## 2022-09-06 PROCEDURE — 83735 ASSAY OF MAGNESIUM: CPT | Performed by: INTERNAL MEDICINE

## 2022-09-06 PROCEDURE — S4991 NICOTINE PATCH NONLEGEND: HCPCS

## 2022-09-06 PROCEDURE — 84100 ASSAY OF PHOSPHORUS: CPT | Performed by: INTERNAL MEDICINE

## 2022-09-06 PROCEDURE — 63600175 PHARM REV CODE 636 W HCPCS

## 2022-09-06 PROCEDURE — 99900026 HC AIRWAY MAINTENANCE (STAT)

## 2022-09-06 PROCEDURE — 94003 VENT MGMT INPAT SUBQ DAY: CPT

## 2022-09-06 PROCEDURE — 25000003 PHARM REV CODE 250: Performed by: INTERNAL MEDICINE

## 2022-09-06 PROCEDURE — 85025 COMPLETE CBC W/AUTO DIFF WBC: CPT | Performed by: INTERNAL MEDICINE

## 2022-09-06 PROCEDURE — 63600175 PHARM REV CODE 636 W HCPCS: Performed by: INTERNAL MEDICINE

## 2022-09-06 PROCEDURE — 94640 AIRWAY INHALATION TREATMENT: CPT

## 2022-09-06 PROCEDURE — 25000003 PHARM REV CODE 250

## 2022-09-06 PROCEDURE — 99291 CRITICAL CARE FIRST HOUR: CPT | Mod: ,,, | Performed by: HOSPITALIST

## 2022-09-06 PROCEDURE — 37799 UNLISTED PX VASCULAR SURGERY: CPT

## 2022-09-06 PROCEDURE — 25000242 PHARM REV CODE 250 ALT 637 W/ HCPCS: Performed by: STUDENT IN AN ORGANIZED HEALTH CARE EDUCATION/TRAINING PROGRAM

## 2022-09-06 RX ORDER — POTASSIUM CHLORIDE 14.9 MG/ML
20 INJECTION INTRAVENOUS ONCE
Status: COMPLETED | OUTPATIENT
Start: 2022-09-06 | End: 2022-09-06

## 2022-09-06 RX ORDER — DIAZEPAM 5 MG/1
5 TABLET ORAL EVERY 8 HOURS
Status: DISCONTINUED | OUTPATIENT
Start: 2022-09-08 | End: 2022-09-07

## 2022-09-06 RX ORDER — DIAZEPAM 5 MG/1
5 TABLET ORAL EVERY 8 HOURS
Status: DISCONTINUED | OUTPATIENT
Start: 2022-09-09 | End: 2022-09-06

## 2022-09-06 RX ORDER — PROPOFOL 10 MG/ML
0-50 INJECTION, EMULSION INTRAVENOUS CONTINUOUS
Status: DISCONTINUED | OUTPATIENT
Start: 2022-09-06 | End: 2022-09-07

## 2022-09-06 RX ORDER — FENTANYL CITRATE-0.9 % NACL/PF 10 MCG/ML
0-300 PLASTIC BAG, INJECTION (ML) INTRAVENOUS CONTINUOUS
Status: DISCONTINUED | OUTPATIENT
Start: 2022-09-06 | End: 2022-09-07

## 2022-09-06 RX ORDER — DIAZEPAM 5 MG/1
10 TABLET ORAL EVERY 8 HOURS
Status: DISCONTINUED | OUTPATIENT
Start: 2022-09-06 | End: 2022-09-07

## 2022-09-06 RX ORDER — DIAZEPAM 5 MG/1
5 TABLET ORAL EVERY 12 HOURS
Status: DISCONTINUED | OUTPATIENT
Start: 2022-09-12 | End: 2022-09-06

## 2022-09-06 RX ORDER — MIDAZOLAM HYDROCHLORIDE 1 MG/ML
2 INJECTION INTRAMUSCULAR; INTRAVENOUS
Status: DISCONTINUED | OUTPATIENT
Start: 2022-09-06 | End: 2022-09-07

## 2022-09-06 RX ORDER — DIAZEPAM 5 MG/1
5 TABLET ORAL EVERY 12 HOURS
Status: DISCONTINUED | OUTPATIENT
Start: 2022-09-10 | End: 2022-09-07

## 2022-09-06 RX ORDER — DIAZEPAM 5 MG/1
10 TABLET ORAL EVERY 8 HOURS
Status: DISCONTINUED | OUTPATIENT
Start: 2022-09-06 | End: 2022-09-06

## 2022-09-06 RX ORDER — DEXMEDETOMIDINE HYDROCHLORIDE 4 UG/ML
0-1.4 INJECTION, SOLUTION INTRAVENOUS CONTINUOUS
Status: DISCONTINUED | OUTPATIENT
Start: 2022-09-06 | End: 2022-09-10

## 2022-09-06 RX ADMIN — FENTANYL CITRATE 300 MCG/HR: 50 INJECTION INTRAVENOUS at 09:09

## 2022-09-06 RX ADMIN — PROPOFOL 50 MCG/KG/MIN: 10 INJECTION, EMULSION INTRAVENOUS at 03:09

## 2022-09-06 RX ADMIN — BUPROPION HYDROCHLORIDE 150 MG: 75 TABLET, FILM COATED ORAL at 09:09

## 2022-09-06 RX ADMIN — CEFEPIME HYDROCHLORIDE 2 G: 2 INJECTION, SOLUTION INTRAVENOUS at 03:09

## 2022-09-06 RX ADMIN — DEXMEDETOMIDINE HYDROCHLORIDE 0.6 MCG/KG/HR: 4 INJECTION, SOLUTION INTRAVENOUS at 10:09

## 2022-09-06 RX ADMIN — MIDAZOLAM HYDROCHLORIDE 2 MG: 1 INJECTION, SOLUTION INTRAMUSCULAR; INTRAVENOUS at 11:09

## 2022-09-06 RX ADMIN — SENNOSIDES AND DOCUSATE SODIUM 1 TABLET: 50; 8.6 TABLET ORAL at 09:09

## 2022-09-06 RX ADMIN — FENTANYL CITRATE 300 MCG/HR: 50 INJECTION INTRAVENOUS at 01:09

## 2022-09-06 RX ADMIN — CEFEPIME HYDROCHLORIDE 2 G: 2 INJECTION, SOLUTION INTRAVENOUS at 08:09

## 2022-09-06 RX ADMIN — PROPOFOL 50 MCG/KG/MIN: 10 INJECTION, EMULSION INTRAVENOUS at 10:09

## 2022-09-06 RX ADMIN — Medication 4 ML: at 07:09

## 2022-09-06 RX ADMIN — DIAZEPAM 10 MG: 5 TABLET ORAL at 11:09

## 2022-09-06 RX ADMIN — POTASSIUM BICARBONATE 25 MEQ: 25 TABLET, EFFERVESCENT ORAL at 11:09

## 2022-09-06 RX ADMIN — PROPOFOL 40 MCG/KG/MIN: 10 INJECTION, EMULSION INTRAVENOUS at 03:09

## 2022-09-06 RX ADMIN — MINERAL OIL AND WHITE PETROLATUM: 30; 940 OINTMENT OPHTHALMIC at 06:09

## 2022-09-06 RX ADMIN — DIAZEPAM 10 MG: 5 TABLET ORAL at 09:09

## 2022-09-06 RX ADMIN — PROPOFOL 50 MCG/KG/MIN: 10 INJECTION, EMULSION INTRAVENOUS at 07:09

## 2022-09-06 RX ADMIN — FAMOTIDINE 20 MG: 10 INJECTION, SOLUTION INTRAVENOUS at 08:09

## 2022-09-06 RX ADMIN — POTASSIUM CHLORIDE 20 MEQ: 14.9 INJECTION, SOLUTION INTRAVENOUS at 05:09

## 2022-09-06 RX ADMIN — KETAMINE HYDROCHLORIDE 2.5 MCG/KG/MIN: 50 INJECTION INTRAMUSCULAR; INTRAVENOUS at 01:09

## 2022-09-06 RX ADMIN — FAMOTIDINE 20 MG: 10 INJECTION, SOLUTION INTRAVENOUS at 09:09

## 2022-09-06 RX ADMIN — VANCOMYCIN HYDROCHLORIDE 1500 MG: 1.5 INJECTION, POWDER, LYOPHILIZED, FOR SOLUTION INTRAVENOUS at 11:09

## 2022-09-06 RX ADMIN — FUROSEMIDE 40 MG: 10 INJECTION, SOLUTION INTRAMUSCULAR; INTRAVENOUS at 05:09

## 2022-09-06 RX ADMIN — MINERAL OIL AND WHITE PETROLATUM: 30; 940 OINTMENT OPHTHALMIC at 09:09

## 2022-09-06 RX ADMIN — PROPOFOL 45 MCG/KG/MIN: 10 INJECTION, EMULSION INTRAVENOUS at 07:09

## 2022-09-06 RX ADMIN — SENNOSIDES AND DOCUSATE SODIUM 1 TABLET: 50; 8.6 TABLET ORAL at 08:09

## 2022-09-06 RX ADMIN — MIDAZOLAM HYDROCHLORIDE 2 MG: 1 INJECTION, SOLUTION INTRAMUSCULAR; INTRAVENOUS at 07:09

## 2022-09-06 RX ADMIN — DEXMEDETOMIDINE HYDROCHLORIDE 0.5 MCG/KG/HR: 4 INJECTION, SOLUTION INTRAVENOUS at 10:09

## 2022-09-06 RX ADMIN — MIDAZOLAM HYDROCHLORIDE 3 MG/HR: 5 INJECTION, SOLUTION INTRAMUSCULAR; INTRAVENOUS at 08:09

## 2022-09-06 RX ADMIN — CEFEPIME HYDROCHLORIDE 2 G: 2 INJECTION, SOLUTION INTRAVENOUS at 12:09

## 2022-09-06 RX ADMIN — DEXAMETHASONE SODIUM PHOSPHATE 10 MG: 4 INJECTION INTRA-ARTICULAR; INTRALESIONAL; INTRAMUSCULAR; INTRAVENOUS; SOFT TISSUE at 05:09

## 2022-09-06 RX ADMIN — FUROSEMIDE 40 MG: 10 INJECTION, SOLUTION INTRAMUSCULAR; INTRAVENOUS at 11:09

## 2022-09-06 RX ADMIN — Medication 300 MCG/HR: at 03:09

## 2022-09-06 RX ADMIN — POLYETHYLENE GLYCOL 3350 17 G: 17 POWDER, FOR SOLUTION ORAL at 08:09

## 2022-09-06 RX ADMIN — NICOTINE 1 PATCH: 14 PATCH TRANSDERMAL at 08:09

## 2022-09-06 RX ADMIN — BUPROPION HYDROCHLORIDE 150 MG: 75 TABLET, FILM COATED ORAL at 08:09

## 2022-09-06 RX ADMIN — ENOXAPARIN SODIUM 40 MG: 100 INJECTION SUBCUTANEOUS at 05:09

## 2022-09-06 RX ADMIN — Medication 4 ML: at 08:09

## 2022-09-06 NOTE — PLAN OF CARE
Patient on ventilator with PEEP 10 and Fio2 of 70%. Sedation continue. Tube feeding restarted. Vitals stable overnight. Safety measures remain in place.

## 2022-09-06 NOTE — ASSESSMENT & PLAN NOTE
Bilateral PNA, ARDS, septic shock (resolved), bacteremia, h/o drug overdose, polysubstance abuse, leukocytosis, encephalopathy  - Hypoxic to 60s on arrival and failed BiPAP 2/2 agitation and intubated at OSH. Tox positive for THC. D-dimer elevated but CTA negative for PE. COVID, RSV negative.  - Continue fentanyl gtt, ketamine gtt, midazolam gtt, propofol gtt.  - Continue cefepime 2g IV q8hr for 14 day total course. Blood cultures showed acinetobacter, pseudomonas luteola. Repeat blood cultures with fever; add vancomycin for resp culture showing staph.  - ARDSnet protocol; proning/supinating. Improving O2; pulm/crit planning for no further proning at this time. Discontinue cisatracurium gtt.  - Continue furosemide 40mg IV q6hr, metolazone 5mg per OG daily, goal mildly net negative. Continue dexamethasone 20mg IV daily 5 days followed by 10mg IV daily for 5 days.  - Appreciate pulm/crit assistance.  - Palliative consulted given persistent vent requirements; appreciate assistance.

## 2022-09-06 NOTE — PROGRESS NOTES
"[facility] - Intensive Care [facility] Medicine  Progress Note    Patient Name: Leighton Carroll  MRN: [id number]  Patient Class: IP- Inpatient   Admission Date: 8/26/2022  Length of Stay: 11 days  Attending Physician: Alexia Abel MD  Primary Care Provider: Primary Doctor No        Subjective:     Principal Problem:Acute respiratory failure with hypoxia and hypercarbia        HPI:  Patient's HPI is adapted from notes of Dr. Schuler, Dr. Morin and Dr. Hall ([facility]).   Patient arrived in [facility] ICU intubated, pt's girlfriend's number not on file, unable to verify events PTA.     Leighton Carroll is a 42 year old man with a PMHx of depression, hx of drug overdose (7/11/2022, buproprion and gabapentin), polysubstance abuse (meth, heroin) and nicotine dependence.    He was presented to [facility] on 8/25/2022 via EMS with shortness of breath and dry cough. He was reportedly found down by his girlfriend at home yesterday evening (8/25/2022) and was given multiple rounds of chest compressions. He woke up and began having shortness of breath and called EMS, which found him saturating at 66% on room air, which increased to 90s with nonrebreather mask. Patient denied subjective fever, chills, sick contacts, chest pain, palpitations, abdominal pain.     At [facility], patient had a fever to 101, with leukocytosis (20) and lactic acidosis (3.0) with a normal procal. CTA chest showed patchy perihilar opacities bilaterally most pronounced in right lower lobe. Covid, Group A strep, respiratory influenza panel -ve. D-dimer was elevated (3.68), but CTPA negative for PE. Tox postive for THC only. CXR was -ve for pneumothorax. Patient was started on IV Cefepime and IV Vancomycin.     Patient was initially on BiPAP but became agitated and was intubated. Patient reportedly had "red spit". Patient was difficult to sedate requiring propofol and precedex and multiple doses of versed and ketamine, thus became hypotensive " after intubation and was started on levophed.     Patient is transferred to [facility] ICU[facility] [place] medicine, for management of acute respiratory failure with hypoxia and hypercapnia.         Overview/Hospital Course:  Admitted with acute hypoxemic respiratory failure.  Blood cultures demonstrated Acinetobacter, Pseudomonas luteola.  Pulmonology and ID consulted.  TTE performed without evidence of vegetation. Repeat blood culture showed bacillus in 1/4 bottles but suspected contaminant.  Opted for 14 day course of cefepime. Developed ARDS and started chemical paralysis/proning as well as ARDSnet protocol. Palliative consulted given the severity of his illness and likely extended recovery. Respiratory status was slow to improve but gradually had decreasing oxygenation/pressure requirements. Had repeat fevers and sputum culture showed staph aureus; vancomycin added.      Interval History: No fever overnight, and resp culture showing staph. Respiratory status about the same with FiO2 70% and 10 PEEP. Still on high level of sedation.    Review of Systems   Unable to perform ROS: Intubated   Objective:     Vital Signs (Most Recent):  Temp: 99.5 °F (37.5 °C) (09/06/22 1400)  Pulse: 79 (09/06/22 1400)  Resp: (!) 22 (09/06/22 1400)  BP: (!) 108/57 (09/06/22 1400)  SpO2: (!) 92 % (09/06/22 1400)   Vital Signs (24h Range):  Temp:  [98.1 °F (36.7 °C)-99.9 °F (37.7 °C)] 99.5 °F (37.5 °C)  Pulse:  [] 79  Resp:  [17-31] 22  SpO2:  [86 %-99 %] 92 %  BP: ()/(52-83) 108/57  Arterial Line BP: ()/(51-75) 103/51     Weight: 86.2 kg (190 lb)  Body mass index is 28.06 kg/m².    Intake/Output Summary (Last 24 hours) at 9/6/2022 1516  Last data filed at 9/6/2022 1438  Gross per 24 hour   Intake 1871.49 ml   Output 2750 ml   Net -878.51 ml        Physical Exam  Vitals and nursing note reviewed.   Constitutional:       General: He is not in acute distress.     Appearance: He is well-developed.      Comments: Sedated   HENT:       Head: Normocephalic and atraumatic.      Comments: ET tube in place     Nose: Nose normal.   Eyes:      General:         Right eye: No discharge.         Left eye: No discharge.      Conjunctiva/sclera: Conjunctivae normal.   Cardiovascular:      Rate and Rhythm: Normal rate.      Pulses: Normal pulses.   Pulmonary:      Effort: Pulmonary effort is normal. No respiratory distress.      Comments: Intubated, mechanical breath sounds.  Abdominal:      General: Bowel sounds are normal.      Palpations: Abdomen is soft.      Tenderness: There is no abdominal tenderness. There is no guarding or rebound.   Musculoskeletal:         General: Normal range of motion.      Right lower leg: No edema.      Left lower leg: No edema.   Skin:     General: Skin is warm and dry.      Comments: Extensive tatoos throughout entire body   Neurological:      Comments: Sedated     Significant Labs:   CBC:  Recent Labs   Lab 09/04/22  0514 09/05/22  0354 09/06/22 0319   WBC 14.26* 13.59* 13.15*   HGB 11.2* 11.8* 11.3*   HCT 35.1* 36.0* 34.9*    517* 507*   GRAN  --  70.4  9.6* 75.7*  10.0*   LYMPH  --  16.6*  2.3 12.4*  1.6   MONO  --  9.5  1.3* 7.7  1.0   EOS  --  0.2 0.3   BASO  --  0.04 0.05     CMP:  Recent Labs   Lab 09/04/22  0514 09/05/22  0354 09/06/22  0319    140 138   K 3.4* 3.7 2.8*   CL 83* 82* 83*   CO2 48* 45* 43*   BUN 63* 73* 75*   CREATININE 0.8 0.9 0.8   * 101 96   CALCIUM 9.6 9.5 9.5   MG  --  2.5 2.6   PHOS  --  3.0 2.9   ALKPHOS  --  107  --    AST  --  102*  --    ALT  --  112*  --    BILITOT  --  0.4  --    PROT  --  7.9  --    ALBUMIN  --  2.5*  --    ANIONGAP 10 13 12        Significant Imaging:   No new imaging this morning.      Assessment/Plan:      * Acute respiratory failure with hypoxia and hypercarbia  Bilateral PNA, ARDS, septic shock (resolved), bacteremia, h/o drug overdose, polysubstance abuse, leukocytosis, encephalopathy  - Hypoxic to 60s on arrival and failed BiPAP 2/2  agitation and intubated at OSH. Tox positive for THC. D-dimer elevated but CTA negative for PE. COVID, RSV negative.  - Continue fentanyl gtt, ketamine gtt, midazolam gtt, propofol gtt.  - Continue cefepime 2g IV q8hr for 14 day total course. Blood cultures showed acinetobacter, pseudomonas luteola. Repeat blood cultures with fever; add vancomycin for resp culture showing staph.  - ARDSnet protocol; proning/supinating. Improving O2; pulm/crit planning for no further proning at this time. Discontinue cisatracurium gtt.  - Continue furosemide 40mg IV q6hr, metolazone 5mg per OG daily, goal mildly net negative. Continue dexamethasone 20mg IV daily 5 days followed by 10mg IV daily for 5 days.  - Appreciate pulm/crit assistance.  - Palliative consulted given persistent vent requirements; appreciate assistance.    Hypokalemia  - Due to diuresis  - Replete and monitor with repeat labs    Bilateral pneumonia  - As above.    Hematuria  - Gross hematuria; not present on repeat UA.    Gram-negative bacteremia  - As above.    Septic shock  - As above.    History of drug overdose  - As above.    Depression  - Continue buproprion 150mg per OG daily, quetiapine 200mg per OG qHS.  - Resume divalproex ER 500mg 1g PO daily, risperiodone 2mg PO daily when appropriate.      VTE Risk Mitigation (From admission, onward)         Ordered     enoxaparin injection 40 mg  Daily         08/26/22 1422     IP VTE HIGH RISK PATIENT  Once         08/26/22 1422     Place sequential compression device  Until discontinued         08/26/22 1422     Place MARGRET hose  Until discontinued         08/26/22 1422                Critical care time spent on the evaluation and treatment of severe organ dysfunction, review of pertinent labs and imaging studies, discussions with consulting providers and discussions with patient/family: 45   minutes.        Alexia Abel MD  Department of Hospital Medicine   [facility] Intensive [facility]

## 2022-09-06 NOTE — SUBJECTIVE & OBJECTIVE
Interval History: No fever overnight, and resp culture showing staph. Respiratory status about the same with FiO2 70% and 10 PEEP. Still on high level of sedation.    Review of Systems   Unable to perform ROS: Intubated   Objective:     Vital Signs (Most Recent):  Temp: 99.5 °F (37.5 °C) (09/06/22 1400)  Pulse: 79 (09/06/22 1400)  Resp: (!) 22 (09/06/22 1400)  BP: (!) 108/57 (09/06/22 1400)  SpO2: (!) 92 % (09/06/22 1400)   Vital Signs (24h Range):  Temp:  [98.1 °F (36.7 °C)-99.9 °F (37.7 °C)] 99.5 °F (37.5 °C)  Pulse:  [] 79  Resp:  [17-31] 22  SpO2:  [86 %-99 %] 92 %  BP: ()/(52-83) 108/57  Arterial Line BP: ()/(51-75) 103/51     Weight: 86.2 kg (190 lb)  Body mass index is 28.06 kg/m².    Intake/Output Summary (Last 24 hours) at 9/6/2022 1516  Last data filed at 9/6/2022 1438  Gross per 24 hour   Intake 1871.49 ml   Output 2750 ml   Net -878.51 ml        Physical Exam  Vitals and nursing note reviewed.   Constitutional:       General: He is not in acute distress.     Appearance: He is well-developed.      Comments: Sedated   HENT:      Head: Normocephalic and atraumatic.      Comments: ET tube in place     Nose: Nose normal.   Eyes:      General:         Right eye: No discharge.         Left eye: No discharge.      Conjunctiva/sclera: Conjunctivae normal.   Cardiovascular:      Rate and Rhythm: Normal rate.      Pulses: Normal pulses.   Pulmonary:      Effort: Pulmonary effort is normal. No respiratory distress.      Comments: Intubated, mechanical breath sounds.  Abdominal:      General: Bowel sounds are normal.      Palpations: Abdomen is soft.      Tenderness: There is no abdominal tenderness. There is no guarding or rebound.   Musculoskeletal:         General: Normal range of motion.      Right lower leg: No edema.      Left lower leg: No edema.   Skin:     General: Skin is warm and dry.      Comments: Extensive tatoos throughout entire body   Neurological:      Comments: Sedated      Significant Labs:   CBC:  Recent Labs   Lab 09/04/22 0514 09/05/22 0354 09/06/22 0319   WBC 14.26* 13.59* 13.15*   HGB 11.2* 11.8* 11.3*   HCT 35.1* 36.0* 34.9*    517* 507*   GRAN  --  70.4  9.6* 75.7*  10.0*   LYMPH  --  16.6*  2.3 12.4*  1.6   MONO  --  9.5  1.3* 7.7  1.0   EOS  --  0.2 0.3   BASO  --  0.04 0.05     CMP:  Recent Labs   Lab 09/04/22 0514 09/05/22 0354 09/06/22 0319    140 138   K 3.4* 3.7 2.8*   CL 83* 82* 83*   CO2 48* 45* 43*   BUN 63* 73* 75*   CREATININE 0.8 0.9 0.8   * 101 96   CALCIUM 9.6 9.5 9.5   MG  --  2.5 2.6   PHOS  --  3.0 2.9   ALKPHOS  --  107  --    AST  --  102*  --    ALT  --  112*  --    BILITOT  --  0.4  --    PROT  --  7.9  --    ALBUMIN  --  2.5*  --    ANIONGAP 10 13 12        Significant Imaging:   No new imaging this morning.

## 2022-09-06 NOTE — PROGRESS NOTES
"[facility] - Intensive Care ([place])  Wound Care    Patient Name:  Leighton Carroll   MRN:  [id number]  Date: 9/6/2022  Diagnosis: Acute respiratory failure with hypoxia and hypercarbia    History:     Past Medical History:   Diagnosis Date    Anxiety     Depression     Hepatitis C     Substance abuse     METH AND HEROIN       Social History     Socioeconomic History    Marital status: Single   Tobacco Use    Smoking status: Every Day     Packs/day: 0.50     Types: Cigarettes    Smokeless tobacco: Never   Substance and Sexual Activity    Alcohol use: Yes     Comment: 5th whiskey or more daily    Drug use: Yes     Types: Methamphetamines, Marijuana     Comment: heroin , "pt reports he has been doing whatever he can get his hands on"      Social Determinants of Health     Financial Resource Strain: High Risk    Difficulty of Paying Living Expenses: Hard   Food Insecurity: Food Insecurity Present    Worried About Running Out of Food in the Last Year: Sometimes true    Ran Out of Food in the Last Year: Sometimes true   Transportation Needs: Unmet Transportation Needs    Lack of Transportation (Medical): Yes    Lack of Transportation (Non-Medical): Yes   Physical Activity: Inactive    Days of Exercise per Week: 0 days    Minutes of Exercise per Session: 0 min   Stress: Stress Concern Present    Feeling of Stress : Very much   Social Connections: Socially Isolated    Frequency of Communication with Friends and Family: More than three times a week    Frequency of Social Gatherings with Friends and Family: Three times a week    Attends Jewish Services: Never    Active Member of Clubs or Organizations: No    Attends Club or Organization Meetings: Never    Marital Status: Never    Housing Stability: High Risk    Unable to Pay for Housing in the Last Year: Yes    Unstable Housing in the Last Year: Yes       Precautions:     Allergies as of 08/26/2022    (No Known Allergies)       WOC Assessment Details/Treatment   New " consult for wounds to the penis , nose and right cheek.  Believe injuries to the penile shaft, glans and foreskin are related to proning.   See photo documentation. Scrotum denuded and abraded, barrier cream applied  Septum of the nose is medical device related skin injury  and appears to be full thickness. Eschar noted to right nare and septum.   Right cheek of face is a MARSI from ET tube donohue. Unsure if ET tube donohue was in place during proning. Skin injury with pink and yellow tissue exposed. RT staff using mepilex border to cheeks under the ET Tube securement device. .  Noted scabbed injury to left forehead also assumed to be from proning.     09/06/22 1300   Pain/Comfort/Sleep   Preferred Pain Scale rFLACC (Revised Face Legs Arms Cry Consolability Scale)   rFLACC Pain Rating: - Face 0-->no particular expression or smile   rFLACC Pain Rating: - Legs 0-->normal position or relaxed   rFLACC Pain Rating: - Activity 0-->lying quietly, normal position, moves easily   rFLACC Pain Rating: - Cry 0-->no cry (awake or asleep)   rFLACC Pain Rating: - Consolability 0-->content, relaxed   rFLACC Score: 0   POSS ([place] Opioid-Induced Sed Scale) 4 - Somnolent, minimal or no response to verbal and physical stimulation   RASS ([place] Agitation-Sedation Scale)   RASS ([place] Agitation-Sedation Scale) -3-->moderate sedation   RASS Goal -3-->moderate sedation   Pain/Comfort/Sleep Interventions   Sleep/Rest Enhancement awakenings minimized;relaxation techniques promoted   Oxygen Therapy   Oxygen Concentration (%) 50        Altered Skin Integrity 09/04/22 0945 Right medial;anterior Lip #1 Skin Tear Mucosal membrane tissue injury with history of medical device use   Date First Assessed/Time First Assessed: 09/04/22 0945   Altered Skin Integrity Present on Admission: suspected hospital acquired  Side: Right  Orientation: medial;anterior  Location: Lip  Wound Number: #1  Is this injury device related?: (c) Yes  Yas...   Wound  Image    Description of Altered Skin Integrity Mucosal membrane tissue injury with history of medical device use   Dressing Appearance Open to air;No dressing   Drainage Amount None   Appearance Maroon   Tissue loss description Not applicable        Altered Skin Integrity 09/04/22 0945 anterior Frontal region #2 Ulceration Partial thickness tissue loss. Shallow open ulcer with a red or pink wound bed, without slough. Intact or Open/Ruptured Serum-filled blister.   Date First Assessed/Time First Assessed: 09/04/22 0945   Altered Skin Integrity Present on Admission: suspected hospital acquired  Orientation: anterior  Location: Frontal region  Wound Number: #2  Is this injury device related?: Yes  Primary Wound Ty...   Wound Image    Dressing Appearance Open to air;No dressing   Drainage Amount None   Drainage Characteristics/Odor No odor   Appearance Dry;Tan  (brown scab proning injury)   Tissue loss description Not applicable   Periwound Area Intact   Wound Edges Open   Wound Length (cm) 1 cm   Wound Width (cm) 0.8 cm   Wound Surface Area (cm^2) 0.8 cm^2   Care Cleansed with:;Sterile normal saline        Altered Skin Integrity 09/04/22 0945 upper;lower Penis #3 Other (comment) Purple or maroon localized area of discolored intact skin or non-intact skin or a blood-filled blister.   Date First Assessed/Time First Assessed: 09/04/22 0945   Altered Skin Integrity Present on Admission: suspected hospital acquired  Orientation: upper;lower  Location: Penis  Wound Number: #3  Is this injury device related?: No  Primary Wound Type: (c)...   Wound Image     Description of Altered Skin Integrity Purple or maroon localized area of discolored intact skin or non-intact skin or a blood-filled blister.   Dressing Appearance Open to air;No dressing   Drainage Amount None   Drainage Characteristics/Odor No odor   Appearance Purple;Dry   Periwound Area Intact   Wound Length (cm) 5 cm   Wound Width (cm) 2 cm   Wound Depth (cm)    (multil;e maroon/ purple blistwers around head of penis/foreskin)   Wound Surface Area (cm^2) 10 cm^2   Care Soap and water;Applied:;Skin Barrier  (Triad)        Altered Skin Integrity 09/04/22 0945 anterior Scrotum #4 Abrasion(s)    Date First Assessed/Time First Assessed: 09/04/22 0945   Altered Skin Integrity Present on Admission: suspected hospital acquired  Orientation: anterior  Location: Scrotum  Wound Number: #4  Is this injury device related?: No  Primary Wound Type: Jose Raul...   Wound Image    Dressing Appearance Open to air;Dry   Drainage Amount None   Drainage Characteristics/Odor No odor   Appearance Pink;Moist  (denuded)   Tissue loss description Partial thickness   Wound Length (cm) 3 cm   Wound Width (cm) 3 cm   Wound Surface Area (cm^2) 9 cm^2   Care Cleansed with:;Soap and water;Applied:;Skin Barrier        Altered Skin Integrity 09/06/22 1300 Right Cheek Medical adhesive related skin injury   Date First Assessed/Time First Assessed: 09/06/22 1300   Altered Skin Integrity Present on Admission: suspected hospital acquired  Side: Right  Location: Cheek  Is this injury device related?: (c) Yes  Primary Wound Type: Medical adhesive related skin...   Wound Image    Drainage Amount Scant   Drainage Characteristics/Odor Yellow;No odor   Appearance Pink;White   Periwound Area Intact   Wound Edges Open;Irregular   Wound Length (cm) 2 cm   Wound Width (cm) 3 cm   Wound Depth (cm) 0.1 cm   Wound Volume (cm^3) 0.6 cm^3   Wound Surface Area (cm^2) 6 cm^2   Care Cleansed with:;Wound cleanser  (mepilex in use under ET tube donohue)        Altered Skin Integrity 09/06/22 1300 Nose Full thickness tissue loss. Base is covered by slough and/or eschar in the wound bed   Date First Assessed/Time First Assessed: 09/06/22 1300   Altered Skin Integrity Present on Admission: suspected hospital acquired  Location: (c) Nose  Is this injury device related?: Yes  Description of Altered Skin Integrity: Full thickness tissue lo...    Wound Image    Description of Altered Skin Integrity Full thickness tissue loss. Base is covered by slough and/or eschar in the wound bed   Dressing Appearance Open to air;No dressing   Drainage Amount None   Drainage Characteristics/Odor No odor   Appearance Dry  (brown eschar/ crust)   Tissue loss description Full thickness   Black (%), Wound Tissue Color 100 %  (brown)   Periwound Area Intact   Wound Edges Open   Wound Length (cm) 0.8 cm   Wound Width (cm) 1 cm   Wound Surface Area (cm^2) 0.8 cm^2   Care Cleansed with:;Wound cleanser;Applied:;Skin Barrier  (Triad)   Skin Interventions   Device Skin Pressure Protection absorbent pad utilized/changed;adhesive use limited;skin-to-device areas padded;skin-to-skin areas padded;pressure points protected;positioning supports utilized   Pressure Reduction Devices specialty bed utilized;positioning supports utilized;heel offloading device utilized;pressure-redistributing mattress utilized   Pressure Reduction Techniques weight shift assistance provided;pressure points protected;positioned off wounds;heels elevated off bed   Radiation Site Skin Care no signs of pressure or friction present   Skin Protection adhesive use limited;incontinence pads utilized;skin-to-device areas padded;skin-to-skin areas padded;transparent dressing maintained;tubing/devices free from skin contact   Safety   Safety Precautions emergency equipment at bedside   Safety Management   Safety Promotion/Fall Prevention bed alarm set;lighting adjusted;medications reviewed;pulse ox;room near unit station   Patient Rounds bed in low position;bed wheels locked;call light in patient/parent reach;clutter free environment maintained;ID band on;placement of personal items at bedside;toileting offered;visualized patient   Daily Care   Activity Management Patient unable to perform activities  (sedated; intuabted)   Positioning   Body Position turned;supine   Head of Bed (HOB) Positioning HOB at 30-45 degrees    Positioning/Transfer Devices pillows;in use       09/06/2022

## 2022-09-06 NOTE — PROGRESS NOTES
[facility] Pulmonary/Critical Care Fellow Progress Note:    Brief Hx: 41 yo M w/ PMHx of drug use (heroin, methamphetamine, others) admitted  to [facility] for hypoxemic and hypercapnic respiratory failure after being found down, presumably 2/2 drug overdose and in shock. He received CPR from his GF and did wake up when EMS found him and he presented to the ED with leukocytosis, lactic acidosis, febrile. CTA showed diffuse dense bilateral infiltrates. UDS+ for THC. Blood cultures grew acinetobacter pittii & pesudomonas luteola and bacillus cereus for which he has been receiving cefepime. Given his CT findings and evolution of infiltrates on CXR, he was treated as ARDS. He started proning on  and started receiving steroids for ARDS dosing on  with last proned event on 9/3. He had repeat fevers in the ICU thereafter and respiratory cultures showed MRSA and he was started on vancomycin on .     Subjective:  Patient intubated and sedated, coming off ketamine and versed, the patient has been dyssynchronous with the ventilator.     Objective:  Last 24 Hour Vital Signs:  BP  Min: 97/52  Max: 120/83  Temp  Av.3 °F (36.8 °C)  Min: 98.1 °F (36.7 °C)  Max: 99.1 °F (37.3 °C)  Pulse  Av.4  Min: 70  Max: 122  Resp  Av.8  Min: 21  Max: 31  SpO2  Av.7 %  Min: 86 %  Max: 99 %  Body mass index is 28.06 kg/m².  I/O last 3 completed shifts:  In: 4446.7 [I.V.:2258.1; NG/GT:1796; IV Piggyback:392.6]  Out: 5435 [Urine:5395; Drains:40]      Physical Exam:  General: Sedated  HENT:  NCAT; anicteric sclera; (+)ETT in place, small wound on nasal septum  Cardio:  Regular rate and rhythm with normal S1 and S2; no murmurs or rubs  Resp:  CTAB; respirations unlabored; no wheezes, crackles or rhonchi  Abdom:  Soft, non-distended  Extrem:  WWP with no clubbing, cyanosis or edema, small cuts on hands and legs that are scabbed over  Pulses:  2+ and symmetric distally  Neuro:  AAOx0; sedated    Assessment &  "Plan:     42M with history of drug use (heroin, methamphetamine, "whatever he can get his hands on") presents with hypoxemic and hypercapnic respiratory failure presumably secondary to drug overdose requiring intubation found to have bacteremia and diffuse lung infiltrates.      Acute hypoxic and hypercapnic respiratory failure  In setting of presumed drug overdose (fentanyl?)  With diffuse bilateral pulmonary infiltrates on CT chest, +fevers and leukocytosis  Sputum (+) staph aureus - on Vancomycin RIP negative  JOVANA/ANCA negative -no new hemoptysis noted  Blood cultures positive with negative respiratory culture on admission  Blood cultures with pseudomonas luteola and acinetobacter pitii -- patient with negative TTE for valve vegetations  - plan for 14d of treatment - stop after 9/7  Continue with current vent settings, wean FiO2 as able to maintain O2 saturations >90% (avoid over oxygenation) and titrate on ARDS ladder (currently low-peep) - if patient's secretions improve by tomorrow we can likely extubate him in the AM  Hold off on SAT - patient has required a lot of sedation - propofol, fentanyl, versed , ketamine gtt in addition, added seroquel 200mg QHS & bupropion 150mg BID (was on 300mg XR outpatient) to help reduce need for sedation - versed gtt Dc'd and ketamine gtt Dc'd with PRN versed orders as well as valium taper  Lasix 40mg IV QID ordered to keep net even fluid balance - held metolazone as patient is off paralytic and coming off sedation drips  Continue to wean sedation as tolerated to target RASS -3  Multifocal PNA & ARDS  Started prone 8/30 - stopped after 9/3  given improvement in oxygenation  Respiratory culture with MRSA  - represents VAP - started on vancomycin 9/5  Started steroids for ARDS 9/1 - 20mg for 5 days and then 10mg for 5 days  Aspiration vs. Inhalation injury/pneumonitis vs. Septic emboli vs. Vasculitis (less likely)  Lab and culture workup as above  No new evidence of " hemoptysis  Continue cefepime - 14d total, vancy 5d total   Urine legionella negative, strep Ag negative  HIV negative, viral PCR negative, Hep panel (+)hep C, negative acute hepatitis   CK elevated on different admission, Myositis panel pending, JOVANA & ANCA negative  Sedation weaning  Patient with significant sedation requirements and possibility for withdrawal  Off versed gtt and ketamine gtt, started precedex and on oral benzo taper and PRN versed ordered in case of severe agitation, can revisit the need for oral medications once he is extubated   Not a significant drinker per family, but they do not live with him and he has received propofol and versed gtt for multiple days now and it is possible he had a withdrawal syndrome on presentation given his tachycardia, diaphoresis, hypertension within the first few days of his ICU stay though this was clouded by infection    Code: FULL     DVT: Lovenox  GI: Famotidine  Feeds: TF running  Sedation: propofol gtt, fentanyl gtt, precedex, PRN versed 2mg q2h, valium taper 10mg q8h PO x2 d, 5mg q8h PO x2d, 5mg q12h PO for 2d and off    Sujata Francois MD  Pulm/CC Fellow

## 2022-09-06 NOTE — PROGRESS NOTES
Patient reviewed, renal function stable, cultures reviewed, no new levels, continue current therapy; Next levels due: 9/7 @9643

## 2022-09-06 NOTE — ASSESSMENT & PLAN NOTE
Bilateral PNA, ARDS, septic shock (resolved), bacteremia, h/o drug overdose, polysubstance abuse  - Hypoxic to 60s on arrival and failed BiPAP 2/2 agitation and intubated at OSH. Tox positive for THC. D-dimer elevated but CTA negative for PE. COVID, RSV negative.  - Continue fentanyl gtt, ketamine gtt, midazolam gtt, propofol gtt.  - Continue cefepime 2g IV q8hr for 14 day total course. Blood cultures showed acinetobacter, pseudomonas luteola. Repeat blood cultures with fever; add vancomycin for resp culture showing staph.  - ARDSnet protocol; proning/supinating. Improving O2; pulm/crit planning for no further proning at this time. Discontinue cisatracurium gtt.  - Continue furosemide 40mg IV q6hr, metolazone 5mg per OG daily, goal mildly net negative. Continue dexamethasone 20mg IV daily 5 days followed by 10mg IV daily for 5 days.  - Appreciate pulm/crit assistance.  - Palliative consulted given persistent vent requirements; appreciate assistance.

## 2022-09-06 NOTE — SUBJECTIVE & OBJECTIVE
Interval History: Febrile overnight and resp culture showing staph. Respiratory status gradually improving however with continued decreasing FiO2 and PEEP requirements.    Review of Systems   Unable to perform ROS: Intubated   Objective:     Vital Signs (Most Recent):  Temp: 98.1 °F (36.7 °C) (09/05/22 2130)  Pulse: 73 (09/05/22 2130)  Resp: (!) 28 (09/05/22 2130)  BP: 105/67 (09/05/22 2000)  SpO2: 95 % (09/05/22 2130)   Vital Signs (24h Range):  Temp:  [98.1 °F (36.7 °C)-100.9 °F (38.3 °C)] 98.1 °F (36.7 °C)  Pulse:  [] 73  Resp:  [23-28] 28  SpO2:  [86 %-97 %] 95 %  BP: ()/(58-75) 105/67  Arterial Line BP: ()/(51-78) 108/56     Weight: 86.2 kg (190 lb)  Body mass index is 28.06 kg/m².    Intake/Output Summary (Last 24 hours) at 9/5/2022 2201  Last data filed at 9/5/2022 1817  Gross per 24 hour   Intake 3188.36 ml   Output 3985 ml   Net -796.64 ml        Physical Exam  Vitals and nursing note reviewed.   Constitutional:       General: He is not in acute distress.     Appearance: He is well-developed.   HENT:      Head: Normocephalic and atraumatic.   Eyes:      General:         Right eye: No discharge.         Left eye: No discharge.      Conjunctiva/sclera: Conjunctivae normal.   Cardiovascular:      Rate and Rhythm: Normal rate.      Pulses: Normal pulses.   Pulmonary:      Effort: Pulmonary effort is normal. No respiratory distress.      Comments: Intubated, mechanical breath sounds.  Abdominal:      Palpations: Abdomen is soft.      Tenderness: There is no abdominal tenderness.   Musculoskeletal:         General: Normal range of motion.      Right lower leg: No edema.      Left lower leg: No edema.   Skin:     General: Skin is warm and dry.   Neurological:      Comments: RASS -5, CAM/ICU N/A.     Significant Labs:   CBC:  Recent Labs   Lab 09/03/22  0345 09/04/22  0514 09/05/22  0354   WBC 14.15* 14.26* 13.59*   HGB 10.8* 11.2* 11.8*   HCT 34.7* 35.1* 36.0*    426 517*   GRAN 73.4*   10.4*  --  70.4  9.6*   LYMPH 11.5*  1.6  --  16.6*  2.3   MONO 12.2  1.7*  --  9.5  1.3*   EOS 0.1  --  0.2   BASO 0.04  --  0.04     CMP:  Recent Labs   Lab 09/03/22  0345 09/04/22  0514 09/05/22  0354    141 140   K 4.2 3.4* 3.7   CL 85* 83* 82*   CO2 49* 48* 45*   BUN 53* 63* 73*   CREATININE 0.8 0.8 0.9   * 119* 101   CALCIUM 9.8 9.6 9.5   MG  --   --  2.5   PHOS  --   --  3.0   ALKPHOS 105  --  107   AST 93*  --  102*   ALT 62*  --  112*   BILITOT 0.2  --  0.4   PROT 7.6  --  7.9   ALBUMIN 2.2*  --  2.5*   ANIONGAP 10 10 13        Significant Imaging:   No new imaging this morning.

## 2022-09-07 LAB
ALBUMIN SERPL BCP-MCNC: 2.6 G/DL (ref 3.5–5.2)
ALLENS TEST: ABNORMAL
ALP SERPL-CCNC: 93 U/L (ref 55–135)
ALT SERPL W/O P-5'-P-CCNC: 102 U/L (ref 10–44)
ANION GAP SERPL CALC-SCNC: 11 MMOL/L (ref 8–16)
AST SERPL-CCNC: 84 U/L (ref 10–40)
BASOPHILS # BLD AUTO: 0.06 K/UL (ref 0–0.2)
BASOPHILS NFR BLD: 0.5 % (ref 0–1.9)
BILIRUB DIRECT SERPL-MCNC: 0.4 MG/DL (ref 0.1–0.3)
BILIRUB SERPL-MCNC: 0.5 MG/DL (ref 0.1–1)
BUN SERPL-MCNC: 60 MG/DL (ref 6–20)
CALCIUM SERPL-MCNC: 9.4 MG/DL (ref 8.7–10.5)
CHLORIDE SERPL-SCNC: 88 MMOL/L (ref 95–110)
CO2 SERPL-SCNC: 40 MMOL/L (ref 23–29)
CREAT SERPL-MCNC: 0.8 MG/DL (ref 0.5–1.4)
DELSYS: ABNORMAL
DIFFERENTIAL METHOD: ABNORMAL
EOSINOPHIL # BLD AUTO: 0.3 K/UL (ref 0–0.5)
EOSINOPHIL NFR BLD: 2.1 % (ref 0–8)
ERYTHROCYTE [DISTWIDTH] IN BLOOD BY AUTOMATED COUNT: 14.1 % (ref 11.5–14.5)
ERYTHROCYTE [SEDIMENTATION RATE] IN BLOOD BY WESTERGREN METHOD: 22 MM/H
EST. GFR  (NO RACE VARIABLE): >60 ML/MIN/1.73 M^2
FIO2: 50
GLUCOSE SERPL-MCNC: 101 MG/DL (ref 70–110)
HCO3 UR-SCNC: 49.1 MMOL/L (ref 24–28)
HCT VFR BLD AUTO: 34.4 % (ref 40–54)
HCT VFR BLD CALC: 35 %PCV (ref 36–54)
HGB BLD-MCNC: 11.3 G/DL (ref 14–18)
HGB BLD-MCNC: 12 G/DL
IMM GRANULOCYTES # BLD AUTO: 0.22 K/UL (ref 0–0.04)
IMM GRANULOCYTES NFR BLD AUTO: 1.7 % (ref 0–0.5)
LYMPHOCYTES # BLD AUTO: 2 K/UL (ref 1–4.8)
LYMPHOCYTES NFR BLD: 15 % (ref 18–48)
MAGNESIUM SERPL-MCNC: 2.5 MG/DL (ref 1.6–2.6)
MCH RBC QN AUTO: 29.4 PG (ref 27–31)
MCHC RBC AUTO-ENTMCNC: 32.8 G/DL (ref 32–36)
MCV RBC AUTO: 89 FL (ref 82–98)
MIN VOL: 3
MODE: ABNORMAL
MONOCYTES # BLD AUTO: 1.1 K/UL (ref 0.3–1)
MONOCYTES NFR BLD: 8.3 % (ref 4–15)
NEUTROPHILS # BLD AUTO: 9.5 K/UL (ref 1.8–7.7)
NEUTROPHILS NFR BLD: 72.4 % (ref 38–73)
NRBC BLD-RTO: 0 /100 WBC
PCO2 BLDA: 62.4 MMHG (ref 35–45)
PEEP: 8
PH SMN: 7.5 [PH] (ref 7.35–7.45)
PHOSPHATE SERPL-MCNC: 3.3 MG/DL (ref 2.7–4.5)
PIP: 22
PLATELET # BLD AUTO: 617 K/UL (ref 150–450)
PMV BLD AUTO: 10 FL (ref 9.2–12.9)
PO2 BLDA: 67 MMHG (ref 80–100)
POC BE: 26 MMOL/L
POC IONIZED CALCIUM: 1.12 MMOL/L (ref 1.06–1.42)
POC SATURATED O2: 94 % (ref 95–100)
POC TCO2: >50 MMOL/L (ref 23–27)
POTASSIUM BLD-SCNC: 3.6 MMOL/L (ref 3.5–5.1)
POTASSIUM SERPL-SCNC: 2.8 MMOL/L (ref 3.5–5.1)
PROT SERPL-MCNC: 7.6 G/DL (ref 6–8.4)
RBC # BLD AUTO: 3.85 M/UL (ref 4.6–6.2)
SAMPLE: ABNORMAL
SITE: ABNORMAL
SODIUM BLD-SCNC: 135 MMOL/L (ref 136–145)
SODIUM SERPL-SCNC: 139 MMOL/L (ref 136–145)
SP02: 96
VANCOMYCIN SERPL-MCNC: 14.7 UG/ML
VT: 480
WBC # BLD AUTO: 13.09 K/UL (ref 3.9–12.7)

## 2022-09-07 PROCEDURE — 25000003 PHARM REV CODE 250

## 2022-09-07 PROCEDURE — 63600175 PHARM REV CODE 636 W HCPCS: Performed by: INTERNAL MEDICINE

## 2022-09-07 PROCEDURE — 99291 PR CRITICAL CARE, E/M 30-74 MINUTES: ICD-10-PCS | Mod: ,,, | Performed by: HOSPITALIST

## 2022-09-07 PROCEDURE — 25000003 PHARM REV CODE 250: Performed by: STUDENT IN AN ORGANIZED HEALTH CARE EDUCATION/TRAINING PROGRAM

## 2022-09-07 PROCEDURE — 80202 ASSAY OF VANCOMYCIN: CPT | Performed by: INTERNAL MEDICINE

## 2022-09-07 PROCEDURE — S4991 NICOTINE PATCH NONLEGEND: HCPCS

## 2022-09-07 PROCEDURE — 80048 BASIC METABOLIC PNL TOTAL CA: CPT | Performed by: INTERNAL MEDICINE

## 2022-09-07 PROCEDURE — 20000000 HC ICU ROOM

## 2022-09-07 PROCEDURE — 63600175 PHARM REV CODE 636 W HCPCS: Performed by: STUDENT IN AN ORGANIZED HEALTH CARE EDUCATION/TRAINING PROGRAM

## 2022-09-07 PROCEDURE — 25000003 PHARM REV CODE 250: Performed by: INTERNAL MEDICINE

## 2022-09-07 PROCEDURE — 27100171 HC OXYGEN HIGH FLOW UP TO 24 HOURS

## 2022-09-07 PROCEDURE — 92610 EVALUATE SWALLOWING FUNCTION: CPT

## 2022-09-07 PROCEDURE — 63600175 PHARM REV CODE 636 W HCPCS

## 2022-09-07 PROCEDURE — 94761 N-INVAS EAR/PLS OXIMETRY MLT: CPT

## 2022-09-07 PROCEDURE — 85025 COMPLETE CBC W/AUTO DIFF WBC: CPT | Performed by: INTERNAL MEDICINE

## 2022-09-07 PROCEDURE — 25000242 PHARM REV CODE 250 ALT 637 W/ HCPCS: Performed by: STUDENT IN AN ORGANIZED HEALTH CARE EDUCATION/TRAINING PROGRAM

## 2022-09-07 PROCEDURE — 99900026 HC AIRWAY MAINTENANCE (STAT)

## 2022-09-07 PROCEDURE — 84100 ASSAY OF PHOSPHORUS: CPT | Performed by: INTERNAL MEDICINE

## 2022-09-07 PROCEDURE — 99900035 HC TECH TIME PER 15 MIN (STAT)

## 2022-09-07 PROCEDURE — 94003 VENT MGMT INPAT SUBQ DAY: CPT

## 2022-09-07 PROCEDURE — 27000207 HC ISOLATION

## 2022-09-07 PROCEDURE — 27200966 HC CLOSED SUCTION SYSTEM

## 2022-09-07 PROCEDURE — 80076 HEPATIC FUNCTION PANEL: CPT | Performed by: INTERNAL MEDICINE

## 2022-09-07 PROCEDURE — 99291 CRITICAL CARE FIRST HOUR: CPT | Mod: ,,, | Performed by: HOSPITALIST

## 2022-09-07 PROCEDURE — 83735 ASSAY OF MAGNESIUM: CPT | Performed by: INTERNAL MEDICINE

## 2022-09-07 PROCEDURE — 94640 AIRWAY INHALATION TREATMENT: CPT

## 2022-09-07 RX ORDER — CALCIUM GLUCONATE 20 MG/ML
3 INJECTION, SOLUTION INTRAVENOUS
Status: DISCONTINUED | OUTPATIENT
Start: 2022-09-07 | End: 2022-09-10

## 2022-09-07 RX ORDER — FAMOTIDINE 10 MG/ML
20 INJECTION INTRAVENOUS DAILY
Status: COMPLETED | OUTPATIENT
Start: 2022-09-07 | End: 2022-09-08

## 2022-09-07 RX ORDER — FUROSEMIDE 10 MG/ML
40 INJECTION INTRAMUSCULAR; INTRAVENOUS
Status: DISCONTINUED | OUTPATIENT
Start: 2022-09-07 | End: 2022-09-07

## 2022-09-07 RX ORDER — MAGNESIUM SULFATE HEPTAHYDRATE 40 MG/ML
4 INJECTION, SOLUTION INTRAVENOUS
Status: DISCONTINUED | OUTPATIENT
Start: 2022-09-07 | End: 2022-09-13

## 2022-09-07 RX ORDER — POTASSIUM CHLORIDE 7.45 MG/ML
60 INJECTION INTRAVENOUS
Status: DISCONTINUED | OUTPATIENT
Start: 2022-09-07 | End: 2022-09-13

## 2022-09-07 RX ORDER — MAGNESIUM SULFATE HEPTAHYDRATE 40 MG/ML
2 INJECTION, SOLUTION INTRAVENOUS
Status: DISCONTINUED | OUTPATIENT
Start: 2022-09-07 | End: 2022-09-13

## 2022-09-07 RX ORDER — CALCIUM GLUCONATE 20 MG/ML
1 INJECTION, SOLUTION INTRAVENOUS
Status: DISCONTINUED | OUTPATIENT
Start: 2022-09-07 | End: 2022-09-10

## 2022-09-07 RX ORDER — POTASSIUM CHLORIDE 7.45 MG/ML
80 INJECTION INTRAVENOUS
Status: DISCONTINUED | OUTPATIENT
Start: 2022-09-07 | End: 2022-09-13

## 2022-09-07 RX ORDER — FUROSEMIDE 10 MG/ML
40 INJECTION INTRAMUSCULAR; INTRAVENOUS
Status: COMPLETED | OUTPATIENT
Start: 2022-09-07 | End: 2022-09-08

## 2022-09-07 RX ORDER — CALCIUM GLUCONATE 20 MG/ML
2 INJECTION, SOLUTION INTRAVENOUS
Status: DISCONTINUED | OUTPATIENT
Start: 2022-09-07 | End: 2022-09-10

## 2022-09-07 RX ORDER — POTASSIUM CHLORIDE 7.45 MG/ML
40 INJECTION INTRAVENOUS
Status: DISCONTINUED | OUTPATIENT
Start: 2022-09-07 | End: 2022-09-13

## 2022-09-07 RX ORDER — METOLAZONE 5 MG/1
5 TABLET ORAL DAILY
Status: COMPLETED | OUTPATIENT
Start: 2022-09-07 | End: 2022-09-07

## 2022-09-07 RX ADMIN — DEXAMETHASONE SODIUM PHOSPHATE 10 MG: 4 INJECTION INTRA-ARTICULAR; INTRALESIONAL; INTRAMUSCULAR; INTRAVENOUS; SOFT TISSUE at 05:09

## 2022-09-07 RX ADMIN — DEXMEDETOMIDINE HYDROCHLORIDE 1.1 MCG/KG/HR: 4 INJECTION, SOLUTION INTRAVENOUS at 07:09

## 2022-09-07 RX ADMIN — POTASSIUM CHLORIDE 10 MEQ: 7.46 INJECTION, SOLUTION INTRAVENOUS at 07:09

## 2022-09-07 RX ADMIN — METOLAZONE 5 MG: 5 TABLET ORAL at 09:09

## 2022-09-07 RX ADMIN — PROPOFOL 50 MCG/KG/MIN: 10 INJECTION, EMULSION INTRAVENOUS at 06:09

## 2022-09-07 RX ADMIN — CEFEPIME HYDROCHLORIDE 2 G: 2 INJECTION, SOLUTION INTRAVENOUS at 07:09

## 2022-09-07 RX ADMIN — FUROSEMIDE 40 MG: 10 INJECTION, SOLUTION INTRAMUSCULAR; INTRAVENOUS at 05:09

## 2022-09-07 RX ADMIN — NICOTINE 1 PATCH: 14 PATCH TRANSDERMAL at 09:09

## 2022-09-07 RX ADMIN — CEFEPIME HYDROCHLORIDE 2 G: 2 INJECTION, SOLUTION INTRAVENOUS at 11:09

## 2022-09-07 RX ADMIN — METHYLNALTREXONE BROMIDE 8 MG: 12 INJECTION, SOLUTION SUBCUTANEOUS at 09:09

## 2022-09-07 RX ADMIN — POTASSIUM CHLORIDE 10 MEQ: 7.46 INJECTION, SOLUTION INTRAVENOUS at 11:09

## 2022-09-07 RX ADMIN — POTASSIUM CHLORIDE 10 MEQ: 7.46 INJECTION, SOLUTION INTRAVENOUS at 04:09

## 2022-09-07 RX ADMIN — FENTANYL CITRATE 300 MCG/HR: 50 INJECTION INTRAVENOUS at 07:09

## 2022-09-07 RX ADMIN — CEFEPIME HYDROCHLORIDE 2 G: 2 INJECTION, SOLUTION INTRAVENOUS at 04:09

## 2022-09-07 RX ADMIN — Medication 4 ML: at 08:09

## 2022-09-07 RX ADMIN — DEXMEDETOMIDINE HYDROCHLORIDE 0.5 MCG/KG/HR: 4 INJECTION, SOLUTION INTRAVENOUS at 03:09

## 2022-09-07 RX ADMIN — POLYETHYLENE GLYCOL 3350 17 G: 17 POWDER, FOR SOLUTION ORAL at 09:09

## 2022-09-07 RX ADMIN — DIAZEPAM 10 MG: 5 TABLET ORAL at 05:09

## 2022-09-07 RX ADMIN — VANCOMYCIN HYDROCHLORIDE 1500 MG: 1.5 INJECTION, POWDER, LYOPHILIZED, FOR SOLUTION INTRAVENOUS at 12:09

## 2022-09-07 RX ADMIN — SENNOSIDES AND DOCUSATE SODIUM 1 TABLET: 50; 8.6 TABLET ORAL at 09:09

## 2022-09-07 RX ADMIN — ACETAMINOPHEN 650 MG: 325 TABLET, FILM COATED ORAL at 05:09

## 2022-09-07 RX ADMIN — POTASSIUM CHLORIDE 10 MEQ: 7.46 INJECTION, SOLUTION INTRAVENOUS at 03:09

## 2022-09-07 RX ADMIN — FAMOTIDINE 20 MG: 10 INJECTION, SOLUTION INTRAVENOUS at 09:09

## 2022-09-07 RX ADMIN — ACETAMINOPHEN 650 MG: 325 TABLET, FILM COATED ORAL at 03:09

## 2022-09-07 RX ADMIN — POTASSIUM BICARBONATE 25 MEQ: 25 TABLET, EFFERVESCENT ORAL at 09:09

## 2022-09-07 RX ADMIN — ENOXAPARIN SODIUM 40 MG: 100 INJECTION SUBCUTANEOUS at 04:09

## 2022-09-07 RX ADMIN — POTASSIUM CHLORIDE 10 MEQ: 7.46 INJECTION, SOLUTION INTRAVENOUS at 01:09

## 2022-09-07 RX ADMIN — FUROSEMIDE 40 MG: 10 INJECTION, SOLUTION INTRAMUSCULAR; INTRAVENOUS at 04:09

## 2022-09-07 RX ADMIN — PROPOFOL 50 MCG/KG/MIN: 10 INJECTION, EMULSION INTRAVENOUS at 02:09

## 2022-09-07 RX ADMIN — POTASSIUM CHLORIDE 10 MEQ: 7.46 INJECTION, SOLUTION INTRAVENOUS at 09:09

## 2022-09-07 RX ADMIN — BUPROPION HYDROCHLORIDE 150 MG: 75 TABLET, FILM COATED ORAL at 09:09

## 2022-09-07 RX ADMIN — POTASSIUM CHLORIDE 10 MEQ: 7.46 INJECTION, SOLUTION INTRAVENOUS at 06:09

## 2022-09-07 RX ADMIN — POTASSIUM CHLORIDE 10 MEQ: 7.46 INJECTION, SOLUTION INTRAVENOUS at 10:09

## 2022-09-07 RX ADMIN — FAMOTIDINE 20 MG: 10 INJECTION, SOLUTION INTRAVENOUS at 12:09

## 2022-09-07 RX ADMIN — MINERAL OIL AND WHITE PETROLATUM: 30; 940 OINTMENT OPHTHALMIC at 05:09

## 2022-09-07 RX ADMIN — DEXMEDETOMIDINE HYDROCHLORIDE 1.4 MCG/KG/HR: 4 INJECTION, SOLUTION INTRAVENOUS at 11:09

## 2022-09-07 NOTE — PT/OT/SLP EVAL
Speech Language Pathology Evaluation  Bedside Swallow    Patient Name:  Leighton Carroll   MRN:  [id number]  Admitting Diagnosis: Acute respiratory failure with hypoxia and hypercarbia    Recommendations:                 General Recommendations:     1. Speech to follow 4-6x/week for ongoing evaluation of oral and pharyngeal dysphagia, cognitive communication status  Diet recommendations:  SOLIDS:   NPO,  LIQUIDS:  NPO (ice chips in moderation, 1-2 mls of water from a spoon for oral hydration only)   Alternate feeding and hydration method recommended  Aspiration Precautions:   Ice chips in moderation  1-2 mls of water at a time via spoon    General Precautions: Standard, aspiration    Communication strategies:      History:     41 yo M w/ PMHx of drug use (heroin, methamphetamine, others) admitted 8/25 to [facility] for hypoxemic and hypercapnic respiratory failure after being found down, presumably 2/2 drug overdose and in shock. He received CPR from his GF and did wake up when EMS found him and he presented to the ED with leukocytosis, lactic acidosis, febrile. CTA showed diffuse dense bilateral infiltrates. UDS+ for THC. Blood cultures grew acinetobacter pittii & pesudomonas luteola and bacillus cereus for which he has been receiving cefepime. Given his CT findings and evolution of infiltrates on CXR, he was treated as ARDS. He started proning on 8/30 and started receiving steroids for ARDS dosing on 9/1 with last proned event on 9/3. He had repeat fevers in the ICU thereafter and respiratory cultures showed MRSA and he was started on vancomycin on 9/5. The patient was extubated on 9/7 AM to 4-5L NC.       Past Medical History:   Diagnosis Date    Anxiety     Depression     Hepatitis C     Substance abuse     METH AND HEROIN       No past surgical history on file.    Subjective   Pt awake, sitting up in bed. Family giving ice-chips and sips of water from a spoon. Pt noted to be drooling, large amounts of mucus  coming from nares, weak, unproductive cough    Respiratory Status: Nasal cannula, flow 4-5 L/min    Objective:     Oral Musculature Evaluation  Oral Musculature: general weakness  Dentition: scattered dentition, teeth in poor condition  Secretion Management: problems swallowing secretions  Mandibular Strength and Mobility:  (generalized weakness)  Oral Labial Strength and Mobility: impaired retraction, impaired pursing, impaired seal, impaired coordination  Lingual Strength and Mobility: impaired strength  Volitional Cough: weak, dysphonic cough  Volitional Swallow: delayed  Voice Prior to PO Intake: weak, low volume, raspy, wet with secretions  Oral Musculature Comments: Opening mouth resting posture. Drooling, nasal drainage and eye watering. Able to achieve mouth closure with cues. Able to manage oral secretions with cues. Symmetrical smile. Tongue protrudes midline. Labial and lingual ROM intact, able to lateralize, elevate and retract tongue. However, oral motor movements are slow and labored. Vocal quality is dysphonic: low volume. Raspy, wet with secretions. Able to cough and clear secretions with max cues to cough and swallow. Able to expectorate secretions with cough, swallow and oral suction. Vocal quality clear, yet raspy after secretion management    Bedside Swallow Eval:   Consistencies Assessed:  Thin liquids ice chips, thin liquids in 1, 3, 5 and 10 ml sips  Puree 1/2 tsp amounts  \  Solids: deferred    Oral Phase:   Dcr lip seal  Dcr formation of a cohesive bolus  Oral holding and delayed oral transit  Max assistance required to facilitate controlled oral swallow, lip seal, and onset of oral swallow    Pharyngeal Phase:   Trigger of the swallow reflex appears delayed  No overt signs of airway threat or aspiration on controlled amounts of ice chips, 1-3 mls of water from a spoon. No coughing, throat clearing or change in vocal quality  Onset of delayed weak cough, throat clearing, drooling, increase  in nasal drainage on  5/5 trials of larger volume sips of water via cup or controlled sips via straw, and on 2/2 trials of puree  Baseline cough noted,. However, coughing exacerbated with trials of liquids and purees  Pt demonstrating a confusional state with dcr ability to reliably follow through with compensatory strategies    COGNITIVE COMMUNICATION STATUS: Pt is awake, yet lethargic. Dcr focused and sustained attention: max assistance to sustain attention for 15-30 sec intervals, constant redirection required. Confusional state noted: oriented to his name. Not oriented to his , age, date, place, reason for hospitalization. Able to follow simple 1 step commands less than 50% of time. Instance of severely delayed response, or no response. Verbal expression is fluent, however, confused language, incoherent responses, off topic irrelevant responses. Unable to reliably communicated wants and needs at this time. Perseverative on drinking water    EDUCATION: Discussed signs concerned for airway threat and aspiration with family. Discussed limiting oral intake to ice chips and very small 1-2 mls of water from a spoon for oral hydration. Discussed pt with RN at bedside, and with MDs via secure chat    Assessment:     Leighton Carroll is a 42 y.o. male with an SLP diagnosis of oral and pharyngeal Dysphagia, Cognitive-Linguistic Impairment, and Dysphonia.  He presents with  overt signs of airway threat and concern for aspiration with small volumes of liquids and purees this evaluation.    Goals:   Multidisciplinary Problems       SLP Goals          Problem: SLP    Goal Priority Disciplines Outcome   SLP Goal     SLP Ongoing, Progressing   Description: 1. Pt will be able to consume 3-5 mls of water from a spoon with no signs of airway threat or aspiration given max assistance  2. Pt will be able to consume 1/2 tsp amounts of purees with no signs of airway threat or aspiration given max assistance  3. Pt will be able to focus  and sustain attention to simple familiar tasks for 15 min with moderate redirection required  4. Increase orientation to person, place, date, reason for hospitalization to 50% acc given max verbal and written cues                       Plan:     Patient to be seen:  4 x/week, 6 x/week   Plan of Care expires:  09/30/22  Plan of Care reviewed with:  patient, family, friend   SLP Follow-Up:  Yes       Discharge recommendations:  rehabilitation facility       Time Tracking:     SLP Treatment Date:   09/07/22  Speech Start Time:  1305  Speech Stop Time:  1349     Speech Total Time (min):  44 min    Billable Minutes: Eval Swallow and Oral Function 44 min    09/07/2022

## 2022-09-07 NOTE — PHYSICIAN QUERY
PT Name: Leighton Carroll  MR #: [id number]     DOCUMENTATION CLARIFICATION     CDS: Jermain Mayorga RN CCDS               Contact information: [email or web address]    This form is a permanent document in the medical record.     Query Date: September 7, 2022    By submitting this query, we are merely seeking further clarification of documentation.  Please utilize your independent clinical judgment when addressing the question(s) below.    The Medical Record contains the following:   Indicators   Supporting Clinical Findings Location in Medical Record    Non-blanchable erythema/redness      Ulcer/Injury/Skin Breakdown      Deep Tissue Injury      x Wound care consult New consult for wounds to the penis ,nose   Believe injuries to the penile shaft, glans and foreskin are related to proning.  Septum of the nose is medical device related skin injury and appears to be full thickness. Eschar noted to right nare and septum.             Altered Skin Integrity 09/04/22 0945 upper;lower Penis #3 Other (comment) Purple or maroon localized area of discolored intact skin or non-intact skin or a blood-filled blister.   Date First Assessed/Time First Assessed: 09/04/22 0945   Altered Skin Integrity Present on Admission: suspected hospital acquired  Orientation: upper;lower  Location: Penis  Wound Number: #3  Is this injury device related?: No  Primary Wound Type: (c)...   Description of Altered Skin Integrity Purple or maroon localized area of discolored intact skin or non-intact skin or a blood-filled blister.   Dressing Appearance Open to air;No dressing   Drainage Amount None   Drainage Characteristics/Odor No odor   Appearance Purple;Dry   Periwound Area Intact   Wound Length (cm) 5 cm   Wound Width (cm) 2 cm   Wound Depth (cm)    (multil;e maroon/ purple blistwers around head of penis/foreskin)   Wound Surface Area (cm^2) 10 cm^2         Altered Skin Integrity 09/06/22 1300 Nose Full thickness tissue loss. Base is covered by  slough and/or eschar in the wound bed   Date First Assessed/Time First Assessed: 09/06/22 1300   Altered Skin Integrity Present on Admission: suspected hospital acquired  Location: (c) Nose  Is this injury device related?: Yes  Description of Altered Skin Integrity: Full thickness tissue lo...   Description of Altered Skin Integrity Full thickness tissue loss. Base is covered by slough and/or eschar in the wound bed   Dressing Appearance Open to air;No dressing   Drainage Amount None   Drainage Characteristics/Odor No odor   Appearance Dry  (brown eschar/ crust)   Tissue loss description Full thickness   Black (%), Wound Tissue Color 100 %  (brown)   Periwound Area Intact   Wound Edges Open   Wound Length (cm) 0.8 cm   Wound Width (cm) 1 cm   Wound Surface Area (cm^2) 0.8 cm^2    9/6/2022 Wound care progress notes    x Acute/Chronic Illness ARDS, septic shock (resolved), bacteremia  Acute respiratory failure with hypoxia and hypercarbia  Bilateral PNA 9/4/2022 Hospital Medicine progress notes    x Medication/Treatment Skin Interventions   Device Skin Pressure Protection absorbent pad utilized/changed;adhesive use limited;skin-to-device areas padded;skin-to-skin areas padded;pressure points protected;positioning supports utilized   Pressure Reduction Devices specialty bed utilized;positioning supports utilized;heel offloading device utilized;pressure-redistributing mattress utilized   Pressure Reduction Techniques weight shift assistance provided;pressure points protected;positioned off wounds;heels elevated off bed   Radiation Site Skin Care no signs of pressure or friction present   Skin Protection adhesive use limited;incontinence pads utilized;skin-to-device areas padded;skin-to-skin areas padded;transparent dressing maintained;tubing/devices free from skin contact    9/6/2022 Wound care progress notes    x Other He started proning on 8/30 and started receiving steroids for ARDS dosing on 9/1 with last proned  event on 9/3.    Farrukh Risk score 10 9/6/2022 Critical Care progress notes      9/6/2022 Nursing assessment     The clinical guidelines noted are only a system guideline. It does not replace the providers clinical judgment.    Per the National Pressure Injury Advisory Panel:   A pressure injury is localized damage to the skin and underlying soft tissue usually over a bony prominence or related to a medical or other device. The injury can present as intact skin or an open ulcer and may be painful. The injury occurs as a result of intense and/or prolonged pressure or pressure in combination with shear. The tolerance of soft tissue for pressure and shear may also be affected by microclimate, nutrition, perfusion, co-morbidities and condition of the soft tissue.       Stage 1 Pressure Injury:  Intact skin with a localized area of non-blanchable erythema, which may appear differently in darkly pigmented skin. Color changes do not include purple or maroon discoloration; these may indicate deep tissue pressure injury.    Stage 2 Pressure Injury:  Partial-thickness loss of skin with exposed dermis. The wound bed is viable, pink or red, moist, and may also present as an intact or ruptured serum-filled blister.    Stage 3 Pressure Injury:  Full-thickness loss of skin, in which adipose (fat) is visible in the ulcer and granulation tissue and epibole (rolled wound edges) are often present. Slough and/or eschar may be visible. Undermining and tunneling may occur.    Stage 4 Pressure Injury:  Full-thickness skin and tissue loss with exposed or directly palpable fascia, muscle, tendon, ligament, cartilage or bone in the ulcer. Slough and/or eschar may be visible. Epibole (rolled edges), undermining and/or tunneling often occur.    Unstageable Pressure Injury:  Full-thickness skin and tissue loss in which the extent of tissue damage within the ulcer cannot be confirmed because it is obscured by slough or eschar. If slough or  eschar is removed, a Stage 3 or Stage 4 pressure injury will be revealed.    Deep Tissue Pressure Injury:  Intact or non-intact skin with localized area of persistent non-blanchable deep red, maroon, purple discoloration or epidermal separation revealing a dark wound bed or blood filled blister. This injury results from intense and/or prolonged pressure and shear forces at the bone-muscle interface. The wound may evolve rapidly to reveal the actual extent of tissue injury, or may resolve without tissue loss. If necrotic tissue, subcutaneous tissue, granulation tissue, fascia, muscle or other underlying structures are visible, this indicates a full thickness pressure injury (Unstageable, Stage 3 or Stage 4). Do not use DTPI to describe vascular, traumatic, neuropathic, or dermatologic conditions.   Medical Device Related Pressure Injury: This describes an etiology. Medical device related pressure injuries result from the use of devices designed and applied for diagnostic or therapeutic purposes. The resultant pressure injury generally conforms to the pattern or shape of the device. The injury should be staged using the staging system.    Mucosal Membrane Pressure Injury: Mucosal membrane pressure injury is found on mucous membranes with a history of a medical device in use at the location of the injury. Due to the anatomy of the tissue these ulcers cannot be staged.       Provider, please provide the integumentary diagnosis related to the documentation of Penis and Nose:     Penile shaft    [ X ] Deep Tissue Pressure Injury   [   ] Other Integumentary Diagnosis (please specify):______________   [  ] Clinically Undetermined     Penile glans and foreskin    [ X ] Mucosal Membrane Pressure Injury   [   ] Other Integumentary Diagnosis (please specify):______________   [  ] Clinically Undetermined       Nose    [ X ] Mucosal Membrane Pressure Injury   [   ] Other Integumentary Diagnosis (please specify):______________   [   ] Clinically Undetermined         Please document in your progress notes daily for the duration of treatment until resolved and include in your discharge summary.    Reference:    FLOR Ryan., ALBARO Garland., Goldberg, M., FLOR Acevedo, FLOR Frost, & ROXIE Garner. (2016). Revised National Pressure Ulcer Advisory Panel Pressure Injury Staging System: Revised Pressure Injury Staging System. J Wound Ostomy Continence Nurs, 43(6), 585-597. doi:[email or web address]    Form No.[id number]

## 2022-09-07 NOTE — PHYSICIAN QUERY
PT Name: Leighton Carroll  MR #: [id number]     DOCUMENTATION CLARIFICATION     CDS: Jermain Mayorga RN CCDS               Contact information: [email or web address]    This form is a permanent document in the medical record.    Query Date: September 7, 2022    By submitting this query, we are merely seeking further clarification of documentation.  Please utilize your independent clinical judgment when addressing the question(s) below.  The Medical Record contains the following:   Indicators   Supporting Clinical Findings Location in Medical Record    x Pneumonia documented new staph VAP - await R. Respiratory culture with staph aureus - represents VAP     He is on day 6 of 8 vancomycin for his MRSA PNA    Ventilator associated pneumonia   Pneumonia due to methicillin resistant Staphylococcus aureus (MRSA),  9/5/2022 Pulmonology progress notes    9/10/2022 Pulmonology progress notes    9/12/2022 ENT consult    x Chest X-Ray/CT Scan Endotracheal tube terminates 11.8 cm from the messi.  Nasogastric tube projects beneath the diaphragm, tip not seen.  Right lower lung zone consolidation resolved.  Persistent bilateral interstitial prominence noted.  No pleural effusion or pneumothorax.  Cardiomediastinal silhouette is unremarkable. 9/4/2022 Chest x-ray    x PaO2    PaCO2     O2 sat O2 sat 91% on FIO2 @ 80% with PEEP of 14     09/02/22 09:10 09/03/22 10:08 09/03/22 19:42   POC PH 7.354 7.409 7.508 (H)   POC PCO2 98.6 (HH) 94.9 (HH) 75.7 (HH)   POC PO2 83 70 (L) 128 (H)   POC HCO3 55.0 (H) 59.9 (H) 60.1 (H)   POC SATURATED O2 94 (L) 92 (L) 99   POC BE 29 >30 (H) >30 (H)   POC TCO2 >50 (H) >50 (H) >50 (H)   FiO2 65 80 60   Vt 480 480 480   PiP 30 36    PEEP 14 16 14   Sample ARTERIAL   ARTERIAL ARTERIAL   DelSys Adult Vent   Adult Vent Adult Vent      9/3/2022 Vitals    ABGs    x WBC  08/26/22 14:04 08/29/22 03:45 09/02/22 03:49 09/03/22 03:45 09/05/22 03:54   WBC 34.58 (H) 14.58 (H) 15.13 (H) 14.15 (H) 13.59 (H)    Lab values     x Vital Signs T 99.3-100.8, HR , RR 28, /63 9/3/2022 Vitals    x Cultures  Respiratory culture from Endotracheal Aspirate is positive for:  METHICILLIN RESISTANT STAPHYLOCOCCUS AUREUS, Many   CANDIDA ALBICANS, Moderate     Susceptibility     Methicillin resistant Staphylococcus aureus    CULTURE, RESPIRATORY    Vancomycin 1 mcg/mL Sensitive  9/3/2022 Microbiology report    x Treatment  Today appears to taken a positive turn with improved oxygenation.  On 50% O2 plus 10.  Will stop proning and start to lighten sedation.  started on vancomycin  Respiratory culture with MRSA  - represents VAP - started on vancomycin 9/5 - plan for 7d total  9/4/2022 Pulmonology progress notes    9/5/2022 Pulmonology progress notes  9/9/2022 Pulmonology progress notes    x Supplemental O2 FIO2 @ 80% with PEEP of 14 via mechanical ventilator 9/3/2022 Vitals    Dysphagia/Swallow study      x Other Patient arrived in [facility] ICU intubated    this AM had desaturation event and was placed on 80% and 16 with PaO2 of 70 on ABG. Respiratory culture & CXR ordered given new hypoxemia    Febrile overnight and resp culture showing staph. Respiratory status gradually improving however with continued decreasing FiO2 and PEEP requirements.    He had repeat fevers in the ICU thereafter and respiratory cultures showed MRSA and he was started on vancomycin on 9/5.    Secretion's amount: Large  Secretion's color: creamy, white  Secretion's characteristics: thick    Secretion's amount: small  Secretion's color: creamy; white  Secretion's characteristics: thick 8/26/2022 H&P    9/3/2022 Pulmonology progress notes      9/5/2022 Hospital Medicine progress notes      9/6/2022 Critical Care progress notes      9/3/2022 Nursing respiratory secretion's assessment      9/2/2022 Nursing respiratory secretion's assessment     Provider, please provide the diagnosis related to the above clinical indicators:    [ X ] MRSA Pneumonia due to or associated  with mechanical ventilator   [   ] MRSA Pneumonia unrelated to mechanical ventilator   [   ] Other type of pneumonia (please specify): ________   [   ] Other respiratory diagnosis (please specify): _________   [  ] Clinically undetermined         Please document in your progress notes daily for the duration of treatment, until resolved, and include in your discharge summary.     Form No. [id number]

## 2022-09-07 NOTE — PT/OT/SLP PROGRESS
Physical Therapy      Patient Name:  Leighton Carroll   MRN:  [id number]    Patient not seen today secondary to Other (Comment) (in with SLP upon arrival just initiating evaluating.). Will follow-up 9/8/22.

## 2022-09-07 NOTE — PLAN OF CARE
Plan of care explained to patient, patient intubated unable to express understanding  Problem: Adult Inpatient Plan of Care  Goal: Plan of Care Review  Outcome: Ongoing, Progressing  Goal: Absence of Hospital-Acquired Illness or Injury  Outcome: Ongoing, Progressing  Goal: Optimal Comfort and Wellbeing  Outcome: Ongoing, Progressing  Goal: Readiness for Transition of Care  Outcome: Ongoing, Progressing     Problem: Communication Impairment (Mechanical Ventilation, Invasive)  Goal: Effective Communication  Outcome: Ongoing, Progressing     Problem: Inability to Wean (Mechanical Ventilation, Invasive)  Goal: Mechanical Ventilation Liberation  Outcome: Ongoing, Progressing     Problem: Nutrition Impairment (Mechanical Ventilation, Invasive)  Goal: Optimal Nutrition Delivery  Outcome: Ongoing, Progressing     Problem: Skin and Tissue Injury (Mechanical Ventilation, Invasive)  Goal: Absence of Device-Related Skin and Tissue Injury  Outcome: Ongoing, Progressing

## 2022-09-07 NOTE — PROGRESS NOTES
"[facility] - Intensive Care [facility] Medicine  Progress Note    Patient Name: Leighton Carroll  MRN: [id number]  Patient Class: IP- Inpatient   Admission Date: 8/26/2022  Length of Stay: 12 days  Attending Physician: Alexia Abel MD  Primary Care Provider: Primary Doctor No        Subjective:     Principal Problem:Acute respiratory failure with hypoxia and hypercarbia        HPI:  Patient's HPI is adapted from notes of Dr. Schuler, Dr. Morin and Dr. Hall ([facility]).   Patient arrived in [facility] ICU intubated, pt's girlfriend's number not on file, unable to verify events PTA.     Leighton Carroll is a 42 year old man with a PMHx of depression, hx of drug overdose (7/11/2022, buproprion and gabapentin), polysubstance abuse (meth, heroin) and nicotine dependence.    He was presented to [facility] on 8/25/2022 via EMS with shortness of breath and dry cough. He was reportedly found down by his girlfriend at home yesterday evening (8/25/2022) and was given multiple rounds of chest compressions. He woke up and began having shortness of breath and called EMS, which found him saturating at 66% on room air, which increased to 90s with nonrebreather mask. Patient denied subjective fever, chills, sick contacts, chest pain, palpitations, abdominal pain.     At [facility], patient had a fever to 101, with leukocytosis (20) and lactic acidosis (3.0) with a normal procal. CTA chest showed patchy perihilar opacities bilaterally most pronounced in right lower lobe. Covid, Group A strep, respiratory influenza panel -ve. D-dimer was elevated (3.68), but CTPA negative for PE. Tox postive for THC only. CXR was -ve for pneumothorax. Patient was started on IV Cefepime and IV Vancomycin.     Patient was initially on BiPAP but became agitated and was intubated. Patient reportedly had "red spit". Patient was difficult to sedate requiring propofol and precedex and multiple doses of versed and ketamine, thus became hypotensive " after intubation and was started on levophed.     Patient is transferred to [facility] medicine, for management of acute respiratory failure with hypoxia and hypercapnia.         Overview/Hospital Course:  Admitted with acute hypoxemic respiratory failure.  Blood cultures demonstrated Acinetobacter, Pseudomonas luteola.  Pulmonology and ID consulted.  TTE performed without evidence of vegetation. Repeat blood culture showed bacillus in 1/4 bottles but suspected contaminant.  Opted for 14 day course of cefepime. Developed ARDS and started chemical paralysis/proning as well as ARDSnet protocol. Palliative consulted given the severity of his illness and likely extended recovery. Respiratory status was slow to improve but gradually had decreasing oxygenation/pressure requirements. Had repeat fevers and sputum culture showed staph aureus; vancomycin added.      Interval History: No acute events. Respiratory status with some improvement, FiO2 60% and 8 PEEP. Level of sedation being weaned, on propofol and fentanyl (ketamine stopped).    Review of Systems   Unable to perform ROS: Intubated   Objective:     Vital Signs (Most Recent):  Temp: 99 °F (37.2 °C) (09/07/22 0600)  Pulse: 97 (09/07/22 0806)  Resp: (!) 28 (09/07/22 0806)  BP: (!) 105/55 (09/07/22 0600)  SpO2: (!) 93 % (09/07/22 0600)   Vital Signs (24h Range):  Temp:  [98.8 °F (37.1 °C)-100.2 °F (37.9 °C)] 99 °F (37.2 °C)  Pulse:  [] 97  Resp:  [13-30] 28  SpO2:  [89 %-99 %] 93 %  BP: ()/(55-87) 105/55  Arterial Line BP: ()/(46-82) 99/46     Weight: 86.2 kg (190 lb)  Body mass index is 28.06 kg/m².    Intake/Output Summary (Last 24 hours) at 9/7/2022 0819  Last data filed at 9/7/2022 0600  Gross per 24 hour   Intake 3597.28 ml   Output 2330 ml   Net 1267.28 ml        Physical Exam  Vitals and nursing note reviewed.   Constitutional:       General: He is not in acute distress.     Appearance: He is well-developed.      Comments: Sedated    HENT:      Head: Normocephalic and atraumatic.      Comments: ET tube in place     Nose: Nose normal.   Eyes:      General:         Right eye: No discharge.         Left eye: No discharge.      Conjunctiva/sclera: Conjunctivae normal.   Cardiovascular:      Rate and Rhythm: Normal rate.      Pulses: Normal pulses.   Pulmonary:      Effort: Pulmonary effort is normal. No respiratory distress.      Comments: Intubated, mechanical breath sounds.  Abdominal:      General: Bowel sounds are normal.      Palpations: Abdomen is soft.      Tenderness: There is no abdominal tenderness. There is no guarding or rebound.   Musculoskeletal:         General: Normal range of motion.      Right lower leg: No edema.      Left lower leg: No edema.   Skin:     General: Skin is warm and dry.      Comments: Extensive tatoos throughout entire body   Neurological:      Comments: Sedated but eyes open and tracks intermittently     Significant Labs:   CBC:  Recent Labs   Lab 09/05/22  0354 09/06/22  0319 09/06/22  1315 09/07/22  0406   WBC 13.59* 13.15*  --  13.09*   HGB 11.8* 11.3*  --  11.3*   HCT 36.0* 34.9* 35* 34.4*   * 507*  --  617*   GRAN 70.4  9.6* 75.7*  10.0*  --  72.4  9.5*   LYMPH 16.6*  2.3 12.4*  1.6  --  15.0*  2.0   MONO 9.5  1.3* 7.7  1.0  --  8.3  1.1*   EOS 0.2 0.3  --  0.3   BASO 0.04 0.05  --  0.06     CMP:  Recent Labs   Lab 09/05/22  0354 09/06/22  0319 09/07/22  0406    138 139   K 3.7 2.8* 2.8*   CL 82* 83* 88*   CO2 45* 43* 40*   BUN 73* 75* 60*   CREATININE 0.9 0.8 0.8    96 101   CALCIUM 9.5 9.5 9.4   MG 2.5 2.6 2.5   PHOS 3.0 2.9 3.3   ALKPHOS 107  --  93   *  --  84*   *  --  102*   BILITOT 0.4  --  0.5   PROT 7.9  --  7.6   ALBUMIN 2.5*  --  2.6*   ANIONGAP 13 12 11        Significant Imaging:   No new imaging this morning.      Assessment/Plan:      * Acute respiratory failure with hypoxia and hypercarbia  Bilateral PNA, ARDS, septic shock (resolved), bacteremia,  h/o drug overdose, polysubstance abuse, leukocytosis, encephalopathy  - Hypoxic to 60s on arrival and failed BiPAP 2/2 agitation and intubated at OSH. Tox positive for THC. D-dimer elevated but CTA negative for PE. COVID, RSV negative.  - Continue fentanyl gtt and propofol gtt (weaned off ketamine gtt, midazolam gtt since last night)  - Continue cefepime 2g IV q8hr for 14 day total course. Blood cultures showed acinetobacter, pseudomonas luteola. Repeat blood cultures with fever; added vancomycin for resp culture showing staph.  - ARDSnet protocol; proning/supinating. Improving O2; pulm/crit planning for no further proning at this time. Discontinued cisatracurium gtt.  - Continue furosemide 40mg IV q6hr, metolazone 5mg per OG daily, goal mildly net negative. Continue dexamethasone 20mg IV daily 5 days followed by 10mg IV daily for 5 days (currently on 10 mg)  - Appreciate pulm/crit assistance.  - Palliative consulted given persistent vent requirements; appreciate assistance.  - O2 and PEEP requirements decreasing, as per Pulm/critical care    Hypokalemia  - Due to diuresis  - Replete and monitor with repeat labs    Bilateral pneumonia  - As above.    Hematuria  - Gross hematuria; not present on repeat UA.    Gram-negative bacteremia  - As above.    Septic shock  - As above.    History of drug overdose  - As above.    Depression  - Continue buproprion 150mg per OG daily, quetiapine 200mg per OG qHS.  - Resume divalproex ER 500mg 1g PO daily, risperiodone 2mg PO daily when appropriate.      VTE Risk Mitigation (From admission, onward)         Ordered     enoxaparin injection 40 mg  Daily         08/26/22 1422     IP VTE HIGH RISK PATIENT  Once         08/26/22 1422     Place sequential compression device  Until discontinued         08/26/22 1422     Place MARGRET hose  Until discontinued         08/26/22 1422                Critical care time spent on the evaluation and treatment of severe organ dysfunction, review of  pertinent labs and imaging studies, discussions with consulting providers and discussions with patient/family: 35   minutes.        Alexia Abel MD  Department of Hospital Medicine   [facility] - Intensive Care [facility])

## 2022-09-07 NOTE — PLAN OF CARE
Problem: SLP  Goal: SLP Goal  Description: 1. Pt will be able to consume 3-5 mls of water from a spoon with no signs of airway threat or aspiration given max assistance  2. Pt will be able to consume 1/2 tsp amounts of purees with no signs of airway threat or aspiration given max assistance  3. Pt will be able to focus and sustain attention to simple familiar tasks for 15 min with moderate redirection required  4. Increase orientation to person, place, date, reason for hospitalization to 50% acc given max verbal and written cues  Outcome: Ongoing, Progressing  Clinical bedside swallow evaluation initiated s/p extubation this morning. Pt to be seen 4-6x/week. Ice chips and 1-2 mls of water from a spoon only at this time.

## 2022-09-07 NOTE — PROGRESS NOTES
Pharmacokinetic Assessment Follow Up: IV Vancomycin    Vancomycin serum concentration assessment(s):    The trough level was drawn correctly and can be used to guide therapy at this time. The measurement is within the desired definitive target range of 10 to 20 mcg/mL.    Vancomycin Regimen Plan:    Continue regimen to Vancomycin 1500 mg IV every 12 hours with next serum trough concentration measured at 1130 prior to 1200 dose on 9/9    Drug levels (last 3 results):  Recent Labs   Lab Result Units 09/07/22  1152   Vancomycin, Random ug/mL 14.7       Pharmacy will continue to follow and monitor vancomycin.    Please contact pharmacy at extension [phone] for questions regarding this assessment.    Thank you for the consult,   May Bartholomew       Patient brief summary:  Leighton Carroll is a 42 y.o. male initiated on antimicrobial therapy with IV Vancomycin for treatment of  pneumonia    The patient's current regimen is Vancomycin 1500mg IVPB every 12 hours    Drug Allergies:   Review of patient's allergies indicates:  No Known Allergies    Actual Body Weight:   86.2kg    Renal Function:   Estimated Creatinine Clearance: 129.5 mL/min (based on SCr of 0.8 mg/dL).,     Dialysis Method (if applicable):  N/A    CBC (last 72 hours):  Recent Labs   Lab Result Units 09/05/22  0354 09/06/22 0319 09/07/22  0406   WBC K/uL 13.59* 13.15* 13.09*   Hemoglobin g/dL 11.8* 11.3* 11.3*   Hematocrit % 36.0* 34.9* 34.4*   Platelets K/uL 517* 507* 617*   Gran % % 70.4 75.7* 72.4   Lymph % % 16.6* 12.4* 15.0*   Mono % % 9.5 7.7 8.3   Eosinophil % % 1.6 2.2 2.1   Basophil % % 0.3 0.4 0.5   Differential Method  Automated Automated Automated       Metabolic Panel (last 72 hours):  Recent Labs   Lab Result Units 09/05/22  0354 09/06/22 0319 09/07/22  0406   Sodium mmol/L 140 138 139   Potassium mmol/L 3.7 2.8* 2.8*   Chloride mmol/L 82* 83* 88*   CO2 mmol/L 45* 43* 40*   Glucose mg/dL 101 96 101   BUN mg/dL 73* 75* 60*   Creatinine mg/dL 0.9 0.8  0.8   Albumin g/dL 2.5*  --  2.6*   Total Bilirubin mg/dL 0.4  --  0.5   Alkaline Phosphatase U/L 107  --  93   AST U/L 102*  --  84*   ALT U/L 112*  --  102*   Magnesium mg/dL 2.5 2.6 2.5   Phosphorus mg/dL 3.0 2.9 3.3       Vancomycin Administrations:  vancomycin given in the last 96 hours                     vancomycin 1.5 g in dextrose 5 % 250 mL IVPB (ready to mix) (mg) 1,500 mg New Bag 09/07/22 1209     1,500 mg New Bag 09/06/22 2331     1,500 mg New Bag  1130     1,500 mg New Bag 09/05/22 2334    vancomycin 2 g in dextrose 5 % 500 mL IVPB (mg) 2,000 mg New Bag 09/05/22 1144                    Microbiologic Results:  Microbiology Results (last 7 days)       Procedure Component Value Units Date/Time    Blood culture [[id number]] Collected: 09/05/22 1205    Order Status: Completed Specimen: Blood Updated: 09/06/22 2022     Blood Culture, Routine No Growth to date      No Growth to date    Blood culture [[id number]] Collected: 09/05/22 1210    Order Status: Completed Specimen: Blood Updated: 09/06/22 2022     Blood Culture, Routine No Growth to date      No Growth to date    Culture, Respiratory with Gram Stain [[id number]]  (Abnormal)  (Susceptibility) Collected: 09/03/22 1344    Order Status: Completed Specimen: Respiratory from Endotracheal Aspirate Updated: 09/06/22 0937     Respiratory Culture No Pseudomonas isolated.      METHICILLIN RESISTANT STAPHYLOCOCCUS AUREUS  Many        PATRICK ALBICANS  Moderate       Gram Stain (Respiratory) Few WBC's     Gram Stain (Respiratory) Rare Gram positive cocci    Blood culture [[id number]] Collected: 08/28/22 1653    Order Status: Completed Specimen: Blood Updated: 09/02/22 2312     Blood Culture, Routine No growth after 5 days.    Narrative:      Blood cultures from 2 different sites. 4 bottles total.  Please draw before starting antibiotics.    Blood culture [[id number]] Collected: 08/28/22 1650    Order Status: Completed Specimen: Blood Updated: 09/02/22 2312     Blood  Culture, Routine No growth after 5 days.    Narrative:      Blood cultures x 2 different sites. 4 bottles total. Please  draw cultures before administering antibiotics.    Blood culture [[id number]] Collected: 08/27/22 1328    Order Status: Completed Specimen: Blood Updated: 09/02/22 0612     Blood Culture, Routine No growth after 5 days.

## 2022-09-07 NOTE — EICU
Intervention Initiated From:  Bedside    Osvaldo Communicated with Bedside Nurse regarding:  Labs and Medication  K 2.8 this am, has replacement ordered but not due until 9am (dose is 25 meq potassium bicarbonate disintegrating tab).    Doctor Notified:  Yes  Comments: Dr Emmett Meadows

## 2022-09-07 NOTE — CARE UPDATE
Pt alert and oriented to person and time. O2 sat 89%, /70. Pt was extubated today, currently on nasal cannula at 4L. Pt pulled accidentally pulled out a-line, pressure was held for 5 minutes. Gauze and tegaderm was placed over site. POC reviewed with pt and pt family. Pt and family in agreement with plan. Updates given to oncoming nurse.

## 2022-09-07 NOTE — ASSESSMENT & PLAN NOTE
Bilateral PNA, ARDS, septic shock (resolved), bacteremia, h/o drug overdose, polysubstance abuse, leukocytosis, encephalopathy  - Hypoxic to 60s on arrival and failed BiPAP 2/2 agitation and intubated at OSH. Tox positive for THC. D-dimer elevated but CTA negative for PE. COVID, RSV negative.  - Continue fentanyl gtt and propofol gtt (weaned off ketamine gtt, midazolam gtt since last night)  - Continue cefepime 2g IV q8hr for 14 day total course. Blood cultures showed acinetobacter, pseudomonas luteola. Repeat blood cultures with fever; added vancomycin for resp culture showing staph.  - ARDSnet protocol; proning/supinating. Improving O2; pulm/crit planning for no further proning at this time. Discontinued cisatracurium gtt.  - Continue furosemide 40mg IV q6hr, metolazone 5mg per OG daily, goal mildly net negative. Continue dexamethasone 20mg IV daily 5 days followed by 10mg IV daily for 5 days (currently on 10 mg)  - Appreciate pulm/crit assistance.  - Palliative consulted given persistent vent requirements; appreciate assistance.  - O2 and PEEP requirements decreasing, as per Pulm/critical care

## 2022-09-07 NOTE — PT/OT/SLP PROGRESS
Occupational Therapy      Patient Name:  Leighton Carroll   MRN:  [id number]    Attempted to see pt at 13:35.  Upon arrival to room pt just began speech session with SLP.  Will follow up at next scheduled therapy session.    LIT Middleton  9/7/2022

## 2022-09-07 NOTE — PLAN OF CARE
"F - Era and Belief: Pt said he was Church    I - Importance: Patient was saying, "I don't want God to think I have forgotten him."  also reminded patient that he was not forgotten by God, either.     C - Community: At the end of 's visit pt's mom and sister arrived.     A - Address in Care:   provided follow-up visit per nurse request after nurse heard patient share that "I don't want God to think I have forgotten him." Pt was speaking softly, so to hear him better  was going to close the door. Pt requested to keep door open, and shared that he had a "bad experience" in the past and felt safe keeping the door open. Patient went on to share that, "I love God and I am scared of you (referring to ."  asked patient if he would like  to leave and pt said yes, saying that he had a bad experience in the past and was scared.  asked if patient would like us to put a hold on chaplains visiting him and he said no. When asked if patient wanted this  to not visit him again he said no.  will try again on a later date to reassess.  As  was excusing himself pt's mom and sister arrived.   "

## 2022-09-07 NOTE — SUBJECTIVE & OBJECTIVE
Interval History: No acute events. Respiratory status with some improvement, FiO2 60% and 8 PEEP. Level of sedation being weaned, on propofol and fentanyl (ketamine stopped).    Review of Systems   Unable to perform ROS: Intubated   Objective:     Vital Signs (Most Recent):  Temp: 99 °F (37.2 °C) (09/07/22 0600)  Pulse: 97 (09/07/22 0806)  Resp: (!) 28 (09/07/22 0806)  BP: (!) 105/55 (09/07/22 0600)  SpO2: (!) 93 % (09/07/22 0600)   Vital Signs (24h Range):  Temp:  [98.8 °F (37.1 °C)-100.2 °F (37.9 °C)] 99 °F (37.2 °C)  Pulse:  [] 97  Resp:  [13-30] 28  SpO2:  [89 %-99 %] 93 %  BP: ()/(55-87) 105/55  Arterial Line BP: ()/(46-82) 99/46     Weight: 86.2 kg (190 lb)  Body mass index is 28.06 kg/m².    Intake/Output Summary (Last 24 hours) at 9/7/2022 0819  Last data filed at 9/7/2022 0600  Gross per 24 hour   Intake 3597.28 ml   Output 2330 ml   Net 1267.28 ml        Physical Exam  Vitals and nursing note reviewed.   Constitutional:       General: He is not in acute distress.     Appearance: He is well-developed.      Comments: Sedated   HENT:      Head: Normocephalic and atraumatic.      Comments: ET tube in place     Nose: Nose normal.   Eyes:      General:         Right eye: No discharge.         Left eye: No discharge.      Conjunctiva/sclera: Conjunctivae normal.   Cardiovascular:      Rate and Rhythm: Normal rate.      Pulses: Normal pulses.   Pulmonary:      Effort: Pulmonary effort is normal. No respiratory distress.      Comments: Intubated, mechanical breath sounds.  Abdominal:      General: Bowel sounds are normal.      Palpations: Abdomen is soft.      Tenderness: There is no abdominal tenderness. There is no guarding or rebound.   Musculoskeletal:         General: Normal range of motion.      Right lower leg: No edema.      Left lower leg: No edema.   Skin:     General: Skin is warm and dry.      Comments: Extensive tatoos throughout entire body   Neurological:      Comments: Sedated but  eyes open and tracks intermittently     Significant Labs:   CBC:  Recent Labs   Lab 09/05/22  0354 09/06/22  0319 09/06/22  1315 09/07/22  0406   WBC 13.59* 13.15*  --  13.09*   HGB 11.8* 11.3*  --  11.3*   HCT 36.0* 34.9* 35* 34.4*   * 507*  --  617*   GRAN 70.4  9.6* 75.7*  10.0*  --  72.4  9.5*   LYMPH 16.6*  2.3 12.4*  1.6  --  15.0*  2.0   MONO 9.5  1.3* 7.7  1.0  --  8.3  1.1*   EOS 0.2 0.3  --  0.3   BASO 0.04 0.05  --  0.06     CMP:  Recent Labs   Lab 09/05/22  0354 09/06/22  0319 09/07/22  0406    138 139   K 3.7 2.8* 2.8*   CL 82* 83* 88*   CO2 45* 43* 40*   BUN 73* 75* 60*   CREATININE 0.9 0.8 0.8    96 101   CALCIUM 9.5 9.5 9.4   MG 2.5 2.6 2.5   PHOS 3.0 2.9 3.3   ALKPHOS 107  --  93   *  --  84*   *  --  102*   BILITOT 0.4  --  0.5   PROT 7.9  --  7.6   ALBUMIN 2.5*  --  2.6*   ANIONGAP 13 12 11        Significant Imaging:   No new imaging this morning.

## 2022-09-07 NOTE — PROGRESS NOTES
[facility] Pulmonary/Critical Care Fellow Progress Note:    Brief Hx: 41 yo M w/ PMHx of drug use (heroin, methamphetamine, others) admitted  to [facility] for hypoxemic and hypercapnic respiratory failure after being found down, presumably 2/2 drug overdose and in shock. He received CPR from his GF and did wake up when EMS found him and he presented to the ED with leukocytosis, lactic acidosis, febrile. CTA showed diffuse dense bilateral infiltrates. UDS+ for THC. Blood cultures grew acinetobacter pittii & pesudomonas luteola and bacillus cereus for which he has been receiving cefepime. Given his CT findings and evolution of infiltrates on CXR, he was treated as ARDS. He started proning on  and started receiving steroids for ARDS dosing on  with last proned event on 9/3. He had repeat fevers in the ICU thereafter and respiratory cultures showed MRSA and he was started on vancomycin on . The patient was extubate don  AM to 4-5L NC.     Subjective:  Patient intubated and awake on sedation.     Objective:  Last 24 Hour Vital Signs:  BP  Min: 98/56  Max: 168/86  Temp  Av.1 °F (37.3 °C)  Min: 79.9 °F (26.6 °C)  Max: 100.2 °F (37.9 °C)  Pulse  Av.5  Min: 74  Max: 146  Resp  Av.3  Min: 13  Max: 33  SpO2  Av.3 %  Min: 89 %  Max: 99 %  Body mass index is 28.06 kg/m².  I/O last 3 completed shifts:  In: 4805.6 [I.V.:2211.4; NG/GT:1748; IV Piggyback:846.2]  Out: 3905 [Urine:3905]      Physical Exam:  General: Sedated  HENT:  NCAT; anicteric sclera; (+)ETT in place, small wound on nasal septum  Cardio:  Regular rate and rhythm with normal S1 and S2; no murmurs or rubs  Resp:  CTAB; respirations unlabored; no wheezes, crackles or rhonchi  Abdom:  Soft, non-distended  Extrem:  WWP with no clubbing, cyanosis or edema, small cuts on hands and legs that are scabbed over  Pulses:  2+ and symmetric distally  Neuro:  AAOx0; sedated    Assessment & Plan:     42M with history of drug use  "(heroin, methamphetamine, "whatever he can get his hands on") presents with hypoxemic and hypercapnic respiratory failure presumably secondary to drug overdose requiring intubation found to have bacteremia and diffuse lung infiltrates.      Acute hypoxic and hypercapnic respiratory failure  In setting of presumed drug overdose (fentanyl?)  With diffuse bilateral pulmonary infiltrates on CT chest, +fevers and leukocytosis  Sputum (+) staph aureus - on Vancomycin RIP negative  JOVANA/ANCA negative - no new hemoptysis noted  Blood cultures positive with negative respiratory culture on admission  Blood cultures with pseudomonas luteola and acinetobacter pitii -- patient with negative TTE for valve vegetations  - plan for 14d of treatment - stop after 9/7  Extubated on 4-5L NC, NT suction PRN  Continue lasix 40mg IV BID for today now that he is off drips and extubated  Multifocal PNA & ARDS  Started prone 8/30 - stopped after 9/3  given improvement in oxygenation  Respiratory culture with MRSA  - represents VAP - started on vancomycin 9/5  Started steroids for ARDS 9/1 - 20mg for 5 days and then 10mg for 5 days  Aspiration vs. Inhalation injury/pneumonitis vs. Septic emboli vs. Vasculitis (less likely)  Lab and culture workup as above  No new evidence of hemoptysis  Continue cefepime - 14d total, vancy 5d total   Urine legionella negative, strep Ag negative  HIV negative, viral PCR negative, Hep panel (+)hep C, negative acute hepatitis   CK elevated on different admission, Myositis panel pending, JOVANA & ANCA negative  Sedation requirements  Will monitor for any withdrawal, Dc'd valium pending swallow evaluation  Precedex ordered if needed, but patient appears calm and cooperative at the moment    Code: FULL     DVT: Lovenox  GI: Famotidine  Feeds: NPO  Sedation: none, precedex if needed    Sujata Francois MD  Pulm/CC Fellow    "

## 2022-09-07 NOTE — PHYSICIAN QUERY
PT Name: Leighton Carroll  MR #: [id number]     DOCUMENTATION CLARIFICATION     CDS: Jermain Mayorga RN CCDS               Contact information: [email or web address]    This form is a permanent document in the medical record.     Query Date: September 7, 2022    By submitting this query, we are merely seeking further clarification of documentation.  Please utilize your independent clinical judgment when addressing the question(s) below.    The Medical Record contains the following:   Indicators   Supporting Clinical Findings Location in Medical Record    Non-blanchable erythema/redness      Ulcer/Injury/Skin Breakdown      Deep Tissue Injury      x Wound care consult       Altered Skin Integrity 09/04/22 0945 Right medial;anterior Lip #1 Skin Tear Mucosal membrane tissue injury with history of medical device use   Date First Assessed/Time First Assessed: 09/04/22 0945   Altered Skin Integrity Present on Admission: suspected hospital acquired  Side: Right  Orientation: medial;anterior  Location: Lip  Wound Number: #1  Is this injury device related?: (c) Yes  Yas...   Description of Altered Skin Integrity Mucosal membrane tissue injury with history of medical device use   Dressing Appearance Open to air;No dressing   Drainage Amount None   Appearance Maroon   Tissue loss description Not applicable     Noted scabbed injury to left forehead also assumed to be from proning.       Altered Skin Integrity 09/04/22 0945 anterior Frontal region #2 Ulceration Partial thickness tissue loss. Shallow open ulcer with a red or pink wound bed, without slough. Intact or Open/Ruptured Serum-filled blister.   Date First Assessed/Time First Assessed: 09/04/22 0945   Altered Skin Integrity Present on Admission: suspected hospital acquired  Orientation: anterior  Location: Frontal region  Wound Number: #2  Is this injury device related?: Yes  Primary Wound Ty...   Dressing Appearance Open to air;No dressing   Drainage Amount None   Drainage  Characteristics/Odor No odor   Appearance Dry;Tan  (brown scab proning injury)   Tissue loss description Not applicable   Periwound Area Intact   Wound Edges Open   Wound Length (cm) 1 cm   Wound Width (cm) 0.8 cm   Wound Surface Area (cm^2) 0.8 cm^2     Noted new presentation to right ear with maroon discoloration.     Altered Skin Integrity 09/08/22 1130 Right Ear Purple or maroon localized area of discolored intact skin or non-intact skin or a blood-filled blister.   Date First Assessed/Time First Assessed: 09/08/22 1130   Altered Skin Integrity Present on Admission: suspected hospital acquired  Side: Right  Location: Ear  Is this injury device related?: (c) Yes  Description of Altered Skin Integrity: Purple or ma...   Description of Altered Skin Integrity Purple or maroon localized area of discolored intact skin or non-intact skin or a blood-filled blister.   Dressing Appearance Open to air;No dressing   Drainage Amount None   Drainage Characteristics/Odor No odor   Appearance Maroon;Dry   Tissue loss description Not applicable   Periwound Area Intact   Wound Length (cm) 0.8 cm   Wound Width (cm) 0.5 cm   Wound Surface Area (cm^2) 0.4 cm^2    9/6/2022 Wound care progress notes                                                                                                                                                9/8/2022 Wound care progress notes    x Acute/Chronic Illness ARDS, septic shock (resolved), bacteremia  Acute respiratory failure with hypoxia and hypercarbia  Bilateral PNA 9/4/2022 Hospital Medicine progress notes    x Medication/Treatment Skin Interventions   Device Skin Pressure Protection absorbent pad utilized/changed;adhesive use limited;skin-to-device areas padded;skin-to-skin areas padded;pressure points protected;positioning supports utilized   Pressure Reduction Devices specialty bed utilized;positioning supports utilized;heel offloading device utilized;pressure-redistributing mattress  utilized   Pressure Reduction Techniques weight shift assistance provided;pressure points protected;positioned off wounds;heels elevated off bed   Radiation Site Skin Care no signs of pressure or friction present   Skin Protection adhesive use limited;incontinence pads utilized;skin-to-device areas padded;skin-to-skin areas padded;transparent dressing maintained;tubing/devices free from skin contact     Wounds cleaned and Triad applied to all affected areas.  9/6/2022 Wound care progress notes                                9/8/2022 Wound care progress notes    x Other He started proning on 8/30 and started receiving steroids for ARDS dosing on 9/1 with last proned event on 9/3.    Farrukh Risk score 10 9/6/2022 Critical Care progress notes      9/6/2022 Nursing assessment     The clinical guidelines noted are only a system guideline. It does not replace the providers clinical judgment.    Per the National Pressure Injury Advisory Panel:   A pressure injury is localized damage to the skin and underlying soft tissue usually over a bony prominence or related to a medical or other device. The injury can present as intact skin or an open ulcer and may be painful. The injury occurs as a result of intense and/or prolonged pressure or pressure in combination with shear. The tolerance of soft tissue for pressure and shear may also be affected by microclimate, nutrition, perfusion, co-morbidities and condition of the soft tissue.       Stage 1 Pressure Injury:  Intact skin with a localized area of non-blanchable erythema, which may appear differently in darkly pigmented skin. Color changes do not include purple or maroon discoloration; these may indicate deep tissue pressure injury.    Stage 2 Pressure Injury:  Partial-thickness loss of skin with exposed dermis. The wound bed is viable, pink or red, moist, and may also present as an intact or ruptured serum-filled blister.    Stage 3 Pressure Injury:  Full-thickness loss  of skin, in which adipose (fat) is visible in the ulcer and granulation tissue and epibole (rolled wound edges) are often present. Slough and/or eschar may be visible. Undermining and tunneling may occur.    Stage 4 Pressure Injury:  Full-thickness skin and tissue loss with exposed or directly palpable fascia, muscle, tendon, ligament, cartilage or bone in the ulcer. Slough and/or eschar may be visible. Epibole (rolled edges), undermining and/or tunneling often occur.    Unstageable Pressure Injury:  Full-thickness skin and tissue loss in which the extent of tissue damage within the ulcer cannot be confirmed because it is obscured by slough or eschar. If slough or eschar is removed, a Stage 3 or Stage 4 pressure injury will be revealed.    Deep Tissue Pressure Injury:  Intact or non-intact skin with localized area of persistent non-blanchable deep red, maroon, purple discoloration or epidermal separation revealing a dark wound bed or blood filled blister. This injury results from intense and/or prolonged pressure and shear forces at the bone-muscle interface. The wound may evolve rapidly to reveal the actual extent of tissue injury, or may resolve without tissue loss. If necrotic tissue, subcutaneous tissue, granulation tissue, fascia, muscle or other underlying structures are visible, this indicates a full thickness pressure injury (Unstageable, Stage 3 or Stage 4). Do not use DTPI to describe vascular, traumatic, neuropathic, or dermatologic conditions.   Medical Device Related Pressure Injury: This describes an etiology. Medical device related pressure injuries result from the use of devices designed and applied for diagnostic or therapeutic purposes. The resultant pressure injury generally conforms to the pattern or shape of the device. The injury should be staged using the staging system.    Mucosal Membrane Pressure Injury: Mucosal membrane pressure injury is found on mucous membranes with a history of a  medical device in use at the location of the injury. Due to the anatomy of the tissue these ulcers cannot be staged.       Provider, please provide the integumentary diagnosis related to the documentation of Lip, Forehead and Right ear:     Lip     [ X ] Mucosal Membrane Pressure Injury   [   ] Other Integumentary Diagnosis (please specify):______________   [  ] Clinically Undetermined     Forehead    [ X ] Medical Device Related Pressure Injury/Decubitus Ulcer, Stage 2   [   ] Medical Device Related Pressure Injury/Decubitus Ulcer, other stage (please specify): _____   [   ] Other Integumentary Diagnosis (please specify):______________   [  ] Clinically Undetermined     Right ear     [ X ] Medical Device Related Deep Tissue Pressure Injury   [   ] Other Integumentary Diagnosis (please specify):______________   [  ] Clinically Undetermined       Please document in your progress notes daily for the duration of treatment until resolved and include in your discharge summary.    Reference:    FLOR Ryan., Dada, JJannette FAUSTIN., Goldberg, M., CARRIE Acevedo., FLOR Frost, & ROXIE Garner. (2016). Revised National Pressure Ulcer Advisory Panel Pressure Injury Staging System: Revised Pressure Injury Staging System. J Wound Ostomy Continence Nurs, 43(6), 585-597. doi:10.1097/won.[id number]    Form No.[id number]

## 2022-09-07 NOTE — EICU
EICU FOLLOWUP NOTE:    Called for:  Hypokalemia with potassium of 2.8      Telemetry was reviewed. Medical records including notes, labs and imaging were reviewed.    DISCUSSED with bedside nurse.    ASSESSMENT AND PLAN:    Creatinine is 0.8.  I will order IV reduction/magnesium/calcium replacement protocol is he is currently NPO.    Thank You for allowing EICU to participate in the care of the patient. Please call as needed    Tyree Meadows MD  [facility]  [phone]

## 2022-09-08 LAB
ANION GAP SERPL CALC-SCNC: 13 MMOL/L (ref 8–16)
BASOPHILS # BLD AUTO: 0.06 K/UL (ref 0–0.2)
BASOPHILS NFR BLD: 0.4 % (ref 0–1.9)
BUN SERPL-MCNC: 39 MG/DL (ref 6–20)
CALCIUM SERPL-MCNC: 9.9 MG/DL (ref 8.7–10.5)
CHLORIDE SERPL-SCNC: 93 MMOL/L (ref 95–110)
CO2 SERPL-SCNC: 29 MMOL/L (ref 23–29)
CREAT SERPL-MCNC: 0.7 MG/DL (ref 0.5–1.4)
DIFFERENTIAL METHOD: ABNORMAL
EOSINOPHIL # BLD AUTO: 0.1 K/UL (ref 0–0.5)
EOSINOPHIL NFR BLD: 0.5 % (ref 0–8)
ERYTHROCYTE [DISTWIDTH] IN BLOOD BY AUTOMATED COUNT: 13.8 % (ref 11.5–14.5)
EST. GFR  (NO RACE VARIABLE): >60 ML/MIN/1.73 M^2
GLUCOSE SERPL-MCNC: 117 MG/DL (ref 70–110)
HCT VFR BLD AUTO: 38.6 % (ref 40–54)
HGB BLD-MCNC: 12.7 G/DL (ref 14–18)
IMM GRANULOCYTES # BLD AUTO: 0.28 K/UL (ref 0–0.04)
IMM GRANULOCYTES NFR BLD AUTO: 1.9 % (ref 0–0.5)
LYMPHOCYTES # BLD AUTO: 1.4 K/UL (ref 1–4.8)
LYMPHOCYTES NFR BLD: 10 % (ref 18–48)
MAGNESIUM SERPL-MCNC: 2.3 MG/DL (ref 1.6–2.6)
MCH RBC QN AUTO: 28.8 PG (ref 27–31)
MCHC RBC AUTO-ENTMCNC: 32.9 G/DL (ref 32–36)
MCV RBC AUTO: 88 FL (ref 82–98)
MONOCYTES # BLD AUTO: 1.3 K/UL (ref 0.3–1)
MONOCYTES NFR BLD: 8.6 % (ref 4–15)
NEUTROPHILS # BLD AUTO: 11.4 K/UL (ref 1.8–7.7)
NEUTROPHILS NFR BLD: 78.6 % (ref 38–73)
NRBC BLD-RTO: 0 /100 WBC
PHOSPHATE SERPL-MCNC: 3 MG/DL (ref 2.7–4.5)
PLATELET # BLD AUTO: 754 K/UL (ref 150–450)
PMV BLD AUTO: 9.8 FL (ref 9.2–12.9)
POTASSIUM SERPL-SCNC: 3.3 MMOL/L (ref 3.5–5.1)
RBC # BLD AUTO: 4.41 M/UL (ref 4.6–6.2)
SODIUM SERPL-SCNC: 135 MMOL/L (ref 136–145)
WBC # BLD AUTO: 14.47 K/UL (ref 3.9–12.7)

## 2022-09-08 PROCEDURE — 63600175 PHARM REV CODE 636 W HCPCS

## 2022-09-08 PROCEDURE — 80048 BASIC METABOLIC PNL TOTAL CA: CPT | Performed by: INTERNAL MEDICINE

## 2022-09-08 PROCEDURE — 83735 ASSAY OF MAGNESIUM: CPT | Performed by: INTERNAL MEDICINE

## 2022-09-08 PROCEDURE — 94761 N-INVAS EAR/PLS OXIMETRY MLT: CPT

## 2022-09-08 PROCEDURE — 25000003 PHARM REV CODE 250

## 2022-09-08 PROCEDURE — 63600175 PHARM REV CODE 636 W HCPCS: Performed by: INTERNAL MEDICINE

## 2022-09-08 PROCEDURE — 25000003 PHARM REV CODE 250: Performed by: STUDENT IN AN ORGANIZED HEALTH CARE EDUCATION/TRAINING PROGRAM

## 2022-09-08 PROCEDURE — 27000221 HC OXYGEN, UP TO 24 HOURS

## 2022-09-08 PROCEDURE — 84100 ASSAY OF PHOSPHORUS: CPT | Performed by: INTERNAL MEDICINE

## 2022-09-08 PROCEDURE — 97167 OT EVAL HIGH COMPLEX 60 MIN: CPT

## 2022-09-08 PROCEDURE — S4991 NICOTINE PATCH NONLEGEND: HCPCS

## 2022-09-08 PROCEDURE — 63600175 PHARM REV CODE 636 W HCPCS: Performed by: NURSE PRACTITIONER

## 2022-09-08 PROCEDURE — 20000000 HC ICU ROOM

## 2022-09-08 PROCEDURE — 85025 COMPLETE CBC W/AUTO DIFF WBC: CPT | Performed by: INTERNAL MEDICINE

## 2022-09-08 PROCEDURE — 25000003 PHARM REV CODE 250: Performed by: INTERNAL MEDICINE

## 2022-09-08 PROCEDURE — 99291 CRITICAL CARE FIRST HOUR: CPT | Mod: ,,, | Performed by: HOSPITALIST

## 2022-09-08 PROCEDURE — 97163 PT EVAL HIGH COMPLEX 45 MIN: CPT

## 2022-09-08 PROCEDURE — 97530 THERAPEUTIC ACTIVITIES: CPT

## 2022-09-08 PROCEDURE — 63600175 PHARM REV CODE 636 W HCPCS: Performed by: STUDENT IN AN ORGANIZED HEALTH CARE EDUCATION/TRAINING PROGRAM

## 2022-09-08 PROCEDURE — 27000207 HC ISOLATION

## 2022-09-08 PROCEDURE — 99900035 HC TECH TIME PER 15 MIN (STAT)

## 2022-09-08 PROCEDURE — 94640 AIRWAY INHALATION TREATMENT: CPT

## 2022-09-08 PROCEDURE — 99291 PR CRITICAL CARE, E/M 30-74 MINUTES: ICD-10-PCS | Mod: ,,, | Performed by: HOSPITALIST

## 2022-09-08 PROCEDURE — 92526 ORAL FUNCTION THERAPY: CPT

## 2022-09-08 PROCEDURE — 27100171 HC OXYGEN HIGH FLOW UP TO 24 HOURS

## 2022-09-08 PROCEDURE — 25000242 PHARM REV CODE 250 ALT 637 W/ HCPCS: Performed by: STUDENT IN AN ORGANIZED HEALTH CARE EDUCATION/TRAINING PROGRAM

## 2022-09-08 PROCEDURE — 97535 SELF CARE MNGMENT TRAINING: CPT

## 2022-09-08 PROCEDURE — 36415 COLL VENOUS BLD VENIPUNCTURE: CPT | Performed by: INTERNAL MEDICINE

## 2022-09-08 RX ORDER — POTASSIUM CHLORIDE 14.9 MG/ML
20 INJECTION INTRAVENOUS
Status: COMPLETED | OUTPATIENT
Start: 2022-09-08 | End: 2022-09-08

## 2022-09-08 RX ADMIN — ENOXAPARIN SODIUM 40 MG: 100 INJECTION SUBCUTANEOUS at 05:09

## 2022-09-08 RX ADMIN — VANCOMYCIN HYDROCHLORIDE 1500 MG: 1.5 INJECTION, POWDER, LYOPHILIZED, FOR SOLUTION INTRAVENOUS at 12:09

## 2022-09-08 RX ADMIN — Medication 4 ML: at 07:09

## 2022-09-08 RX ADMIN — POTASSIUM CHLORIDE 20 MEQ: 14.9 INJECTION, SOLUTION INTRAVENOUS at 11:09

## 2022-09-08 RX ADMIN — VANCOMYCIN HYDROCHLORIDE 1500 MG: 1.5 INJECTION, POWDER, LYOPHILIZED, FOR SOLUTION INTRAVENOUS at 11:09

## 2022-09-08 RX ADMIN — NICOTINE 1 PATCH: 14 PATCH TRANSDERMAL at 10:09

## 2022-09-08 RX ADMIN — DEXMEDETOMIDINE HYDROCHLORIDE 1.4 MCG/KG/HR: 4 INJECTION, SOLUTION INTRAVENOUS at 02:09

## 2022-09-08 RX ADMIN — POTASSIUM CHLORIDE 20 MEQ: 14.9 INJECTION, SOLUTION INTRAVENOUS at 05:09

## 2022-09-08 RX ADMIN — FUROSEMIDE 40 MG: 10 INJECTION, SOLUTION INTRAMUSCULAR; INTRAVENOUS at 05:09

## 2022-09-08 RX ADMIN — DEXMEDETOMIDINE HYDROCHLORIDE 1.4 MCG/KG/HR: 4 INJECTION, SOLUTION INTRAVENOUS at 05:09

## 2022-09-08 RX ADMIN — MINERAL OIL AND WHITE PETROLATUM: 30; 940 OINTMENT OPHTHALMIC at 05:09

## 2022-09-08 RX ADMIN — Medication 4 ML: at 08:09

## 2022-09-08 RX ADMIN — DEXAMETHASONE SODIUM PHOSPHATE 10 MG: 4 INJECTION INTRA-ARTICULAR; INTRALESIONAL; INTRAMUSCULAR; INTRAVENOUS; SOFT TISSUE at 05:09

## 2022-09-08 RX ADMIN — DEXMEDETOMIDINE HYDROCHLORIDE 1 MCG/KG/HR: 4 INJECTION, SOLUTION INTRAVENOUS at 04:09

## 2022-09-08 RX ADMIN — POTASSIUM CHLORIDE 20 MEQ: 14.9 INJECTION, SOLUTION INTRAVENOUS at 08:09

## 2022-09-08 RX ADMIN — DEXMEDETOMIDINE HYDROCHLORIDE 1.4 MCG/KG/HR: 4 INJECTION, SOLUTION INTRAVENOUS at 09:09

## 2022-09-08 RX ADMIN — FAMOTIDINE 20 MG: 10 INJECTION, SOLUTION INTRAVENOUS at 08:09

## 2022-09-08 RX ADMIN — DEXMEDETOMIDINE HYDROCHLORIDE 1.4 MCG/KG/HR: 4 INJECTION, SOLUTION INTRAVENOUS at 12:09

## 2022-09-08 RX ADMIN — SENNOSIDES AND DOCUSATE SODIUM 1 TABLET: 50; 8.6 TABLET ORAL at 08:09

## 2022-09-08 RX ADMIN — BUPROPION HYDROCHLORIDE 150 MG: 75 TABLET, FILM COATED ORAL at 08:09

## 2022-09-08 NOTE — PLAN OF CARE
Problem: Physical Therapy  Goal: Physical Therapy Goal  Description: Goals to be met by: 10/8/22    Patient will increase functional independence with mobility by performin. Supine<>sit with CGA with appropriate use of hospital bed features.   2. Sit<>stand with CGA with LRAD.  3. Sitting EOB x5 min with upright posture with SBA.   4. Activity x5 min on appropriate supplemental O2 with SpO2 >90%.  5. Standing x1 min with CGA with LRAD and upright posture.  6. Gait x 10 feet with modA with LRAD.  Outcome: Ongoing, Progressing     Patient evaluated by PT and goals established. Patient with generalized weakness throughout trunk and extremities but eager for OOB mobility. Vitals remained stable during activity on 14L HFNC. Able to stand 2x with HHA, required 2 person modA to come to standing. Due to fatigue, deferred gait training this date. PT will continue to follow and progress as tolerated. Rec for dc to IRF. Please see progress note for detailed plan of care and recommendations.

## 2022-09-08 NOTE — PT/OT/SLP EVAL
Physical Therapy Evaluation and Treatment    Patient Name:  Leighton Carroll   MRN:  [id number]    Recommendations:     Discharge Recommendations:  rehabilitation facility   Discharge Equipment Recommendations:  (TBD pending progress)   Barriers to discharge: Inaccessible home and Decreased caregiver support    Assessment:     Leighton Carroll is a 42 y.o. male admitted with a medical diagnosis of Acute respiratory failure with hypoxia and hypercarbia - found down and in shock, intubated 8/26-9/7 starting proning protocol with paralytics 8/30-9/3. High levels of sedation required and early mobility unable to be initiated while intubated. Pmhx significant for depression and polysubstance abuse. He presents with the following impairments/functional limitations:  weakness, impaired self care skills, impaired functional mobility, gait instability, impaired balance, decreased upper extremity function, decreased lower extremity function, decreased safety awareness, abnormal tone, impaired coordination, impaired cardiopulmonary response to activity.    Patient evaluated by PT and goals established. Patient with generalized weakness throughout trunk and extremities but eager for OOB mobility. Vitals remained stable during activity on 14L HFNC. Able to stand 2x with HHA, required 2 person modA to come to standing. Due to fatigue, deferred gait training this date. PT will continue to follow and progress as tolerated. Rec for dc to IRF.    Rehab Prognosis: Good; patient would benefit from acute skilled PT services to address these deficits and reach maximum level of function.    Recent Surgery: * No surgery found *      Plan:     During this hospitalization, patient to be seen 6 x/week to address the identified rehab impairments via gait training, therapeutic activities, therapeutic exercises, neuromuscular re-education and progress toward the following goals:    Plan of Care Expires:  10/08/22    Subjective     Chief Complaint: Denies  pain or dizziness  Patient/Family Comments/goals: Eager for OOB mobility; Patient agreeable to evaluation.  Pain/Comfort:  Pain Rating 1: 0/10  Pain Rating Post-Intervention 1: 0/10    Patients cultural, spiritual, Pentec[employer]al conflicts given the current situation: no    Living Environment:   Pt lives with friends in a single story home with no steps to enter   Upon discharge, patient will have assistance from unknown.  Prior level of function:  Ambulation: Indep  ADL's: Indep  IADLs: Indep  Equipment used at home: none.      Objective:     Communicated with MIKHAIL Munoz prior to session.  Patient found HOB elevated with oxygen, peripheral IV, salomon catheter, telemetry, blood pressure cuff, pulse ox (continuous), SCD, PICC line  upon PT entry to room.    General Precautions: Standard, aspiration, fall, respiratory, contact   Orthopedic Precautions:N/A   Braces: N/A  Respiratory Status: Nasal cannula, flow 12 L/min    Patient donned non slip socks and gait belt for OOB mobility.    Exams:  Cognition:   Patient is oriented to person, , place, date.  Pt follows approximately 100% of one step commands.    Mood: Pleasant and cooperative, minimal verbalizations   Safety Awareness: Impaired  Musculoskeletal:  BMI: 28.06  Posture:  Forward head, rounded shoulders, increased thoracic kyphosis (not fixed), posterior pelvic tilt  LE ROM/Strength:   R ROM: WFL  L ROM: WFL  R Strength:   Hip flexion: 2/5  Knee extension: 2/5  Dorsiflexion: 3/5   L Strength:   Hip flexion: 2/5  Knee extension: 2/5  Dorsiflexion: 3/5   Neuromuscular:  Sensation: Denies paresthesias.   Tone/Reflexes: Generalized hypotonia  Coordination: Impaired d/t weakness  Balance:   Static sitting: Poor, impaired/insufficient postural reactions  Static standing: Poor  Visual-vestibular: No impairments identified with functional mobility. No formal testing performed.  Integument: Visible skin intact  Cardiopulmonary:  Vital signs:   BP with moderate increase with  sitting, SPO2 maintained >90% and HR max 98 bpm  Edema: None noted      Functional Mobility:  Bed Mobility:     Scooting anteriorly to EOB: maximal assistance  Pt initiates but unable to achieve sufficient excursion d/t weakness  Bridging to HOB: total assistance  Supine to Sit: moderate assistance and of 2 persons  Initiates LE and trunk, requires assistance to complete  Sit to Supine: maximal assistance and of 2 persons  Transfers:     Sit to Stand:  moderate assistance and of 2 persons with hand-held assist  2x from EOB  Balance:   Static sitting EOB x10 min with CGA-modA.   Frequently forward LOB with insufficient postural reactions   Maintains forward flexion of trunk with posterior pelvic tilt  Static standing 2x5 sec with modA x2 people.   Blocking of BLE  Posterior support on LE on bed    Therapeutic Activities and Exercises:   TA:  Cueing for upright posture - able to maintain 5-10 sec before return to slumped position  Cueing for extension during standing trials  Cueing for hand/LE placement during bridging   PT educated patient re:   PT plan of care/role of PT  Safety with OOB mobility  Use of hospital bed features  Discharge disposition    Pt verbalized understanding       AM-PAC 6 CLICK MOBILITY  Total Score:10     Patient left HOB elevated with all lines intact, call button in reach, RN and mother present, and white board updated .    GOALS:   Multidisciplinary Problems       Physical Therapy Goals          Problem: Physical Therapy    Goal Priority Disciplines Outcome Goal Variances Interventions   Physical Therapy Goal     PT, PT/OT Ongoing, Progressing     Description: Goals to be met by: 10/8/22    Patient will increase functional independence with mobility by performin. Supine<>sit with CGA with appropriate use of hospital bed features.   2. Sit<>stand with CGA with LRAD.  3. Sitting EOB x5 min with upright posture with SBA.   4. Activity x5 min on appropriate supplemental O2 with SpO2  >90%.  5. Standing x1 min with CGA with LRAD and upright posture.  6. Gait x 10 feet with modA with LRAD.                       History:     Past Medical History:   Diagnosis Date    Anxiety     Depression     Hepatitis C     Substance abuse     METH AND HEROIN       No past surgical history on file.    Time Tracking:     PT Received On: 09/08/22  PT Start Time: 1008     PT Stop Time: 1036  PT Total Time (min): 28 min     Overlap with OT for portions of session due to complex nature of patient, tolerance for therapeutic modalities, and safety with mobility to decrease fall risk for patient and caregiver injury requiring two skilled therapists to provide interventions.    Billable Minutes: Evaluation 20 and Therapeutic Activity 8      09/08/2022

## 2022-09-08 NOTE — PT/OT/SLP PROGRESS
Speech Language Pathology Treatment    Patient Name:  Leighton Carroll   MRN:  [id number]  Admitting Diagnosis: Acute respiratory failure with hypoxia and hypercarbia    Recommendations:                 General Recommendations:                1. Speech to follow 4-6x/week for ongoing evaluation of oral and pharyngeal dysphagia, cognitive communication status    Diet recommendations:  SOLIDS:   NPO  LIQUIDS:  NPO (ice chips in moderation, 1-2 mls of water from a spoon for oral hydration only)   Alternate feeding and hydration method recommended    Aspiration Precautions:   Ice chips in moderation  1-2 mls of water at a time via spoon     General Precautions: Standard, aspiration     Communication strategies:      Subjective     RN reports pt asking for ice/water this AM. Reports pt with incr in gurgling with ice chips given by staff this morning.  Pt awake/alert, reclined in bed. RN assisted in repositioning pt upright in bed.  Noted pt's O2 needs increased over night    Respiratory Status: Nasal Cannula    Objective:     Has the patient been evaluated by SLP for swallowing?   Yes  Keep patient NPO? No   Current Respiratory Status:    NC    Cognitive-Communicative Status:  Improved level of alertness noted this date. Pt able to maintain wakeful state throughout session. Oriented to person, place, situation, and date. Ongoing reduced ability to sustain attention, required redirection every 2-3 minutes. Able to follow simple, 1-step commands related to oral intake with 90% acc given min assist. Perseverating on drinking water this date. Tangential speech with frequent topic changes. Difficult to redirect.     Voice:   Vocal quality remains dysphonic characterized by low volume and moderately raspy. Intermittent aphonia, required verbal cues to achieve adequate phonation. Cough unproductive and weak. Unable to cough to expel secretions this date. However, noted improvement in overall management of secretions. Able to swallow  secretions without pooling or drooling    Swallowing:  Bedside swallow re-assessment completed this date. RN reports incr in wet/gurgling vocal quality with trials of ice chips.     Consistencies Assessed:  Ice chips x3  Thin liquids- 1/3tsp, 1-3ml via cup edge  Puree 1/2 tsp amounts  Solids: deferred     Oral Phase:   Dcr lip seal  Dcr formation of a cohesive bolus  Functional mastication of ice chips  Mild oral holding with delayed initiation of a-p transport, however, pt did not require verbal cues to initiate swallow response this date     Pharyngeal Phase:   Trigger of the swallow reflex appears delayed  Overt s/s of aspiration or airway threat noted on 2/5 trials of water and 4/5 trials of puree- weak unproductive cough and throat clearing, eyes watering  No overt s/s of aspiration or airway threat during trials of ice chips- no coughing, choking, changes in breath stream or vocal quality    Education: Recommend pt remain NPO except for ice chips and sips of water via spoon due to ongoing dysphonia with s/s of airway threat and inability to adequately cough to clear suspected aspirated material. Discussed with RN.     Assessment:     Leighton Carroll is a 42 y.o. male with an SLP diagnosis of oral and pharyngeal Dysphagia, Cognitive-Linguistic Impairment, and Dysphonia.  He presents with ongoing overt signs of airway threat and concern for aspiration with small volumes of liquids and purees.    Goals:   Multidisciplinary Problems       SLP Goals          Problem: SLP    Goal Priority Disciplines Outcome   SLP Goal     SLP Ongoing, Progressing   Description: 1. Pt will be able to consume 3-5 mls of water from a spoon with no signs of airway threat or aspiration given max assistance  2. Pt will be able to consume 1/2 tsp amounts of purees with no signs of airway threat or aspiration given max assistance  3. Pt will be able to focus and sustain attention to simple familiar tasks for 15 min with moderate redirection  required  4. Increase orientation to person, place, date, reason for hospitalization to 50% acc given max verbal and written cues                       Plan:     Patient to be seen:  4 x/week, 6 x/week   Plan of Care expires:  09/30/22  Plan of Care reviewed with:  patient   SLP Follow-Up:  Yes       Discharge recommendations:  rehabilitation facility       Time Tracking:     SLP Treatment Date:   09/08/22  Speech Start Time:  0910  Speech Stop Time:  0929     Speech Total Time (min):  19 min    Billable Minutes: Treatment Swallowing Dysfunction 19 min    09/08/2022

## 2022-09-08 NOTE — SUBJECTIVE & OBJECTIVE
Interval History: No acute events. Extubated yesterday and on 4L NC, failed swallow evaluation yesterday. Remains on Precedex gtt. Able to answer simple questions and follow commands. Denies pain but does report SOB and weakness.     Review of Systems   Constitutional:  Negative for chills and fever.   Respiratory:  Positive for shortness of breath.    Cardiovascular:  Negative for chest pain.   Neurological:  Positive for weakness.   Objective:     Vital Signs (Most Recent):  Temp: 99.4 °F (37.4 °C) (09/08/22 0335)  Pulse: 80 (09/08/22 0755)  Resp: 15 (09/08/22 0755)  BP: 118/74 (09/08/22 0605)  SpO2: (!) 94 % (09/08/22 0755)   Vital Signs (24h Range):  Temp:  [79.9 °F (26.6 °C)-100.9 °F (38.3 °C)] 99.4 °F (37.4 °C)  Pulse:  [] 80  Resp:  [10-33] 15  SpO2:  [88 %-97 %] 94 %  BP: (107-143)/(63-87) 118/74  Arterial Line BP: (122-146)/(62-81) 138/65     Weight: 86.2 kg (190 lb)  Body mass index is 28.06 kg/m².    Intake/Output Summary (Last 24 hours) at 9/8/2022 0757  Last data filed at 9/8/2022 0600  Gross per 24 hour   Intake 1892.77 ml   Output 3315 ml   Net -1422.23 ml        Physical Exam  Vitals and nursing note reviewed.   Constitutional:       General: He is not in acute distress.     Appearance: He is well-developed.      Comments: Sedated   HENT:      Head: Normocephalic and atraumatic.      Comments: NC in place     Nose: Nose normal.   Eyes:      General:         Right eye: No discharge.         Left eye: No discharge.      Conjunctiva/sclera: Conjunctivae normal.   Cardiovascular:      Rate and Rhythm: Normal rate.      Pulses: Normal pulses.   Pulmonary:      Effort: Pulmonary effort is normal. No respiratory distress.      Comments: Course breath sounds bilaterally, but even and unlabored  Abdominal:      General: Bowel sounds are normal.      Palpations: Abdomen is soft.      Tenderness: There is no abdominal tenderness. There is no guarding or rebound.   Musculoskeletal:         General: Normal  range of motion.      Right lower leg: No edema.      Left lower leg: No edema.   Skin:     General: Skin is warm and dry.      Comments: Extensive tatoos throughout entire body   Neurological:      Comments: Awake, alert, oriented x 2, answers simple questions with weak voice and follow commands     Significant Labs:   CBC:  Recent Labs   Lab 09/06/22  0319 09/06/22  1315 09/07/22  0406 09/08/22  0337   WBC 13.15*  --  13.09* 14.47*   HGB 11.3*  --  11.3* 12.7*   HCT 34.9* 35* 34.4* 38.6*   *  --  617* 754*   GRAN 75.7*  10.0*  --  72.4  9.5* 78.6*  11.4*   LYMPH 12.4*  1.6  --  15.0*  2.0 10.0*  1.4   MONO 7.7  1.0  --  8.3  1.1* 8.6  1.3*   EOS 0.3  --  0.3 0.1   BASO 0.05  --  0.06 0.06     CMP:  Recent Labs   Lab 09/06/22  0319 09/07/22  0406 09/08/22  0336    139 135*   K 2.8* 2.8* 3.3*   CL 83* 88* 93*   CO2 43* 40* 29   BUN 75* 60* 39*   CREATININE 0.8 0.8 0.7   GLU 96 101 117*   CALCIUM 9.5 9.4 9.9   MG 2.6 2.5 2.3   PHOS 2.9 3.3 3.0   ALKPHOS  --  93  --    AST  --  84*  --    ALT  --  102*  --    BILITOT  --  0.5  --    PROT  --  7.6  --    ALBUMIN  --  2.6*  --    ANIONGAP 12 11 13        Significant Imaging:   All imaging were reviewed

## 2022-09-08 NOTE — PROGRESS NOTES
Patient reviewed, renal function stable, cultures reviewed, no new levels, continue current therapy; Next levels due: 9/9 @1474

## 2022-09-08 NOTE — PLAN OF CARE
Plan of care reviewed with patient  Problem: Adult Inpatient Plan of Care  Goal: Plan of Care Review  Outcome: Ongoing, Progressing  Goal: Absence of Hospital-Acquired Illness or Injury  Outcome: Ongoing, Progressing  Goal: Optimal Comfort and Wellbeing  Outcome: Ongoing, Progressing  Goal: Readiness for Transition of Care  Outcome: Ongoing, Progressing     Problem: Communication Impairment (Mechanical Ventilation, Invasive)  Goal: Effective Communication  Outcome: Ongoing, Progressing     Problem: Device-Related Complication Risk (Mechanical Ventilation, Invasive)  Goal: Optimal Device Function  Outcome: Ongoing, Progressing     Problem: Skin and Tissue Injury (Mechanical Ventilation, Invasive)  Goal: Absence of Device-Related Skin and Tissue Injury  Outcome: Ongoing, Progressing

## 2022-09-08 NOTE — PROGRESS NOTES
"[facility] - Int[facility] Care ([facility])  Wound Care    Patient Name:  Leighton Carroll   MRN:  [id number]  Date: 9/8/2022  Diagnosis: Acute respiratory failure with hypoxia and hypercarbia    History:     Past Medical History:   Diagnosis Date    Anxiety     Depression     Hepatitis C     Substance abuse     METH AND HEROIN       Social History     Socioeconomic History    Marital status: Single   Tobacco Use    Smoking status: Every Day     Packs/day: 0.50     Types: Cigarettes    Smokeless tobacco: Never   Substance and Sexual Activity    Alcohol use: Yes     Comment: 5th whiskey or more daily    Drug use: Yes     Types: Methamphetamines, Marijuana     Comment: heroin , "pt reports he has been doing whatever he can get his hands on"      Social Determinants of Health     Financial Resource Strain: High Risk    Difficulty of Paying Living Expenses: Hard   Food Insecurity: Food Insecurity Present    Worried About Running Out of Food in the Last Year: Sometimes true    Ran Out of Food in the Last Year: Sometimes true   Transportation Needs: Unmet Transportation Needs    Lack of Transportation (Medical): Yes    Lack of Transportation (Non-Medical): Yes   Physical Activity: Inactive    Days of Exercise per Week: 0 days    Minutes of Exercise per Session: 0 min   Stress: Stress Concern Present    Feeling of Stress : Very much   Social Connections: Socially Isolated    Frequency of Communication with Friends and Family: More than three times a week    Frequency of Social Gatherings with Friends and Family: Three times a week    Attends Church Services: Never    Active Member of Clubs or Organizations: No    Attends Club or Organization Meetings: Never    Marital Status: Never    Housing Stability: High Risk    Unable to Pay for Housing in the Last Year: Yes    Unstable Housing in the Last Year: Yes       Precautions:     Allergies as of 08/26/2022    (No Known Allergies)       [facility] Assessment Details/Treatment   Follow up " on skin concerns  Septum and nare injury responding to use of Triad.Black crust beginning to lift and expose pink tissue.   Left forehead scab is smaller and beginning to lift . Right cheek has scabbed over since ET tube positioning device has been removed. Triad and mepilex applied.  Penile DTI to shaft unchanged, but blistering and crusts to glans and foreskin are lifting.  Scrotal area improving.  Noted new presentation to right ear with maroon discoloration.   Wounds cleaned and Triad applied to all affected areas.   Sacral area remains clear, Back with scaly dry skin and ordered daily moisturizer.     09/08/22 1130        Altered Skin Integrity 09/04/22 0945 anterior Frontal region #2 Ulceration Partial thickness tissue loss. Shallow open ulcer with a red or pink wound bed, without slough. Intact or Open/Ruptured Serum-filled blister.   Date First Assessed/Time First Assessed: 09/04/22 0945   Altered Skin Integrity Present on Admission: suspected hospital acquired  Orientation: anterior  Location: Frontal region  Wound Number: #2  Is this injury device related?: Yes  Primary Wound Ty...   Wound Image    Dressing Appearance Open to air;No dressing   Drainage Amount None   Drainage Characteristics/Odor No odor   Appearance Dry;Tan  (brown scab)   Tissue loss description Partial thickness   Periwound Area Intact   Wound Edges Open   Wound Length (cm) 1 cm   Wound Width (cm) 0.5 cm   Wound Surface Area (cm^2) 0.5 cm^2   Care Cleansed with:;Soap and water;Applied:;Skin Barrier  (Triad hydrophilic wound dressing)        Altered Skin Integrity 09/04/22 0945 upper;lower Penis #3 Other (comment) Purple or maroon localized area of discolored intact skin or non-intact skin or a blood-filled blister.   Date First Assessed/Time First Assessed: 09/04/22 0945   Altered Skin Integrity Present on Admission: suspected hospital acquired  Orientation: upper;lower  Location: Penis  Wound Number: #3  Is this injury device related?:  No  Primary Wound Type: (c)...   Wound Image     Description of Altered Skin Integrity Purple or maroon localized area of discolored intact skin or non-intact skin or a blood-filled blister.   Dressing Appearance Open to air;other (see comments)  (Triad in use)   Drainage Amount None   Drainage Characteristics/Odor No odor   Appearance Purple   Periwound Area Intact   Care Cleansed with:;Soap and water;Applied:;Skin Barrier  (Triad)        Altered Skin Integrity 09/04/22 0945 anterior Scrotum #4 Abrasion(s)    Date First Assessed/Time First Assessed: 09/04/22 0945   Altered Skin Integrity Present on Admission: suspected hospital acquired  Orientation: anterior  Location: Scrotum  Wound Number: #4  Is this injury device related?: No  Primary Wound Type: Jose Raul...   Dressing Appearance Open to air;other (see comments)  (Triad in use)   Drainage Amount None   Drainage Characteristics/Odor No odor   Appearance Pink;Maroon   Tissue loss description Partial thickness   Care Cleansed with:;Soap and water;Applied:;Skin Barrier  (Triad)        Altered Skin Integrity 09/06/22 1300 Right Cheek Medical adhesive related skin injury   Date First Assessed/Time First Assessed: 09/06/22 1300   Altered Skin Integrity Present on Admission: suspected hospital acquired  Side: Right  Location: Cheek  Is this injury device related?: (c) Yes  Primary Wound Type: Medical adhesive related skin...   Wound Image    Dressing Appearance Open to air;No dressing   Drainage Amount None   Drainage Characteristics/Odor No odor   Appearance Tan;Pink;Dry  (crusted scab)   Wound Length (cm) 1 cm   Wound Width (cm) 1.5 cm   Wound Surface Area (cm^2) 1.5 cm^2   Care Cleansed with:;Soap and water;Applied:;Skin Barrier  (Triad)        Altered Skin Integrity 09/06/22 1300 Nose Full thickness tissue loss. Base is covered by slough and/or eschar in the wound bed   Date First Assessed/Time First Assessed: 09/06/22 1300   Altered Skin Integrity Present on  Admission: suspected hospital acquired  Location: (c) Nose  Is this injury device related?: Yes  Description of Altered Skin Integrity: Full thickness tissue lo...   Wound Image    Dressing Appearance Open to air;No dressing   Drainage Amount None   Drainage Characteristics/Odor No odor   Appearance Tan;Pink;Black   Periwound Area Intact   Wound Edges Open   Care Cleansed with:;Soap and water;Sterile normal saline;Applied:;Skin Barrier  (Triad)        Altered Skin Integrity 09/08/22 1130 Right Ear Purple or maroon localized area of discolored intact skin or non-intact skin or a blood-filled blister.   Date First Assessed/Time First Assessed: 09/08/22 1130   Altered Skin Integrity Present on Admission: suspected hospital acquired  Side: Right  Location: Ear  Is this injury device related?: (c) Yes  Description of Altered Skin Integrity: Purple or ma...   Wound Image    Description of Altered Skin Integrity Purple or maroon localized area of discolored intact skin or non-intact skin or a blood-filled blister.   Dressing Appearance Open to air;No dressing   Drainage Amount None   Drainage Characteristics/Odor No odor   Appearance Maroon;Dry   Tissue loss description Not applicable   Periwound Area Intact   Wound Length (cm) 0.8 cm   Wound Width (cm) 0.5 cm   Wound Surface Area (cm^2) 0.4 cm^2   Care Cleansed with:;Soap and water;Applied:;Skin Barrier  (Triad)       09/08/2022

## 2022-09-08 NOTE — ASSESSMENT & PLAN NOTE
Bilateral PNA, ARDS, septic shock (resolved), bacteremia, h/o drug overdose, polysubstance abuse, leukocytosis, encephalopathy  - Hypoxic to 60s on arrival and failed BiPAP 2/2 agitation and intubated at OSH. Tox positive for THC. D-dimer elevated but CTA negative for PE. COVID, RSV negative.  - Continue fentanyl gtt and propofol gtt (weaned off ketamine gtt, midazolam gtt since last night)  - Continue cefepime 2g IV q8hr for 14 day total course. Blood cultures showed acinetobacter, pseudomonas luteola. Repeat blood cultures with fever; added vancomycin for resp culture showing staph.  - ARDSnet protocol; proning/supinating. Improving O2; discontinued cisatracurium gtt.  - s/p furosemide 40mg IV q6hr, metolazone 5mg per OG daily, goal mildly net negative.   - s/p dexamethasone 20mg IV daily 5 days tapered down to 10mg IV daily for 5 days (currently on 10 mg until 9/11/22)  - Palliative consulted given persistent vent requirements; appreciate assistance.  - Extubated on 9/7/22 and currently on 4L NC  - Still on Precedex gtt, will attempt to wean  - Failed swallow evaluation yesterday, re-attempt today and advance diet as recommended  - PT/OT   - As per Pulm/critical care

## 2022-09-08 NOTE — PROGRESS NOTES
[facility] Intensive Care ([place])  Adult Nutrition  Progress Note    SUMMARY     Recommendations  1) advance PO diet to goal of regular, texture per SLP    2) If unable to advance PO diet in < 4 days replace NG for continued TF and consider PEG placement  TF recommendations:  Isosource 1.5 @ 20ml/hr.  Increase by 10mls q 8 hours until goal rate of 55ml/hr is reached  + 165 ml flush q 4 hr  (provides 1980 kcal (100% EEN), 89 g protein (86%EPN), 1003 ml free water)    3) weigh weekly      Goals: 1) Patient to receive adequate nutrition prior to discharge. 2) diet ordered in < 4 days or nutrition support restarted    Nutrition Goal Status: was meeting/ new  Communication of RD Recs: other (comment) (POC, sticky note)    Assessment and Plan    Nutrition Problem  Inability to consume oral intake     Related to (etiology):   Condition associated with diagnosis:respiratory failure    Signs and Symptoms (as evidenced by):   NPO, mechanical ventilation, and need for alternate source of nutrition     Interventions;  Enteral nutrition therapy, collaboration with other providers    Nutrition Diagnosis Status:   Continues      Malnutrition Assessment   Farrukh = 14, lip skin tear, wounds to nose, penis, frontal area, scrotum, and cheek  Edema: 1-2+  NFPE by on-site RD at f/u    Reason for Assessment    Reason For Assessment: progress note    Diagnosis: psychological disorder, other (see comments) (respiratory failure, overdose)    Past Medical History:   Diagnosis Date    Anxiety     Depression     Hepatitis C     Substance abuse     METH AND HEROIN     Interdisciplinary Rounds: did not attend- remote    General Information Comments:   8/27/2022: Patient with overdose and intubated at this time.  Patient already started on tubefeeing via OG tube.  RD to recommend current formula of Isosource 1.5 with goal rate of 50ml/hr.  Propofol running with rate of 25.9ml/hr.  Previous 24 hour total of propofol is 454mls (499kcals).  LBM  "not noted at this time.  NKFA.  RD to continue to monitor tube feeding tolerance and labs. Current labs reviewed.  (RD remote)  8/31/2022: Patient continues with vent, tube feeding and propofol.  Kcals for tube feeding and propofol noted.  Patient was proned last night and is now supine.  Propofol rate of 25.9ml/hr and 650mls of tube feeding recorded for the previous 24 hours.  Labs reviewed. NKFA.  RD will continue to monitor.  (RD remote)  9/8/22 Pt wqas tolerating TF 9/6, noted to have been restarted after pause at 6AM that morning. Pt extubated 9/7, npo x 1 day, failed SLP eval.     Nutrition Discharge Planning: regular diet, texture per SLP    Nutrition Risk Screen    Nutrition Risk Screen: dysphagia or difficulty swallowing    Nutrition/Diet History    Spiritual, Cultural Beliefs, Judaism Practices, Values that Affect Care: no  Food Allergies: NKFA  Factors Affecting Nutritional Intake: NPO, trouble swallowing    Anthropometrics    Temp: 99.1 °F (37.3 °C)  Height Method: Estimated  Height: 5' 9"  Height (inches): 69 in  Weight Method: Bed Scale  Weight: 86.2 kg (190 lb)  Weight (lb): 190 lb  Ideal Body Weight (IBW), Male: 160 lb  % Ideal Body Weight, Male (lb): 118.75 %  BMI (Calculated): 28  BMI Grade: 25 - 29.9 - overweight     Lab/Procedures/Meds    Pertinent Labs Reviewed: reviewed  BMP  Lab Results   Component Value Date     (L) 09/08/2022    K 3.3 (L) 09/08/2022    CL 93 (L) 09/08/2022    CO2 29 09/08/2022    BUN 39 (H) 09/08/2022    CREATININE 0.7 09/08/2022    CALCIUM 9.9 09/08/2022    ANIONGAP 13 09/08/2022    ESTGFRAFRICA >60 07/16/2022    EGFRNONAA >60 07/16/2022     Lab Results   Component Value Date    CALCIUM 9.9 09/08/2022    PHOS 3.0 09/08/2022     Lab Results   Component Value Date    ALBUMIN 2.6 (L) 09/07/2022     No results found for: POCTGLUCOSE    Pertinent Medications Reviewed: reviewed  Dexamethasone, polyethylene glycol, senna, NaPhos, Ca, Mg sulfate, KCl    Estimated/Assessed " Needs    Weight Used For Calorie Calculations: 86.2 kg (190 lb 0.6 oz)  Energy Calorie Requirements (kcal): 1975 kcal  Energy Need Method:  MSJ ( x 1.25)   Protein Requirements: 1.2g/kg (104g/day)  Weight Used For Protein Calculations: 86.2 kg (190 lb 0.6 oz)  Fluid Requirements (mL): 2000 ml or per MD  Estimated Fluid Requirement Method: RDA MethodRDA Method (mL): 20  CHO Requirement: N/A      Nutrition Prescription Ordered    Current Diet Order: NPO x 1 day    Evaluation of Received Nutrient/Fluid Intake    Energy Calories Required: not meeting needs  Protein Required: not meeting needs  Fluid Required: meeting needs  Tolerance: NPO    Intake/Output Summary (Last 24 hours) at 9/8/2022 1216  Last data filed at 9/8/2022 0705  Gross per 24 hour   Intake 1327.35 ml   Output 2590 ml   Net -1262.65 ml       % Intake of Estimated Energy Needs:  0%  % Meal Intake: NPO      Nutrition Risk    Level of Risk/Frequency of Follow-up: moderate 2 x weekly      Monitor and Evaluation    Food and Nutrient Intake: enteral nutrition intake  Food and Nutrient Adminstration: enteral and parenteral nutrition administration  Knowledge/Beliefs/Attitudes: other (specify)  Physical Activity and Function: nutrition-related ADLs and IADLs  Anthropometric Measurements: height/length, weight, weight change, body mass index  Biochemical Data, Medical Tests and Procedures: glucose/endocrine profile, gastrointestinal profile, electrolyte and renal panel, skin      Nutrition Follow-Up    RD Follow-up?: Yes

## 2022-09-08 NOTE — PHYSICIAN QUERY
PT Name: Leighton Carroll  MR #: [id number]    DOCUMENTATION CLARIFICATION     CDS: Jermain Mayorga RN CCDS               Contact information: [email or web address]    This form is a permanent document in the medical record.     Query Date: September 8, 2022    By submitting this query, we are merely seeking further clarification of documentation. Please utilize your independent clinical judgment when addressing the question(s) below.    The Medical Record contains the following:     Indicators   Supporting Clinical Findings Location in Medical Record    x AMS, Confusion,  LOC, etc.  Awake, alert, oriented x 2, answers simple questions with weak voice and follow commands   Able to answer simple questions and follow commands. 9/8/2022 Hospital Medicine progress notes    x Acute/Chronic Illness Acute respiratory failure with hypoxia and hypercarbia  Bilateral PNA  ARDS  Septic shock  h/o drug overdose and polysubstance abuse  Gram-negative bacteremia  Blood cultures showed acinetobacter, pseudomonas luteola.   new staph VAP 9/8/2022 Hospital Medicine progress notes              9/5/2022 Pulmonology progress notes    Radiology Findings      Electrolyte Imbalance      x Medication Opted for 14 day course of cefepime. Remains on Precedex gtt.   started on vancomycin 9/8/2022 Hospital Medicine progress notes  9/5/2022 Pulmonology progress notes    Treatment              x Other Tox postive for THC only.  9/8/2022 Hospital Medicine progress notes     The noted clinical guidelines are only system guidelines and do not replace the providers clinical judgment.    The National [place] of Neurologic Disorders and Stroke (NINDS) of the NIH describes encephalopathy as any diffuse disease of the brain that alters brain function or structure.    Provider, please clarify the diagnosis of Encephalopathy associated with above clinical findings.    [   ] Metabolic Encephalopathy - Due to electrolyte imbalance, metabolic derangements,  or infectious processes, includes Septic Encephalopathy, Uremic Encephalopathy   [   ] Other Encephalopathy (please specify): ____________________   [ X ] Encephalopathy, unspecified      [   ]  Clinically Undetermined         Please document in your progress notes daily for the duration of treatment until resolved, and include in your discharge summary.    References:  DOREEN Avila RN, CCDS. (2018, June 9). Notes from the Instructor: Encephalopathy tips. Retrieved October 22, 2020, from [email or web address]    ICD-9-CM Coding Clinic First Quarter 2013, Effective with discharges: October 21, 2013 [facility] § Seizure with encephalopathy due to postictal state (2013).    ICD-10-CM/[facility] Integrated Codebook (Version V.20.8.10.0) [Computer software]. (2020). Retrieved October 21, 2020.    National [place] of Neurological Disorders and Stroke. (2019, March 27). Retrieved October 22, 2020, from https://www.ninds.nih.gov/Disorders/All-Disorders/Eomtbxbhjhvqcm-Dpmpapojsly-Bljf    Form No. 06940

## 2022-09-08 NOTE — PLAN OF CARE
Recommendations  1) advance PO diet to goal of regular, texture per SLP    2) If unable to advance PO diet in < 4 days replace NG for continued TF and consider PEG placement  TF recommendations:  Isosource 1.5 @ 20ml/hr.  Increase by 10mls q 8 hours until goal rate of 55ml/hr is reached  + 165 ml flush q 4 hr  (provides 1980 kcal (100% EEN), 89 g protein (86%EPN), 1003 ml free water)    3) weigh weekly      Goals: 1) Patient to receive adequate nutrition prior to discharge. 2) diet ordered in < 4 days or nutrition support restarted    Nutrition Goal Status: was meeting/ new  Communication of RD Recs: other (comment) (POC, sticky note)

## 2022-09-08 NOTE — PROGRESS NOTES
"[facility] Pulmonary/Critical Care Fellow Progress Note:    Brief Hx: 41 yo M w/ PMHx of drug use (heroin, methamphetamine, others) admitted  to [facility] for hypoxemic and hypercapnic respiratory failure after being found down, presumably 2/2 drug overdose and in shock. He received CPR from his GF and did wake up when EMS found him and he presented to the ED with leukocytosis, lactic acidosis, febrile. CTA showed diffuse dense bilateral infiltrates. UDS+ for THC. Blood cultures grew acinetobacter pittii & pesudomonas luteola and bacillus cereus for which he has been receiving cefepime. Given his CT findings and evolution of infiltrates on CXR, he was treated as ARDS. He started proning on  and started receiving steroids for ARDS dosing on  with last proned event on 9/3. He had repeat fevers in the ICU thereafter and respiratory cultures showed MRSA and he was started on vancomycin on . The patient was extubate don  AM to 4-5L NC.     Subjective:  Patient extubated on increased oxygen.     Objective:  Last 24 Hour Vital Signs:  BP  Min: 99/59  Max: 143/81  Temp  Av.7 °F (37.6 °C)  Min: 98.5 °F (36.9 °C)  Max: 100.9 °F (38.3 °C)  Pulse  Av.4  Min: 78  Max: 110  Resp  Av  Min: 10  Max: 28  SpO2  Av.5 %  Min: 88 %  Max: 97 %  Body mass index is 28.06 kg/m².  I/O last 3 completed shifts:  In: 3937.9 [I.V.:1378.2; NG/GT:885; IV Piggyback:1674.7]  Out: 4410 [Urine:4410]      Physical Exam:  General: Sedated  HENT:  NCAT; anicteric sclera; (+)nasal cannula  Cardio:  Regular rate and rhythm with normal S1 and S2; no murmurs or rubs  Resp:  CTAB; respirations unlabored; no wheezes, crackles or rhonchi  Abdom:  Soft, non-distended  Extrem:  WWP with no clubbing, cyanosis or edema, small cuts on hands and legs that are scabbed over  Pulses:  2+ and symmetric distally  Neuro:  Alert and awake    Assessment & Plan:     42M with history of drug use (heroin, methamphetamine, "whatever " "he can get his hands on") presents with hypoxemic and hypercapnic respiratory failure presumably secondary to drug overdose requiring intubation found to have bacteremia and diffuse lung infiltrates.      Acute hypoxic and hypercapnic respiratory failure  In setting of presumed drug overdose (fentanyl?)  With diffuse bilateral pulmonary infiltrates on CT chest, +fevers and leukocytosis  Sputum (+) staph aureus - on Vancomycin   RIP negative  JOVANA/ANCA negative - no new hemoptysis noted  Blood cultures positive with negative respiratory culture on admission  Blood cultures with pseudomonas luteola and acinetobacter pitii -- patient with negative TTE for valve vegetations  - plan for 14d of treatment - stop after 9/7  Extubated on 4-5L NC with increased O2 overnight, NT suction PRN  Multifocal PNA & ARDS  Started prone 8/30 - stopped after 9/3  given improvement in oxygenation  Respiratory culture with MRSA  - represents VAP - started on vancomycin 9/5  Started steroids for ARDS 9/1 - 20mg for 5 days and then 10mg for 5 days  Aspiration vs. Inhalation injury/pneumonitis vs. Septic emboli vs. Vasculitis (less likely)  Lab and culture workup as above  No new evidence of hemoptysis  completed cefepime - 14d total, on vanc 5d total - stop after 9/9   Urine legionella negative, strep Ag negative  HIV negative, viral PCR negative, Hep panel (+)hep C, negative acute hepatitis   CK elevated on different admission, Myositis panel pending, JOVANA & ANCA negative  Sedation requirements  Will monitor for any withdrawal, Dc'd valium pending swallow evaluation  Precedex ordered if needed, but patient appears calm and cooperative at the moment    Code: FULL     DVT: Lovenox  GI: Famotidine  Feeds: NPO  Sedation: none, precedex if needed    Sujata Francois MD  Pulm/CC Fellow    "

## 2022-09-08 NOTE — PROGRESS NOTES
"[facility], [place], operated by [facility] - Intensive Care [facility] Medicine  Progress Note    Patient Name: Leighton Carroll  MRN: [id number]  Patient Class: IP- Inpatient   Admission Date: 8/26/2022  Length of Stay: 13 days  Attending Physician: Alexia Abel MD  Primary Care Provider: Primary Doctor No        Subjective:     Principal Problem:Acute respiratory failure with hypoxia and hypercarbia        HPI:  Patient's HPI is adapted from notes of Dr. Schuler, Dr. Morin and Dr. Hall ([facility]).   Patient arrived in [facility] [place], operated by [facility] ICU intubated, pt's girlfriend's number not on file, unable to verify events PTA.     Leighton Carroll is a 42 year old man with a PMHx of depression, hx of drug overdose (7/11/2022, buproprion and gabapentin), polysubstance abuse (meth, heroin) and nicotine dependence.    He was presented to [facility] on 8/25/2022 via EMS with shortness of breath and dry cough. He was reportedly found down by his girlfriend at home yesterday evening (8/25/2022) and was given multiple rounds of chest compressions. He woke up and began having shortness of breath and called EMS, which found him saturating at 66% on room air, which increased to 90s with nonrebreather mask. Patient denied subjective fever, chills, sick contacts, chest pain, palpitations, abdominal pain.     At [facility], patient had a fever to 101, with leukocytosis (20) and lactic acidosis (3.0) with a normal procal. CTA chest showed patchy perihilar opacities bilaterally most pronounced in right lower lobe. Covid, Group A strep, respiratory influenza panel -ve. D-dimer was elevated (3.68), but CTPA negative for PE. Tox postive for THC only. CXR was -ve for pneumothorax. Patient was started on IV Cefepime and IV Vancomycin.     Patient was initially on BiPAP but became agitated and was intubated. Patient reportedly had "red spit". Patient was difficult to sedate requiring propofol and precedex and multiple doses of versed and ketamine, thus became hypotensive " after intubation and was started on levophed.     Patient is transferred to [facility] ICU, [facility] medicine, for management of acute respiratory failure with hypoxia and hypercapnia.             Overview/Hospital Course:  Admitted with acute hypoxemic respiratory failure.  Blood cultures demonstrated Acinetobacter, Pseudomonas luteola.  Pulmonology and ID consulted.  TTE performed without evidence of vegetation. Repeat blood culture showed bacillus in 1/4 bottles but suspected contaminant.  Opted for 14 day course of cefepime. Developed ARDS and started chemical paralysis/proning as well as ARDSnet protocol. Palliative consulted given the severity of his illness and likely extended recovery. Respiratory status was slow to improve but gradually had decreasing oxygenation/pressure requirements. Had repeat fevers and sputum culture showed staph aureus; vancomycin added. Extubated on 9/7/22.       Interval History: No acute events. Extubated yesterday and on 4L NC, failed swallow evaluation yesterday. Remains on Precedex gtt. Able to answer simple questions and follow commands. Denies pain but does report SOB and weakness.     Review of Systems   Constitutional:  Negative for chills and fever.   Respiratory:  Positive for shortness of breath.    Cardiovascular:  Negative for chest pain.   Neurological:  Positive for weakness.   Objective:     Vital Signs (Most Recent):  Temp: 99.4 °F (37.4 °C) (09/08/22 0335)  Pulse: 80 (09/08/22 0755)  Resp: 15 (09/08/22 0755)  BP: 118/74 (09/08/22 0605)  SpO2: (!) 94 % (09/08/22 0755)   Vital Signs (24h Range):  Temp:  [79.9 °F (26.6 °C)-100.9 °F (38.3 °C)] 99.4 °F (37.4 °C)  Pulse:  [] 80  Resp:  [10-33] 15  SpO2:  [88 %-97 %] 94 %  BP: (107-143)/(63-87) 118/74  Arterial Line BP: (122-146)/(62-81) 138/65     Weight: 86.2 kg (190 lb)  Body mass index is 28.06 kg/m².    Intake/Output Summary (Last 24 hours) at 9/8/2022 8468  Last data filed at 9/8/2022 0600  Gross per 24 hour    Intake 1892.77 ml   Output 3315 ml   Net -1422.23 ml        Physical Exam  Vitals and nursing note reviewed.   Constitutional:       General: He is not in acute distress.     Appearance: He is well-developed.      Comments: Sedated   HENT:      Head: Normocephalic and atraumatic.      Comments: NC in place     Nose: Nose normal.   Eyes:      General:         Right eye: No discharge.         Left eye: No discharge.      Conjunctiva/sclera: Conjunctivae normal.   Cardiovascular:      Rate and Rhythm: Normal rate.      Pulses: Normal pulses.   Pulmonary:      Effort: Pulmonary effort is normal. No respiratory distress.      Comments: Course breath sounds bilaterally, but even and unlabored  Abdominal:      General: Bowel sounds are normal.      Palpations: Abdomen is soft.      Tenderness: There is no abdominal tenderness. There is no guarding or rebound.   Musculoskeletal:         General: Normal range of motion.      Right lower leg: No edema.      Left lower leg: No edema.   Skin:     General: Skin is warm and dry.      Comments: Extensive tatoos throughout entire body   Neurological:      Comments: Awake, alert, oriented x 2, answers simple questions with weak voice and follow commands     Significant Labs:   CBC:  Recent Labs   Lab 09/06/22 0319 09/06/22  1315 09/07/22  0406 09/08/22  0337   WBC 13.15*  --  13.09* 14.47*   HGB 11.3*  --  11.3* 12.7*   HCT 34.9* 35* 34.4* 38.6*   *  --  617* 754*   GRAN 75.7*  10.0*  --  72.4  9.5* 78.6*  11.4*   LYMPH 12.4*  1.6  --  15.0*  2.0 10.0*  1.4   MONO 7.7  1.0  --  8.3  1.1* 8.6  1.3*   EOS 0.3  --  0.3 0.1   BASO 0.05  --  0.06 0.06     CMP:  Recent Labs   Lab 09/06/22 0319 09/07/22  0406 09/08/22  0336    139 135*   K 2.8* 2.8* 3.3*   CL 83* 88* 93*   CO2 43* 40* 29   BUN 75* 60* 39*   CREATININE 0.8 0.8 0.7   GLU 96 101 117*   CALCIUM 9.5 9.4 9.9   MG 2.6 2.5 2.3   PHOS 2.9 3.3 3.0   ALKPHOS  --  93  --    AST  --  84*  --    ALT  --  102*   --    BILITOT  --  0.5  --    PROT  --  7.6  --    ALBUMIN  --  2.6*  --    ANIONGAP 12 11 13        Significant Imaging:   All imaging were reviewed      Assessment/Plan:      * Acute respiratory failure with hypoxia and hypercarbia  Bilateral PNA, ARDS, septic shock (resolved), bacteremia, h/o drug overdose, polysubstance abuse, leukocytosis, encephalopathy  - Hypoxic to 60s on arrival and failed BiPAP 2/2 agitation and intubated at OSH. Tox positive for THC. D-dimer elevated but CTA negative for PE. COVID, RSV negative.  - Continue fentanyl gtt and propofol gtt (weaned off ketamine gtt, midazolam gtt since last night)  - Continue cefepime 2g IV q8hr for 14 day total course. Blood cultures showed acinetobacter, pseudomonas luteola. Repeat blood cultures with fever; added vancomycin for resp culture showing staph.  - ARDSnet protocol; proning/supinating. Improving O2; discontinued cisatracurium gtt.  - s/p furosemide 40mg IV q6hr, metolazone 5mg per OG daily, goal mildly net negative.   - s/p dexamethasone 20mg IV daily 5 days tapered down to 10mg IV daily for 5 days (currently on 10 mg until 9/11/22)  - Palliative consulted given persistent vent requirements; appreciate assistance.  - Extubated on 9/7/22 and currently on 4L NC  - Still on Precedex gtt, will attempt to wean  - Failed swallow evaluation yesterday, re-attempt today and advance diet as recommended  - PT/OT   - As per Pulm/critical care    Hypokalemia  - Due to diuresis  - Replete and monitor with repeat labs    Bilateral pneumonia  - As above.    Hematuria  - Gross hematuria; not present on repeat UA.    Gram-negative bacteremia  - As above.    Septic shock  - As above.    History of drug overdose  - As above.    Depression  - Continue buproprion 150mg per OG daily, quetiapine 200mg per OG qHS.  - Resume divalproex ER 500mg 1g PO daily, risperiodone 2mg PO daily when appropriate.      VTE Risk Mitigation (From admission, onward)         Ordered      enoxaparin injection 40 mg  Daily         08/26/22 1422     IP VTE HIGH RISK PATIENT  Once         08/26/22 1422     Place sequential compression device  Until discontinued         08/26/22 1422     Place MARGRET hose  Until discontinued         08/26/22 1422                Critical care time spent on the evaluation and treatment of severe organ dysfunction, review of pertinent labs and imaging studies, discussions with consulting providers and discussions with patient/family: 35  minutes.        Alexia Abel MD  Department of Hospital Medicine   [facility] - Intensive Care [facility]

## 2022-09-08 NOTE — PT/OT/SLP EVAL
Occupational Therapy   Evaluation    Name: Leighton Carroll  MRN: [id number]  Admitting Diagnosis:  Acute respiratory failure with hypoxia and hypercarbia  Recent Surgery: * No surgery found *      Recommendations:     Discharge Recommendations: rehabilitation facility  Discharge Equipment Recommendations:  other (see comments) (TBD pending progress)  Barriers to discharge:  Other (Comment) (medical status)    Assessment:     Leighton Carroll is a 42 y.o. male with a medical diagnosis of Acute respiratory failure with hypoxia and hypercarbia.  He presents with weakness, impaired endurance, impaired self care skills, impaired functional mobility, gait instability, impaired balance, decreased coordination, decreased upper extremity function, decreased lower extremity function, decreased safety awareness, impaired coordination, impaired fine motor, impaired cardiopulmonary response to activity.      Pt would benefit from intensive therapies in an inpatient setting with 3 hours of therapy/day. Pt's needs cannot be met at a lower level of care. Pt is motivated to progress. Rehabilitation facility recommended to return patient to PLOF, prevent future falls and decrease readmission risk.      Rehab Prognosis: Good; patient would benefit from acute skilled OT services to address these deficits and reach maximum level of function.       Plan:     Patient to be seen 5 x/week to address the above listed problems via self-care/home management, therapeutic activities, therapeutic exercises  Plan of Care Expires: 10/08/22  Plan of Care Reviewed with: patient, mother    Subjective     Chief Complaint: sweating and fatigue  Patient/Family Comments/goals: to return to PLOF    Occupational Profile:  Living Environment: Pt reports he lives with friends in a house. Unable to report if stairs are present  Previous level of function: Independent  Roles and Routines: unknown  Equipment Used at Home:  none  Assistance upon Discharge:  uncertain    Pain/Comfort:  Pain Rating 1: 0/10    Patients cultural, spiritual, Holiness conflicts given the current situation: no    Objective:     Communicated with: RN prior to session.  Patient found HOB elevated with oxygen, peripheral IV, salomon catheter, telemetry, blood pressure cuff, pulse ox (continuous) upon OT entry to room.    General Precautions: Standard, aspiration, fall   Orthopedic Precautions:N/A   Braces: N/A  Respiratory Status: Nasal cannula, flow 14 L/min    Occupational Performance:    Bed Mobility:    Supine to sitting: mod A x2  Sit to supine: max A x2 with cues for sequencing   Pt sat EOB with CGA-mod A for sitting balance. Pt required cues for upright sitting balance and to maintain head in neutral, tending to assume full neck flexion. Pt able to initiate postural movements but unable to maintain or control 2/2 deconditioning and weakness.     Functional Mobility/Transfers:  Sit <> stand: mod A x2 B HHA. 2 bouts.   Functional Mobility: Functional sitting and standing performed to improve activity tolerance for increased endurance and independence with ADLs as well as functional balance retraining for fall prevention.      Activities of Daily Living:  LBD: dependent to don socks in supine     Cognitive/Visual Perceptual:  Cognitive/Psychosocial Skills:     -       Oriented to: Person, Place, and Time   -       Follows Commands/attention:Follows one-step commands  -       Communication: clear/fluent and hoarse voice  -       Memory: No Deficits noted  -       Safety awareness/insight to disability: impaired and limited by weakness for safe mobility    -       Mood/Affect/Coping skills/emotional control: Pleasant    Physical Exam:  Upper Extremity Range of Motion:     -       Right Upper Extremity: WNL  -       Left Upper Extremity: WNL  Upper Extremity Strength:    -       Right Upper Extremity: Deficits: 2+/5 and some shoulder joint difference compared to LUE noted but R shoulder without  pain and full shoulder ROM  -       Left Upper Extremity: Deficits: 2+/5     AMPAC 6 Click ADL:  AMPAC Total Score: 10    Treatment & Education:  OT role, plan of care, progression of goals, importance of continued OOB activity, fall prevention, safety precautions, ADL/functional mobility/transfer retraining    Patient left HOB elevated with all lines intact, call button in reach, RN notified, and RN and mother present    GOALS:   Multidisciplinary Problems       Occupational Therapy Goals          Problem: Occupational Therapy    Goal Priority Disciplines Outcome Interventions   Occupational Therapy Goal     OT, PT/OT Ongoing, Progressing    Description: Goals to be met by: 9/22/22     Patient will increase functional independence with ADLs by performing:    UE Dressing with [place].  LE Dressing with [place].  Grooming while standing with [place].  Toileting from toilet with [place] for hygiene and clothing management.   Toilet transfer to toilet with [place].                         History:     Past Medical History:   Diagnosis Date    Anxiety     Depression     Hepatitis C     Substance abuse     METH AND HEROIN       No past surgical history on file.    Time Tracking:     OT Date of Treatment: 09/08/22  OT Start Time: 1009  OT Stop Time: 1035  OT Total Time (min): 26 min    Billable Minutes:Evaluation 16 minutes  Self Care/Home Management 10 minutes    Co treament performed due to patient's multiple deficits requiring two skilled therapists to safety assess patient's ability to complete ADLs and functional mobility in addition to accommodating for patient's activity tolerance while in acute care setting.      9/8/2022

## 2022-09-08 NOTE — PLAN OF CARE
Problem: Occupational Therapy  Goal: Occupational Therapy Goal  Description: Goals to be met by: 9/22/22     Patient will increase functional independence with ADLs by performing:    UE Dressing with [place].  LE Dressing with [place].  Grooming while standing with [place].  Toileting from toilet with [place] for hygiene and clothing management.   Toilet transfer to toilet with [place].    Outcome: Ongoing, Progressing     OT eval complete. Fully independent at baseline, no AD. BP WFL during sitting EOB. Pt following commands and answering questions appropriately. Mod-max A x2 bed mobility. Mod Ax2 sit <> stand B HHA. Dependent for LBD. No pain. Supportive family.     DC: IPR  DME: TBD

## 2022-09-09 LAB
ALBUMIN SERPL BCP-MCNC: 3.1 G/DL (ref 3.5–5.2)
ALP SERPL-CCNC: 93 U/L (ref 55–135)
ALT SERPL W/O P-5'-P-CCNC: 122 U/L (ref 10–44)
ANION GAP SERPL CALC-SCNC: 12 MMOL/L (ref 8–16)
AST SERPL-CCNC: 81 U/L (ref 10–40)
BACTERIA #/AREA URNS HPF: ABNORMAL /HPF
BASOPHILS # BLD AUTO: 0.1 K/UL (ref 0–0.2)
BASOPHILS NFR BLD: 0.6 % (ref 0–1.9)
BILIRUB DIRECT SERPL-MCNC: 0.5 MG/DL (ref 0.1–0.3)
BILIRUB SERPL-MCNC: 0.9 MG/DL (ref 0.1–1)
BILIRUB UR QL STRIP: NEGATIVE
BUN SERPL-MCNC: 41 MG/DL (ref 6–20)
CALCIUM SERPL-MCNC: 10 MG/DL (ref 8.7–10.5)
CHLORIDE SERPL-SCNC: 97 MMOL/L (ref 95–110)
CLARITY UR: CLEAR
CO2 SERPL-SCNC: 27 MMOL/L (ref 23–29)
COLOR UR: YELLOW
CREAT SERPL-MCNC: 0.7 MG/DL (ref 0.5–1.4)
DIFFERENTIAL METHOD: ABNORMAL
EOSINOPHIL # BLD AUTO: 0.2 K/UL (ref 0–0.5)
EOSINOPHIL NFR BLD: 1.1 % (ref 0–8)
ERYTHROCYTE [DISTWIDTH] IN BLOOD BY AUTOMATED COUNT: 14.3 % (ref 11.5–14.5)
EST. GFR  (NO RACE VARIABLE): >60 ML/MIN/1.73 M^2
GLUCOSE SERPL-MCNC: 102 MG/DL (ref 70–110)
GLUCOSE UR QL STRIP: NEGATIVE
HCT VFR BLD AUTO: 39 % (ref 40–54)
HGB BLD-MCNC: 13.2 G/DL (ref 14–18)
HGB UR QL STRIP: ABNORMAL
HYALINE CASTS #/AREA URNS LPF: 2 /LPF
IMM GRANULOCYTES # BLD AUTO: 0.39 K/UL (ref 0–0.04)
IMM GRANULOCYTES NFR BLD AUTO: 2.2 % (ref 0–0.5)
KETONES UR QL STRIP: NEGATIVE
LEUKOCYTE ESTERASE UR QL STRIP: NEGATIVE
LYMPHOCYTES # BLD AUTO: 1.9 K/UL (ref 1–4.8)
LYMPHOCYTES NFR BLD: 10.4 % (ref 18–48)
MAGNESIUM SERPL-MCNC: 2.1 MG/DL (ref 1.6–2.6)
MCH RBC QN AUTO: 29.3 PG (ref 27–31)
MCHC RBC AUTO-ENTMCNC: 33.8 G/DL (ref 32–36)
MCV RBC AUTO: 87 FL (ref 82–98)
MICROSCOPIC COMMENT: ABNORMAL
MONOCYTES # BLD AUTO: 1.9 K/UL (ref 0.3–1)
MONOCYTES NFR BLD: 10.6 % (ref 4–15)
NEUTROPHILS # BLD AUTO: 13.4 K/UL (ref 1.8–7.7)
NEUTROPHILS NFR BLD: 75.1 % (ref 38–73)
NITRITE UR QL STRIP: NEGATIVE
NRBC BLD-RTO: 0 /100 WBC
PH UR STRIP: 7 [PH] (ref 5–8)
PHOSPHATE SERPL-MCNC: 2.9 MG/DL (ref 2.7–4.5)
PLATELET # BLD AUTO: 980 K/UL (ref 150–450)
PMV BLD AUTO: 9.6 FL (ref 9.2–12.9)
POTASSIUM SERPL-SCNC: 3.2 MMOL/L (ref 3.5–5.1)
PROT SERPL-MCNC: 8.3 G/DL (ref 6–8.4)
PROT UR QL STRIP: ABNORMAL
RBC # BLD AUTO: 4.5 M/UL (ref 4.6–6.2)
RBC #/AREA URNS HPF: 7 /HPF (ref 0–4)
SODIUM SERPL-SCNC: 136 MMOL/L (ref 136–145)
SP GR UR STRIP: 1.01 (ref 1–1.03)
SQUAMOUS #/AREA URNS HPF: 6 /HPF
URN SPEC COLLECT METH UR: ABNORMAL
UROBILINOGEN UR STRIP-ACNC: ABNORMAL EU/DL
WBC # BLD AUTO: 17.8 K/UL (ref 3.9–12.7)
WBC #/AREA URNS HPF: 2 /HPF (ref 0–5)

## 2022-09-09 PROCEDURE — 25000242 PHARM REV CODE 250 ALT 637 W/ HCPCS: Performed by: STUDENT IN AN ORGANIZED HEALTH CARE EDUCATION/TRAINING PROGRAM

## 2022-09-09 PROCEDURE — 94761 N-INVAS EAR/PLS OXIMETRY MLT: CPT

## 2022-09-09 PROCEDURE — 63600175 PHARM REV CODE 636 W HCPCS

## 2022-09-09 PROCEDURE — 94640 AIRWAY INHALATION TREATMENT: CPT

## 2022-09-09 PROCEDURE — 85025 COMPLETE CBC W/AUTO DIFF WBC: CPT | Performed by: INTERNAL MEDICINE

## 2022-09-09 PROCEDURE — 36569 INSJ PICC 5 YR+ W/O IMAGING: CPT

## 2022-09-09 PROCEDURE — 27000207 HC ISOLATION

## 2022-09-09 PROCEDURE — 81000 URINALYSIS NONAUTO W/SCOPE: CPT | Performed by: STUDENT IN AN ORGANIZED HEALTH CARE EDUCATION/TRAINING PROGRAM

## 2022-09-09 PROCEDURE — 80076 HEPATIC FUNCTION PANEL: CPT | Performed by: INTERNAL MEDICINE

## 2022-09-09 PROCEDURE — 25000003 PHARM REV CODE 250: Performed by: INTERNAL MEDICINE

## 2022-09-09 PROCEDURE — 76937 US GUIDE VASCULAR ACCESS: CPT

## 2022-09-09 PROCEDURE — 92611 MOTION FLUOROSCOPY/SWALLOW: CPT

## 2022-09-09 PROCEDURE — 25000003 PHARM REV CODE 250

## 2022-09-09 PROCEDURE — C1751 CATH, INF, PER/CENT/MIDLINE: HCPCS

## 2022-09-09 PROCEDURE — 27100171 HC OXYGEN HIGH FLOW UP TO 24 HOURS

## 2022-09-09 PROCEDURE — 97530 THERAPEUTIC ACTIVITIES: CPT

## 2022-09-09 PROCEDURE — 25500020 PHARM REV CODE 255: Performed by: HOSPITALIST

## 2022-09-09 PROCEDURE — 99291 PR CRITICAL CARE, E/M 30-74 MINUTES: ICD-10-PCS | Mod: ,,, | Performed by: HOSPITALIST

## 2022-09-09 PROCEDURE — 63600175 PHARM REV CODE 636 W HCPCS: Performed by: INTERNAL MEDICINE

## 2022-09-09 PROCEDURE — 25000003 PHARM REV CODE 250: Performed by: STUDENT IN AN ORGANIZED HEALTH CARE EDUCATION/TRAINING PROGRAM

## 2022-09-09 PROCEDURE — 84100 ASSAY OF PHOSPHORUS: CPT | Performed by: INTERNAL MEDICINE

## 2022-09-09 PROCEDURE — 80048 BASIC METABOLIC PNL TOTAL CA: CPT | Performed by: INTERNAL MEDICINE

## 2022-09-09 PROCEDURE — 99291 CRITICAL CARE FIRST HOUR: CPT | Mod: ,,, | Performed by: HOSPITALIST

## 2022-09-09 PROCEDURE — 63600175 PHARM REV CODE 636 W HCPCS: Performed by: STUDENT IN AN ORGANIZED HEALTH CARE EDUCATION/TRAINING PROGRAM

## 2022-09-09 PROCEDURE — 83735 ASSAY OF MAGNESIUM: CPT | Performed by: INTERNAL MEDICINE

## 2022-09-09 PROCEDURE — 27000646 HC AEROBIKA DEVICE

## 2022-09-09 PROCEDURE — 92526 ORAL FUNCTION THERAPY: CPT

## 2022-09-09 PROCEDURE — 97116 GAIT TRAINING THERAPY: CPT

## 2022-09-09 PROCEDURE — 20000000 HC ICU ROOM

## 2022-09-09 PROCEDURE — 97535 SELF CARE MNGMENT TRAINING: CPT

## 2022-09-09 PROCEDURE — 36415 COLL VENOUS BLD VENIPUNCTURE: CPT | Performed by: INTERNAL MEDICINE

## 2022-09-09 PROCEDURE — 99900035 HC TECH TIME PER 15 MIN (STAT)

## 2022-09-09 PROCEDURE — A9698 NON-RAD CONTRAST MATERIALNOC: HCPCS | Performed by: HOSPITALIST

## 2022-09-09 PROCEDURE — 94664 DEMO&/EVAL PT USE INHALER: CPT

## 2022-09-09 PROCEDURE — S4991 NICOTINE PATCH NONLEGEND: HCPCS

## 2022-09-09 PROCEDURE — 97112 NEUROMUSCULAR REEDUCATION: CPT

## 2022-09-09 RX ADMIN — POTASSIUM CHLORIDE 10 MEQ: 7.46 INJECTION, SOLUTION INTRAVENOUS at 01:09

## 2022-09-09 RX ADMIN — Medication 4 ML: at 08:09

## 2022-09-09 RX ADMIN — DEXMEDETOMIDINE HYDROCHLORIDE 0.5 MCG/KG/HR: 4 INJECTION, SOLUTION INTRAVENOUS at 04:09

## 2022-09-09 RX ADMIN — POTASSIUM CHLORIDE 60 MEQ: 7.46 INJECTION, SOLUTION INTRAVENOUS at 06:09

## 2022-09-09 RX ADMIN — SENNOSIDES AND DOCUSATE SODIUM 1 TABLET: 50; 8.6 TABLET ORAL at 08:09

## 2022-09-09 RX ADMIN — NICOTINE 1 PATCH: 14 PATCH TRANSDERMAL at 08:09

## 2022-09-09 RX ADMIN — ENOXAPARIN SODIUM 40 MG: 100 INJECTION SUBCUTANEOUS at 05:09

## 2022-09-09 RX ADMIN — BARIUM SULFATE 20 ML: 0.81 POWDER, FOR SUSPENSION ORAL at 02:09

## 2022-09-09 RX ADMIN — POTASSIUM BICARBONATE 25 MEQ: 25 TABLET, EFFERVESCENT ORAL at 08:09

## 2022-09-09 RX ADMIN — DEXMEDETOMIDINE HYDROCHLORIDE 0.9 MCG/KG/HR: 4 INJECTION, SOLUTION INTRAVENOUS at 09:09

## 2022-09-09 RX ADMIN — POTASSIUM CHLORIDE 10 MEQ: 7.46 INJECTION, SOLUTION INTRAVENOUS at 10:09

## 2022-09-09 RX ADMIN — BUPROPION HYDROCHLORIDE 150 MG: 75 TABLET, FILM COATED ORAL at 08:09

## 2022-09-09 RX ADMIN — POTASSIUM CHLORIDE 10 MEQ: 7.46 INJECTION, SOLUTION INTRAVENOUS at 09:09

## 2022-09-09 RX ADMIN — DEXAMETHASONE SODIUM PHOSPHATE 10 MG: 4 INJECTION INTRA-ARTICULAR; INTRALESIONAL; INTRAMUSCULAR; INTRAVENOUS; SOFT TISSUE at 09:09

## 2022-09-09 RX ADMIN — MAGNESIUM SULFATE HEPTAHYDRATE 2 G: 2 INJECTION, SOLUTION INTRAVENOUS at 06:09

## 2022-09-09 RX ADMIN — VANCOMYCIN HYDROCHLORIDE 1500 MG: 1.5 INJECTION, POWDER, LYOPHILIZED, FOR SOLUTION INTRAVENOUS at 12:09

## 2022-09-09 RX ADMIN — METHYLNALTREXONE BROMIDE 8 MG: 12 INJECTION, SOLUTION SUBCUTANEOUS at 08:09

## 2022-09-09 NOTE — PROGRESS NOTES
Pharmacy to dose Vancomycin:    Level not drawn prior to med administration. Level retimed for 3875 9/9/22.    Thank you,   Antionette Zepeda, Pharm.D.

## 2022-09-09 NOTE — PLAN OF CARE
Problem: Adult Inpatient Plan of Care  Goal: Plan of Care Review  Outcome: Ongoing, Progressing  Goal: Patient-Specific Goal (Individualized)  Outcome: Ongoing, Progressing  Goal: Absence of Hospital-Acquired Illness or Injury  Outcome: Ongoing, Progressing  Goal: Optimal Comfort and Wellbeing  Outcome: Ongoing, Progressing  Goal: Readiness for Transition of Care  Outcome: Ongoing, Progressing     Problem: Communication Impairment (Mechanical Ventilation, Invasive)  Goal: Effective Communication  Outcome: Ongoing, Progressing     Problem: Device-Related Complication Risk (Mechanical Ventilation, Invasive)  Goal: Optimal Device Function  Outcome: Ongoing, Progressing     Problem: Inability to Wean (Mechanical Ventilation, Invasive)  Goal: Mechanical Ventilation Liberation  Outcome: Ongoing, Progressing     Problem: Nutrition Impairment (Mechanical Ventilation, Invasive)  Goal: Optimal Nutrition Delivery  Outcome: Ongoing, Progressing     Problem: Skin and Tissue Injury (Mechanical Ventilation, Invasive)  Goal: Absence of Device-Related Skin and Tissue Injury  Outcome: Ongoing, Progressing     Problem: Ventilator-Induced Lung Injury (Mechanical Ventilation, Invasive)  Goal: Absence of Ventilator-Induced Lung Injury  Outcome: Ongoing, Progressing     Problem: Communication Impairment (Artificial Airway)  Goal: Effective Communication  Outcome: Ongoing, Progressing     Problem: Device-Related Complication Risk (Artificial Airway)  Goal: Optimal Device Function  Outcome: Ongoing, Progressing     Problem: Skin and Tissue Injury (Artificial Airway)  Goal: Absence of Device-Related Skin or Tissue Injury  Outcome: Ongoing, Progressing     Problem: Noninvasive Ventilation Acute  Goal: Effective Unassisted Ventilation and Oxygenation  Outcome: Ongoing, Progressing     Problem: Infection  Goal: Absence of Infection Signs and Symptoms  Outcome: Ongoing, Progressing     Problem: Adjustment to Illness (Sepsis/Septic  Shock)  Goal: Optimal Coping  Outcome: Ongoing, Progressing     Problem: Bleeding (Sepsis/Septic Shock)  Goal: Absence of Bleeding  Outcome: Ongoing, Progressing     Problem: Glycemic Control Impaired (Sepsis/Septic Shock)  Goal: Blood Glucose Level Within Desired Range  Outcome: Ongoing, Progressing     Problem: Infection Progression (Sepsis/Septic Shock)  Goal: Absence of Infection Signs and Symptoms  Outcome: Ongoing, Progressing     Problem: Nutrition Impaired (Sepsis/Septic Shock)  Goal: Optimal Nutrition Intake  Outcome: Ongoing, Progressing     Problem: Skin Injury Risk Increased  Goal: Skin Health and Integrity  Outcome: Ongoing, Progressing     Problem: Fall Injury Risk  Goal: Absence of Fall and Fall-Related Injury  Outcome: Ongoing, Progressing     Problem: Fluid Imbalance (Pneumonia)  Goal: Fluid Balance  Outcome: Ongoing, Progressing     Problem: Infection (Pneumonia)  Goal: Resolution of Infection Signs and Symptoms  Outcome: Ongoing, Progressing     Problem: Respiratory Compromise (Pneumonia)  Goal: Effective Oxygenation and Ventilation  Outcome: Ongoing, Progressing    Problem: Coping Ineffective  Goal: Effective Coping  Outcome: Ongoing, Progressing     Problem: Impaired Wound Healing  Goal: Optimal Wound Healing  Outcome: Ongoing, Progressing     Problem: Spiritual Distress Risk or Actual  Goal: Spiritual Wellbeing  Outcome: Ongoing, Progressing     Problem: Oral Intake Inadequate  Goal: Improved Oral Intake  Outcome: Ongoing, Progressing

## 2022-09-09 NOTE — PLAN OF CARE
Problem: SLP  Goal: SLP Goal  Description: 1. Pt will be able to consume 3-5 mls of water from a spoon with no signs of airway threat or aspiration given max assistance  2. Pt will be able to consume 1/2 tsp amounts of purees with no signs of airway threat or aspiration given max assistance  3. Pt will be able to focus and sustain attention to simple familiar tasks for 15 min with moderate redirection required  4. Increase orientation to person, place, date, reason for hospitalization to 50% acc given max verbal and written cues  Outcome: Ongoing, Progressing  MBS completed with moderate to severe oral and pharyngeal dysphagia. Cognitive status further impacting ability to follow through with compensatory strategies to reduce risk of aspiration. Speech to continue to follow daily

## 2022-09-09 NOTE — PROGRESS NOTES
[facility] Pulmonary/Critical Care Fellow Progress Note:    Brief Hx: 41 yo M w/ PMHx of drug use (heroin, methamphetamine, others) admitted  to [facility] for hypoxemic and hypercapnic respiratory failure after being found down, presumably 2/2 drug overdose and in shock. He received CPR from his GF and did wake up when EMS found him and he presented to the ED with leukocytosis, lactic acidosis, febrile. CTA showed diffuse dense bilateral infiltrates. UDS+ for THC. Blood cultures grew acinetobacter pittii & pesudomonas luteola and bacillus cereus for which he has been receiving cefepime. Given his CT findings and evolution of infiltrates on CXR, he was treated as ARDS. He started proning on  and started receiving steroids for ARDS dosing on  with last proned event on 9/3. He had repeat fevers in the ICU thereafter and respiratory cultures showed MRSA and he was started on vancomycin on . The patient was extubated on  AM to 4-5L NC but had rising oxygen requirements and has had issues with swallowing. Developing a granulocytosis and reactive thrombocytopenia so repeat infectious work up ordered on .     Subjective:  On 12L NC this AM, voice sounds improved    Objective:  Last 24 Hour Vital Signs:  BP  Min: 99/58  Max: 156/75  Temp  Av.6 °F (37 °C)  Min: 98 °F (36.7 °C)  Max: 99.2 °F (37.3 °C)  Pulse  Av.2  Min: 72  Max: 106  Resp  Av.7  Min: 14  Max: 48  SpO2  Av.3 %  Min: 88 %  Max: 96 %  Body mass index is 28.06 kg/m².  I/O last 3 completed shifts:  In: 699.4 [I.V.:363.4; IV Piggyback:336.1]  Out: 2615 [Urine:2615]      Physical Exam:  General: Awake and alert, responding to questions properly and voice sounds stronger today  HENT:  NCAT; anicteric sclera; (+)nasal cannula  Cardio:  Regular rate and rhythm with normal S1 and S2; no murmurs or rubs  Resp:  respirations unlabored; +rhonchi in posterior lung fields, no wheezing or crackles  Abdom:  Soft,  "non-distended  Extrem:  WWP with no clubbing, cyanosis or edema, small cuts on hands and legs that are scabbed over  Pulses:  2+ and symmetric distally  Neuro:  Alert and awake, moving all 4 extremities has 4/5 strength in UE and having hard time following exam for LE.     Assessment & Plan:     42M with history of drug use (heroin, methamphetamine, "whatever he can get his hands on") presents with hypoxemic and hypercapnic respiratory failure presumably secondary to drug overdose requiring intubation found to have bacteremia and diffuse lung infiltrates.      Acute hypoxic and hypercapnic respiratory failure  In setting of presumed drug overdose (fentanyl?)  With diffuse bilateral pulmonary infiltrates on CT chest, +fevers and leukocytosis  Sputum (+) staph aureus - on Vancomycin   RIP negative  JOVANA/ANCA negative - no new hemoptysis noted  Blood cultures positive with negative respiratory culture on admission  Blood cultures with pseudomonas luteola and acinetobacter pitii -- patient with negative TTE for valve vegetations  - plan for 14d of treatment - stop after 9/7  Extubated on 4-5L NC with increased O2 thereafter, NT suction PRN, chest PT ordered  Multifocal PNA & ARDS  Started prone 8/30 - stopped after 9/3  given improvement in oxygenation  Respiratory culture with MRSA  - represents VAP - started on vancomycin 9/5 - plan for 7d total   Started steroids for ARDS 9/1 - 20mg for 5 days and then 10mg for 5 days  Aspiration vs. Inhalation injury/pneumonitis vs. Septic emboli vs. Vasculitis (less likely)  Lab and culture workup as above  No new evidence of hemoptysis  completed cefepime - 14d total, on vanc - 7d total planned  Urine legionella negative, strep Ag negative  HIV negative, viral PCR negative, Hep panel (+)hep C, negative acute hepatitis   CK elevated on different admission, Myositis panel pending, JOVANA & ANCA negative  Sedation requirements  Will monitor for any withdrawal  Precedex ordered if needed, " but patient appears calm and cooperative at the moment    Code: FULL     DVT: Lovenox  GI: Famotidine  Feeds: pending swallow study  Sedation: none, precedex if needed    Sujata Francois MD  Pulm/CC Fellow
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
## 2022-09-09 NOTE — PLAN OF CARE
09/09/22 1328   Post-Acute Status   Post-Acute Authorization Placement   Post-Acute Placement Status Referrals Sent   Discharge Delays (!) Diet Not Ready for Discharge   Discharge Plan   Discharge Plan A Rehab

## 2022-09-09 NOTE — NURSING
Plan of care reviewed with pt and family. Pt voiced understanding. NSR on monitor. No acute distress noted. Pt frequently asks for ice chips. Pt tolerates ice chips well. Pt coughs with encouragement.    Precedex @0.9mcg/kg/hr. Titrate down.    Side rails X2, bed in lowest position, call bell within reach, pt advised to call for assistance. Maintain bed alarm for pt safety.

## 2022-09-09 NOTE — PT/OT/SLP PROGRESS
Occupational Therapy   Treatment    Name: Leighton Carroll  MRN: [id number]  Admitting Diagnosis:  Acute respiratory failure with hypoxia and hypercarbia       Recommendations:     Discharge Recommendations: rehabilitation facility  Discharge Equipment Recommendations:  other (see comments) (TBD pending progress)  Barriers to discharge:  Other (Comment) (medical status)    Assessment:     Leighton Carroll is a 42 y.o. male with a medical diagnosis of Acute respiratory failure with hypoxia and hypercarbia.  He presents with impaired endurance, weakness, impaired self care skills, impaired functional mobility, gait instability, impaired balance, decreased coordination, decreased upper extremity function, decreased lower extremity function, decreased safety awareness, impaired coordination, impaired fine motor, impaired cardiopulmonary response to activity.     Pt progressing well. Up to chair this date. Bed mob mod Ax 2. Sit <> stand Min A x2 with RW. Bed > chair mod A x2 with RW. Max A toileting in standing. HR elevated to 115 with ambulation and SpO2 desaturated to 86% requiring titration from 8-12L throughout session. BP WFL throughout session. Pt highly motivated to progress OOB activity.     Pt would benefit from intensive therapies in an inpatient setting with 3 hours of therapy/day. Pt's needs cannot be met at a lower level of care. Pt is motivated to progress. Rehabilitation facility recommended to return patient to PLOF, prevent future falls and decrease readmission risk.      Rehab Prognosis:  Good; patient would benefit from acute skilled OT services to address these deficits and reach maximum level of function.       Plan:     Patient to be seen 5 x/week to address the above listed problems via self-care/home management, therapeutic activities, therapeutic exercises  Plan of Care Expires: 10/08/22  Plan of Care Reviewed with: patient    Subjective     Pain/Comfort:  Pain Rating 1: 0/10    Objective:     Communicated  with: RN prior to session.  Patient found HOB elevated with oxygen, peripheral IV, salomon catheter, telemetry, blood pressure cuff, pulse ox (continuous), SCD, PICC line upon OT entry to room.    General Precautions: Standard, aspiration, fall, contact, respiratory   Orthopedic Precautions:N/A   Braces: N/A  Respiratory Status: Nasal cannula, flow 8 L/min     Occupational Performance:     Bed Mobility:    Supine to sitting: mod Ax 2 with cues for sequencing. Pt slightly impulsive requiring cues for safety throughout, tending to initiate mobility before cueing from OT.      Functional Mobility/Transfers:  Sit > stand from EOB: min A x2 and RW. Pt requiring assist throughout to maintain B hands on RW 2/2 weakness and decreased coordination.   Sitting balance: CGA-mod A x1-2 for sitting balance. Pt with decreased postural controls, tending to assume forward leaning and neck flexion.   Bed > chair: mod A x2 and RW. Cues for hand placement, sequencing, coordination and safety. Assist required for RW management.   Pt sat in chair for x ray and rest break.  After x ray, pt stood with min A x2 and RW for toilet hygiene. Pt stood for approx 2 minutes with mod A progressing to CGA and RW for standing balance. Pt then performed functional ambulation a few steps forward and back from chair. Pt left sitting up in chair, OT to return in approx 30 minutes to transfer back to bed.   Functional Mobility: Functional transfers performed to improve activity tolerance for increased endurance and independence with toilet transfers and ADLs as well as functional balance retraining for fall prevention.      Activities of Daily Living:  Toileting: max A in standing for toilet hygiene and clothing management       [facility] 6 Click ADL: 10    Treatment & Education:  OT role, plan of care, progression of goals, importance of continued OOB activity, ADL/functional mobility/transfer retraining, fall prevention, safety precautions, call don't fall,  endurance retraining, vitals monitoring     Patient left up in chair with all lines intact, call button in reach, RN notified, and mother present    GOALS:   Multidisciplinary Problems       Occupational Therapy Goals          Problem: Occupational Therapy    Goal Priority Disciplines Outcome Interventions   Occupational Therapy Goal     OT, PT/OT Ongoing, Progressing    Description: Goals to be met by: 9/22/22     Patient will increase functional independence with ADLs by performing:    UE Dressing with [place].  LE Dressing with [place].  Grooming while standing with [place].  Toileting from toilet with [place] for hygiene and clothing management.   Toilet transfer to toilet with [place].                         Time Tracking:     OT Date of Treatment: 09/09/22  OT Start Time: 0857  OT Stop Time: 0929  OT Total Time (min): 32 min    Billable Minutes:Self Care/Home Management 32 minutes    Co treament performed due to patient's multiple deficits requiring two skilled therapists to safety assess patient's ability to complete ADLs and functional mobility in addition to accommodating for patient's activity tolerance while in acute care setting.      OT/BRIANA: OT          9/9/2022

## 2022-09-09 NOTE — NURSING
Attempted to return call to patient's mother. Received message that caller is not accepting calls at this time. Attempted to call twice. Will continue to monitor patient and attempt to call patient's mother back.     2118: Spoke to patient's mother. Update given.

## 2022-09-09 NOTE — PT/OT/SLP PROGRESS
Physical Therapy Treatment    Patient Name:  Leigthon Carroll   MRN:  [id number]    Recommendations:     Discharge Recommendations:  rehabilitation facility   Discharge Equipment Recommendations:  (TBD pending progress)   Barriers to discharge: Inaccessible home and Decreased caregiver support, current medical needs    Assessment:     Leighton Carroll is a 42 y.o. male admitted with a medical diagnosis of Acute respiratory failure with hypoxia and hypercarbia - found down and in shock, intubated 8/26-9/7 starting proning protocol with paralytics 8/30-9/3. High levels of sedation required and early mobility unable to be initiated while intubated. Pmhx significant for depression and polysubstance abuse.  He presents with the following impairments/functional limitations:  weakness, impaired self care skills, impaired functional mobility, gait instability, impaired balance, decreased upper extremity function, decreased lower extremity function, decreased safety awareness, abnormal tone, impaired coordination, impaired fine motor, impaired cardiopulmonary response to activity.    Patient seen with OT to safely progress OOB mobility, gait and transfers performed with patient able to tolerate sitting in bedside chair x30 min. Continues to require 2 person assistance d/t weakness, poor coordination, and impaired balance however noted progress in level of assistance required for standing and gait. Rec for dc to IRF.    Patient was independent at home prior to this event for locomotion, ADLs, and IADLs. There is expectation of returning to prior level of function to maintain independence avoiding readmission.      Pt's clinical condition meets full inpatient rehab criteria. Inpatient rehab will provide to total interdisciplinary treatment approach needed. Pt is at high risk of unplanned readmission due to fall risk and inability to perform ADLs without significant assistance. The lower level of care cannot provide total  interdisciplinary approach needed.      Pt is able to tolerate 3 hours of daily therapy. Pt is pleasant and motivated to return to prior level of function.     Rehab Prognosis: Good; patient would benefit from acute skilled PT services to address these deficits and reach maximum level of function.    Recent Surgery: * No surgery found *      Plan:     During this hospitalization, patient to be seen 6 x/week to address the identified rehab impairments via gait training, therapeutic activities, therapeutic exercises, neuromuscular re-education and progress toward the following goals:    Plan of Care Expires:  10/08/22    Subjective     Chief Complaint: None reported, denies pain or fatigue with activity  Patient/Family Comments/goals: Patient agreeable to PT treatment.  Pain/Comfort:  Pain Rating 1: 0/10  Pain Rating Post-Intervention 1: 0/10      Objective:     Communicated with RN prior to session.  Patient found HOB elevated with oxygen, peripheral IV, PICC line, salomon catheter, telemetry, blood pressure cuff, pulse ox (continuous), SCD upon PT entry to room.     General Precautions: Standard, aspiration, fall, contact, respiratory   Orthopedic Precautions:N/A   Braces: N/A  Respiratory Status: Nasal cannula, flow 8 L/min     Patient donned non slip socks and gait belt for OOB mobility.     Functional Mobility:  Bed Mobility:     Scooting - anteriorly at EOB: moderate assistance  Attempts to perform - poor coordination between UE, weight shift, and shifting of hips requiring assistance to achieve excursion  Bridging to HOB: maximal assistance  Supine to Sit: moderate assistance and of 2 persons  Pt initiates, assistance to complete  Sit to Supine: moderate assistance and of 2 persons  Transfers:     Sit to Stand:  minimum assistance and of 2 persons with rolling walker  From EOB and 2x from bedside chair  Cues for hand placement  Assistance to coordination of weight shift and extension  Bed to Chair: moderate  assistance and of 2 persons with  rolling walker  using  Step Transfer  See gait   Performed bed<>chair, returned to bed after 30 min sitting in chair  Gait: 2x3 ft lateral stepping with RW and min-modA x2 people during step transfer, 1x2 ft forwards and backwards with RW and min-modA x2 people.   Short step lengths with increased B stance time  Poor RW management with poor  (R weaker than L) requiring maxA for management  Increased difficulty with backwards stepping  Balance:   Static sitting EOB x5 min with CGA-modA  Continued poor balance with impaired postural reactions, pt continually attempting to lean forwards  Standing x2 min with modA progressing to CGA with BUE support on RW.   Pt with significant anterior-posterior lateral sway without overt LOB  With cueing, improved UE support/ on RW  Able to briefly release RW without LOB      AM-PAC 6 CLICK MOBILITY  Turning over in bed (including adjusting bedclothes, sheets and blankets)?: 2  Sitting down on and standing up from a chair with arms (e.g., wheelchair, bedside commode, etc.): 2  Moving from lying on back to sitting on the side of the bed?: 2  Moving to and from a bed to a chair (including a wheelchair)?: 2  Need to walk in hospital room?: 2  Climbing 3-5 steps with a railing?: 1  Basic Mobility Total Score: 11       Therapeutic Activities and Exercises:   See above for gait, transfers, and bed mobility  NR:  Cueing for balance and upright posture  Manual assistance for UE  on RW and improving coordination of RW management  Vitals:  Required increase from 8L NC to 12L NC to maintain SpO2 >90%   No significant change in BP  HR max 110 bpm during initial gait bout, repeated gait bouts with HR max 103    Patient left HOB elevated with all lines intact, call button in reach, RN notified, and mother present..    GOALS:   Multidisciplinary Problems       Physical Therapy Goals          Problem: Physical Therapy    Goal Priority Disciplines Outcome  Goal Variances Interventions   Physical Therapy Goal     PT, PT/OT Ongoing, Progressing     Description: Goals to be met by: 10/8/22    Patient will increase functional independence with mobility by performin. Supine<>sit with CGA with appropriate use of hospital bed features.   2. Sit<>stand with CGA with LRAD.  3. Sitting EOB x5 min with upright posture with SBA.   4. Activity x5 min on appropriate supplemental O2 with SpO2 >90%.  5. Standing x1 min with CGA with LRAD and upright posture.  6. Gait x 10 feet with modA with LRAD.                       Time Tracking:     PT Received On: 22  PT Start Time: 855     PT Stop Time: 929  PT Start Time: 50     PT Stop Time: 1001  PT Total Time (min): 34 min + 11 min = 45 min    Billable Minutes: Gait Training 15, Therapeutic Activity 20, and Neuromuscular Re-education 10    Treatment Type: Treatment  PT/PTA: PT     PTA Visit Number: 0     2022

## 2022-09-09 NOTE — PLAN OF CARE
" provided follow-up visit with compassionate presence, reflective listening, and emotional support. Pt apologized for saying he was "scared" of  the first time we met after pt was extubated.  reassured patient. When asked what he was scared of, patient was unable to identify anything in particular simply mentioning he had just been extubated. Pt's room is decorated with pt's religous resources including rosary, bible, and prayers. Pt thankful for visit and made aware of 's presence as needed. No spiritual or relational needs expressed at this time.   "

## 2022-09-09 NOTE — PROCEDURES
"Leighton Carroll is a 42 y.o. male patient.    Temp: 98.2 °F (36.8 °C) (09/09/22 1505)  Pulse: 92 (09/09/22 1505)  Resp: (!) 28 (09/09/22 1505)  BP: 134/75 (09/09/22 1505)  SpO2: 99 % (09/09/22 1505)  Weight: 86.2 kg (190 lb) (08/27/22 1414)  Height: 5' 9" (175.3 cm) (08/30/22 1613)    PICC  Date/Time: 9/9/2022 4:00 PM  Location procedure was performed: [facility] PICC LINE PLACEMENT  Performed by: Rafia Melendez RN  Time out: Immediately prior to procedure a time out was called to verify the correct patient, procedure, equipment, support staff and site/side marked as required  Indications: med administration and vascular access  Anesthesia: local infiltration  Local anesthetic: lidocaine 1% without epinephrine  Anesthetic Total (mL): 1  Preparation: skin prepped with ChloraPrep  Skin prep agent dried: skin prep agent completely dried prior to procedure  Sterile barriers: all five maximum sterile barriers used - cap, mask, sterile gown, sterile gloves, and large sterile sheet  Hand hygiene: hand hygiene performed prior to central venous catheter insertion  Catheter type: double lumen  Catheter size: 5 Fr  Catheter Length: 47cm    Ultrasound guidance: yes  Vessel Caliber: medium and patent, compressibility normal  no esophageal manometryNumber of attempts: 1  Post-procedure: blood return through all ports, chlorhexidine patch and sterile dressing applied  Technical procedures used: microintroducer with ultrasound  Estimated blood loss (mL): 0  Specimens: No  Implants: No  Assessment: placement verified by x-ray, no pneumothorax on x-ray, successful placement and tip termination  Complications: none        Name hbourg  9/9/2022    "

## 2022-09-09 NOTE — PLAN OF CARE
Problem: Occupational Therapy  Goal: Occupational Therapy Goal  Description: Goals to be met by: 9/22/22     Patient will increase functional independence with ADLs by performing:    UE Dressing with [place].  LE Dressing with [place].  Grooming while standing with [place].  Toileting from toilet with [place] for hygiene and clothing management.   Toilet transfer to toilet with [place].    Outcome: Ongoing, Progressing     Pt progressing well. Up to chair this date. Bed mob mod Ax 2. Sit <> stand Min A x2 with RW. Bed > chair mod A x2 with RW. Max A toileting in standing. HR elevated to 115 with ambulation and SpO2 desaturated to 86% requiring titration from 8-12L throughout session. BP WFL throughout session. Pt highly motivated to progress OOB activity.     DC: KOKO  DME: [facility]

## 2022-09-09 NOTE — PT/OT/SLP PROGRESS
Occupational Therapy   Treatment    Name: Leighton Carroll  MRN: [id number]  Admitting Diagnosis:  Acute respiratory failure with hypoxia and hypercarbia       Recommendations:     Discharge Recommendations: rehabilitation facility  Discharge Equipment Recommendations:  other (see comments) (TBD pending progress)  Barriers to discharge:  Other (Comment) (medical status. O2 requirements.)    Assessment:     Leighton Carroll is a 42 y.o. male with a medical diagnosis of Acute respiratory failure with hypoxia and hypercarbia.  He presents with  weakness, impaired endurance, impaired self care skills, impaired functional mobility, gait instability, impaired balance, decreased coordination, decreased upper extremity function, decreased lower extremity function, decreased safety awareness, impaired coordination, impaired cardiopulmonary response to activity.     Pt would benefit from intensive therapies in an inpatient setting with 3 hours of therapy/day. Pt's needs cannot be met at a lower level of care. Pt is motivated to progress. Rehabilitation facility recommended to return patient to PLOF, prevent future falls and decrease readmission risk.      Rehab Prognosis:  Good; patient would benefit from acute skilled OT services to address these deficits and reach maximum level of function.       Plan:     Patient to be seen 5 x/week to address the above listed problems via self-care/home management, therapeutic activities, therapeutic exercises  Plan of Care Expires: 10/08/22  Plan of Care Reviewed with: patient, mother    Subjective     Pain/Comfort:  Pain Rating 1: 0/10    Objective:     Communicated with: RN prior to session.  Patient found up in chair with oxygen, blood pressure cuff, pulse ox (continuous), peripheral IV, salomon catheter, telemetry upon OT entry to room.    General Precautions: Standard, aspiration, fall, respiratory, contact   Orthopedic Precautions:N/A   Braces: N/A  Respiratory Status: Nasal cannula, flow 12  L/min     Occupational Performance:     Bed Mobility:    Sit to supine: CGA-Min A x2      Functional Mobility/Transfers:  Chair to bed: CGA-Min A x2 with RW  Functional Mobility: Pt left up in chair and tolerated for approx 20 minutes. Pt requested to return to bed and OT/PT assested.   OOB activity and back to bed transfer performed to improve activity tolerance for increased endurance and independence with ADLs as well as functional balance retraining for fall prevention.    Pt, mother, and RN educated on OOB activity this evening and over the weekend with RN x2 assist. Pt educated on increasing activity tolerance by sitting in chair or upright in bed with HOB at 90 degrees. Pt and mother verbalized understanding.      [facility] 6 Click ADL: 10    Treatment & Education:  OT role, plan of care, progression of goals, importance of continued OOB activity, ADL/functional mobility/transfer retraining, fall prevention, safety precautions, call don't fall, endurance retraining    Patient left HOB elevated with all lines intact, call button in reach, and RN notified. Mother present.     GOALS:   Multidisciplinary Problems       Occupational Therapy Goals          Problem: Occupational Therapy    Goal Priority Disciplines Outcome Interventions   Occupational Therapy Goal     OT, PT/OT Ongoing, Progressing    Description: Goals to be met by: 9/22/22     Patient will increase functional independence with ADLs by performing:    UE Dressing with [place].  LE Dressing with [place].  Grooming while standing with [place].  Toileting from toilet with [place] for hygiene and clothing management.   Toilet transfer to toilet with [place].                         Time Tracking:     OT Date of Treatment: 09/09/22  OT Start Time: 0949  OT Stop Time: 1001  OT Total Time (min): 12 min    Billable Minutes:Self Care/Home Management 12 minutes    Co treament performed due to patient's multiple deficits requiring two  skilled therapists to safety assess patient's ability to complete ADLs and functional mobility in addition to accommodating for patient's activity tolerance while in acute care setting.      OT/BRIANA: OT          9/9/2022
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
## 2022-09-10 PROBLEM — J15.212 PNEUMONIA DUE TO METHICILLIN RESISTANT STAPHYLOCOCCUS AUREUS (MRSA): Status: ACTIVE | Noted: 2022-09-10

## 2022-09-10 PROBLEM — T17.908A ASPIRATION INTO AIRWAY: Status: ACTIVE | Noted: 2022-09-10

## 2022-09-10 PROBLEM — R00.0 TACHYCARDIA: Status: ACTIVE | Noted: 2022-09-10

## 2022-09-10 LAB
ANION GAP SERPL CALC-SCNC: 10 MMOL/L (ref 8–16)
BACTERIA BLD CULT: NORMAL
BACTERIA BLD CULT: NORMAL
BASOPHILS # BLD AUTO: 0.06 K/UL (ref 0–0.2)
BASOPHILS # BLD AUTO: 0.1 K/UL (ref 0–0.2)
BASOPHILS NFR BLD: 0.4 % (ref 0–1.9)
BASOPHILS NFR BLD: 0.6 % (ref 0–1.9)
BUN SERPL-MCNC: 33 MG/DL (ref 6–20)
CALCIUM SERPL-MCNC: 9.8 MG/DL (ref 8.7–10.5)
CHLORIDE SERPL-SCNC: 102 MMOL/L (ref 95–110)
CO2 SERPL-SCNC: 23 MMOL/L (ref 23–29)
CREAT SERPL-MCNC: 0.6 MG/DL (ref 0.5–1.4)
DIFFERENTIAL METHOD: ABNORMAL
DIFFERENTIAL METHOD: ABNORMAL
EOSINOPHIL # BLD AUTO: 0.2 K/UL (ref 0–0.5)
EOSINOPHIL # BLD AUTO: 0.3 K/UL (ref 0–0.5)
EOSINOPHIL NFR BLD: 1.1 % (ref 0–8)
EOSINOPHIL NFR BLD: 1.8 % (ref 0–8)
ERYTHROCYTE [DISTWIDTH] IN BLOOD BY AUTOMATED COUNT: 14.5 % (ref 11.5–14.5)
ERYTHROCYTE [DISTWIDTH] IN BLOOD BY AUTOMATED COUNT: 14.6 % (ref 11.5–14.5)
EST. GFR  (NO RACE VARIABLE): >60 ML/MIN/1.73 M^2
GLUCOSE SERPL-MCNC: 106 MG/DL (ref 70–110)
HCT VFR BLD AUTO: 38.8 % (ref 40–54)
HCT VFR BLD AUTO: 39.5 % (ref 40–54)
HGB BLD-MCNC: 13.2 G/DL (ref 14–18)
HGB BLD-MCNC: 13.3 G/DL (ref 14–18)
IMM GRANULOCYTES # BLD AUTO: 0.36 K/UL (ref 0–0.04)
IMM GRANULOCYTES # BLD AUTO: 0.37 K/UL (ref 0–0.04)
IMM GRANULOCYTES NFR BLD AUTO: 2.2 % (ref 0–0.5)
IMM GRANULOCYTES NFR BLD AUTO: 2.2 % (ref 0–0.5)
LYMPHOCYTES # BLD AUTO: 2.1 K/UL (ref 1–4.8)
LYMPHOCYTES # BLD AUTO: 2.1 K/UL (ref 1–4.8)
LYMPHOCYTES NFR BLD: 12.3 % (ref 18–48)
LYMPHOCYTES NFR BLD: 12.7 % (ref 18–48)
MAGNESIUM SERPL-MCNC: 2 MG/DL (ref 1.6–2.6)
MCH RBC QN AUTO: 29.2 PG (ref 27–31)
MCH RBC QN AUTO: 29.5 PG (ref 27–31)
MCHC RBC AUTO-ENTMCNC: 33.7 G/DL (ref 32–36)
MCHC RBC AUTO-ENTMCNC: 34 G/DL (ref 32–36)
MCV RBC AUTO: 87 FL (ref 82–98)
MCV RBC AUTO: 87 FL (ref 82–98)
MONOCYTES # BLD AUTO: 1.7 K/UL (ref 0.3–1)
MONOCYTES # BLD AUTO: 1.8 K/UL (ref 0.3–1)
MONOCYTES NFR BLD: 11.3 % (ref 4–15)
MONOCYTES NFR BLD: 9.9 % (ref 4–15)
NEUTROPHILS # BLD AUTO: 11.5 K/UL (ref 1.8–7.7)
NEUTROPHILS # BLD AUTO: 12.7 K/UL (ref 1.8–7.7)
NEUTROPHILS NFR BLD: 71.4 % (ref 38–73)
NEUTROPHILS NFR BLD: 74.1 % (ref 38–73)
NRBC BLD-RTO: 0 /100 WBC
NRBC BLD-RTO: 0 /100 WBC
PATH REV BLD -IMP: NORMAL
PHOSPHATE SERPL-MCNC: 3.2 MG/DL (ref 2.7–4.5)
PLATELET # BLD AUTO: 1043 K/UL (ref 150–450)
PLATELET # BLD AUTO: 961 K/UL (ref 150–450)
PMV BLD AUTO: 9.3 FL (ref 9.2–12.9)
PMV BLD AUTO: 9.5 FL (ref 9.2–12.9)
POTASSIUM SERPL-SCNC: 3.5 MMOL/L (ref 3.5–5.1)
RBC # BLD AUTO: 4.47 M/UL (ref 4.6–6.2)
RBC # BLD AUTO: 4.55 M/UL (ref 4.6–6.2)
SODIUM SERPL-SCNC: 135 MMOL/L (ref 136–145)
VANCOMYCIN TROUGH SERPL-MCNC: 11.2 UG/ML (ref 10–22)
WBC # BLD AUTO: 16.16 K/UL (ref 3.9–12.7)
WBC # BLD AUTO: 17.14 K/UL (ref 3.9–12.7)

## 2022-09-10 PROCEDURE — S4991 NICOTINE PATCH NONLEGEND: HCPCS

## 2022-09-10 PROCEDURE — 85060 BLOOD SMEAR INTERPRETATION: CPT | Mod: ,,, | Performed by: PATHOLOGY

## 2022-09-10 PROCEDURE — 80202 ASSAY OF VANCOMYCIN: CPT | Performed by: HOSPITALIST

## 2022-09-10 PROCEDURE — 87040 BLOOD CULTURE FOR BACTERIA: CPT | Mod: 59 | Performed by: SURGERY

## 2022-09-10 PROCEDURE — 63600175 PHARM REV CODE 636 W HCPCS: Performed by: HOSPITALIST

## 2022-09-10 PROCEDURE — 27000207 HC ISOLATION

## 2022-09-10 PROCEDURE — 97116 GAIT TRAINING THERAPY: CPT

## 2022-09-10 PROCEDURE — 87040 BLOOD CULTURE FOR BACTERIA: CPT | Performed by: STUDENT IN AN ORGANIZED HEALTH CARE EDUCATION/TRAINING PROGRAM

## 2022-09-10 PROCEDURE — 99291 CRITICAL CARE FIRST HOUR: CPT | Mod: ,,, | Performed by: HOSPITALIST

## 2022-09-10 PROCEDURE — 92526 ORAL FUNCTION THERAPY: CPT

## 2022-09-10 PROCEDURE — 36415 COLL VENOUS BLD VENIPUNCTURE: CPT | Performed by: SURGERY

## 2022-09-10 PROCEDURE — 99291 PR CRITICAL CARE, E/M 30-74 MINUTES: ICD-10-PCS | Mod: ,,, | Performed by: HOSPITALIST

## 2022-09-10 PROCEDURE — 94761 N-INVAS EAR/PLS OXIMETRY MLT: CPT

## 2022-09-10 PROCEDURE — 25000003 PHARM REV CODE 250: Performed by: SURGERY

## 2022-09-10 PROCEDURE — 80048 BASIC METABOLIC PNL TOTAL CA: CPT | Performed by: INTERNAL MEDICINE

## 2022-09-10 PROCEDURE — 63600175 PHARM REV CODE 636 W HCPCS: Performed by: INTERNAL MEDICINE

## 2022-09-10 PROCEDURE — 83735 ASSAY OF MAGNESIUM: CPT | Performed by: INTERNAL MEDICINE

## 2022-09-10 PROCEDURE — 25000003 PHARM REV CODE 250: Performed by: HOSPITALIST

## 2022-09-10 PROCEDURE — 25000242 PHARM REV CODE 250 ALT 637 W/ HCPCS: Performed by: STUDENT IN AN ORGANIZED HEALTH CARE EDUCATION/TRAINING PROGRAM

## 2022-09-10 PROCEDURE — 94640 AIRWAY INHALATION TREATMENT: CPT

## 2022-09-10 PROCEDURE — 20000000 HC ICU ROOM

## 2022-09-10 PROCEDURE — 25000003 PHARM REV CODE 250

## 2022-09-10 PROCEDURE — 97535 SELF CARE MNGMENT TRAINING: CPT

## 2022-09-10 PROCEDURE — 63600175 PHARM REV CODE 636 W HCPCS: Performed by: STUDENT IN AN ORGANIZED HEALTH CARE EDUCATION/TRAINING PROGRAM

## 2022-09-10 PROCEDURE — 99900035 HC TECH TIME PER 15 MIN (STAT)

## 2022-09-10 PROCEDURE — 25000003 PHARM REV CODE 250: Performed by: STUDENT IN AN ORGANIZED HEALTH CARE EDUCATION/TRAINING PROGRAM

## 2022-09-10 PROCEDURE — 27000221 HC OXYGEN, UP TO 24 HOURS

## 2022-09-10 PROCEDURE — 84100 ASSAY OF PHOSPHORUS: CPT | Performed by: INTERNAL MEDICINE

## 2022-09-10 PROCEDURE — 85025 COMPLETE CBC W/AUTO DIFF WBC: CPT | Performed by: STUDENT IN AN ORGANIZED HEALTH CARE EDUCATION/TRAINING PROGRAM

## 2022-09-10 PROCEDURE — 63600175 PHARM REV CODE 636 W HCPCS

## 2022-09-10 PROCEDURE — 85060 PATHOLOGIST REVIEW: ICD-10-PCS | Mod: ,,, | Performed by: PATHOLOGY

## 2022-09-10 PROCEDURE — 85025 COMPLETE CBC W/AUTO DIFF WBC: CPT | Mod: 91 | Performed by: INTERNAL MEDICINE

## 2022-09-10 RX ORDER — LIDOCAINE HYDROCHLORIDE 20 MG/ML
JELLY TOPICAL ONCE
Status: COMPLETED | OUTPATIENT
Start: 2022-09-10 | End: 2022-09-10

## 2022-09-10 RX ADMIN — POTASSIUM CHLORIDE 10 MEQ: 7.46 INJECTION, SOLUTION INTRAVENOUS at 01:09

## 2022-09-10 RX ADMIN — BUPROPION HYDROCHLORIDE 150 MG: 75 TABLET, FILM COATED ORAL at 09:09

## 2022-09-10 RX ADMIN — Medication 4 ML: at 08:09

## 2022-09-10 RX ADMIN — DEXAMETHASONE SODIUM PHOSPHATE 10 MG: 4 INJECTION INTRA-ARTICULAR; INTRALESIONAL; INTRAMUSCULAR; INTRAVENOUS; SOFT TISSUE at 05:09

## 2022-09-10 RX ADMIN — LIDOCAINE HYDROCHLORIDE: 20 JELLY TOPICAL at 06:09

## 2022-09-10 RX ADMIN — VANCOMYCIN HYDROCHLORIDE 1750 MG: 500 INJECTION, POWDER, LYOPHILIZED, FOR SOLUTION INTRAVENOUS at 12:09

## 2022-09-10 RX ADMIN — POTASSIUM BICARBONATE 25 MEQ: 25 TABLET, EFFERVESCENT ORAL at 09:09

## 2022-09-10 RX ADMIN — VANCOMYCIN HYDROCHLORIDE 1500 MG: 1.5 INJECTION, POWDER, LYOPHILIZED, FOR SOLUTION INTRAVENOUS at 12:09

## 2022-09-10 RX ADMIN — NICOTINE 1 PATCH: 14 PATCH TRANSDERMAL at 09:09

## 2022-09-10 RX ADMIN — POTASSIUM CHLORIDE 10 MEQ: 7.46 INJECTION, SOLUTION INTRAVENOUS at 10:09

## 2022-09-10 RX ADMIN — POTASSIUM CHLORIDE 10 MEQ: 7.46 INJECTION, SOLUTION INTRAVENOUS at 03:09

## 2022-09-10 RX ADMIN — ENOXAPARIN SODIUM 40 MG: 100 INJECTION SUBCUTANEOUS at 05:09

## 2022-09-10 RX ADMIN — SENNOSIDES AND DOCUSATE SODIUM 1 TABLET: 50; 8.6 TABLET ORAL at 09:09

## 2022-09-10 RX ADMIN — POTASSIUM CHLORIDE 10 MEQ: 7.46 INJECTION, SOLUTION INTRAVENOUS at 12:09

## 2022-09-10 NOTE — NURSING
Plan of care reviewed with pt and mother. Pt voiced understanding. NSR on monitor. Pt weaned off of Precedex. Pt on 8L high flow nasal cannula. No acute distress noted. Cano discontinued. Pt refused condom catheter. Urinal provided. Urinal at bedside.     Side rails X2, bed in lowest position, call bell within reach, pt advised to call for assistance. Maintain bed alarm for pt safety.

## 2022-09-10 NOTE — PT/OT/SLP PROGRESS
Physical Therapy Treatment    Patient Name:  Leighton Carroll   MRN:  [id number]    Recommendations:     Discharge Recommendations:  rehabilitation facility   Discharge Equipment Recommendations:  (TBD PENDING PROGRESS)   Barriers to discharge:  condition    Assessment:     Leighton Carroll is a 42 y.o. male admitted with a medical diagnosis of Acute respiratory failure with hypoxia and hypercarbia.  He presents with the following impairments/functional limitations:  weakness, impaired endurance, impaired sensation, gait instability, impaired functional mobility, impaired self care skills, impaired balance, decreased lower extremity function, decreased safety awareness, decreased ROM, impaired cardiopulmonary response to activity .  Pt slightly impulsive but cooperative and pleasant to work with. Pt was Min A for OOB mobility due to impulsivity and wire management. He was slow to follow commands and needed reinforcement for safe technique and mobility. Pt continues to be appropriate for inpatient rehab.    Rehab Prognosis: Fair; patient would benefit from acute skilled PT services to address these deficits and reach maximum level of function.    Recent Surgery: * No surgery found *      Plan:     During this hospitalization, patient to be seen 6 x/week to address the identified rehab impairments via gait training, therapeutic activities, therapeutic exercises and progress toward the following goals:    Plan of Care Expires:  10/08/22    Subjective     Chief Complaint: no complaints  Patient/Family Comments/goals: no comment  Pain/Comfort:  Pain Rating 1: 0/10  Pain Rating Post-Intervention 1: 0/10      Objective:     Communicated with nurse prior to session.  Patient found HOB elevated with oxygen, blood pressure cuff, pulse ox (continuous), peripheral IV, salomon catheter, telemetry upon PT entry to room.     General Precautions: Standard, aspiration, NPO, respiratory, fall   Orthopedic Precautions:N/A   Braces:  N/A  Respiratory Status: Nasal cannula, flow 3 L/min     Functional Mobility:  Bed Mobility:     Scooting: supervision  Supine to Sit: supervision  Sit to Supine: supervision  Transfers:     Sit to Stand:  minimum assistance with rolling walker  Toilet Transfer: minimum assistance with  rolling walker  using  Step Transfer  Gait: 5-10 ft in room with RW and Min A cueing for safety and slower tiera      AM-PAC 6 CLICK MOBILITY  Turning over in bed (including adjusting bedclothes, sheets and blankets)?: 4  Sitting down on and standing up from a chair with arms (e.g., wheelchair, bedside commode, etc.): 3  Moving from lying on back to sitting on the side of the bed?: 4  Moving to and from a bed to a chair (including a wheelchair)?: 3  Need to walk in hospital room?: 3  Climbing 3-5 steps with a railing?: 3  Basic Mobility Total Score: 20       Therapeutic Activities and Exercises:  Bed sheets, jeovany pads changed    Patient left HOB elevated with all lines intact, call button in reach, and sig other present..    GOALS:   Multidisciplinary Problems       Physical Therapy Goals          Problem: Physical Therapy    Goal Priority Disciplines Outcome Goal Variances Interventions   Physical Therapy Goal     PT, PT/OT Ongoing, Progressing     Description: Goals to be met by: 10/8/22    Patient will increase functional independence with mobility by performin. Supine<>sit with CGA with appropriate use of hospital bed features. - MET  2. Sit<>stand with CGA with LRAD.  3. Sitting EOB x5 min with upright posture with SBA.   4. Activity x5 min on appropriate supplemental O2 with SpO2 >90%.  5. Standing x1 min with CGA with LRAD and upright posture.   6. Gait x 10 feet with modA with LRAD.                       Time Tracking:     PT Received On: 09/10/22  PT Start Time: 1427     PT Stop Time: 1453  PT Total Time (min): 26 min     Billable Minutes: Gait Training 13 co-treat with OT for patient safety    Treatment Type:  Treatment  PT/PTA: PT     PTA Visit Number: 0     09/10/2022

## 2022-09-10 NOTE — PROGRESS NOTES
"[facility] - Intensive Care [facility] Medicine  Progress Note    Patient Name: Leighton Carroll  MRN: [id number]  Patient Class: IP- Inpatient   Admission Date: 8/26/2022  Length of Stay: 15 days  Attending Physician: Alexia Abel MD  Primary Care Provider: Primary Doctor No        Subjective:     Principal Problem:Acute respiratory failure with hypoxia and hypercarbia        HPI:  Patient's HPI is adapted from notes of Dr. Schuler, Dr. Morin and Dr. Hall (Located within [facility]).   Patient arrived in [facility] ICU intubated, pt's girlfriend's number not on file, unable to verify events PTA.     Leighton Carroll is a 42 year old man with a PMHx of depression, hx of drug overdose (7/11/2022, buproprion and gabapentin), polysubstance abuse (meth, heroin) and nicotine dependence.    He was presented to [facility] on 8/25/2022 via EMS with shortness of breath and dry cough. He was reportedly found down by his girlfriend at home yesterday evening (8/25/2022) and was given multiple rounds of chest compressions. He woke up and began having shortness of breath and called EMS, which found him saturating at 66% on room air, which increased to 90s with nonrebreather mask. Patient denied subjective fever, chills, sick contacts, chest pain, palpitations, abdominal pain.     At Located [facility], patient had a fever to 101, with leukocytosis (20) and lactic acidosis (3.0) with a normal procal. CTA chest showed patchy perihilar opacities bilaterally most pronounced in right lower lobe. Covid, Group A strep, respiratory influenza panel -ve. D-dimer was elevated (3.68), but CTPA negative for PE. Tox postive for THC only. CXR was -ve for pneumothorax. Patient was started on IV Cefepime and IV Vancomycin.     Patient was initially on BiPAP but became agitated and was intubated. Patient reportedly had "red spit". Patient was difficult to sedate requiring propofol and precedex and multiple doses of versed and ketamine, thus became hypotensive " after intubation and was started on levophed.     Patient is transferred to [facility] ICU, [facility] medicine, for management of acute respiratory failure with hypoxia and hypercapnia.         Overview/Hospital Course:  Admitted with acute hypoxemic respiratory failure.  Blood cultures demonstrated Acinetobacter, Pseudomonas luteola.  Pulmonology and ID consulted.  TTE performed without evidence of vegetation. Repeat blood culture showed bacillus in 1/4 bottles but suspected contaminant.  Opted for 14 day course of cefepime. Developed ARDS and started chemical paralysis/proning as well as ARDSnet protocol. Palliative consulted given the severity of his illness and likely extended recovery. Respiratory status was slow to improve but gradually had decreasing oxygenation/pressure requirements. Had repeat fevers and sputum culture showed staph aureus; vancomycin added. Extubated on 9/7/22.       Interval History: No acute events. On 8L HFNC and now down to 5L, reports feeling better and voice stronger.    Review of Systems   Constitutional:  Negative for chills and fever.   Respiratory:  Positive for shortness of breath.    Cardiovascular:  Negative for chest pain.   Neurological:  Positive for weakness.   Objective:     Vital Signs (Most Recent):  Temp: 98 °F (36.7 °C) (09/10/22 1130)  Pulse: 98 (09/10/22 1330)  Resp: 17 (09/10/22 1330)  BP: (!) 135/90 (09/10/22 1330)  SpO2: 96 % (09/10/22 1330)   Vital Signs (24h Range):  Temp:  [98 °F (36.7 °C)-99 °F (37.2 °C)] 98 °F (36.7 °C)  Pulse:  [] 98  Resp:  [15-28] 17  SpO2:  [89 %-99 %] 96 %  BP: (113-148)/(74-90) 135/90     Weight: 86.2 kg (190 lb)  Body mass index is 28.06 kg/m².    Intake/Output Summary (Last 24 hours) at 9/10/2022 1550  Last data filed at 9/10/2022 0543  Gross per 24 hour   Intake 2256.41 ml   Output 725 ml   Net 1531.41 ml        Physical Exam  Vitals and nursing note reviewed.   Constitutional:       General: He is not in acute distress.      Appearance: He is well-developed.   HENT:      Head: Normocephalic and atraumatic.      Comments: HFNC in place     Nose: Nose normal.   Eyes:      General:         Right eye: No discharge.         Left eye: No discharge.      Conjunctiva/sclera: Conjunctivae normal.   Cardiovascular:      Rate and Rhythm: Normal rate.      Pulses: Normal pulses.   Pulmonary:      Effort: Pulmonary effort is normal. No respiratory distress.      Comments: Course breath sounds bilaterally, but even and unlabored  Abdominal:      General: Bowel sounds are normal.      Palpations: Abdomen is soft.      Tenderness: There is no abdominal tenderness. There is no guarding or rebound.   Musculoskeletal:         General: Normal range of motion.      Right lower leg: No edema.      Left lower leg: No edema.   Skin:     General: Skin is warm and dry.      Comments: Extensive tatoos throughout entire body   Neurological:      Comments: Awake, alert, oriented x 2, answers simple questions with weak voice and follow commands     Significant Labs:   CBC:  Recent Labs   Lab 09/09/22  0421 09/10/22  0056 09/10/22  0433   WBC 17.80* 17.14* 16.16*   HGB 13.2* 13.3* 13.2*   HCT 39.0* 39.5* 38.8*   * 1,043* 961*   GRAN 75.1*  13.4* 74.1*  12.7* 71.4  11.5*   LYMPH 10.4*  1.9 12.3*  2.1 12.7*  2.1   MONO 10.6  1.9* 9.9  1.7* 11.3  1.8*   EOS 0.2 0.2 0.3   BASO 0.10 0.06 0.10     CMP:  Recent Labs   Lab 09/08/22  0336 09/09/22  0421 09/10/22  0433   * 136 135*   K 3.3* 3.2* 3.5   CL 93* 97 102   CO2 29 27 23   BUN 39* 41* 33*   CREATININE 0.7 0.7 0.6   * 102 106   CALCIUM 9.9 10.0 9.8   MG 2.3 2.1 2.0   PHOS 3.0 2.9 3.2   ALKPHOS  --  93  --    AST  --  81*  --    ALT  --  122*  --    BILITOT  --  0.9  --    PROT  --  8.3  --    ALBUMIN  --  3.1*  --    ANIONGAP 13 12 10        Significant Imaging:   All imaging were reviewed      Assessment/Plan:      * Acute respiratory failure with hypoxia and hypercarbia  Bilateral PNA,  ARDS, septic shock (resolved), bacteremia, h/o drug overdose, polysubstance abuse, leukocytosis, encephalopathy, dysphagia  - Hypoxic to 60s on arrival and failed BiPAP 2/2 agitation and intubated at OSH. Tox positive for THC. D-dimer elevated but CTA negative for PE. COVID, RSV negative.  - Continue fentanyl gtt and propofol gtt (weaned off ketamine gtt, midazolam gtt since last night)  - Completed cefepime 2g IV q8hr for 14 day total course. Blood cultures showed acinetobacter, pseudomonas luteola  - Repeat blood cultures for fever; added vancomycin for resp culture showing Staph, patient to complete full course, 2 more days left  - ARDSnet protocol; proning/supinating. Improving O2; discontinued cisatracurium gtt.  - s/p furosemide 40mg IV q6hr, metolazone 5mg per OG daily, goal mildly net negative; stopped  - s/p dexamethasone 20mg IV daily 5 days tapered down to 10mg IV daily for 5 days (currently on 10 mg until 9/11/22)  - Extubated on 9/7/22 and currently on 5L   - Weaned off Precedex gtt  - Failed swallow evaluation again today, and will plan for NG placement and start tube feedings  - ENT evaluation, plan for consult on Monday and patient to continue to work with therapy  - PT/OT and speech  - As per Pulm/critical care following and appreciate input  - Will plan to step down to floor later today    Hypokalemia  - Due to diuresis  - Replete and monitor with repeat labs    Bilateral pneumonia  - As above.    Hematuria  - Gross hematuria; not present on repeat UA.    Gram-negative bacteremia  - As above.    Septic shock  - As above.    History of drug overdose  - As above.    Depression  - Continue buproprion 150mg per OG daily, quetiapine 200mg per OG qHS.  - Resume divalproex ER 500mg 1g PO daily, risperiodone 2mg PO daily when appropriate.      VTE Risk Mitigation (From admission, onward)         Ordered     enoxaparin injection 40 mg  Daily         08/26/22 1422     IP VTE HIGH RISK PATIENT  Once          08/26/22 1422     Place sequential compression device  Until discontinued         08/26/22 1422     Place MARGRET hose  Until discontinued         08/26/22 1422                Critical care time spent on the evaluation and treatment of severe organ dysfunction, review of pertinent labs and imaging studies, discussions with consulting providers and discussions with patient/family:  35 minutes.        Alexia Abel MD  Department of Hospital Medicine   [facility] - Intensive Care [facility], [place]

## 2022-09-10 NOTE — PLAN OF CARE
Problem: Physical Therapy  Goal: Physical Therapy Goal  Description: Goals to be met by: 10/8/22    Patient will increase functional independence with mobility by performin. Supine<>sit with CGA with appropriate use of hospital bed features. - MET  2. Sit<>stand with CGA with LRAD.  3. Sitting EOB x5 min with upright posture with SBA.   4. Activity x5 min on appropriate supplemental O2 with SpO2 >90%.  5. Standing x1 min with CGA with LRAD and upright posture.   6. Gait x 10 feet with modA with LRAD.  Outcome: Ongoing, Progressing     Pt slightly impulsive but cooperative and pleasant to work with. Pt was Min A for OOB mobility due to impulsivity and wire management. He was slow to follow commands and needed reinforcement for safe technique and mobility. Pt continues to be appropriate for inpatient rehab.

## 2022-09-10 NOTE — PT/OT/SLP PROGRESS
Speech Language Pathology Treatment    Patient Name:  Leighton Carroll   MRN:  [id number]  Admitting Diagnosis: Acute respiratory failure with hypoxia and hypercarbia    Recommendations:                 Recommendations:                General Recommendations:    Continue speech pathology daily for the remediation of moderate to severe oral and pharyngeal dysphagia, cognitive communication deficits  Recommend ENT consult due to persistent dysphonia     Diet recommendations:    SOLIDS: NPO  LIQUIDS: Thin 3-5 mls of water by spoon only at this time  Oral trial of purees and solids in speech therapy only at time  Consideration of an alternate feeding method     Aspiration Precautions  Ice chips in moderation  3-5 mls of water only from a spoon  2 swallows per spoonful of liquid  Cough and swallow to eject any material from airway entrance     General Precautions: Standard, aspiration, NPO, respiratory      Communication strategies:      Subjective     RN stating patient is stable for SLP treatment   Family member at bedside   SLP in for dysphagia therapy s/p MBSS 9/9    Respiratory Status: Nasal Cannula 8L high flow     Objective:     Has the patient been evaluated by SLP for swallowing?   No  Keep patient NPO? Yes   Current Respiratory Status:    High flow nasal cannula 8L     Cognitive-Communicative Status:  Pt awake, alert. Ongoing reduced ability to sustain attention, required redirection every 2-3 minutes and repetition of commands for follow through. Able to follow simple, 1-step commands related to oral intake with 70% acc this date, 90% given command twice.    Voice:   Vocal quality remains dysphonic characterized by low volume and moderately raspy. Intermittent aphonia, required verbal cues to achieve adequate phonation. Cough continues to sound unproductive and weak, however observed to be effective in ejecting trace residuals from airway during MBSS. Increased phonation achieved after throat clearing and cough.  "    Swallowing:  Consistencies Assessed:  Ice chips x2  Thin liquids- 3-5mls x10  Puree 1/2 tsp amounts x7  Solids: deferred due to level of residuals on MBSS      Oral Phase:   Dcr lip seal, required moderate cues to close oral cavity when attempting to initiate swallow  Pt eventually able to independently follow through with closed mouth swallow on 5/10 sips of thin liquids   Dcr formation of a cohesive bolus, anterior spillage of liquids x1  Prompt a-p transport of purees given mild cues   No oral residuals after the swallow      Pharyngeal Phase:   Trigger of the swallow reflex appears delayed (observed to be delayed on MBSS)  No overt s/s of aspiration during intake of thin liquids or purees this date: no direct cough after swallow  HOWEVER, as oral trials increased, pt with wet vocal quality and wet cough  Required max cues to swallow twice after each bolus  Required verbal cues to "swallow hard" and visual cues (holding up fingers) to swallow twice         Education: discussed continued NPO status with pt and pt's family member based on results of MBSS and risk of further compromise to respiratory status. Pt denied questions regarding MBSS. Discussed care team's discussion regarding alternate feeding method and continued dysphagia treatment at this time.     Assessment:     Leighton Carroll is a 42 y.o. male with an SLP diagnosis of  moderate to severe oral and pharyngeal Dysphagia based on MBSS completed 9/9. Pt also demonstrates Cognitive-Linguistic Impairment, and Dysphonia. Recommend continued SLP treatment.    Goals:   Multidisciplinary Problems       SLP Goals          Problem: SLP    Goal Priority Disciplines Outcome   SLP Goal     SLP Ongoing, Progressing   Description: 1. Pt will be able to consume 3-5 mls of water from a spoon with no signs of airway threat or aspiration given max assistance  2. Pt will be able to consume 1/2 tsp amounts of purees with no signs of airway threat or aspiration given max " assistance  3. Pt will be able to focus and sustain attention to simple familiar tasks for 15 min with moderate redirection required  4. Increase orientation to person, place, date, reason for hospitalization to 50% acc given max verbal and written cues                       Plan:     Patient to be seen:  daily   Plan of Care expires:  09/30/22  Plan of Care reviewed with:  patient (RN, MD via secure chat)   SLP Follow-Up:  Yes       Discharge recommendations:  rehabilitation facility       Time Tracking:     SLP Treatment Date:   09/10/22  Speech Start Time:  1237  Speech Stop Time:  1253     Speech Total Time (min):  16 min    Billable Minutes: Treatment Swallowing Dysfunction 16 min    09/10/2022

## 2022-09-10 NOTE — ASSESSMENT & PLAN NOTE
Bilateral PNA, ARDS, septic shock (resolved), bacteremia, h/o drug overdose, polysubstance abuse, leukocytosis, encephalopathy  - Hypoxic to 60s on arrival and failed BiPAP 2/2 agitation and intubated at OSH. Tox positive for THC. D-dimer elevated but CTA negative for PE. COVID, RSV negative.  - Continue fentanyl gtt and propofol gtt (weaned off ketamine gtt, midazolam gtt since last night)  - Continue cefepime 2g IV q8hr for 14 day total course. Blood cultures showed acinetobacter, pseudomonas luteola. Repeat blood cultures with fever; added vancomycin for resp culture showing staph.  - ARDSnet protocol; proning/supinating. Improving O2; discontinued cisatracurium gtt.  - s/p furosemide 40mg IV q6hr, metolazone 5mg per OG daily, goal mildly net negative.   - s/p dexamethasone 20mg IV daily 5 days tapered down to 10mg IV daily for 5 days (currently on 10 mg until 9/11/22)  - Palliative consulted given persistent vent requirements; appreciate assistance.  - Extubated on 9/7/22 and currently on 12L HFNC  - Weaned off Precedex gtt,  - Failed swallow evaluation yesterday, and again today  - PT/OT   - As per Pulm/critical care

## 2022-09-10 NOTE — PROGRESS NOTES
[facility] Pulmonary/Critical Care Fellow Progress Note:    Brief Hx: 41 yo M w/ PMHx of drug use (heroin, methamphetamine, others) admitted  to [facility] for hypoxemic and hypercapnic respiratory failure after being found down, presumably 2/2 drug overdose and in shock. He received CPR from his GF and did wake up when EMS found him and he presented to the ED with leukocytosis, lactic acidosis, febrile. CTA showed diffuse dense bilateral infiltrates. UDS+ for THC. Blood cultures grew acinetobacter pittii & pesudomonas luteola and bacillus cereus for which he has been receiving cefepime. Given his CT findings and evolution of infiltrates on CXR, he was treated as ARDS. He started proning on  and started receiving steroids for ARDS dosing on  with last proned event on 9/3. He had repeat fevers in the ICU thereafter and respiratory cultures showed MRSA and he was started on vancomycin on . The patient was extubated on  AM to 4-5L NC but had rising oxygen requirements and has had issues with swallowing. Developing a granulocytosis and reactive thrombocytopenia so repeat infectious work up ordered on .     Subjective:  On 5L NC this morning. Still seen by speech therapy with continued aspiration. Still not able to tolerate caloric intake by mouth secondary to aspiration.    Objective:  Last 24 Hour Vital Signs:  BP  Min: 113/84  Max: 148/90  Temp  Av.5 °F (36.9 °C)  Min: 98 °F (36.7 °C)  Max: 99 °F (37.2 °C)  Pulse  Av.9  Min: 96  Max: 119  Resp  Av.6  Min: 0  Max: 28  SpO2  Av.6 %  Min: 89 %  Max: 99 %  Body mass index is 28.06 kg/m².  I/O last 3 completed shifts:  In: 2256.4 [P.O.:50; I.V.:632.9; IV Piggyback:1573.5]  Out:  [Urine:]      Physical Exam:  General: Awake and alert, responding to questions properly and voice sounds stronger today  HENT:  NCAT; anicteric sclera; (+)nasal cannula; NG tube placed  Cardio:  Regular rate and rhythm with normal S1 and S2;  "no murmurs or rubs  Resp:  respirations unlabored; +rhonchi in posterior lung fields, no wheezing or crackles  Abdom:  Soft, non-distended  Extrem:  WWP with no clubbing, cyanosis or edema, small cuts on hands and legs that are scabbed over  Pulses:  2+ and symmetric distally  Neuro:  Alert and awake, moving all 4 extremities    Assessment & Plan:     42M with history of drug use (heroin, methamphetamine, "whatever he can get his hands on") presents with hypoxemic and hypercapnic respiratory failure presumably secondary to drug overdose requiring intubation found to have bacteremia and diffuse lung infiltrates.      Acute hypoxic and hypercapnic respiratory failure  In setting of presumed drug overdose (fentanyl?)  With diffuse bilateral pulmonary infiltrates on CT chest, +fevers and leukocytosis  Sputum (+) staph aureus - on Vancomycin Day 6 of 8  RIP negative  JOVANA/ANCA negative - no new hemoptysis noted  Blood cultures positive with negative respiratory culture on admission  Blood cultures with pseudomonas luteola and acinetobacter pitii -- patient with negative TTE for valve vegetations  - plan for 14d of treatment - stop after 9/7  Down to 2-3L NC with increased O2 thereafter, NT suction PRN, chest PT ordered  Safe for downgrade to floor  Multifocal PNA & ARDS  Started prone 8/30 - stopped after 9/3  given improvement in oxygenation  Respiratory culture with MRSA  - represents VAP - started on vancomycin 9/5 - 9/12   Started steroids for ARDS 9/1 - 20mg for 5 days and then 10mg for 5 days  Aspiration vs. Inhalation injury/pneumonitis vs. Septic emboli vs. Vasculitis (less likely)  Lab and culture workup as above  No new evidence of hemoptysis  completed cefepime - 14d total, on vanc - 7d total planned  Urine legionella negative, strep Ag negative  HIV negative, viral PCR negative, Hep panel (+)hep C, negative acute hepatitis   CK elevated on different admission, Myositis panel pending, JOVANA & ANCA " negative  Sedation requirements  Will monitor for any withdrawal  Pt did not require any medications while in the ICU  Dysphagia  Pt with persistent aspiration s/p intubation for ARDS  Followed by speech therapy  NG Tube placed 9/10  Recommend ENT consult due to persistent dysphagia with h/o of OP/nasal stabbing    Okay to transfer to floor from ICU standpoint. NG tube placed for nutrition with decreasing oxygen requirements.     Code: FULL  DVT: Lovenox  GI: Famotidine  Feeds: NG tube feeds  Sedation: none    Justin Ellerman, MD  Pulm/CC Fellow

## 2022-09-10 NOTE — PROGRESS NOTES
Patient VSS.O2 sats well maintained on High Flow 8L. Patient cooperative and following command. Urine output via urinal 750 ml for shift. Patient tolerating ice chips.

## 2022-09-10 NOTE — PROGRESS NOTES
Pharmacokinetic Assessment Follow Up: IV Vancomycin    Vancomycin serum concentration assessment(s):    The trough level was drawn correctly and can be used to guide therapy at this time. The measurement is below the desired definitive target range of 15 to 20 mcg/mL.    Vancomycin Regimen Plan:    Change regimen to Vancomycin 1750 mg IV every 12 hours with next serum trough concentration measured at 2330 prior to the dose on 9/11/22    Drug levels (last 3 results):  Recent Labs   Lab Result Units 09/07/22  1152 09/10/22  0001   Vancomycin, Random ug/mL 14.7  --    Vancomycin-Trough ug/mL  --  11.2       Pharmacy will continue to follow and monitor vancomycin.    Please contact pharmacy at extension [phone] for questions regarding this assessment.    Thank you for the consult,   Breanne Khan       Patient brief summary:  Leighton Carroll is a 42 y.o. male initiated on antimicrobial therapy with IV Vancomycin for treatment of lower respiratory infection      Drug Allergies:   Review of patient's allergies indicates:  No Known Allergies    Actual Body Weight:   86.2 kg    Renal Function:   Estimated Creatinine Clearance: 148 mL/min (based on SCr of 0.7 mg/dL).,     Dialysis Method (if applicable):  N/A    CBC (last 72 hours):  Recent Labs   Lab Result Units 09/07/22  0406 09/08/22  0337 09/09/22  0421 09/10/22  0056   WBC K/uL 13.09* 14.47* 17.80* 17.14*   Hemoglobin g/dL 11.3* 12.7* 13.2* 13.3*   Hematocrit % 34.4* 38.6* 39.0* 39.5*   Platelets K/uL 617* 754* 980* 1,043*   Gran % % 72.4 78.6* 75.1* 74.1*   Lymph % % 15.0* 10.0* 10.4* 12.3*   Mono % % 8.3 8.6 10.6 9.9   Eosinophil % % 2.1 0.5 1.1 1.1   Basophil % % 0.5 0.4 0.6 0.4   Differential Method  Automated Automated Automated Automated       Metabolic Panel (last 72 hours):  Recent Labs   Lab Result Units 09/07/22  0406 09/08/22  0336 09/09/22  0421 09/09/22  1317   Sodium mmol/L 139 135* 136  --    Potassium mmol/L 2.8* 3.3* 3.2*  --    Chloride mmol/L 88* 93* 97   --    CO2 mmol/L 40* 29 27  --    Glucose mg/dL 101 117* 102  --    Glucose, UA   --   --   --  Negative   BUN mg/dL 60* 39* 41*  --    Creatinine mg/dL 0.8 0.7 0.7  --    Albumin g/dL 2.6*  --  3.1*  --    Total Bilirubin mg/dL 0.5  --  0.9  --    Alkaline Phosphatase U/L 93  --  93  --    AST U/L 84*  --  81*  --    ALT U/L 102*  --  122*  --    Magnesium mg/dL 2.5 2.3 2.1  --    Phosphorus mg/dL 3.3 3.0 2.9  --        Vancomycin Administrations:  vancomycin given in the last 96 hours                     vancomycin 1.5 g in dextrose 5 % 250 mL IVPB (ready to mix) (mg) 1,500 mg New Bag 09/10/22 0059     1,500 mg New Bag 09/09/22 1200     1,500 mg New Bag 09/08/22 2333     1,500 mg New Bag  1141     1,500 mg New Bag  0010      Restarted 09/07/22 1214     1,500 mg New Bag  1209     1,500 mg New Bag 09/06/22 2331     1,500 mg New Bag  1130                    Microbiologic Results:  Microbiology Results (last 7 days)       Procedure Component Value Units Date/Time    Blood culture [[id number]] Collected: 09/10/22 0007    Order Status: Sent Specimen: Blood Updated: 09/10/22 0008    Blood culture [[id number]] Collected: 09/05/22 1205    Order Status: Completed Specimen: Blood Updated: 09/09/22 2022     Blood Culture, Routine No Growth to date      No Growth to date      No Growth to date      No Growth to date      No Growth to date    Blood culture [[id number]] Collected: 09/05/22 1210    Order Status: Completed Specimen: Blood Updated: 09/09/22 2022     Blood Culture, Routine No Growth to date      No Growth to date      No Growth to date      No Growth to date      No Growth to date    Blood culture [[id number]]     Order Status: Sent Specimen: Blood     Culture, Respiratory with Gram Stain [[id number]]  (Abnormal)  (Susceptibility) Collected: 09/03/22 1344    Order Status: Completed Specimen: Respiratory from Endotracheal Aspirate Updated: 09/06/22 0937     Respiratory Culture No Pseudomonas isolated.      METHICILLIN  RESISTANT STAPHYLOCOCCUS AUREUS  Many        PATRICK ALBICANS  Moderate       Gram Stain (Respiratory) Few WBC's     Gram Stain (Respiratory) Rare Gram positive cocci

## 2022-09-10 NOTE — ASSESSMENT & PLAN NOTE
Bilateral PNA, ARDS, septic shock (resolved), bacteremia, h/o drug overdose, polysubstance abuse, leukocytosis, encephalopathy, dysphagia  - Hypoxic to 60s on arrival and failed BiPAP 2/2 agitation and intubated at OSH. Tox positive for THC. D-dimer elevated but CTA negative for PE. COVID, RSV negative.  - Continue fentanyl gtt and propofol gtt (weaned off ketamine gtt, midazolam gtt since last night)  - Completed cefepime 2g IV q8hr for 14 day total course. Blood cultures showed acinetobacter, pseudomonas luteola  - Repeat blood cultures for fever; added vancomycin for resp culture showing Staph, patient to complete full course, 2 more days left  - ARDSnet protocol; proning/supinating. Improving O2; discontinued cisatracurium gtt.  - s/p furosemide 40mg IV q6hr, metolazone 5mg per OG daily, goal mildly net negative; stopped  - s/p dexamethasone 20mg IV daily 5 days tapered down to 10mg IV daily for 5 days (currently on 10 mg until 9/11/22)  - Extubated on 9/7/22 and currently on 5L   - Weaned off Precedex gtt  - Failed swallow evaluation again today, and will plan for NG placement and start tube feedings  - ENT evaluation, plan for consult on Monday and patient to continue to work with therapy  - PT/OT and speech  - As per Pulm/critical care following and appreciate input  - Will plan to step down to floor later today

## 2022-09-10 NOTE — PT/OT/SLP PROGRESS
Occupational Therapy   Treatment    Name: Leighton Carroll  MRN: [id number]  Admitting Diagnosis:  Acute respiratory failure with hypoxia and hypercarbia       Recommendations:     Discharge Recommendations: rehabilitation facility  Discharge Equipment Recommendations:   (TBD pending progress)  Barriers to discharge:   (current functional level)    Assessment:   Found on O2 NC though participating in session on room air with O2 sats decreasing to 88-89 though quickly improving to above 90%.  HR increasing to 139 BPM after ambulating bed>toilet.  MIN A for functional transfers and short household level ambulation task using RW.  Requiring assist for thorough back roly hygiene though handed wipes and able to assist with wiping while in stance X 4 wipes.  Hand hygiene while seated EOB with setup of needed items and cues for task initiation.  Demos some impulsivity with standing abruptly and also needs increased processing time to follow through with commands.  Progressing towards goals.  Continued recommendation of post acute OT in IRF.  To benefit from continued acute care OT services to increase independence in self-care/functional transfers.  Continue POC.     Leighton Carroll is a 42 y.o. male with a medical diagnosis of Acute respiratory failure with hypoxia and hypercarbia.  He presents with below deficits decreasing independence in self-care/functional transfers. Performance deficits affecting function are weakness, impaired endurance, impaired self care skills, impaired functional mobility, gait instability, decreased lower extremity function, decreased upper extremity function, impaired balance, decreased safety awareness, decreased coordination, impaired cognition, impaired cardiopulmonary response to activity.     Rehab Prognosis:  Good; patient would benefit from acute skilled OT services to address these deficits and reach maximum level of function.       Plan:     Patient to be seen 5 x/week to address the above  listed problems via self-care/home management, therapeutic activities, therapeutic exercises  Plan of Care Expires: 10/08/22  Plan of Care Reviewed with: patient, friend    Subjective     Pain/Comfort:  Pain Rating 1: 0/10  Pain Rating Post-Intervention 1: 0/10    Objective:     Communicated with: Naveen IRVIN RN prior to session.  Patient found HOB elevated with oxygen, peripheral IV, salomon catheter (ICU monitoring) and friend present upon OT entry to room.    General Precautions: Standard, aspiration, fall, respiratory, NPO, contact   Orthopedic Precautions:N/A   Braces: N/A  Respiratory Status: Nasal cannula, flow 2 L/min     Occupational Performance:     Bed Mobility:    Supine<>sit with SBA, increased time, and HOB elevated when coming into sit.     Functional Mobility/Transfers:  Sit>stand EOB>RW with MIN A and ambulating short household distance bed<>toilet with cues for maintaining  on RW as Pt. Attempting to discard as well as when to initiate safe stance as Pt. Abruptly attempting to stand from bed.  Step transfer to toilet with use of grab bar with MIN A.      Activities of Daily Living:  Found with skid markings on bed pad and sheets with sheets changed while out of bed.  Pt. Requiring assist for thorough back roly hygiene in stance with BUE supported at RW and using LUE for backward reaching X 4 wipes to assist with back roly hygiene.  Hand hygiene seated EOB with setup of soapy towel, rinsing towel, and drying towel.  Requires increased time for follow through of commands for participation in self-care tasks.       [facility] 6 Click ADL: 12    Treatment & Education:  Supine<>sit with SBA, increased time, and HOB elevated when coming into sit.   Sit>stand EOB>RW with MIN A and ambulating short household distance bed<>toilet with cues for maintaining  on RW as Pt. Attempting to discard as well as when to initiate safe stance as Pt. Abruptly attempting to stand from bed.  Step transfer to toilet with  use of grab bar with MIN A.    Found with skid markings on bed pad and sheets with sheets changed while out of bed.  Pt. Requiring assist for thorough back roly hygiene in stance with BUE supported at RW and using LUE for backward reaching X 4 wipes to assist with back roly hygiene.  Hand hygiene seated EOB with setup of soapy towel, rinsing towel, and drying towel.  Requires increased time for follow through of commands for participation in self-care tasks.     Patient left HOB elevated with all lines intact, call button in reach, nursing notified, and friend present    GOALS:   Multidisciplinary Problems       Occupational Therapy Goals          Problem: Occupational Therapy    Goal Priority Disciplines Outcome Interventions   Occupational Therapy Goal     OT, PT/OT Ongoing, Progressing    Description: Goals to be met by: 9/22/22     Patient will increase functional independence with ADLs by performing:    UE Dressing with Ceiba.  LE Dressing with Ceiba.  Grooming while standing with Ceiba.  Toileting from toilet with Ceiba for hygiene and clothing management.   Toilet transfer to toilet with Ceiba.                         Time Tracking:     OT Date of Treatment: 09/10/22  OT Start Time: 1427  OT Stop Time: 1450  OT Total Time (min): 23 min    Billable Minutes:Self Care/Home Management 23    Overlap with PT for portions of session due to complex nature of patient, tolerance for therapeutic modalities, and safety with mobility to decrease fall risk for patient and caregiver injury requiring two skilled therapists to provide interventions.      OT/BRIANA: OT          9/10/2022

## 2022-09-10 NOTE — PLAN OF CARE
Problem: Adult Inpatient Plan of Care  Goal: Patient-Specific Goal (Individualized)  Outcome: Ongoing, Progressing  Goal: Absence of Hospital-Acquired Illness or Injury  Outcome: Ongoing, Progressing  Goal: Optimal Comfort and Wellbeing  Outcome: Ongoing, Progressing  Goal: Readiness for Transition of Care  Outcome: Ongoing, Progressing     Problem: Skin and Tissue Injury (Mechanical Ventilation, Invasive)  Goal: Absence of Device-Related Skin and Tissue Injury  Outcome: Ongoing, Progressing     Problem: Adjustment to Illness (Sepsis/Septic Shock)  Goal: Optimal Coping  Outcome: Ongoing, Progressing     Problem: Glycemic Control Impaired (Sepsis/Septic Shock)  Goal: Blood Glucose Level Within Desired Range  Outcome: Ongoing, Progressing     Problem: Nutrition Impaired (Sepsis/Septic Shock)  Goal: Optimal Nutrition Intake  Outcome: Ongoing, Progressing     Problem: Skin Injury Risk Increased  Goal: Skin Health and Integrity  Outcome: Ongoing, Progressing     Problem: Fall Injury Risk  Goal: Absence of Fall and Fall-Related Injury  Outcome: Ongoing, Progressing     Problem: Fluid Imbalance (Pneumonia)  Goal: Fluid Balance  Outcome: Ongoing, Progressing     Problem: Infection (Pneumonia)  Goal: Resolution of Infection Signs and Symptoms  Outcome: Ongoing, Progressing     Problem: Respiratory Compromise (Pneumonia)  Goal: Effective Oxygenation and Ventilation  Outcome: Ongoing, Progressing     Problem: ARDS (Acute Respiratory Distress Syndrome)  Goal: Effective Oxygenation  Outcome: Ongoing, Progressing     Problem: Coping Ineffective  Goal: Effective Coping  Outcome: Ongoing, Progressing     Problem: Impaired Wound Healing  Goal: Optimal Wound Healing  Outcome: Ongoing, Progressing     Problem: Spiritual Distress Risk or Actual  Goal: Spiritual Wellbeing  Outcome: Ongoing, Progressing     Problem: Oral Intake Inadequate  Goal: Improved Oral Intake  Outcome: Ongoing, Progressing

## 2022-09-10 NOTE — SUBJECTIVE & OBJECTIVE
Interval History: No acute events. On 12L HFNC, reports feeling better and voice stronger. Off Precedex.    Review of Systems   Constitutional:  Negative for chills and fever.   Respiratory:  Positive for shortness of breath.    Cardiovascular:  Negative for chest pain.   Neurological:  Positive for weakness.   Objective:     Vital Signs (Most Recent):  Temp: 98.2 °F (36.8 °C) (09/09/22 1505)  Pulse: 110 (09/09/22 2001)  Resp: (!) 26 (09/09/22 2001)  BP: 132/86 (09/09/22 1800)  SpO2: 98 % (09/09/22 2001)   Vital Signs (24h Range):  Temp:  [97.7 °F (36.5 °C)-99.2 °F (37.3 °C)] 98.2 °F (36.8 °C)  Pulse:  [] 110  Resp:  [14-48] 26  SpO2:  [88 %-99 %] 98 %  BP: (109-156)/(66-86) 132/86     Weight: 86.2 kg (190 lb)  Body mass index is 28.06 kg/m².    Intake/Output Summary (Last 24 hours) at 9/9/2022 2149  Last data filed at 9/9/2022 1758  Gross per 24 hour   Intake 1926.36 ml   Output 1170 ml   Net 756.36 ml        Physical Exam  Vitals and nursing note reviewed.   Constitutional:       General: He is not in acute distress.     Appearance: He is well-developed.   HENT:      Head: Normocephalic and atraumatic.      Comments: HFNC in place     Nose: Nose normal.   Eyes:      General:         Right eye: No discharge.         Left eye: No discharge.      Conjunctiva/sclera: Conjunctivae normal.   Cardiovascular:      Rate and Rhythm: Normal rate.      Pulses: Normal pulses.   Pulmonary:      Effort: Pulmonary effort is normal. No respiratory distress.      Comments: Course breath sounds bilaterally, but even and unlabored  Abdominal:      General: Bowel sounds are normal.      Palpations: Abdomen is soft.      Tenderness: There is no abdominal tenderness. There is no guarding or rebound.   Musculoskeletal:         General: Normal range of motion.      Right lower leg: No edema.      Left lower leg: No edema.   Skin:     General: Skin is warm and dry.      Comments: Extensive tatoos throughout entire body    Neurological:      Comments: Awake, alert, oriented x 2, answers simple questions with weak voice and follow commands     Significant Labs:   CBC:  Recent Labs   Lab 09/07/22  0406 09/08/22  0337 09/09/22  0421   WBC 13.09* 14.47* 17.80*   HGB 11.3* 12.7* 13.2*   HCT 34.4* 38.6* 39.0*   * 754* 980*   GRAN 72.4  9.5* 78.6*  11.4* 75.1*  13.4*   LYMPH 15.0*  2.0 10.0*  1.4 10.4*  1.9   MONO 8.3  1.1* 8.6  1.3* 10.6  1.9*   EOS 0.3 0.1 0.2   BASO 0.06 0.06 0.10     CMP:  Recent Labs   Lab 09/07/22  0406 09/08/22  0336 09/09/22  0421    135* 136   K 2.8* 3.3* 3.2*   CL 88* 93* 97   CO2 40* 29 27   BUN 60* 39* 41*   CREATININE 0.8 0.7 0.7    117* 102   CALCIUM 9.4 9.9 10.0   MG 2.5 2.3 2.1   PHOS 3.3 3.0 2.9   ALKPHOS 93  --  93   AST 84*  --  81*   *  --  122*   BILITOT 0.5  --  0.9   PROT 7.6  --  8.3   ALBUMIN 2.6*  --  3.1*   ANIONGAP 11 13 12        Significant Imaging:   All imaging were reviewed

## 2022-09-10 NOTE — PROGRESS NOTES
"[facility] - Intensive Care [facility] Medicine  Progress Note    Patient Name: Leighton Carroll  MRN: [id number]  Patient Class: IP- Inpatient   Admission Date: 8/26/2022  Length of Stay: 14 days  Attending Physician: Alexia Abel MD  Primary Care Provider: Primary Doctor No        Subjective:     Principal Problem:Acute respiratory failure with hypoxia and hypercarbia        HPI:  Patient's HPI is adapted from notes of Dr. Schuler, Dr. Morin and Dr. Hall ([facility]).   Patient arrived in [facility] ICU intubated, pt's girlfriend's number not on file, unable to verify events PTA.     Leighton Carroll is a 42 year old man with a PMHx of depression, hx of drug overdose (7/11/2022, buproprion and gabapentin), polysubstance abuse (meth, heroin) and nicotine dependence.    He was presented to [facility] on 8/25/2022 via EMS with shortness of breath and dry cough. He was reportedly found down by his girlfriend at home yesterday evening (8/25/2022) and was given multiple rounds of chest compressions. He woke up and began having shortness of breath and called EMS, which found him saturating at 66% on room air, which increased to 90s with nonrebreather mask. Patient denied subjective fever, chills, sick contacts, chest pain, palpitations, abdominal pain.     At [facility], patient had a fever to 101, with leukocytosis (20) and lactic acidosis (3.0) with a normal procal. CTA chest showed patchy perihilar opacities bilaterally most pronounced in right lower lobe. Covid, Group A strep, respiratory influenza panel -ve. D-dimer was elevated (3.68), but CTPA negative for PE. Tox postive for THC only. CXR was -ve for pneumothorax. Patient was started on IV Cefepime and IV Vancomycin.     Patient was initially on BiPAP but became agitated and was intubated. Patient reportedly had "red spit". Patient was difficult to sedate requiring propofol and precedex and multiple doses of versed and ketamine, thus became hypotensive " after intubation and was started on levophed.     Patient is transferred to [facility] ICU, [facility] medicine, for management of acute respiratory failure with hypoxia and hypercapnia.         Overview/Hospital Course:  Admitted with acute hypoxemic respiratory failure.  Blood cultures demonstrated Acinetobacter, Pseudomonas luteola.  Pulmonology and ID consulted.  TTE performed without evidence of vegetation. Repeat blood culture showed bacillus in 1/4 bottles but suspected contaminant.  Opted for 14 day course of cefepime. Developed ARDS and started chemical paralysis/proning as well as ARDSnet protocol. Palliative consulted given the severity of his illness and likely extended recovery. Respiratory status was slow to improve but gradually had decreasing oxygenation/pressure requirements. Had repeat fevers and sputum culture showed staph aureus; vancomycin added. Extubated on 9/7/22.       Interval History: No acute events. On 12L HFNC, reports feeling better and voice stronger. Off Precedex.    Review of Systems   Constitutional:  Negative for chills and fever.   Respiratory:  Positive for shortness of breath.    Cardiovascular:  Negative for chest pain.   Neurological:  Positive for weakness.   Objective:     Vital Signs (Most Recent):  Temp: 98.2 °F (36.8 °C) (09/09/22 1505)  Pulse: 110 (09/09/22 2001)  Resp: (!) 26 (09/09/22 2001)  BP: 132/86 (09/09/22 1800)  SpO2: 98 % (09/09/22 2001)   Vital Signs (24h Range):  Temp:  [97.7 °F (36.5 °C)-99.2 °F (37.3 °C)] 98.2 °F (36.8 °C)  Pulse:  [] 110  Resp:  [14-48] 26  SpO2:  [88 %-99 %] 98 %  BP: (109-156)/(66-86) 132/86     Weight: 86.2 kg (190 lb)  Body mass index is 28.06 kg/m².    Intake/Output Summary (Last 24 hours) at 9/9/2022 2149  Last data filed at 9/9/2022 1758  Gross per 24 hour   Intake 1926.36 ml   Output 1170 ml   Net 756.36 ml        Physical Exam  Vitals and nursing note reviewed.   Constitutional:       General: He is not in acute distress.      Appearance: He is well-developed.   HENT:      Head: Normocephalic and atraumatic.      Comments: HFNC in place     Nose: Nose normal.   Eyes:      General:         Right eye: No discharge.         Left eye: No discharge.      Conjunctiva/sclera: Conjunctivae normal.   Cardiovascular:      Rate and Rhythm: Normal rate.      Pulses: Normal pulses.   Pulmonary:      Effort: Pulmonary effort is normal. No respiratory distress.      Comments: Course breath sounds bilaterally, but even and unlabored  Abdominal:      General: Bowel sounds are normal.      Palpations: Abdomen is soft.      Tenderness: There is no abdominal tenderness. There is no guarding or rebound.   Musculoskeletal:         General: Normal range of motion.      Right lower leg: No edema.      Left lower leg: No edema.   Skin:     General: Skin is warm and dry.      Comments: Extensive tatoos throughout entire body   Neurological:      Comments: Awake, alert, oriented x 2, answers simple questions with weak voice and follow commands     Significant Labs:   CBC:  Recent Labs   Lab 09/07/22  0406 09/08/22  0337 09/09/22  0421   WBC 13.09* 14.47* 17.80*   HGB 11.3* 12.7* 13.2*   HCT 34.4* 38.6* 39.0*   * 754* 980*   GRAN 72.4  9.5* 78.6*  11.4* 75.1*  13.4*   LYMPH 15.0*  2.0 10.0*  1.4 10.4*  1.9   MONO 8.3  1.1* 8.6  1.3* 10.6  1.9*   EOS 0.3 0.1 0.2   BASO 0.06 0.06 0.10     CMP:  Recent Labs   Lab 09/07/22  0406 09/08/22  0336 09/09/22  0421    135* 136   K 2.8* 3.3* 3.2*   CL 88* 93* 97   CO2 40* 29 27   BUN 60* 39* 41*   CREATININE 0.8 0.7 0.7    117* 102   CALCIUM 9.4 9.9 10.0   MG 2.5 2.3 2.1   PHOS 3.3 3.0 2.9   ALKPHOS 93  --  93   AST 84*  --  81*   *  --  122*   BILITOT 0.5  --  0.9   PROT 7.6  --  8.3   ALBUMIN 2.6*  --  3.1*   ANIONGAP 11 13 12        Significant Imaging:   All imaging were reviewed      Assessment/Plan:      * Acute respiratory failure with hypoxia and hypercarbia  Bilateral PNA, ARDS,  septic shock (resolved), bacteremia, h/o drug overdose, polysubstance abuse, leukocytosis, encephalopathy  - Hypoxic to 60s on arrival and failed BiPAP 2/2 agitation and intubated at OSH. Tox positive for THC. D-dimer elevated but CTA negative for PE. COVID, RSV negative.  - Continue fentanyl gtt and propofol gtt (weaned off ketamine gtt, midazolam gtt since last night)  - Continue cefepime 2g IV q8hr for 14 day total course. Blood cultures showed acinetobacter, pseudomonas luteola. Repeat blood cultures with fever; added vancomycin for resp culture showing staph.  - ARDSnet protocol; proning/supinating. Improving O2; discontinued cisatracurium gtt.  - s/p furosemide 40mg IV q6hr, metolazone 5mg per OG daily, goal mildly net negative.   - s/p dexamethasone 20mg IV daily 5 days tapered down to 10mg IV daily for 5 days (currently on 10 mg until 9/11/22)  - Palliative consulted given persistent vent requirements; appreciate assistance.  - Extubated on 9/7/22 and currently on 12L HFNC  - Weaned off Precedex gtt,  - Failed swallow evaluation yesterday, and again today  - PT/OT   - As per Pulm/critical care    Hypokalemia  - Due to diuresis  - Replete and monitor with repeat labs    Bilateral pneumonia  - As above.    Hematuria  - Gross hematuria; not present on repeat UA.    Gram-negative bacteremia  - As above.    Septic shock  - As above.    History of drug overdose  - As above.    Depression  - Continue buproprion 150mg per OG daily, quetiapine 200mg per OG qHS.  - Resume divalproex ER 500mg 1g PO daily, risperiodone 2mg PO daily when appropriate.      VTE Risk Mitigation (From admission, onward)         Ordered     enoxaparin injection 40 mg  Daily         08/26/22 1422     IP VTE HIGH RISK PATIENT  Once         08/26/22 1422     Place sequential compression device  Until discontinued         08/26/22 1422     Place MARGRET hose  Until discontinued         08/26/22 1422                Critical care time spent on the  evaluation and treatment of severe organ dysfunction, review of pertinent labs and imaging studies, discussions with consulting providers and discussions with patient/family: 35  minutes.        Alexia Abel MD  Department of Hospital Medicine   [facility] - Intensive Care [facility]

## 2022-09-11 LAB
ALBUMIN SERPL BCP-MCNC: 3.2 G/DL (ref 3.5–5.2)
ALP SERPL-CCNC: 77 U/L (ref 55–135)
ALT SERPL W/O P-5'-P-CCNC: 103 U/L (ref 10–44)
ANION GAP SERPL CALC-SCNC: 10 MMOL/L (ref 8–16)
AST SERPL-CCNC: 45 U/L (ref 10–40)
BASOPHILS # BLD AUTO: 0.12 K/UL (ref 0–0.2)
BASOPHILS NFR BLD: 0.8 % (ref 0–1.9)
BILIRUB DIRECT SERPL-MCNC: 0.3 MG/DL (ref 0.1–0.3)
BILIRUB SERPL-MCNC: 0.7 MG/DL (ref 0.1–1)
BUN SERPL-MCNC: 31 MG/DL (ref 6–20)
CALCIUM SERPL-MCNC: 9.6 MG/DL (ref 8.7–10.5)
CHLORIDE SERPL-SCNC: 105 MMOL/L (ref 95–110)
CO2 SERPL-SCNC: 23 MMOL/L (ref 23–29)
CREAT SERPL-MCNC: 0.7 MG/DL (ref 0.5–1.4)
DIFFERENTIAL METHOD: ABNORMAL
EOSINOPHIL # BLD AUTO: 0.5 K/UL (ref 0–0.5)
EOSINOPHIL NFR BLD: 3.2 % (ref 0–8)
ERYTHROCYTE [DISTWIDTH] IN BLOOD BY AUTOMATED COUNT: 15 % (ref 11.5–14.5)
EST. GFR  (NO RACE VARIABLE): >60 ML/MIN/1.73 M^2
GLUCOSE SERPL-MCNC: 101 MG/DL (ref 70–110)
HCT VFR BLD AUTO: 39.1 % (ref 40–54)
HGB BLD-MCNC: 12.9 G/DL (ref 14–18)
IMM GRANULOCYTES # BLD AUTO: 0.27 K/UL (ref 0–0.04)
IMM GRANULOCYTES NFR BLD AUTO: 1.8 % (ref 0–0.5)
LYMPHOCYTES # BLD AUTO: 2.6 K/UL (ref 1–4.8)
LYMPHOCYTES NFR BLD: 16.8 % (ref 18–48)
MAGNESIUM SERPL-MCNC: 2.2 MG/DL (ref 1.6–2.6)
MCH RBC QN AUTO: 29.1 PG (ref 27–31)
MCHC RBC AUTO-ENTMCNC: 33 G/DL (ref 32–36)
MCV RBC AUTO: 88 FL (ref 82–98)
MONOCYTES # BLD AUTO: 1.9 K/UL (ref 0.3–1)
MONOCYTES NFR BLD: 12.3 % (ref 4–15)
MRSA ID BY PCR: NEGATIVE
NEUTROPHILS # BLD AUTO: 10.1 K/UL (ref 1.8–7.7)
NEUTROPHILS NFR BLD: 65.1 % (ref 38–73)
NRBC BLD-RTO: 0 /100 WBC
PHOSPHATE SERPL-MCNC: 3.7 MG/DL (ref 2.7–4.5)
PLATELET # BLD AUTO: 928 K/UL (ref 150–450)
PMV BLD AUTO: 9.3 FL (ref 9.2–12.9)
POTASSIUM SERPL-SCNC: 3.9 MMOL/L (ref 3.5–5.1)
PROT SERPL-MCNC: 7.7 G/DL (ref 6–8.4)
RBC # BLD AUTO: 4.44 M/UL (ref 4.6–6.2)
SODIUM SERPL-SCNC: 138 MMOL/L (ref 136–145)
STAPH AUREUS ID BY PCR: NEGATIVE
WBC # BLD AUTO: 15.42 K/UL (ref 3.9–12.7)

## 2022-09-11 PROCEDURE — 99233 PR SUBSEQUENT HOSPITAL CARE,LEVL III: ICD-10-PCS | Mod: ,,, | Performed by: HOSPITALIST

## 2022-09-11 PROCEDURE — 27000221 HC OXYGEN, UP TO 24 HOURS

## 2022-09-11 PROCEDURE — 84100 ASSAY OF PHOSPHORUS: CPT | Performed by: INTERNAL MEDICINE

## 2022-09-11 PROCEDURE — 25000003 PHARM REV CODE 250: Performed by: STUDENT IN AN ORGANIZED HEALTH CARE EDUCATION/TRAINING PROGRAM

## 2022-09-11 PROCEDURE — 36415 COLL VENOUS BLD VENIPUNCTURE: CPT | Performed by: HOSPITALIST

## 2022-09-11 PROCEDURE — 25000003 PHARM REV CODE 250: Performed by: HOSPITALIST

## 2022-09-11 PROCEDURE — 94761 N-INVAS EAR/PLS OXIMETRY MLT: CPT

## 2022-09-11 PROCEDURE — 25000003 PHARM REV CODE 250: Performed by: NURSE PRACTITIONER

## 2022-09-11 PROCEDURE — 80202 ASSAY OF VANCOMYCIN: CPT | Performed by: HOSPITALIST

## 2022-09-11 PROCEDURE — 25000003 PHARM REV CODE 250

## 2022-09-11 PROCEDURE — 25000003 PHARM REV CODE 250: Performed by: SURGERY

## 2022-09-11 PROCEDURE — 87150 DNA/RNA AMPLIFIED PROBE: CPT | Performed by: STUDENT IN AN ORGANIZED HEALTH CARE EDUCATION/TRAINING PROGRAM

## 2022-09-11 PROCEDURE — 94640 AIRWAY INHALATION TREATMENT: CPT

## 2022-09-11 PROCEDURE — 63600175 PHARM REV CODE 636 W HCPCS: Performed by: HOSPITALIST

## 2022-09-11 PROCEDURE — 83735 ASSAY OF MAGNESIUM: CPT | Performed by: INTERNAL MEDICINE

## 2022-09-11 PROCEDURE — 94664 DEMO&/EVAL PT USE INHALER: CPT

## 2022-09-11 PROCEDURE — 99233 SBSQ HOSP IP/OBS HIGH 50: CPT | Mod: ,,, | Performed by: HOSPITALIST

## 2022-09-11 PROCEDURE — 27000207 HC ISOLATION

## 2022-09-11 PROCEDURE — 92526 ORAL FUNCTION THERAPY: CPT

## 2022-09-11 PROCEDURE — 80048 BASIC METABOLIC PNL TOTAL CA: CPT | Performed by: INTERNAL MEDICINE

## 2022-09-11 PROCEDURE — 36415 COLL VENOUS BLD VENIPUNCTURE: CPT | Performed by: INTERNAL MEDICINE

## 2022-09-11 PROCEDURE — 85025 COMPLETE CBC W/AUTO DIFF WBC: CPT | Performed by: INTERNAL MEDICINE

## 2022-09-11 PROCEDURE — S4991 NICOTINE PATCH NONLEGEND: HCPCS

## 2022-09-11 PROCEDURE — 99900035 HC TECH TIME PER 15 MIN (STAT)

## 2022-09-11 PROCEDURE — 80076 HEPATIC FUNCTION PANEL: CPT | Performed by: INTERNAL MEDICINE

## 2022-09-11 PROCEDURE — 92507 TX SP LANG VOICE COMM INDIV: CPT

## 2022-09-11 PROCEDURE — 25000242 PHARM REV CODE 250 ALT 637 W/ HCPCS: Performed by: STUDENT IN AN ORGANIZED HEALTH CARE EDUCATION/TRAINING PROGRAM

## 2022-09-11 PROCEDURE — 21400001 HC TELEMETRY ROOM

## 2022-09-11 PROCEDURE — 63600175 PHARM REV CODE 636 W HCPCS

## 2022-09-11 RX ORDER — LIDOCAINE HYDROCHLORIDE 20 MG/ML
JELLY TOPICAL ONCE
Status: COMPLETED | OUTPATIENT
Start: 2022-09-11 | End: 2022-09-11

## 2022-09-11 RX ORDER — DEXMEDETOMIDINE HYDROCHLORIDE 4 UG/ML
0-1.4 INJECTION, SOLUTION INTRAVENOUS CONTINUOUS
Status: DISCONTINUED | OUTPATIENT
Start: 2022-09-11 | End: 2022-09-11

## 2022-09-11 RX ADMIN — POTASSIUM BICARBONATE 25 MEQ: 25 TABLET, EFFERVESCENT ORAL at 09:09

## 2022-09-11 RX ADMIN — BUPROPION HYDROCHLORIDE 150 MG: 75 TABLET, FILM COATED ORAL at 09:09

## 2022-09-11 RX ADMIN — ENOXAPARIN SODIUM 40 MG: 100 INJECTION SUBCUTANEOUS at 05:09

## 2022-09-11 RX ADMIN — POLYETHYLENE GLYCOL 3350 17 G: 17 POWDER, FOR SOLUTION ORAL at 09:09

## 2022-09-11 RX ADMIN — VANCOMYCIN HYDROCHLORIDE 1750 MG: 500 INJECTION, POWDER, LYOPHILIZED, FOR SOLUTION INTRAVENOUS at 11:09

## 2022-09-11 RX ADMIN — SENNOSIDES AND DOCUSATE SODIUM 1 TABLET: 50; 8.6 TABLET ORAL at 09:09

## 2022-09-11 RX ADMIN — LIDOCAINE HYDROCHLORIDE: 20 JELLY TOPICAL at 09:09

## 2022-09-11 RX ADMIN — NICOTINE 1 PATCH: 14 PATCH TRANSDERMAL at 08:09

## 2022-09-11 RX ADMIN — VANCOMYCIN HYDROCHLORIDE 1750 MG: 500 INJECTION, POWDER, LYOPHILIZED, FOR SOLUTION INTRAVENOUS at 12:09

## 2022-09-11 RX ADMIN — Medication 4 ML: at 08:09

## 2022-09-11 RX ADMIN — SENNOSIDES AND DOCUSATE SODIUM 1 TABLET: 50; 8.6 TABLET ORAL at 08:09

## 2022-09-11 RX ADMIN — DEXMEDETOMIDINE HYDROCHLORIDE 0.2 MCG/KG/HR: 4 INJECTION, SOLUTION INTRAVENOUS at 05:09

## 2022-09-11 RX ADMIN — BUPROPION HYDROCHLORIDE 150 MG: 75 TABLET, FILM COATED ORAL at 08:09
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
## 2022-09-11 NOTE — ASSESSMENT & PLAN NOTE
Bilateral PNA, ARDS, septic shock (resolved), bacteremia, h/o drug overdose, polysubstance abuse, leukocytosis, encephalopathy, dysphagia  - Hypoxic to 60s on arrival and failed BiPAP 2/2 agitation and intubated at OSH. Tox positive for THC. D-dimer elevated but CTA negative for PE. COVID, RSV negative.  - Continue fentanyl gtt and propofol gtt (weaned off ketamine gtt, midazolam gtt since last night)  - Completed cefepime 2g IV q8hr for 14 day total course. Blood cultures showed acinetobacter, pseudomonas luteola  - Repeat blood cultures for fever; added vancomycin for resp culture showing Staph, patient to complete full course  - ARDSnet protocol; proning/supinating. Improving O2; discontinued cisatracurium gtt.  - s/p furosemide 40mg IV q6hr, metolazone 5mg per OG daily, goal mildly net negative; stopped  - s/p dexamethasone 20mg IV daily 5 days tapered down to 10mg IV daily for 5 days (currently on 10 mg until 9/11/22)  - Extubated on 9/7/22 and currently on 3L   - Weaned off Precedex gtt  - Stepped down to floor on 9/10  - Failed swallow evaluation again  - NG placement and start tube feedings, will replace NG again  - ENT evaluation, plan for consult on Monday and patient to continue to work with therapy  - PT/OT and speech  - Repeat blood culture from 9/10/22 with G+ cocci in clusters and patient already on Vancomycin  - Will have to extend duration of treatment with vancomycin and repeat blood cultures in a.m.  - As per Pulm/critical care following and appreciate input

## 2022-09-11 NOTE — PROGRESS NOTES
"[facility] Medicine  Progress Note    Patient Name: Leighton Carroll  MRN: [id number]  Patient Class: IP- Inpatient   Admission Date: 8/26/2022  Length of Stay: 16 days  Attending Physician: Alexia Abel MD  Primary Care Provider: Primary Doctor No        Subjective:     Principal Problem:Acute respiratory failure with hypoxia and hypercarbia        HPI:  Patient's HPI is adapted from notes of Dr. Schuler, Dr. Morin and Dr. Hall ([facility]).   Patient arrived in [facility] ICU intubated, pt's girlfriend's number not on file, unable to verify events PTA.     Leighton Carroll is a 42 year old man with a PMHx of depression, hx of drug overdose (7/11/2022, buproprion and gabapentin), polysubstance abuse (meth, heroin) and nicotine dependence.    He was presented to [facility] on 8/25/2022 via EMS with shortness of breath and dry cough. He was reportedly found down by his girlfriend at home yesterday evening (8/25/2022) and was given multiple rounds of chest compressions. He woke up and began having shortness of breath and called EMS, which found him saturating at 66% on room air, which increased to 90s with nonrebreather mask. Patient denied subjective fever, chills, sick contacts, chest pain, palpitations, abdominal pain.     At [facility], patient had a fever to 101, with leukocytosis (20) and lactic acidosis (3.0) with a normal procal. CTA chest showed patchy perihilar opacities bilaterally most pronounced in right lower lobe. Covid, Group A strep, respiratory influenza panel -ve. D-dimer was elevated (3.68), but CTPA negative for PE. Tox postive for THC only. CXR was -ve for pneumothorax. Patient was started on IV Cefepime and IV Vancomycin.     Patient was initially on BiPAP but became agitated and was intubated. Patient reportedly had "red spit". Patient was difficult to sedate requiring propofol and precedex and multiple doses of versed and ketamine, thus became hypotensive after " intubation and was started on levophed.     Patient is transferred to [facility] [place] [facility], [facility] medicine, for management of acute respiratory failure with hypoxia and hypercapnia.         Overview/Hospital Course:  Admitted with acute hypoxemic respiratory failure.  Blood cultures demonstrated Acinetobacter, Pseudomonas luteola.  Pulmonology and ID consulted.  TTE performed without evidence of vegetation. Repeat blood culture showed bacillus in 1/4 bottles but suspected contaminant.  Opted for 14 day course of cefepime. Developed ARDS and started chemical paralysis/proning as well as ARDSnet protocol. Palliative consulted given the severity of his illness and likely extended recovery. Respiratory status was slow to improve but gradually had decreasing oxygenation/pressure requirements. Had repeat fevers and sputum culture showed staph aureus; vancomycin added. Extubated on 9/7/22.  Stepped down to floor on 9/10/2022.      Interval History:  Patient pulled out NG tube last night.  Patient reports he is feeling better.  Weaned down to 3 L via nasal cannula.  No new complaints.    Review of Systems   Constitutional:  Negative for chills and fever.   Respiratory:  Positive for shortness of breath.    Cardiovascular:  Negative for chest pain.   Neurological:  Positive for weakness.   Objective:     Vital Signs (Most Recent):  Temp: 97.6 °F (36.4 °C) (09/11/22 1627)  Pulse: 88 (09/11/22 1627)  Resp: 18 (09/11/22 1627)  BP: 130/79 (09/11/22 1627)  SpO2: (!) 94 % (09/11/22 1627)   Vital Signs (24h Range):  Temp:  [97.6 °F (36.4 °C)-98.7 °F (37.1 °C)] 97.6 °F (36.4 °C)  Pulse:  [] 88  Resp:  [16-26] 18  SpO2:  [83 %-99 %] 94 %  BP: (119-146)/(63-93) 130/79     Weight: 86.2 kg (190 lb)  Body mass index is 28.06 kg/m².    Intake/Output Summary (Last 24 hours) at 9/11/2022 1807  Last data filed at 9/11/2022 1354  Gross per 24 hour   Intake 2207.44 ml   Output 350 ml   Net 1857.44 ml        Physical Exam  Vitals and nursing  note reviewed.   Constitutional:       General: He is not in acute distress.     Appearance: He is well-developed.   HENT:      Head: Normocephalic and atraumatic.      Comments: HFNC in place     Nose: Nose normal.   Eyes:      General:         Right eye: No discharge.         Left eye: No discharge.      Conjunctiva/sclera: Conjunctivae normal.   Cardiovascular:      Rate and Rhythm: Normal rate.      Pulses: Normal pulses.   Pulmonary:      Effort: Pulmonary effort is normal. No respiratory distress.      Comments: Course breath sounds bilaterally, but even and unlabored  Abdominal:      General: Bowel sounds are normal.      Palpations: Abdomen is soft.      Tenderness: There is no abdominal tenderness. There is no guarding or rebound.   Musculoskeletal:         General: Normal range of motion.      Right lower leg: No edema.      Left lower leg: No edema.   Skin:     General: Skin is warm and dry.      Comments: Extensive tatoos throughout entire body   Neurological:      Comments: Awake, alert, oriented x 3     Significant Labs:   CBC:  Recent Labs   Lab 09/10/22  0056 09/10/22  0433 09/11/22  0356   WBC 17.14* 16.16* 15.42*   HGB 13.3* 13.2* 12.9*   HCT 39.5* 38.8* 39.1*   PLT 1,043* 961* 928*   GRAN 74.1*  12.7* 71.4  11.5* 65.1  10.1*   LYMPH 12.3*  2.1 12.7*  2.1 16.8*  2.6   MONO 9.9  1.7* 11.3  1.8* 12.3  1.9*   EOS 0.2 0.3 0.5   BASO 0.06 0.10 0.12     CMP:  Recent Labs   Lab 09/09/22  0421 09/10/22  0433 09/11/22  0356    135* 138   K 3.2* 3.5 3.9   CL 97 102 105   CO2 27 23 23   BUN 41* 33* 31*   CREATININE 0.7 0.6 0.7    106 101   CALCIUM 10.0 9.8 9.6   MG 2.1 2.0 2.2   PHOS 2.9 3.2 3.7   ALKPHOS 93  --  77   AST 81*  --  45*   *  --  103*   BILITOT 0.9  --  0.7   PROT 8.3  --  7.7   ALBUMIN 3.1*  --  3.2*   ANIONGAP 12 10 10        Significant Imaging:   All imaging were reviewed      Assessment/Plan:      * Acute respiratory failure with hypoxia and  hypercarbia  Bilateral PNA, ARDS, septic shock (resolved), bacteremia, h/o drug overdose, polysubstance abuse, leukocytosis, encephalopathy, dysphagia  - Hypoxic to 60s on arrival and failed BiPAP 2/2 agitation and intubated at OSH. Tox positive for THC. D-dimer elevated but CTA negative for PE. COVID, RSV negative.  - Continue fentanyl gtt and propofol gtt (weaned off ketamine gtt, midazolam gtt since last night)  - Completed cefepime 2g IV q8hr for 14 day total course. Blood cultures showed acinetobacter, pseudomonas luteola  - Repeat blood cultures for fever; added vancomycin for resp culture showing Staph, patient to complete full course  - ARDSnet protocol; proning/supinating. Improving O2; discontinued cisatracurium gtt.  - s/p furosemide 40mg IV q6hr, metolazone 5mg per OG daily, goal mildly net negative; stopped  - s/p dexamethasone 20mg IV daily 5 days tapered down to 10mg IV daily for 5 days (currently on 10 mg until 9/11/22)  - Extubated on 9/7/22 and currently on 3L   - Weaned off Precedex gtt  - Stepped down to floor on 9/10  - Failed swallow evaluation again  - NG placement and start tube feedings, will replace NG again  - ENT evaluation, plan for consult on Monday and patient to continue to work with therapy  - PT/OT and speech  - Repeat blood culture from 9/10/22 with G+ cocci in clusters and patient already on Vancomycin  - Will have to extend duration of treatment with vancomycin and repeat blood cultures in a.m.  - As per Pulm/critical care following and appreciate input    Hypokalemia  - Due to diuresis  - Replete and monitor with repeat labs    Bilateral pneumonia  - As above.    Hematuria  - Gross hematuria; not present on repeat UA.    Gram-negative bacteremia  - As above.    Septic shock  - As above.    History of drug overdose  - As above.    Depression  - Continue buproprion 150mg per OG daily, quetiapine 200mg per OG qHS.  - Resume divalproex ER 500mg 1g PO daily, risperiodone 2mg PO daily  when appropriate.      VTE Risk Mitigation (From admission, onward)         Ordered     enoxaparin injection 40 mg  Daily         08/26/22 1422     IP VTE HIGH RISK PATIENT  Once         08/26/22 1422     Place sequential compression device  Until discontinued         08/26/22 1422     Place MARGRET hose  Until discontinued         08/26/22 1422                      Alexia Abel MD  Department of Hospital Medicine   [facility] ([facility])

## 2022-09-11 NOTE — PROGRESS NOTES
NG tube accidentally removed by patient.  VSS, Patient refusing replacement of NG tube at this time . Notified.MD.Will continue to follow.

## 2022-09-11 NOTE — PLAN OF CARE
Problem: Adult Inpatient Plan of Care  Goal: Patient-Specific Goal (Individualized)  Outcome: Ongoing, Progressing  Goal: Absence of Hospital-Acquired Illness or Injury  Outcome: Ongoing, Progressing  Goal: Optimal Comfort and Wellbeing  Outcome: Ongoing, Progressing  Goal: Readiness for Transition of Care  Outcome: Ongoing, Progressing     Problem: Nutrition Impairment (Mechanical Ventilation, Invasive)  Goal: Optimal Nutrition Delivery  Outcome: Ongoing, Progressing     Problem: Infection  Goal: Absence of Infection Signs and Symptoms  Outcome: Ongoing, Progressing

## 2022-09-11 NOTE — PLAN OF CARE
Plan of care reviewed with pt. Pt voiced understanding. NSR on monitor. No acute distress noted. NG tube placed. Pt awaiting transfer.    Side rails X2, bed in lowest position, call bell within reach, pt advised to call for assistance. Maintain bed alarm for pt safety. Monitor O2 sat.

## 2022-09-11 NOTE — NURSING
At appx 1020 this morning a 6 beat run of vtach was noted on CM. Patient was resting in bed, no signs/sx of distress, vitals stable. Dr Ellerman in unit, notified by RN. no new orders rec'd.

## 2022-09-11 NOTE — SUBJECTIVE & OBJECTIVE
Interval History:  Patient pulled out NG tube last night.  Patient reports he is feeling better.  Weaned down to 3 L via nasal cannula.  No new complaints.    Review of Systems   Constitutional:  Negative for chills and fever.   Respiratory:  Positive for shortness of breath.    Cardiovascular:  Negative for chest pain.   Neurological:  Positive for weakness.   Objective:     Vital Signs (Most Recent):  Temp: 97.6 °F (36.4 °C) (09/11/22 1627)  Pulse: 88 (09/11/22 1627)  Resp: 18 (09/11/22 1627)  BP: 130/79 (09/11/22 1627)  SpO2: (!) 94 % (09/11/22 1627)   Vital Signs (24h Range):  Temp:  [97.6 °F (36.4 °C)-98.7 °F (37.1 °C)] 97.6 °F (36.4 °C)  Pulse:  [] 88  Resp:  [16-26] 18  SpO2:  [83 %-99 %] 94 %  BP: (119-146)/(63-93) 130/79     Weight: 86.2 kg (190 lb)  Body mass index is 28.06 kg/m².    Intake/Output Summary (Last 24 hours) at 9/11/2022 1807  Last data filed at 9/11/2022 1354  Gross per 24 hour   Intake 2207.44 ml   Output 350 ml   Net 1857.44 ml        Physical Exam  Vitals and nursing note reviewed.   Constitutional:       General: He is not in acute distress.     Appearance: He is well-developed.   HENT:      Head: Normocephalic and atraumatic.      Comments: HFNC in place     Nose: Nose normal.   Eyes:      General:         Right eye: No discharge.         Left eye: No discharge.      Conjunctiva/sclera: Conjunctivae normal.   Cardiovascular:      Rate and Rhythm: Normal rate.      Pulses: Normal pulses.   Pulmonary:      Effort: Pulmonary effort is normal. No respiratory distress.      Comments: Course breath sounds bilaterally, but even and unlabored  Abdominal:      General: Bowel sounds are normal.      Palpations: Abdomen is soft.      Tenderness: There is no abdominal tenderness. There is no guarding or rebound.   Musculoskeletal:         General: Normal range of motion.      Right lower leg: No edema.      Left lower leg: No edema.   Skin:     General: Skin is warm and dry.      Comments:  Extensive tatoos throughout entire body   Neurological:      Comments: Awake, alert, oriented x 3     Significant Labs:   CBC:  Recent Labs   Lab 09/10/22  0056 09/10/22  0433 09/11/22  0356   WBC 17.14* 16.16* 15.42*   HGB 13.3* 13.2* 12.9*   HCT 39.5* 38.8* 39.1*   PLT 1,043* 961* 928*   GRAN 74.1*  12.7* 71.4  11.5* 65.1  10.1*   LYMPH 12.3*  2.1 12.7*  2.1 16.8*  2.6   MONO 9.9  1.7* 11.3  1.8* 12.3  1.9*   EOS 0.2 0.3 0.5   BASO 0.06 0.10 0.12     CMP:  Recent Labs   Lab 09/09/22  0421 09/10/22  0433 09/11/22  0356    135* 138   K 3.2* 3.5 3.9   CL 97 102 105   CO2 27 23 23   BUN 41* 33* 31*   CREATININE 0.7 0.6 0.7    106 101   CALCIUM 10.0 9.8 9.6   MG 2.1 2.0 2.2   PHOS 2.9 3.2 3.7   ALKPHOS 93  --  77   AST 81*  --  45*   *  --  103*   BILITOT 0.9  --  0.7   PROT 8.3  --  7.7   ALBUMIN 3.1*  --  3.2*   ANIONGAP 12 10 10        Significant Imaging:   All imaging were reviewed

## 2022-09-12 LAB
ANION GAP SERPL CALC-SCNC: 9 MMOL/L (ref 8–16)
BASOPHILS # BLD AUTO: 0.11 K/UL (ref 0–0.2)
BASOPHILS NFR BLD: 0.8 % (ref 0–1.9)
BUN SERPL-MCNC: 22 MG/DL (ref 6–20)
CALCIUM SERPL-MCNC: 10.3 MG/DL (ref 8.7–10.5)
CHLORIDE SERPL-SCNC: 107 MMOL/L (ref 95–110)
CO2 SERPL-SCNC: 21 MMOL/L (ref 23–29)
CREAT SERPL-MCNC: 0.7 MG/DL (ref 0.5–1.4)
DIFFERENTIAL METHOD: ABNORMAL
EOSINOPHIL # BLD AUTO: 0.8 K/UL (ref 0–0.5)
EOSINOPHIL NFR BLD: 5.7 % (ref 0–8)
ERYTHROCYTE [DISTWIDTH] IN BLOOD BY AUTOMATED COUNT: 15.1 % (ref 11.5–14.5)
EST. GFR  (NO RACE VARIABLE): >60 ML/MIN/1.73 M^2
GLUCOSE SERPL-MCNC: 89 MG/DL (ref 70–110)
HCT VFR BLD AUTO: 41 % (ref 40–54)
HGB BLD-MCNC: 13.4 G/DL (ref 14–18)
IMM GRANULOCYTES # BLD AUTO: 0.19 K/UL (ref 0–0.04)
IMM GRANULOCYTES NFR BLD AUTO: 1.5 % (ref 0–0.5)
LYMPHOCYTES # BLD AUTO: 2.5 K/UL (ref 1–4.8)
LYMPHOCYTES NFR BLD: 19.3 % (ref 18–48)
MAGNESIUM SERPL-MCNC: 2.3 MG/DL (ref 1.6–2.6)
MCH RBC QN AUTO: 29.3 PG (ref 27–31)
MCHC RBC AUTO-ENTMCNC: 32.7 G/DL (ref 32–36)
MCV RBC AUTO: 90 FL (ref 82–98)
MONOCYTES # BLD AUTO: 1.5 K/UL (ref 0.3–1)
MONOCYTES NFR BLD: 11.2 % (ref 4–15)
NEUTROPHILS # BLD AUTO: 8 K/UL (ref 1.8–7.7)
NEUTROPHILS NFR BLD: 61.5 % (ref 38–73)
NRBC BLD-RTO: 0 /100 WBC
PATH REV BLD -IMP: NORMAL
PHOSPHATE SERPL-MCNC: 4 MG/DL (ref 2.7–4.5)
PLATELET # BLD AUTO: 766 K/UL (ref 150–450)
PMV BLD AUTO: 9.1 FL (ref 9.2–12.9)
POTASSIUM SERPL-SCNC: 4.1 MMOL/L (ref 3.5–5.1)
RBC # BLD AUTO: 4.57 M/UL (ref 4.6–6.2)
SODIUM SERPL-SCNC: 137 MMOL/L (ref 136–145)
VANCOMYCIN TROUGH SERPL-MCNC: 16.4 UG/ML (ref 10–22)
WBC # BLD AUTO: 13.08 K/UL (ref 3.9–12.7)

## 2022-09-12 PROCEDURE — 31575 PR LARYNGOSCOPY, FLEXIBLE; DIAGNOSTIC: ICD-10-PCS | Mod: ,,, | Performed by: OTOLARYNGOLOGY

## 2022-09-12 PROCEDURE — 94761 N-INVAS EAR/PLS OXIMETRY MLT: CPT

## 2022-09-12 PROCEDURE — 25000003 PHARM REV CODE 250: Performed by: STUDENT IN AN ORGANIZED HEALTH CARE EDUCATION/TRAINING PROGRAM

## 2022-09-12 PROCEDURE — 97535 SELF CARE MNGMENT TRAINING: CPT

## 2022-09-12 PROCEDURE — 99233 SBSQ HOSP IP/OBS HIGH 50: CPT | Mod: 25,,, | Performed by: OTOLARYNGOLOGY

## 2022-09-12 PROCEDURE — 99233 PR SUBSEQUENT HOSPITAL CARE,LEVL III: ICD-10-PCS | Mod: ,,, | Performed by: HOSPITALIST

## 2022-09-12 PROCEDURE — 80048 BASIC METABOLIC PNL TOTAL CA: CPT | Performed by: INTERNAL MEDICINE

## 2022-09-12 PROCEDURE — 27000207 HC ISOLATION

## 2022-09-12 PROCEDURE — 63600175 PHARM REV CODE 636 W HCPCS

## 2022-09-12 PROCEDURE — 92507 TX SP LANG VOICE COMM INDIV: CPT

## 2022-09-12 PROCEDURE — 97530 THERAPEUTIC ACTIVITIES: CPT | Mod: CQ

## 2022-09-12 PROCEDURE — 94664 DEMO&/EVAL PT USE INHALER: CPT

## 2022-09-12 PROCEDURE — 99233 PR SUBSEQUENT HOSPITAL CARE,LEVL III: ICD-10-PCS | Mod: 25,,, | Performed by: OTOLARYNGOLOGY

## 2022-09-12 PROCEDURE — 85025 COMPLETE CBC W/AUTO DIFF WBC: CPT | Performed by: INTERNAL MEDICINE

## 2022-09-12 PROCEDURE — 25000003 PHARM REV CODE 250: Performed by: HOSPITALIST

## 2022-09-12 PROCEDURE — 94640 AIRWAY INHALATION TREATMENT: CPT

## 2022-09-12 PROCEDURE — 27000646 HC AEROBIKA DEVICE

## 2022-09-12 PROCEDURE — 92526 ORAL FUNCTION THERAPY: CPT

## 2022-09-12 PROCEDURE — 63600175 PHARM REV CODE 636 W HCPCS: Performed by: HOSPITALIST

## 2022-09-12 PROCEDURE — 25000242 PHARM REV CODE 250 ALT 637 W/ HCPCS: Performed by: STUDENT IN AN ORGANIZED HEALTH CARE EDUCATION/TRAINING PROGRAM

## 2022-09-12 PROCEDURE — 84100 ASSAY OF PHOSPHORUS: CPT | Performed by: INTERNAL MEDICINE

## 2022-09-12 PROCEDURE — 21400001 HC TELEMETRY ROOM

## 2022-09-12 PROCEDURE — 99900035 HC TECH TIME PER 15 MIN (STAT)

## 2022-09-12 PROCEDURE — 83735 ASSAY OF MAGNESIUM: CPT | Performed by: INTERNAL MEDICINE

## 2022-09-12 PROCEDURE — 87040 BLOOD CULTURE FOR BACTERIA: CPT | Mod: 59 | Performed by: HOSPITALIST

## 2022-09-12 PROCEDURE — 25000003 PHARM REV CODE 250

## 2022-09-12 PROCEDURE — 31575 DIAGNOSTIC LARYNGOSCOPY: CPT | Mod: ,,, | Performed by: OTOLARYNGOLOGY

## 2022-09-12 PROCEDURE — 36415 COLL VENOUS BLD VENIPUNCTURE: CPT | Performed by: HOSPITALIST

## 2022-09-12 PROCEDURE — S4991 NICOTINE PATCH NONLEGEND: HCPCS

## 2022-09-12 PROCEDURE — 99233 SBSQ HOSP IP/OBS HIGH 50: CPT | Mod: ,,, | Performed by: HOSPITALIST

## 2022-09-12 RX ADMIN — BUPROPION HYDROCHLORIDE 150 MG: 75 TABLET, FILM COATED ORAL at 10:09

## 2022-09-12 RX ADMIN — Medication 4 ML: at 07:09

## 2022-09-12 RX ADMIN — BUPROPION HYDROCHLORIDE 150 MG: 75 TABLET, FILM COATED ORAL at 08:09

## 2022-09-12 RX ADMIN — NICOTINE 1 PATCH: 14 PATCH TRANSDERMAL at 10:09

## 2022-09-12 RX ADMIN — VANCOMYCIN HYDROCHLORIDE 1750 MG: 500 INJECTION, POWDER, LYOPHILIZED, FOR SOLUTION INTRAVENOUS at 02:09

## 2022-09-12 RX ADMIN — ENOXAPARIN SODIUM 40 MG: 100 INJECTION SUBCUTANEOUS at 05:09

## 2022-09-12 NOTE — PLAN OF CARE
Problem: Adult Inpatient Plan of Care  Goal: Plan of Care Review  Outcome: Ongoing, Progressing  Goal: Patient-Specific Goal (Individualized)  Outcome: Ongoing, Progressing  Goal: Absence of Hospital-Acquired Illness or Injury  Outcome: Ongoing, Progressing  Goal: Optimal Comfort and Wellbeing  Outcome: Ongoing, Progressing  Goal: Readiness for Transition of Care  Outcome: Ongoing, Progressing     Problem: Communication Impairment (Mechanical Ventilation, Invasive)  Goal: Effective Communication  Outcome: Ongoing, Progressing     Problem: Device-Related Complication Risk (Mechanical Ventilation, Invasive)  Goal: Optimal Device Function  Outcome: Ongoing, Progressing     Problem: Inability to Wean (Mechanical Ventilation, Invasive)  Goal: Mechanical Ventilation Liberation  Outcome: Ongoing, Progressing     Problem: Nutrition Impairment (Mechanical Ventilation, Invasive)  Goal: Optimal Nutrition Delivery  Outcome: Ongoing, Progressing     Problem: Skin and Tissue Injury (Mechanical Ventilation, Invasive)  Goal: Absence of Device-Related Skin and Tissue Injury  Outcome: Ongoing, Progressing     Problem: Communication Impairment (Artificial Airway)  Goal: Effective Communication  Outcome: Ongoing, Progressing     Problem: Skin and Tissue Injury (Artificial Airway)  Goal: Absence of Device-Related Skin or Tissue Injury  Outcome: Ongoing, Progressing     Problem: Infection  Goal: Absence of Infection Signs and Symptoms  Outcome: Ongoing, Progressing

## 2022-09-12 NOTE — PLAN OF CARE
09/12/22 1314   Discharge Assessment   Assessment Type Discharge Planning Reassessment   Confirmed/corrected address, phone number and insurance Yes   Confirmed Demographics Correct on Facesheet   Source of Information patient;family   Communicated CHETAN with patient/caregiver Date not available/Unable to determine   Lives With significant other   Do you expect to return to your current living situation? No   Do you have help at home or someone to help you manage your care at home? Yes   Prior to hospitilization cognitive status: Alert/Oriented   Current cognitive status: Alert/Oriented   Walking or Climbing Stairs Difficulty none   Dressing/Bathing Difficulty none   Equipment Currently Used at Home none   Readmission within 30 days? No   Patient currently being followed by outpatient case management? No   Do you currently have service(s) that help you manage your care at home? No   Do you take prescription medications? Yes   Do you have prescription coverage? Yes   Do you have any problems affording any of your prescribed medications? TBD   Is the patient taking medications as prescribed? no   How do you get to doctors appointments? family or friend will provide   Are you on dialysis? No   Do you take coumadin? No   Discharge Plan A Rehab   Discharge Plan B Home with family   DME Needed Upon Discharge  none   Discharge Plan discussed with: Patient;Parent(s)   Discharge Barriers Identified None

## 2022-09-12 NOTE — PROGRESS NOTES
"[facility] Surg [facility] Medicine  Progress Note    Patient Name: Leighton Carroll  MRN: [id number]  Patient Class: IP- Inpatient   Admission Date: 8/26/2022  Length of Stay: 17 days  Attending Physician: Alexia Abel MD  Primary Care Provider: Primary Doctor No        Subjective:     Principal Problem:Acute respiratory failure with hypoxia and hypercarbia        HPI:  Patient's HPI is adapted from notes of Dr. Schuler, Dr. Morin and Dr. Hall ([facility]).   Patient arrived in [facility] ICU intubated, pt's girlfriend's number not on file, unable to verify events PTA.     Leighton Carroll is a 42 year old man with a PMHx of depression, hx of drug overdose (7/11/2022, buproprion and gabapentin), polysubstance abuse (meth, heroin) and nicotine dependence.    He was presented to [facility] on 8/25/2022 via EMS with shortness of breath and dry cough. He was reportedly found down by his girlfriend at home yesterday evening (8/25/2022) and was given multiple rounds of chest compressions. He woke up and began having shortness of breath and called EMS, which found him saturating at 66% on room air, which increased to 90s with nonrebreather mask. Patient denied subjective fever, chills, sick contacts, chest pain, palpitations, abdominal pain.     At [facility], patient had a fever to 101, with leukocytosis (20) and lactic acidosis (3.0) with a normal procal. CTA chest showed patchy perihilar opacities bilaterally most pronounced in right lower lobe. Covid, Group A strep, respiratory influenza panel -ve. D-dimer was elevated (3.68), but CTPA negative for PE. Tox postive for THC only. CXR was -ve for pneumothorax. Patient was started on IV Cefepime and IV Vancomycin.     Patient was initially on BiPAP but became agitated and was intubated. Patient reportedly had "red spit". Patient was difficult to sedate requiring propofol and precedex and multiple doses of versed and ketamine, thus became hypotensive after " intubation and was started on levophed.     Patient is transferred to [facility] ICU, [facility] medicine, for management of acute respiratory failure with hypoxia and hypercapnia.         Overview/Hospital Course:  Admitted with acute hypoxemic respiratory failure.  Blood cultures demonstrated Acinetobacter, Pseudomonas luteola.  Pulmonology and ID consulted.  TTE performed without evidence of vegetation. Repeat blood culture showed bacillus in 1/4 bottles but suspected contaminant.  Opted for 14 day course of cefepime. Developed ARDS and started chemical paralysis/proning as well as ARDSnet protocol. Palliative consulted given the severity of his illness and likely extended recovery. Respiratory status was slow to improve but gradually had decreasing oxygenation/pressure requirements. Had repeat fevers and sputum culture showed Staph aureus; vancomycin added. Extubated on 9/7/22.  Stepped down to floor on 9/10/2022.  Patient persistently failed swallow evaluation by speech therapy and he continued NPO status.  Attempted NG placement with tube feedings has been difficult given patient's ultimately ends up removing NG overnight.  PT and OT are recommending rehab placement.  Repeat blood cultures from 09/10/2022 remarkable for coag-negative staph, likely contaminant.      Interval History:  Patient pulled out NG tube again last night for the 2nd time.  Patient reports he is feeling better and wants to eat. Weaned down to 3 L via nasal cannula.  No new complaints.  Staff report that he is noncompliant with his NPO status with specific instructions for ice and taking medications as directed by speech therapy.  He was refusing NG placement.  Mother at the bedside, and extensive explanation done with patient and mother.    Review of Systems   Constitutional:  Negative for chills and fever.   Respiratory:  Positive for shortness of breath.    Cardiovascular:  Negative for chest pain.   Neurological:  Positive for weakness.    Objective:     Vital Signs (Most Recent):  Temp: 97.8 °F (36.6 °C) (09/12/22 1213)  Pulse: (!) 118 (09/12/22 1400)  Resp: 18 (09/12/22 1213)  BP: 135/84 (09/12/22 1213)  SpO2: (!) 93 % (09/12/22 1213)   Vital Signs (24h Range):  Temp:  [97.8 °F (36.6 °C)-98.9 °F (37.2 °C)] 97.8 °F (36.6 °C)  Pulse:  [] 118  Resp:  [18] 18  SpO2:  [93 %-96 %] 93 %  BP: (130-136)/(79-84) 135/84     Weight: 86.2 kg (190 lb)  Body mass index is 28.06 kg/m².    Intake/Output Summary (Last 24 hours) at 9/12/2022 1659  Last data filed at 9/12/2022 0000  Gross per 24 hour   Intake --   Output 400 ml   Net -400 ml        Physical Exam  Vitals and nursing note reviewed.   Constitutional:       General: He is not in acute distress.     Appearance: He is well-developed.   HENT:      Head: Normocephalic and atraumatic.      Comments: NC in place     Nose: Nose normal.   Eyes:      General:         Right eye: No discharge.         Left eye: No discharge.      Conjunctiva/sclera: Conjunctivae normal.   Cardiovascular:      Rate and Rhythm: Normal rate.      Pulses: Normal pulses.   Pulmonary:      Effort: Pulmonary effort is normal. No respiratory distress.      Comments: Course breath sounds bilaterally, but even and unlabored  Abdominal:      General: Bowel sounds are normal.      Palpations: Abdomen is soft.      Tenderness: There is no abdominal tenderness. There is no guarding or rebound.   Musculoskeletal:         General: Normal range of motion.      Right lower leg: No edema.      Left lower leg: No edema.   Skin:     General: Skin is warm and dry.      Comments: Extensive tatoos throughout entire body   Neurological:      Comments: Awake, alert, oriented x 3     Significant Labs:   CBC:  Recent Labs   Lab 09/10/22  0433 09/11/22  0356 09/12/22  0530   WBC 16.16* 15.42* 13.08*   HGB 13.2* 12.9* 13.4*   HCT 38.8* 39.1* 41.0   * 928* 766*   GRAN 71.4  11.5* 65.1  10.1* 61.5  8.0*   LYMPH 12.7*  2.1 16.8*  2.6 19.3  2.5    MONO 11.3  1.8* 12.3  1.9* 11.2  1.5*   EOS 0.3 0.5 0.8*   BASO 0.10 0.12 0.11     CMP:  Recent Labs   Lab 09/10/22  0433 09/11/22  0356 09/12/22  0531   * 138 137   K 3.5 3.9 4.1    105 107   CO2 23 23 21*   BUN 33* 31* 22*   CREATININE 0.6 0.7 0.7    101 89   CALCIUM 9.8 9.6 10.3   MG 2.0 2.2 2.3   PHOS 3.2 3.7 4.0   ALKPHOS  --  77  --    AST  --  45*  --    ALT  --  103*  --    BILITOT  --  0.7  --    PROT  --  7.7  --    ALBUMIN  --  3.2*  --    ANIONGAP 10 10 9        Significant Imaging:   All imaging were reviewed      Assessment/Plan:      * Acute respiratory failure with hypoxia and hypercarbia  Bilateral PNA, ARDS, septic shock (resolved), bacteremia, h/o drug overdose, polysubstance abuse, leukocytosis, encephalopathy, dysphagia, debility  - Hypoxic to 60s on arrival and failed BiPAP 2/2 agitation and intubated at OSH. Tox positive for THC. D-dimer elevated but CTA negative for PE. COVID, RSV negative.  - Pulm/critical followed and appreciate input  - Continue fentanyl gtt and propofol gtt (weaned off ketamine gtt, midazolam gtt since last night)  - Completed cefepime 2g IV q8hr for 14 day total course. Blood cultures showed acinetobacter, pseudomonas luteola  - Repeat blood cultures for fever; added vancomycin for resp culture showing Staph, patient to complete full course  - s/p ARDSnet protocol; proning/supinating  - s/p furosemide 40mg IV q6hr, metolazone 5mg per OG daily, goal mildly net negative; stopped  - s/p dexamethasone 20mg IV daily 5 days tapered down to 10mg IV daily for 5 days (last dose on 9/11/22)  - Extubated on 9/7/22 and currently on 3L   - Weaned off Precedex gtt   - Stepped down to floor on 9/10  - NG placement and started tube feedings, patient pulled out NG for the 2nd time   - ENT consult pending for evaluation for vocal cords and patient to continue to work with speech therapy therapy  - PT/OT and speech  - Repeat blood culture from 9/10/22 with G+ cocci  in clusters and patient already on Vancomycin; resulted today with coag-negative staph likely contaminant  - Repeat blood cultures done this morning prior to receiving updated culture results  - Follow up on speech recs and ENT consult  - Case management working on rehab placement    Hypokalemia  - Repleted and monitor with repeat labs    Bilateral pneumonia  - As above.    Hematuria  - Gross hematuria; not present on repeat UA  - Resolved    Gram-negative bacteremia  - As above, completed treatment    Septic shock  - As above, resolved    History of drug overdose  - As above.    Depression  - Continue buproprion 150mg per daily, quetiapine 200mg per qHS.  - Resume divalproex ER 500mg 1g PO daily, risperiodone 2mg PO daily today    VTE Risk Mitigation (From admission, onward)         Ordered     enoxaparin injection 40 mg  Daily         08/26/22 1422     IP VTE HIGH RISK PATIENT  Once         08/26/22 1422     Place sequential compression device  Until discontinued         08/26/22 1422     Place MARGRET hose  Until discontinued         08/26/22 1422                    Alexia Abel MD  Department of Hospital Medicine   [facility] Surg [facility])

## 2022-09-12 NOTE — SUBJECTIVE & OBJECTIVE
Interval History:  Patient pulled out NG tube again last night for the 2nd time.  Patient reports he is feeling better and wants to eat. Weaned down to 3 L via nasal cannula.  No new complaints.  Staff report that he is noncompliant with his NPO status with specific instructions for ice and taking medications as directed by speech therapy.  He was refusing NG placement.  Mother at the bedside, and extensive explanation done with patient and mother.    Review of Systems   Constitutional:  Negative for chills and fever.   Respiratory:  Positive for shortness of breath.    Cardiovascular:  Negative for chest pain.   Neurological:  Positive for weakness.   Objective:     Vital Signs (Most Recent):  Temp: 97.8 °F (36.6 °C) (09/12/22 1213)  Pulse: (!) 118 (09/12/22 1400)  Resp: 18 (09/12/22 1213)  BP: 135/84 (09/12/22 1213)  SpO2: (!) 93 % (09/12/22 1213)   Vital Signs (24h Range):  Temp:  [97.8 °F (36.6 °C)-98.9 °F (37.2 °C)] 97.8 °F (36.6 °C)  Pulse:  [] 118  Resp:  [18] 18  SpO2:  [93 %-96 %] 93 %  BP: (130-136)/(79-84) 135/84     Weight: 86.2 kg (190 lb)  Body mass index is 28.06 kg/m².    Intake/Output Summary (Last 24 hours) at 9/12/2022 1659  Last data filed at 9/12/2022 0000  Gross per 24 hour   Intake --   Output 400 ml   Net -400 ml        Physical Exam  Vitals and nursing note reviewed.   Constitutional:       General: He is not in acute distress.     Appearance: He is well-developed.   HENT:      Head: Normocephalic and atraumatic.      Comments: NC in place     Nose: Nose normal.   Eyes:      General:         Right eye: No discharge.         Left eye: No discharge.      Conjunctiva/sclera: Conjunctivae normal.   Cardiovascular:      Rate and Rhythm: Normal rate.      Pulses: Normal pulses.   Pulmonary:      Effort: Pulmonary effort is normal. No respiratory distress.      Comments: Course breath sounds bilaterally, but even and unlabored  Abdominal:      General: Bowel sounds are normal.       Palpations: Abdomen is soft.      Tenderness: There is no abdominal tenderness. There is no guarding or rebound.   Musculoskeletal:         General: Normal range of motion.      Right lower leg: No edema.      Left lower leg: No edema.   Skin:     General: Skin is warm and dry.      Comments: Extensive tatoos throughout entire body   Neurological:      Comments: Awake, alert, oriented x 3     Significant Labs:   CBC:  Recent Labs   Lab 09/10/22  0433 09/11/22  0356 09/12/22  0530   WBC 16.16* 15.42* 13.08*   HGB 13.2* 12.9* 13.4*   HCT 38.8* 39.1* 41.0   * 928* 766*   GRAN 71.4  11.5* 65.1  10.1* 61.5  8.0*   LYMPH 12.7*  2.1 16.8*  2.6 19.3  2.5   MONO 11.3  1.8* 12.3  1.9* 11.2  1.5*   EOS 0.3 0.5 0.8*   BASO 0.10 0.12 0.11     CMP:  Recent Labs   Lab 09/10/22  0433 09/11/22  0356 09/12/22  0531   * 138 137   K 3.5 3.9 4.1    105 107   CO2 23 23 21*   BUN 33* 31* 22*   CREATININE 0.6 0.7 0.7    101 89   CALCIUM 9.8 9.6 10.3   MG 2.0 2.2 2.3   PHOS 3.2 3.7 4.0   ALKPHOS  --  77  --    AST  --  45*  --    ALT  --  103*  --    BILITOT  --  0.7  --    PROT  --  7.7  --    ALBUMIN  --  3.2*  --    ANIONGAP 10 10 9        Significant Imaging:   All imaging were reviewed

## 2022-09-12 NOTE — PT/OT/SLP PROGRESS
Occupational Therapy   Treatment    Name: Leighton Carroll  MRN: [id number]  Admitting Diagnosis:  Acute respiratory failure with hypoxia and hypercarbia       Recommendations:     Discharge Recommendations: rehabilitation facility  Discharge Equipment Recommendations:  other (see comments) (TBD, anticipate none)  Barriers to discharge:  Other (Comment) (fall risk)    Assessment:     Leighton Carroll is a 42 y.o. male with a medical diagnosis of Acute respiratory failure with hypoxia and hypercarbia.  He presents with impaired endurance, weakness, impaired self care skills, impaired functional mobility, impaired balance, decreased coordination, decreased safety awareness, impaired cognition, impaired coordination.      Pt would benefit from intensive therapies in an inpatient setting with 3 hours of therapy/day. Pt's needs cannot be met at a lower level of care. Pt is motivated to progress. Rehabilitation facility recommended to return patient to PLOF, prevent future falls and decrease readmission risk. Pt is impulsive and with decreased safety awareness/insight into deficits. Pt drinking water from sink and in denial of current limitations. Pt given extensive education on safety precautions. RN aware.     Rehab Prognosis:  Good; patient would benefit from acute skilled OT services to address these deficits and reach maximum level of function.       Plan:     Patient to be seen 5 x/week to address the above listed problems via self-care/home management, therapeutic activities, therapeutic exercises  Plan of Care Expires: 10/08/22  Plan of Care Reviewed with: patient, mother    Subjective     Pain/Comfort:  Pain Rating 1: 0/10    Objective:     Communicated with: RN prior to session.  Patient found HOB elevated with oxygen, blood pressure cuff, pulse ox (continuous), peripheral IV, salomon catheter, telemetry upon OT entry to room.    General Precautions: Standard, aspiration, fall   Orthopedic Precautions:N/A   Braces:  "N/A  Respiratory Status: Room air     Occupational Performance:     Bed Mobility:    Supine <> sitting: Supervision      Functional Mobility/Transfers:  Sit <> Stand: CGA. Impulsive and required cues throughout for safety.   Functional Mobility: Pt required CGA for functional ambulation around room, no AD, and up to sink for grooming     Activities of Daily Living:  Grooming: CGA for oral care at sink.   Upon completion of oral care, pt sat EOB. Pt asked if he would participate in BUE therex. Pt stated, "I don't have any nutrition, how am I supposed to build muscle?" Pt educated on aspiration precautions and risk of having food/drink before cleared my SLP. Pt became increasingly frustrated, stood up and moved to the sink, then began to drink water from the sink despite cues for NPO. Pt then stood from the faucet and firmly stated, "They're lying to me! Do you hear me choking mom?" RN updated.       [facility] 6 Click ADL: 16    Treatment & Education:  OT role, plan of care, progression of goals, importance of continued OOB activity, ADL/functional mobility/transfer retraining, fall prevention, safety precautions, aspiration precautions     Patient left HOB elevated with all lines intact, call button in reach, RN notified, and mother present    GOALS:   Multidisciplinary Problems       Occupational Therapy Goals          Problem: Occupational Therapy    Goal Priority Disciplines Outcome Interventions   Occupational Therapy Goal     OT, PT/OT Ongoing, Progressing    Description: Goals to be met by: 9/22/22     Patient will increase functional independence with ADLs by performing:    UE Dressing with Sunflower.  LE Dressing with Sunflower.  Grooming while standing with Sunflower.  Toileting from toilet with Sunflower for hygiene and clothing management.   Toilet transfer to toilet with Sunflower.                         Time Tracking:     OT Date of Treatment: 09/12/22  OT Start Time: 1102  OT Stop Time: " 1120  OT Total Time (min): 18 min    Billable Minutes:Self Care/Home Management 18 minutes    OT/BRIANA: OT          9/12/2022

## 2022-09-12 NOTE — ASSESSMENT & PLAN NOTE
- Continue buproprion 150mg per daily, quetiapine 200mg per qHS.  - Resume divalproex ER 500mg 1g PO daily, risperiodone 2mg PO daily today

## 2022-09-12 NOTE — PLAN OF CARE
CM spoke with patient and mom at bedside. Request rehab referrals be sent to facilities in [facility]   09/12/22 1026   Post-Acute Status   Post-Acute Authorization Placement   Post-Acute Placement Status Referrals Sent   Patient choice form signed by patient/caregiver List with quality metrics by geographic area provided;List from CMS Compare;List from System Post-Acute Care   Discharge Delays (!) Post-Acute Set-up   Discharge Plan   Discharge Plan A Rehab   Discharge Plan B Home

## 2022-09-12 NOTE — ASSESSMENT & PLAN NOTE
Bilateral PNA, ARDS, septic shock (resolved), bacteremia, h/o drug overdose, polysubstance abuse, leukocytosis, encephalopathy, dysphagia, debility  - Hypoxic to 60s on arrival and failed BiPAP 2/2 agitation and intubated at OSH. Tox positive for THC. D-dimer elevated but CTA negative for PE. COVID, RSV negative.  - Pulm/critical followed and appreciate input  - Continue fentanyl gtt and propofol gtt (weaned off ketamine gtt, midazolam gtt since last night)  - Completed cefepime 2g IV q8hr for 14 day total course. Blood cultures showed acinetobacter, pseudomonas luteola  - Repeat blood cultures for fever; added vancomycin for resp culture showing Staph, patient to complete full course  - s/p ARDSnet protocol; proning/supinating  - s/p furosemide 40mg IV q6hr, metolazone 5mg per OG daily, goal mildly net negative; stopped  - s/p dexamethasone 20mg IV daily 5 days tapered down to 10mg IV daily for 5 days (last dose on 9/11/22)  - Extubated on 9/7/22 and currently on 3L   - Weaned off Precedex gtt   - Stepped down to floor on 9/10  - NG placement and started tube feedings, patient pulled out NG for the 2nd time   - ENT consult pending for evaluation for vocal cords and patient to continue to work with speech therapy therapy  - PT/OT and speech  - Repeat blood culture from 9/10/22 with G+ cocci in clusters and patient already on Vancomycin; resulted today with coag-negative staph likely contaminant  - Repeat blood cultures done this morning prior to receiving updated culture results  - Follow up on speech recs and ENT consult  - Case management working on rehab placement
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
## 2022-09-12 NOTE — NURSING
Pt. Rounding. Pt in bed awake and alert. Resp even and unlabored.vs stable. Ng tube in place and patent running isosource 1.5 at 20ml. Tolerating well. Pt left comfortable and safe. Bed alarm on. Will continue to monitor.

## 2022-09-12 NOTE — PT/OT/SLP PROGRESS
Physical Therapy Treatment    Patient Name:  Leighton Carroll   MRN:  [id number]    Recommendations:     Discharge Recommendations:  rehabilitation facility   Discharge Equipment Recommendations:  (TBD , anticipate none)   Barriers to discharge: Decreased caregiver support    Assessment:     Leighton Carroll is a 42 y.o. male admitted with a medical diagnosis of Acute respiratory failure with hypoxia and hypercarbia.  He presents with the following impairments/functional limitations:  weakness, impaired endurance, impaired self care skills, impaired functional mobility, gait instability, impaired balance, decreased lower extremity function, decreased safety awareness, decreased coordination, impaired cardiopulmonary response to activity ;pt with improved mobility today, inc amb distance, dec assistance req'd.    Rehab Prognosis: Good; patient would benefit from acute skilled PT services to address these deficits and reach maximum level of function.    Recent Surgery: * No surgery found *      Plan:     During this hospitalization, patient to be seen 6 x/week to address the identified rehab impairments via gait training, therapeutic activities, therapeutic exercises and progress toward the following goals:    Plan of Care Expires:  10/08/22    Subjective     Chief Complaint: no c/o's pain  Patient/Family Comments/goals: pt agreeable to session, cooperative.   Pain/Comfort:  Pain Rating 1: 0/10  Pain Rating Post-Intervention 1: 0/10      Objective:     Communicated with nurse prior to session.  Patient found supine with PICC line, telemetry upon PT entry to room.     General Precautions: Standard, aspiration, fall , contact  Orthopedic Precautions:N/A   Braces: N/A  Respiratory Status: Room air     Functional Mobility:  Bed Mobility:     Supine to Sit: independence  Sit to Supine: independence  Transfers:     Sit to Stand:  supervision with no AD  Gait: amb'd ~40' x 2 w/ SBA in room only (2/2 pt on contact prec.).       AM-PAC  6 CLICK MOBILITY  Turning over in bed (including adjusting bedclothes, sheets and blankets)?: 4  Sitting down on and standing up from a chair with arms (e.g., wheelchair, bedside commode, etc.): 4  Moving from lying on back to sitting on the side of the bed?: 4  Moving to and from a bed to a chair (including a wheelchair)?: 3  Need to walk in hospital room?: 3  Climbing 3-5 steps with a railing?: 3  Basic Mobility Total Score: 21       Therapeutic Activities and Exercises:   Perf'd standing LE ex's of heel raises, hip abd, hs curls, mini squats x 5-10 ea.     Patient left HOB elevated with all lines intact, call button in reach, and nruse present..    GOALS:   Multidisciplinary Problems       Physical Therapy Goals          Problem: Physical Therapy    Goal Priority Disciplines Outcome Goal Variances Interventions   Physical Therapy Goal     PT, PT/OT Ongoing, Progressing     Description: Goals to be met by: 10/8/22    Patient will increase functional independence with mobility by performin. Supine<>sit with CGA with appropriate use of hospital bed features. - MET  2. Sit<>stand with CGA with LRAD.  3. Sitting EOB x5 min with upright posture with SBA.   4. Activity x5 min on appropriate supplemental O2 with SpO2 >90%.  5. Standing x1 min with CGA with LRAD and upright posture.   6. Gait x 10 feet with modA with LRAD.                       Time Tracking:     PT Received On: 22  PT Start Time: 1429     PT Stop Time: 1443  PT Total Time (min): 14 min     Billable Minutes: Therapeutic Activity 14    Treatment Type: Treatment  PT/PTA: PTA     PTA Visit Number: 1     2022

## 2022-09-12 NOTE — NURSING
Pt pulled out ng tube. Pt wanting to know when he is going to be able to eat. Notified hospitalist spoke with Torito gave ok to leave out. Will continue to monitor.

## 2022-09-13 LAB
ALBUMIN SERPL BCP-MCNC: 3.2 G/DL (ref 3.5–5.2)
ALP SERPL-CCNC: 73 U/L (ref 55–135)
ALT SERPL W/O P-5'-P-CCNC: 73 U/L (ref 10–44)
ANION GAP SERPL CALC-SCNC: 11 MMOL/L (ref 8–16)
AST SERPL-CCNC: 36 U/L (ref 10–40)
BACTERIA BLD CULT: ABNORMAL
BASOPHILS # BLD AUTO: 0.1 K/UL (ref 0–0.2)
BASOPHILS NFR BLD: 0.7 % (ref 0–1.9)
BILIRUB DIRECT SERPL-MCNC: 0.3 MG/DL (ref 0.1–0.3)
BILIRUB SERPL-MCNC: 0.8 MG/DL (ref 0.1–1)
BUN SERPL-MCNC: 17 MG/DL (ref 6–20)
CALCIUM SERPL-MCNC: 9.8 MG/DL (ref 8.7–10.5)
CHLORIDE SERPL-SCNC: 102 MMOL/L (ref 95–110)
CO2 SERPL-SCNC: 20 MMOL/L (ref 23–29)
CREAT SERPL-MCNC: 0.7 MG/DL (ref 0.5–1.4)
DIFFERENTIAL METHOD: ABNORMAL
EOSINOPHIL # BLD AUTO: 0.9 K/UL (ref 0–0.5)
EOSINOPHIL NFR BLD: 6 % (ref 0–8)
ERYTHROCYTE [DISTWIDTH] IN BLOOD BY AUTOMATED COUNT: 14.8 % (ref 11.5–14.5)
EST. GFR  (NO RACE VARIABLE): >60 ML/MIN/1.73 M^2
GLUCOSE SERPL-MCNC: 90 MG/DL (ref 70–110)
HCT VFR BLD AUTO: 39.7 % (ref 40–54)
HGB BLD-MCNC: 13.2 G/DL (ref 14–18)
IMM GRANULOCYTES # BLD AUTO: 0.25 K/UL (ref 0–0.04)
IMM GRANULOCYTES NFR BLD AUTO: 1.7 % (ref 0–0.5)
LYMPHOCYTES # BLD AUTO: 3.2 K/UL (ref 1–4.8)
LYMPHOCYTES NFR BLD: 21.9 % (ref 18–48)
MAGNESIUM SERPL-MCNC: 2 MG/DL (ref 1.6–2.6)
MCH RBC QN AUTO: 29.5 PG (ref 27–31)
MCHC RBC AUTO-ENTMCNC: 33.2 G/DL (ref 32–36)
MCV RBC AUTO: 89 FL (ref 82–98)
MONOCYTES # BLD AUTO: 1.5 K/UL (ref 0.3–1)
MONOCYTES NFR BLD: 9.9 % (ref 4–15)
NEUTROPHILS # BLD AUTO: 8.7 K/UL (ref 1.8–7.7)
NEUTROPHILS NFR BLD: 59.8 % (ref 38–73)
NRBC BLD-RTO: 0 /100 WBC
PHOSPHATE SERPL-MCNC: 3.9 MG/DL (ref 2.7–4.5)
PLATELET # BLD AUTO: 673 K/UL (ref 150–450)
PMV BLD AUTO: 9 FL (ref 9.2–12.9)
POTASSIUM SERPL-SCNC: 4.1 MMOL/L (ref 3.5–5.1)
PROT SERPL-MCNC: 7.4 G/DL (ref 6–8.4)
RBC # BLD AUTO: 4.48 M/UL (ref 4.6–6.2)
SODIUM SERPL-SCNC: 133 MMOL/L (ref 136–145)
VANCOMYCIN TROUGH SERPL-MCNC: 17 UG/ML (ref 10–22)
WBC # BLD AUTO: 14.58 K/UL (ref 3.9–12.7)

## 2022-09-13 PROCEDURE — 97530 THERAPEUTIC ACTIVITIES: CPT

## 2022-09-13 PROCEDURE — 25000003 PHARM REV CODE 250: Performed by: HOSPITALIST

## 2022-09-13 PROCEDURE — 92526 ORAL FUNCTION THERAPY: CPT

## 2022-09-13 PROCEDURE — 63600175 PHARM REV CODE 636 W HCPCS: Performed by: HOSPITALIST

## 2022-09-13 PROCEDURE — 36415 COLL VENOUS BLD VENIPUNCTURE: CPT | Performed by: INTERNAL MEDICINE

## 2022-09-13 PROCEDURE — 83735 ASSAY OF MAGNESIUM: CPT | Performed by: INTERNAL MEDICINE

## 2022-09-13 PROCEDURE — 27000207 HC ISOLATION

## 2022-09-13 PROCEDURE — 92507 TX SP LANG VOICE COMM INDIV: CPT

## 2022-09-13 PROCEDURE — 21400001 HC TELEMETRY ROOM

## 2022-09-13 PROCEDURE — 36415 COLL VENOUS BLD VENIPUNCTURE: CPT | Performed by: HOSPITALIST

## 2022-09-13 PROCEDURE — 97110 THERAPEUTIC EXERCISES: CPT

## 2022-09-13 PROCEDURE — 99233 PR SUBSEQUENT HOSPITAL CARE,LEVL III: ICD-10-PCS | Mod: ,,, | Performed by: INTERNAL MEDICINE

## 2022-09-13 PROCEDURE — 99233 SBSQ HOSP IP/OBS HIGH 50: CPT | Mod: ,,, | Performed by: INTERNAL MEDICINE

## 2022-09-13 PROCEDURE — S4991 NICOTINE PATCH NONLEGEND: HCPCS

## 2022-09-13 PROCEDURE — 80202 ASSAY OF VANCOMYCIN: CPT | Performed by: HOSPITALIST

## 2022-09-13 PROCEDURE — 80048 BASIC METABOLIC PNL TOTAL CA: CPT | Performed by: INTERNAL MEDICINE

## 2022-09-13 PROCEDURE — 85025 COMPLETE CBC W/AUTO DIFF WBC: CPT | Performed by: INTERNAL MEDICINE

## 2022-09-13 PROCEDURE — 25000003 PHARM REV CODE 250: Performed by: STUDENT IN AN ORGANIZED HEALTH CARE EDUCATION/TRAINING PROGRAM

## 2022-09-13 PROCEDURE — 80076 HEPATIC FUNCTION PANEL: CPT | Performed by: INTERNAL MEDICINE

## 2022-09-13 PROCEDURE — 63600175 PHARM REV CODE 636 W HCPCS

## 2022-09-13 PROCEDURE — 84100 ASSAY OF PHOSPHORUS: CPT | Performed by: INTERNAL MEDICINE

## 2022-09-13 PROCEDURE — 25000003 PHARM REV CODE 250

## 2022-09-13 RX ORDER — PANTOPRAZOLE SODIUM 40 MG/1
40 TABLET, DELAYED RELEASE ORAL DAILY
Status: DISCONTINUED | OUTPATIENT
Start: 2022-09-14 | End: 2022-09-15 | Stop reason: HOSPADM

## 2022-09-13 RX ADMIN — BUPROPION HYDROCHLORIDE 150 MG: 75 TABLET, FILM COATED ORAL at 09:09

## 2022-09-13 RX ADMIN — BUPROPION HYDROCHLORIDE 150 MG: 75 TABLET, FILM COATED ORAL at 08:09

## 2022-09-13 RX ADMIN — VANCOMYCIN HYDROCHLORIDE 1750 MG: 500 INJECTION, POWDER, LYOPHILIZED, FOR SOLUTION INTRAVENOUS at 02:09

## 2022-09-13 RX ADMIN — VANCOMYCIN HYDROCHLORIDE 1750 MG: 500 INJECTION, POWDER, LYOPHILIZED, FOR SOLUTION INTRAVENOUS at 01:09

## 2022-09-13 RX ADMIN — ENOXAPARIN SODIUM 40 MG: 100 INJECTION SUBCUTANEOUS at 06:09

## 2022-09-13 RX ADMIN — NICOTINE 1 PATCH: 14 PATCH TRANSDERMAL at 09:09

## 2022-09-13 NOTE — PROGRESS NOTES
"[facility] Medicine  Progress Note    Patient Name: Leighton Carroll  MRN: [id number]  Patient Class: IP- Inpatient   Admission Date: 8/26/2022  Length of Stay: 18 days  Attending Physician: GAVIOTA Sanon MD  Primary Care Provider: Primary Doctor No        Subjective:     Principal Problem:Acute respiratory failure with hypoxia and hypercarbia        HPI:  Patient's HPI is adapted from notes of Dr. Schuler, Dr. Morin and Dr. Hall ([facility]).   Patient arrived in [facility] ICU intubated, pt's girlfriend's number not on file, unable to verify events PTA.     Leighton Carroll is a 42 year old man with a PMHx of depression, hx of drug overdose (7/11/2022, buproprion and gabapentin), polysubstance abuse (meth, heroin) and nicotine dependence.    He was presented to [facility] on 8/25/2022 via EMS with shortness of breath and dry cough. He was reportedly found down by his girlfriend at home yesterday evening (8/25/2022) and was given multiple rounds of chest compressions. He woke up and began having shortness of breath and called EMS, which found him saturating at 66% on room air, which increased to 90s with nonrebreather mask. Patient denied subjective fever, chills, sick contacts, chest pain, palpitations, abdominal pain.     At [facility], patient had a fever to 101, with leukocytosis (20) and lactic acidosis (3.0) with a normal procal. CTA chest showed patchy perihilar opacities bilaterally most pronounced in right lower lobe. Covid, Group A strep, respiratory influenza panel -ve. D-dimer was elevated (3.68), but CTPA negative for PE. Tox postive for THC only. CXR was -ve for pneumothorax. Patient was started on IV Cefepime and IV Vancomycin.     Patient was initially on BiPAP but became agitated and was intubated. Patient reportedly had "red spit". Patient was difficult to sedate requiring propofol and precedex and multiple doses of versed and ketamine, thus became hypotensive " after intubation and was started on levophed.     Patient is transferred to [facility] ICU, [facility] medicine, for management of acute respiratory failure with hypoxia and hypercapnia.             Overview/Hospital Course:  Admitted with acute hypoxemic respiratory failure.  Blood cultures demonstrated Acinetobacter, Pseudomonas luteola.  Pulmonology and ID consulted.  TTE performed without evidence of vegetation. Repeat blood culture showed bacillus in 1/4 bottles but suspected contaminant.  Opted for 14 day course of cefepime. Developed ARDS and started chemical paralysis/proning as well as ARDSnet protocol. Palliative consulted given the severity of his illness and likely extended recovery. Respiratory status was slow to improve but gradually had decreasing oxygenation/pressure requirements. Had repeat fevers and sputum culture showed Staph aureus; vancomycin added. Extubated on 9/7/22.  Stepped down to floor on 9/10/2022.  Patient persistently failed swallow evaluation by speech therapy and he continued NPO status.  Attempted NG placement with tube feedings has been difficult given patient's ultimately ends up removing NG overnight.  PT and OT are recommending rehab placement.  Repeat blood cultures from 09/10/2022 remarkable for coag-negative staph, likely contaminant.      Interval History:  No acute events overnight; doing well this morning after SLP.    Review of Systems   Constitutional:  Negative for chills and fever.   Respiratory:  Negative for cough and shortness of breath.    Cardiovascular:  Negative for chest pain and palpitations.   Gastrointestinal:  Negative for abdominal pain and nausea.   Objective:     Vital Signs (Most Recent):  Temp: 98 °F (36.7 °C) (09/13/22 1524)  Pulse: 67 (09/13/22 1600)  Resp: 18 (09/13/22 1524)  BP: (!) 144/75 (09/13/22 1524)  SpO2: 97 % (09/13/22 1524)   Vital Signs (24h Range):  Temp:  [97.9 °F (36.6 °C)-98.6 °F (37 °C)] 98 °F (36.7 °C)  Pulse:  [] 67  Resp:   [14-18] 18  SpO2:  [93 %-97 %] 97 %  BP: (125-153)/(75-98) 144/75     Weight: 86.2 kg (190 lb)  Body mass index is 28.06 kg/m².  No intake or output data in the 24 hours ending 09/13/22 1700     Physical Exam  Vitals and nursing note reviewed.   Constitutional:       General: He is not in acute distress.     Appearance: He is well-developed.   HENT:      Head: Normocephalic and atraumatic.      Comments: NC in place     Nose: Nose normal.   Eyes:      General:         Right eye: No discharge.         Left eye: No discharge.      Conjunctiva/sclera: Conjunctivae normal.   Cardiovascular:      Rate and Rhythm: Normal rate.      Pulses: Normal pulses.   Pulmonary:      Effort: Pulmonary effort is normal. No respiratory distress.      Comments: Course breath sounds bilaterally, but even and unlabored  Abdominal:      General: Bowel sounds are normal.      Palpations: Abdomen is soft.      Tenderness: There is no abdominal tenderness. There is no guarding or rebound.   Musculoskeletal:         General: Normal range of motion.      Right lower leg: No edema.      Left lower leg: No edema.   Skin:     General: Skin is warm and dry.      Comments: Extensive tatoos throughout entire body   Neurological:      Comments: Awake, alert, oriented x 3     Significant Labs:   CBC:  Recent Labs   Lab 09/11/22  0356 09/12/22  0530 09/13/22  0434   WBC 15.42* 13.08* 14.58*   HGB 12.9* 13.4* 13.2*   HCT 39.1* 41.0 39.7*   * 766* 673*   GRAN 65.1  10.1* 61.5  8.0* 59.8  8.7*   LYMPH 16.8*  2.6 19.3  2.5 21.9  3.2   MONO 12.3  1.9* 11.2  1.5* 9.9  1.5*   EOS 0.5 0.8* 0.9*   BASO 0.12 0.11 0.10     CMP:  Recent Labs   Lab 09/11/22  0356 09/12/22  0531 09/13/22  0434    137 133*   K 3.9 4.1 4.1    107 102   CO2 23 21* 20*   BUN 31* 22* 17   CREATININE 0.7 0.7 0.7    89 90   CALCIUM 9.6 10.3 9.8   MG 2.2 2.3 2.0   PHOS 3.7 4.0 3.9   ALKPHOS 77  --  73   AST 45*  --  36   *  --  73*   BILITOT 0.7  --   0.8   PROT 7.7  --  7.4   ALBUMIN 3.2*  --  3.2*   ANIONGAP 10 9 11        Significant Imaging:   All imaging were reviewed      Assessment/Plan:      * Acute respiratory failure with hypoxia and hypercarbia  Bilateral PNA, ARDS, septic shock (resolved), bacteremia, h/o drug overdose, polysubstance abuse, leukocytosis, encephalopathy, dysphagia, debility  - Hypoxic to 60s on arrival and failed BiPAP 2/2 agitation and intubated at OSH. Tox positive for THC. D-dimer elevated but CTA negative for PE. COVID, RSV negative.  - Completed 14 day course of cefepime for acinetobacter, pseudomonas luteola noted on blood cultures.  - Repeat blood cultures for fever; added vancomycin for resp culture showing Staph, patient to complete full course  - s/p ARDSnet protocol; proning/supinating and steroid taper. Extubated on 09/07.  - Stepped down to floor on 9/10  - ENT consulted; appreciate assistance. Noted long-standing palatal fistula, recommended outpatient follow-up with OMFS/prosthodontics; decreased glottic sensation and mild R vocal fold paresis likely secondary to prolonged intubation.  - Start pantoprazole 40mg PO daily as per ENT recs.  - Continue PT/OT/SLP.  - Repeat blood cultures 09/10 showed GPCs in clusters, speciated as coag-negative staph, likely contaminant.  - Continue vancomycin IV with pharmacy dosing consult.  - Case management working on rehab placement    Hypokalemia  - Repleted and monitor with repeat labs    Bilateral pneumonia  - As above.    Hematuria  - Gross hematuria; not present on repeat UA  - Resolved    Gram-negative bacteremia  - As above, completed treatment    Septic shock  - As above, resolved    History of drug overdose  - As above.    Depression  - Continue buproprion 150mg per daily, quetiapine 200mg per qHS, divalproex ER 500mg 1g PO daily, risperiodone 2mg PO daily.    VTE Risk Mitigation (From admission, onward)         Ordered     enoxaparin injection 40 mg  Daily         08/26/22  1422     IP VTE HIGH RISK PATIENT  Once         08/26/22 1422     Place sequential compression device  Until discontinued         08/26/22 1422     Place MARGRET hose  Until discontinued         08/26/22 1422                Discharge Planning   CHETAN:      Code Status: Full Code   Is the patient medically ready for discharge?:     Reason for patient still in hospital (select all that apply): Treatment  Discharge Plan A: Rehab   Discharge Delays: (!) Post-Acute Set-up              D Rai Sanon MD  Department of Hospital Medicine   [facility]

## 2022-09-13 NOTE — PROGRESS NOTES
"[facility])  Wound Care    Patient Name:  Leighton Carroll   MRN:  [id number]  Date: 9/13/2022  Diagnosis: Acute respiratory failure with hypoxia and hypercarbia    History:     Past Medical History:   Diagnosis Date    Anxiety     Depression     Hepatitis C     Substance abuse     METH AND HEROIN       Social History     Socioeconomic History    Marital status: Single   Tobacco Use    Smoking status: Every Day     Packs/day: 0.50     Types: Cigarettes    Smokeless tobacco: Never   Substance and Sexual Activity    Alcohol use: Yes     Comment: 5th whiskey or more daily    Drug use: Yes     Types: Methamphetamines, Marijuana     Comment: heroin , "pt reports he has been doing whatever he can get his hands on"      Social Determinants of Health     Financial Resource Strain: High Risk    Difficulty of Paying Living Expenses: Hard   Food Insecurity: Food Insecurity Present    Worried About Running Out of Food in the Last Year: Sometimes true    Ran Out of Food in the Last Year: Sometimes true   Transportation Needs: Unmet Transportation Needs    Lack of Transportation (Medical): Yes    Lack of Transportation (Non-Medical): Yes   Physical Activity: Inactive    Days of Exercise per Week: 0 days    Minutes of Exercise per Session: 0 min   Stress: Stress Concern Present    Feeling of Stress : Very much   Social Connections: Socially Isolated    Frequency of Communication with Friends and Family: More than three times a week    Frequency of Social Gatherings with Friends and Family: Three times a week    Attends Jehovah's witness Services: Never    Active Member of Clubs or Organizations: No    Attends Club or Organization Meetings: Never    Marital Status: Never    Housing Stability: High Risk    Unable to Pay for Housing in the Last Year: Yes    Unstable Housing in the Last Year: Yes       Precautions:     Allergies as of 08/26/2022    (No Known Allergies)       [facility] Assessment Details/Treatment   Follow up " on all alterations in skin integrity   Left forehead abrasion has resolved  Right cheek has scabbed and is much smaller, appears superficial.  Right nare with 0.5x0.5cm of black crusted lesion. Septum has resolved.  Right ear DTI is now a tan scabbed area and appears superficial.  Scrotal area has resolved. Posterior penis DTI has now presented with white fibrinous slough. No evidence of triad in use.  Glans and foreskin have resolved.      09/13/22 1400        Altered Skin Integrity 09/04/22 0945 anterior Frontal region #2 Ulceration Partial thickness tissue loss. Shallow open ulcer with a red or pink wound bed, without slough. Intact or Open/Ruptured Serum-filled blister.   Date First Assessed/Time First Assessed: 09/04/22 0945   Altered Skin Integrity Present on Admission: suspected hospital acquired  Orientation: anterior  Location: Frontal region  Wound Number: #2  Is this injury device related?: Yes  Primary Wound Ty...   Wound Image    Dressing Appearance Open to air;No dressing   Drainage Amount None   Appearance Pink;Epithelialization  (resolved)   Tissue loss description Not applicable   Periwound Area Intact        Altered Skin Integrity 09/04/22 0945 upper;lower Penis #3 Other (comment) Purple or maroon localized area of discolored intact skin or non-intact skin or a blood-filled blister.   Date First Assessed/Time First Assessed: 09/04/22 0945   Altered Skin Integrity Present on Admission: suspected hospital acquired  Orientation: upper;lower  Location: Penis  Wound Number: #3  Is this injury device related?: No  Primary Wound Type: (c)...   Wound Image     Description of Altered Skin Integrity Full thickness tissue loss. Base is covered by slough and/or eschar in the wound bed   Dressing Appearance Open to air;No dressing   Drainage Amount None   Drainage Characteristics/Odor No odor   Appearance White;Yellow;Slough;Fibrin   Periwound Area Intact   Wound Edges Open   Care Cleansed with:;Soap and  water;Applied:;Skin Barrier  (Triad hydrophilic wound dressing)        Altered Skin Integrity 09/04/22 0945 anterior Scrotum #4 Abrasion(s)    Date First Assessed/Time First Assessed: 09/04/22 0945   Altered Skin Integrity Present on Admission: suspected hospital acquired  Orientation: anterior  Location: Scrotum  Wound Number: #4  Is this injury device related?: No  Primary Wound Type: Jose Raul...   Dressing Appearance Open to air;No dressing   Drainage Amount None   Drainage Characteristics/Odor No odor   Appearance Intact  (resolved)   Periwound Area Intact        Altered Skin Integrity 09/06/22 1300 Right Cheek Medical adhesive related skin injury   Date First Assessed/Time First Assessed: 09/06/22 1300   Altered Skin Integrity Present on Admission: suspected hospital acquired  Side: Right  Location: Cheek  Is this injury device related?: (c) Yes  Primary Wound Type: Medical adhesive related skin...   Wound Image     Dressing Appearance Open to air;No dressing   Drainage Amount None   Drainage Characteristics/Odor No odor   Appearance Landa;Dry  (crusted scab)   Periwound Area Intact   Wound Edges Open   Wound Length (cm) 0.5 cm   Wound Width (cm) 0.6 cm   Wound Surface Area (cm^2) 0.3 cm^2   Care Cleansed with:;Soap and water;Applied:;Skin Barrier  (Triad applied)        Altered Skin Integrity 09/06/22 1300 Nose Full thickness tissue loss. Base is covered by slough and/or eschar in the wound bed   Date First Assessed/Time First Assessed: 09/06/22 1300   Altered Skin Integrity Present on Admission: suspected hospital acquired  Location: (c) Nose  Is this injury device related?: Yes  Description of Altered Skin Integrity: Full thickness tissue lo...   Wound Image     Dressing Appearance Open to air;No dressing   Drainage Amount None   Drainage Characteristics/Odor No odor   Appearance Dry;Black  (crusted scab)   Black (%), Wound Tissue Color 100 %   Wound Length (cm) 0.5 cm   Wound Width (cm) 0.5 cm   Wound Surface  Area (cm^2) 0.25 cm^2   Care Cleansed with:;Sterile normal saline;Applied:;Skin Barrier  (Triad)        Altered Skin Integrity 09/08/22 1130 Right Ear Purple or maroon localized area of discolored intact skin or non-intact skin or a blood-filled blister.   Date First Assessed/Time First Assessed: 09/08/22 1130   Altered Skin Integrity Present on Admission: suspected hospital acquired  Side: Right  Location: Ear  Is this injury device related?: (c) Yes  Description of Altered Skin Integrity: Purple or ma...   Wound Image    Description of Altered Skin Integrity   (tan scaly)   Dressing Appearance Open to air;No dressing   Drainage Amount None   Drainage Characteristics/Odor No odor   Appearance Landa;Dry  (crusted scab)   Care Cleansed with:;Soap and water;Applied:;Skin Barrier  (Triad)       09/13/2022

## 2022-09-13 NOTE — PT/OT/SLP PROGRESS
Occupational Therapy   Treatment    Name: Leighton Carroll  MRN: [id number]  Admitting Diagnosis:  Acute respiratory failure with hypoxia and hypercarbia       Recommendations:     Discharge Recommendations: rehabilitation facility  Discharge Equipment Recommendations:  other (see comments) (TBD, anticipate none)  Barriers to discharge:  Other (Comment) (fall risk, below baseline)    Assessment:     Leighton Carroll is a 42 y.o. male with a medical diagnosis of Acute respiratory failure with hypoxia and hypercarbia.  He presents with weakness, impaired endurance, impaired self care skills, impaired functional mobility, impaired balance, decreased coordination, decreased safety awareness, impaired coordination, impaired cardiopulmonary response to activity.     Pt would benefit from intensive therapies in an inpatient setting with 3 hours of therapy/day. Pt's needs cannot be met at a lower level of care. Pt is motivated to progress. Rehabilitation facility recommended to return patient to safe PLOF, prevent future falls and decrease readmission risk.      Rehab Prognosis:  Good; patient would benefit from acute skilled OT services to address these deficits and reach maximum level of function.       Plan:     Patient to be seen 5 x/week to address the above listed problems via self-care/home management, therapeutic activities, therapeutic exercises  Plan of Care Expires: 10/08/22  Plan of Care Reviewed with: patient    Subjective     Pain/Comfort:  Pain Rating 1: 0/10    Objective:     Communicated with: RN prior to session.  Patient found HOB elevated with oxygen, blood pressure cuff, pulse ox (continuous), peripheral IV, salomon catheter, telemetry upon OT entry to room.    General Precautions: Standard, aspiration   Orthopedic Precautions:N/A   Braces: N/A  Respiratory Status: Room air     Occupational Performance:     Bed Mobility:    Supime <> sitting: mod I with HOB elevated     Functional Mobility/Transfers:  Sit <> stand:  Supervision, no AD   Functional Mobility: Pt stood twice to get a paper towel from the sink.    Activities of Daily Living:  Grooming: Supervision to take steps to sink and retrieve paper towel to wipe face    Therapeutic Exercise:  Pt given BUE HEP and yellow theraband. Pt instructed in 3 exercises to strengthen BUEs for increased independence with ADLs. Pt verbalized and demonstrated understanding of therex.     Therapeutic Activity:   Pt initiated conversation about overdose experience. Pt reported feelings of fear associated with near death experience. He then expressed motivation to pursue drug cessation and drug rehabilitation. Pt openly discussed positive social and spiritual supports with OT. OT facilitated conversation by asking open ended questions about pt's goals and desires for the future. Pt verbalized feelings of encouragement upon completion of conversation.     [facility] 6 Click ADL: 17    Treatment & Education:  OT role, plan of care, progression of goals, importance of continued OOB activity, fall prevention, safety precautions, BUE HEP    Patient left HOB elevated with all lines intact, call button in reach, and RN notified    GOALS:   Multidisciplinary Problems       Occupational Therapy Goals          Problem: Occupational Therapy    Goal Priority Disciplines Outcome Interventions   Occupational Therapy Goal     OT, PT/OT Ongoing, Progressing    Description: Goals to be met by: 9/22/22     Patient will increase functional independence with ADLs by performing:    UE Dressing with [place].  LE Dressing with [place].  Grooming while standing with [place].  Toileting from toilet with [place] for hygiene and clothing management.   Toilet transfer to toilet with [place].                         Time Tracking:     OT Date of Treatment: 09/13/22  OT Start Time: 1108  OT Stop Time: 1141  OT Total Time (min): 33 min    Billable Minutes:Therapeutic Activity 15 minutes  Therapeutic  Exercise 18 minutes     OT/BRIANA: OT          9/13/2022
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
## 2022-09-13 NOTE — PLAN OF CARE
Problem: Physical Therapy  Goal: Physical Therapy Goal  Description: Goals to be met by: 10/8/22    Patient will increase functional independence with mobility by performin. Single leg stance on either leg x10 sec without UE support.  2. Dual tasking gait x100 ft without pathway deviations.   3. Ascend/descend 10 stairs without HR with supervision.  4. Activity x5 min on appropriate supplemental O2 with SpO2 >90%.      Met Goals:  Supine<>sit with CGA with appropriate use of hospital bed features. - MET  Sit<>stand with CGA with LRAD. MET   Sitting EOB x5 min with upright posture with SBA. MET   Standing x1 min with CGA with LRAD and upright posture. MET   Gait x 10 feet with modA with LRAD. MET   Outcome: Ongoing, Progressing     Patient with significant progress in independence with functional mobility since transfer out of ICU. Remains impaired in higher level balance activities, safety awareness, and stability with mobility during dual tasking and PT will continue to address these impairments at a lower frequency. Continued rec for IRF to continue intensive SLP/OT interventions.

## 2022-09-13 NOTE — SUBJECTIVE & OBJECTIVE
Interval History:  No acute events overnight; doing well this morning after SLP.    Review of Systems   Constitutional:  Negative for chills and fever.   Respiratory:  Negative for cough and shortness of breath.    Cardiovascular:  Negative for chest pain and palpitations.   Gastrointestinal:  Negative for abdominal pain and nausea.   Objective:     Vital Signs (Most Recent):  Temp: 98 °F (36.7 °C) (09/13/22 1524)  Pulse: 67 (09/13/22 1600)  Resp: 18 (09/13/22 1524)  BP: (!) 144/75 (09/13/22 1524)  SpO2: 97 % (09/13/22 1524)   Vital Signs (24h Range):  Temp:  [97.9 °F (36.6 °C)-98.6 °F (37 °C)] 98 °F (36.7 °C)  Pulse:  [] 67  Resp:  [14-18] 18  SpO2:  [93 %-97 %] 97 %  BP: (125-153)/(75-98) 144/75     Weight: 86.2 kg (190 lb)  Body mass index is 28.06 kg/m².  No intake or output data in the 24 hours ending 09/13/22 1700     Physical Exam  Vitals and nursing note reviewed.   Constitutional:       General: He is not in acute distress.     Appearance: He is well-developed.   HENT:      Head: Normocephalic and atraumatic.      Comments: NC in place     Nose: Nose normal.   Eyes:      General:         Right eye: No discharge.         Left eye: No discharge.      Conjunctiva/sclera: Conjunctivae normal.   Cardiovascular:      Rate and Rhythm: Normal rate.      Pulses: Normal pulses.   Pulmonary:      Effort: Pulmonary effort is normal. No respiratory distress.      Comments: Course breath sounds bilaterally, but even and unlabored  Abdominal:      General: Bowel sounds are normal.      Palpations: Abdomen is soft.      Tenderness: There is no abdominal tenderness. There is no guarding or rebound.   Musculoskeletal:         General: Normal range of motion.      Right lower leg: No edema.      Left lower leg: No edema.   Skin:     General: Skin is warm and dry.      Comments: Extensive tatoos throughout entire body   Neurological:      Comments: Awake, alert, oriented x 3     Significant Labs:   CBC:  Recent Labs    Lab 09/11/22 0356 09/12/22  0530 09/13/22  0434   WBC 15.42* 13.08* 14.58*   HGB 12.9* 13.4* 13.2*   HCT 39.1* 41.0 39.7*   * 766* 673*   GRAN 65.1  10.1* 61.5  8.0* 59.8  8.7*   LYMPH 16.8*  2.6 19.3  2.5 21.9  3.2   MONO 12.3  1.9* 11.2  1.5* 9.9  1.5*   EOS 0.5 0.8* 0.9*   BASO 0.12 0.11 0.10     CMP:  Recent Labs   Lab 09/11/22 0356 09/12/22  0531 09/13/22  0434    137 133*   K 3.9 4.1 4.1    107 102   CO2 23 21* 20*   BUN 31* 22* 17   CREATININE 0.7 0.7 0.7    89 90   CALCIUM 9.6 10.3 9.8   MG 2.2 2.3 2.0   PHOS 3.7 4.0 3.9   ALKPHOS 77  --  73   AST 45*  --  36   *  --  73*   BILITOT 0.7  --  0.8   PROT 7.7  --  7.4   ALBUMIN 3.2*  --  3.2*   ANIONGAP 10 9 11        Significant Imaging:   All imaging were reviewed

## 2022-09-13 NOTE — PLAN OF CARE
Nutrition Plan of Care:    Recommendations  1) continue with Speech therapy recommendations of trial with pureed foods.       2) If unable to advance PO diet in < 4 days replace NG for continued TF and consider PEG placement.  (Patient has refused long term alternate source of nutrition options and has pulled out NG tube)  TF recommendations remain if decision changes.    TF recommendations:  Isosource 1.5 @ 20ml/hr.  Increase by 10mls q 8 hours until goal rate of 55ml/hr is reached  + 165 ml flush q 4 hr  (provides 1980 kcal (100% EEN), 89 g protein (86%EPN), 1003 ml free water)     3) weigh weekly        Goals: 1) Patient to receive adequate nutrition prior to discharge. 2) diet ordered in < 4 days or nutrition support restarted     Nutrition Goal Status: progressing with oral pureed texture but refusing alternate source of nutrition  Communication of RD Recs: other (comment) (POC, sticky note)    Marianna Parmar, MS, RDN, LDN

## 2022-09-13 NOTE — NURSING
0100 patient asking for something for sleep. Spoke with hospitalist on call, per hospitalist, patient is now alert and they want to keep him like that, that we needed to avoid sedatives at the moment.

## 2022-09-13 NOTE — PT/OT/SLP PROGRESS
Physical Therapy Treatment    Patient Name:  Leighton Carroll   MRN:  [id number]    Recommendations:     Discharge Recommendations:  rehabilitation facility   Discharge Equipment Recommendations: none   Barriers to discharge: Inaccessible home and Decreased caregiver support, current medical needs    Assessment:     Leighton Carroll is a 42 y.o. male admitted with a medical diagnosis of Acute respiratory failure with hypoxia and hypercarbia - found down and in shock, intubated 8/26-9/7 starting proning protocol with paralytics 8/30-9/3. High levels of sedation required and early mobility unable to be initiated while intubated. Pmhx significant for depression and polysubstance abuse.  He presents with the following impairments/functional limitations:  gait instability, impaired balance, impaired cognition, decreased safety awareness, impaired coordination, impaired fine motor.    Patient with significant progress in independence with functional mobility since transfer out of ICU. Remains impaired in higher level balance activities, safety awareness, and stability with mobility during dual tasking and PT will continue to address these impairments at a lower frequency. Continued rec for IRF to continue intensive SLP/OT interventions.     Patient was independent at home prior to this event for locomotion, ADLs, and IADLs. There is expectation of returning to prior level of function to maintain independence avoiding readmission.      Pt's clinical condition meets full inpatient rehab criteria. Inpatient rehab will provide to total interdisciplinary treatment approach needed. Pt is at high risk of unplanned readmission due to fall risk and inability to perform ADLs without significant assistance. The lower level of care cannot provide total interdisciplinary approach needed.      Pt is able to tolerate 3 hours of daily therapy. Pt is pleasant and motivated to return to prior level of function.     Rehab Prognosis: Good; patient  "would benefit from acute skilled PT services to address these deficits and reach maximum level of function.    Recent Surgery: * No surgery found *      Plan:     During this hospitalization, patient to be seen 3 x/week to address the identified rehab impairments via gait training, therapeutic activities, therapeutic exercises, neuromuscular re-education and progress toward the following goals:    Plan of Care Expires:  10/08/22    Subjective     Chief Complaint: Happy to have progressed as he has  Patient/Family Comments/goals: Goal to stay clean, tearful when discussing initial extubation feeling "paralyzed" and having had a premonition prior to OD; Patient agreeable to PT treatment.  Pain/Comfort:  Pain Rating 1: 0/10  Pain Rating Post-Intervention 1: 0/10      Objective:     Communicated with RN prior to session.  Patient found ambulatory in room/perez with PICC line, telemetry upon PT entry to room.     General Precautions: Standard, aspiration, contact   Orthopedic Precautions:N/A   Braces: N/A  Respiratory Status: Room air    Functional Mobility:  Bed Mobility:     Supine to Sit: independence  Sit to Supine: independence  Transfers:     Sit to Stand:  independence and supervision with no AD  From EOB and chair  Gait: x50 ft within room with supervision-independence and use of IV pole ad naun.   Slight SOB with longer or faster gait bouts.   Occasional pathway deviations mohamud during dual tasking, but intact stepping postural responses with no overt LOB.  Balance:   Single Leg Stance: attempted, unable to maintain >1 sec prior to stepping/grabbing response to prevent fall  Tandem Gait: x6 steps with CGA and high guard, occasional step outs d/t LOB      AM-PAC 6 CLICK MOBILITY  Turning over in bed (including adjusting bedclothes, sheets and blankets)?: 4  Sitting down on and standing up from a chair with arms (e.g., wheelchair, bedside commode, etc.): 4  Moving from lying on back to sitting on the side of the bed?: " 4  Moving to and from a bed to a chair (including a wheelchair)?: 4  Need to walk in hospital room?: 3  Climbing 3-5 steps with a railing?: 3  Basic Mobility Total Score: 22       Therapeutic Activities and Exercises:   See above for gait, transfers, and bed mobility  Timed Up and Go: 9.94 (<10 normal)    Patient left ambulatory in room/perez with all lines intact and call button in reach..    GOALS:   Multidisciplinary Problems       Physical Therapy Goals          Problem: Physical Therapy    Goal Priority Disciplines Outcome Goal Variances Interventions   Physical Therapy Goal     PT, PT/OT Ongoing, Progressing     Description: Goals to be met by: 10/8/22    Patient will increase functional independence with mobility by performin. Single leg stance on either leg x10 sec without UE support.  2. Dual tasking gait x100 ft without pathway deviations.   3. Ascend/descend 10 stairs without HR with supervision.  4. Activity x5 min on appropriate supplemental O2 with SpO2 >90%.      Met Goals:  Supine<>sit with CGA with appropriate use of hospital bed features. - MET  Sit<>stand with CGA with LRAD. MET   Sitting EOB x5 min with upright posture with SBA. MET   Standing x1 min with CGA with LRAD and upright posture. MET   Gait x 10 feet with modA with LRAD. MET                        Time Tracking:     PT Received On: 22  PT Start Time: 1505     PT Stop Time: 1516  PT Total Time (min): 11 min    Billable Minutes: Therapeutic Activity 11    Treatment Type: Treatment  PT/PTA: PT     PTA Visit Number: 0     2022

## 2022-09-13 NOTE — PLAN OF CARE
Problem: Adult Inpatient Plan of Care  Goal: Plan of Care Review  Outcome: Ongoing, Progressing  Goal: Patient-Specific Goal (Individualized)  Outcome: Ongoing, Progressing  Goal: Absence of Hospital-Acquired Illness or Injury  Outcome: Ongoing, Progressing  Goal: Optimal Comfort and Wellbeing  Outcome: Ongoing, Progressing  Goal: Readiness for Transition of Care  Outcome: Ongoing, Progressing     Problem: Communication Impairment (Mechanical Ventilation, Invasive)  Goal: Effective Communication  Outcome: Ongoing, Progressing     Problem: Device-Related Complication Risk (Mechanical Ventilation, Invasive)  Goal: Optimal Device Function  Outcome: Ongoing, Progressing     Problem: Inability to Wean (Mechanical Ventilation, Invasive)  Goal: Mechanical Ventilation Liberation  Outcome: Ongoing, Progressing     Problem: Nutrition Impairment (Mechanical Ventilation, Invasive)  Goal: Optimal Nutrition Delivery  Outcome: Ongoing, Progressing     Problem: Skin and Tissue Injury (Mechanical Ventilation, Invasive)  Goal: Absence of Device-Related Skin and Tissue Injury  Outcome: Ongoing, Progressing     Problem: Ventilator-Induced Lung Injury (Mechanical Ventilation, Invasive)  Goal: Absence of Ventilator-Induced Lung Injury  Outcome: Ongoing, Progressing     Problem: Communication Impairment (Artificial Airway)  Goal: Effective Communication  Outcome: Ongoing, Progressing     Problem: Device-Related Complication Risk (Artificial Airway)  Goal: Optimal Device Function  Outcome: Ongoing, Progressing     Problem: Skin and Tissue Injury (Artificial Airway)  Goal: Absence of Device-Related Skin or Tissue Injury  Outcome: Ongoing, Progressing     Problem: Noninvasive Ventilation Acute  Goal: Effective Unassisted Ventilation and Oxygenation  Outcome: Ongoing, Progressing     Problem: Infection  Goal: Absence of Infection Signs and Symptoms  Outcome: Ongoing, Progressing     Problem: Adjustment to Illness (Sepsis/Septic  Shock)  Goal: Optimal Coping  Outcome: Ongoing, Progressing     Problem: Bleeding (Sepsis/Septic Shock)  Goal: Absence of Bleeding  Outcome: Ongoing, Progressing     Problem: Glycemic Control Impaired (Sepsis/Septic Shock)  Goal: Blood Glucose Level Within Desired Range  Outcome: Ongoing, Progressing     Problem: Infection Progression (Sepsis/Septic Shock)  Goal: Absence of Infection Signs and Symptoms  Outcome: Ongoing, Progressing     Problem: Nutrition Impaired (Sepsis/Septic Shock)  Goal: Optimal Nutrition Intake  Outcome: Ongoing, Progressing     Problem: Skin Injury Risk Increased  Goal: Skin Health and Integrity  Outcome: Ongoing, Progressing     Problem: Fall Injury Risk  Goal: Absence of Fall and Fall-Related Injury  Outcome: Ongoing, Progressing     Problem: Fluid Imbalance (Pneumonia)  Goal: Fluid Balance  Outcome: Ongoing, Progressing     Problem: Infection (Pneumonia)  Goal: Resolution of Infection Signs and Symptoms  Outcome: Ongoing, Progressing     Problem: Respiratory Compromise (Pneumonia)  Goal: Effective Oxygenation and Ventilation  Outcome: Ongoing, Progressing     Problem: ARDS (Acute Respiratory Distress Syndrome)  Goal: Effective Oxygenation  Outcome: Ongoing, Progressing     Problem: Coping Ineffective  Goal: Effective Coping  Outcome: Ongoing, Progressing     Problem: Impaired Wound Healing  Goal: Optimal Wound Healing  Outcome: Ongoing, Progressing     Problem: Spiritual Distress Risk or Actual  Goal: Spiritual Wellbeing  Outcome: Ongoing, Progressing     Problem: Oral Intake Inadequate  Goal: Improved Oral Intake  Outcome: Ongoing, Progressing

## 2022-09-13 NOTE — CONSULTS
Subjective:     Chief Complaint:   Chief Complaint   Patient presents with    Shortness of Breath       Leighton Carroll is a 42 y.o. male with PMH of depression, hx of drug overdose (7/11/22), polysubstance abuse and nicotine dependence who presented with respiratory failure on 8/25/22. Patient was intubated and treated for pneumonia/bacteremia. He was extubated on 9/7/22 after improvement of respiratory status. Seen by speech therapy and recommended NPO due to aspiration concerns. Patient reports improvement of his voice and swallowing over the past few days. Patient denies previous intubation.     Also reports history of palatal trauma in 2012 with a screw . Has a chronic palatal fistula and manages this by occlusion with toilet paper every other day.       Past Medical History  He has a past medical history of Anxiety, Depression, Hepatitis C, and Substance abuse.    Past Surgical History  He has no past surgical history on file.    Family History  His family history is not on file.    Social History  He reports that he has been smoking cigarettes. He has been smoking an average of .5 packs per day. He has never used smokeless tobacco. He reports current alcohol use. He reports current drug use. Drugs: Methamphetamines and Marijuana.    Allergies  He has No Known Allergies.    Medications  He   Current Facility-Administered Medications:     acetaminophen tablet 650 mg, 650 mg, Oral, Q4H PRN, Kevin Dewitt NP, 650 mg at 09/07/22 1544    buPROPion tablet 150 mg, 150 mg, Oral, BID, Sujata Francois MD, 150 mg at 09/12/22 1009    enoxaparin injection 40 mg, 40 mg, Subcutaneous, Daily, Kevin Dewitt NP, 40 mg at 09/12/22 1724    magnesium sulfate 2g in water 50mL IVPB (premix), 2 g, Intravenous, PRN, Tyree Meadows MD, Stopped at 09/09/22 2000    magnesium sulfate 2g in water 50mL IVPB (premix), 4 g, Intravenous, PRN, Tyree Meadows MD    nicotine 14 mg/24 hr 1 patch, 1 patch, Transdermal, Daily, Kevin Dewitt NP, 1  "patch at 09/12/22 1009    potassium chloride 10 mEq in 100 mL IVPB, 40 mEq, Intravenous, PRN, Stopped at 09/10/22 1522 **AND** potassium chloride 10 mEq in 100 mL IVPB, 60 mEq, Intravenous, PRN, Last Rate: 100 mL/hr at 09/09/22 1716, Restarted at 09/09/22 1716 **AND** potassium chloride 10 mEq in 100 mL IVPB, 80 mEq, Intravenous, PRN, Tyree Meadows MD, Last Rate: 100 mL/hr at 09/07/22 1700, Rate Verify at 09/07/22 1700    sodium chloride 0.9% flush 10 mL, 10 mL, Intravenous, PRN, Kevin Dewitt NP    sodium chloride 3% nebulizer solution 4 mL, 4 mL, Nebulization, Q12H, Sujata Francois MD, 4 mL at 09/12/22 1917    sodium phosphate 15 mmol in dextrose 5 % 250 mL IVPB, 15 mmol, Intravenous, PRN, Tyree Meadows MD    sodium phosphate 20.01 mmol in dextrose 5 % 250 mL IVPB, 20.01 mmol, Intravenous, PRN, Tyree Meadows MD    sodium phosphate 30 mmol in dextrose 5 % 250 mL IVPB, 30 mmol, Intravenous, PRN, Tyree Meadows MD    Pharmacy to dose Vancomycin consult, , , Once **AND** vancomycin - pharmacy to dose, , Intravenous, pharmacy to manage frequency, Sujata Francois MD    [COMPLETED] vancomycin 2 g in dextrose 5 % 500 mL IVPB, 2,000 mg, Intravenous, Once, Stopped at 09/05/22 1344 **FOLLOWED BY** vancomycin 1.75 g in 5 % dextrose 500 mL IVPB, 1,750 mg, Intravenous, Q12H, Alexia Abel MD, Stopped at 09/12/22 1647       Objective:     /80 (BP Location: Right arm, Patient Position: Lying)   Pulse 85   Temp 97.8 °F (36.6 °C) (Oral)   Resp 16   Ht 5' 9" (1.753 m)   Wt 86.2 kg (190 lb)   SpO2 95%   BMI 28.06 kg/m²      Constitutional:   Vital signs are normal. He appears well-developed and well-nourished.     Head:  Normocephalic. Salivary glands normal.      Ears:    Right Ear: No drainage. No decreased hearing is noted.   Left Ear: No drainage. No decreased hearing is noted.     Nose:  No mucosal edema. Turbinates normal.  Right sinus exhibits no maxillary sinus tenderness and no frontal sinus tenderness. Left sinus " exhibits no maxillary sinus tenderness and no frontal sinus tenderness.     Mouth/Throat  Abnormal uvula midline. No uvula swelling, trismus or xerostomia. No posterior oropharyngeal edema or posterior oropharyngeal erythema.         Neck:  Thyroid normal, trachea normal, full range of motion with neck supple and no adenopathy.     Pulmonary/Chest:   No apnea, no tachypnea and no bradypnea. No respiratory distress.     Psychiatric:   He has a normal mood and affect. His speech is not rapid and/or pressured.   Voice - rough/wet, non-breathy, mild asthenia, no strain,      Neurological:          Voice - rough/wet, non-breathy, mild asthenia, no strain,      Skin:          Voice - rough/wet, non-breathy, mild asthenia, no strain,        Procedure    Flexible laryngoscopy performed.  See procedure note.  Nasal/Nasopharyngo/Laryn/Hypopharyngoscopy Procedures    Procedure:  Diagnostic nasal, nasopharyngoscopy, laryngoscopy and hypopharyngoscopy.    Routine preparation with local 1% neosynephrine and lidocaine     NOSE:   External:  No gross deformity   Intranasal:    Mucosa:  No polyps, ulcers or lesions; fistula right nasal floor    Septum:  No gross deformity.    Turbinates:  Not enlarged.    Nasopharynx:  No lesions.   Mucosa:  No lesions.   Adenoids:  Present.   Posterior Choanae:  Patent.   Eustachian Tubes:  Patent.    Oropharynx:    BOT: No lesions or edema      Larynx/hypopharynx:   Epiglottis:  No lesions, without edema.   Arytenoids: no lesions    AE Folds:  No lesions.   Vocal cords:  No masses, decreased glottic sensation with moderate amount of secretions present   Very mild paresis right vocal fold but clear abduction and adduction with good vocal fold approximation     Subglottis: No obvious stenosis   Hypopharynx:  No lesions.   Piriform sinus:  + pooling, no lesions   Post Cricoid:  No edema or erythema      Data Reviewed    WBC (K/uL)   Date Value   09/12/2022 13.08 (H)     Eosinophil % (%)   Date Value    09/12/2022 5.7     Eos # (K/uL)   Date Value   09/12/2022 0.8 (H)     Platelets (K/uL)   Date Value   09/12/2022 766 (H)     Glucose (mg/dL)   Date Value   09/12/2022 89     Hemoglobin (g/dL)   Date Value   09/12/2022 13.4 (L)     POC Hematocrit (%PCV)   Date Value   09/06/2022 35 (L)     Hematocrit (%)   Date Value   09/12/2022 41.0     Platelets (K/uL)   Date Value   09/12/2022 766 (H)     Prothrombin Time (sec)   Date Value   08/25/2022 11.3     aPTT (sec)   Date Value   08/25/2022 23.1     INR (no units)   Date Value   08/25/2022 1.1           Assessment:     1. Pneumonia of both lungs due to Pseudomonas species, unspecified part of lung [J15.1 (ICD-10-CM)]    2. Pneumonia    3. Fall    4. Tachycardia    5. Acute respiratory failure with hypoxia and hypercarbia [J96.01, J96.02 (ICD-10-CM)]    6. Gram-negative bacteremia [R78.81 (ICD-10-CM)]    7. ARDS (adult respiratory distress syndrome)    8. Arrhythmia    9. Ventilator associated pneumonia    10. Staphylococcal pneumonia    11. Respiratory failure    12. Aspiration into airway, subsequent encounter    13. Pneumonia due to methicillin resistant Staphylococcus aureus (MRSA), unspecified laterality, unspecified part of lung          Plan:     Patient has long-standing palatal fistula that he occludes daily with tissue paper. Likely a good candidate for an obturator, recommend follow up with oral surgery/prosthodontics at [facility] as out-patient. This is unlikely causing any of his current issues with dysphagia. Scope exam reveals decreased glottic sensation and very mild right vocal fold paresis with good approximation. Likely neuropraxia from prolonged intubation; anticipate improvement with time and speech therapy. Consider adding PPI and recommend continued speech therapy and diet per their recommendation.

## 2022-09-13 NOTE — PROGRESS NOTES
Pharmacokinetic Assessment Follow Up: IV Vancomycin    Vancomycin serum concentration assessment(s):    The trough level was drawn correctly and can be used to guide therapy at this time. The measurement is within the desired definitive target range of 10 to 20 mcg/mL.    Vancomycin Regimen Plan:    Continue regimen to Vancomycin 1750 mg IV every 12 hours with next serum trough concentration measured at 1230 prior to 1300 dose on 9/18    Drug levels (last 3 results):  Recent Labs   Lab Result Units 09/11/22  2310 09/13/22  1255   Vancomycin-Trough ug/mL 16.4 17.0       Pharmacy will continue to follow and monitor vancomycin.    Please contact pharmacy at extension [phone] for questions regarding this assessment.    Thank you for the consult,   May Bartholomew       Patient brief summary:  Leighton Carroll is a 42 y.o. male initiated on antimicrobial therapy with IV Vancomycin for treatment of  pneumonia    The patient's current regimen is Vancomycin 1750mg IVPB every 12 hours    Drug Allergies:   Review of patient's allergies indicates:  No Known Allergies    Actual Body Weight:   86.2kg    Renal Function:   Estimated Creatinine Clearance: 148 mL/min (based on SCr of 0.7 mg/dL).,     Dialysis Method (if applicable):  N/A    CBC (last 72 hours):  Recent Labs   Lab Result Units 09/11/22  0356 09/12/22  0530 09/13/22  0434   WBC K/uL 15.42* 13.08* 14.58*   Hemoglobin g/dL 12.9* 13.4* 13.2*   Hematocrit % 39.1* 41.0 39.7*   Platelets K/uL 928* 766* 673*   Gran % % 65.1 61.5 59.8   Lymph % % 16.8* 19.3 21.9   Mono % % 12.3 11.2 9.9   Eosinophil % % 3.2 5.7 6.0   Basophil % % 0.8 0.8 0.7   Differential Method  Automated Automated Automated       Metabolic Panel (last 72 hours):  Recent Labs   Lab Result Units 09/11/22  0356 09/12/22  0531 09/13/22  0434   Sodium mmol/L 138 137 133*   Potassium mmol/L 3.9 4.1 4.1   Chloride mmol/L 105 107 102   CO2 mmol/L 23 21* 20*   Glucose mg/dL 101 89 90   BUN mg/dL 31* 22* 17   Creatinine  mg/dL 0.7 0.7 0.7   Albumin g/dL 3.2*  --  3.2*   Total Bilirubin mg/dL 0.7  --  0.8   Alkaline Phosphatase U/L 77  --  73   AST U/L 45*  --  36   ALT U/L 103*  --  73*   Magnesium mg/dL 2.2 2.3 2.0   Phosphorus mg/dL 3.7 4.0 3.9       Vancomycin Administrations:  vancomycin given in the last 96 hours                     vancomycin 1.75 g in 5 % dextrose 500 mL IVPB (mg) 1,750 mg New Bag 09/13/22 0105     1,750 mg New Bag 09/12/22 1447     1,750 mg New Bag  0200     1,750 mg New Bag 09/11/22 1154     1,750 mg New Bag  0015     1,750 mg New Bag 09/10/22 1213    vancomycin 1.5 g in dextrose 5 % 250 mL IVPB (ready to mix) (mg) 1,500 mg New Bag 09/10/22 0059                    Microbiologic Results:  Microbiology Results (last 7 days)       Procedure Component Value Units Date/Time    Blood culture [[id number]] Collected: 09/12/22 0530    Order Status: Completed Specimen: Blood Updated: 09/13/22 1022     Blood Culture, Routine No Growth to date      No Growth to date    Blood culture [[id number]] Collected: 09/12/22 0526    Order Status: Completed Specimen: Blood Updated: 09/13/22 1022     Blood Culture, Routine No Growth to date      No Growth to date    Blood culture [[id number]]  (Abnormal) Collected: 09/10/22 0007    Order Status: Completed Specimen: Blood Updated: 09/13/22 0834     Blood Culture, Routine Gram stain aer bottle: Gram positive cocci in clusters resembling Staph      Results called to and read back by:Salena Nelson RN 09/11/2022  13:54      Gram stain meron bottle: Gram positive cocci in clusters resembling Staph      Positive results previously called 09/11/2022  18:40      COAGULASE-NEGATIVE STAPHYLOCOCCUS SPECIES  Organism is a probable contaminant      Blood culture [[id number]] Collected: 09/10/22 1641    Order Status: Completed Specimen: Blood from Antecubital, Right Hand Updated: 09/13/22 0613     Blood Culture, Routine No Growth to date      No Growth to date      No Growth to date    Narrative:       Aerobic and Anaerobic, only 1 of 2 collected this morning    MRSA/SA Rapid ID by PCR from Blood culture [[id number]] Collected: 09/11/22 1840    Order Status: Completed Updated: 09/11/22 2056     Staph aureus ID by PCR Negative     MRSA ID by PCR Negative    Blood culture [[id number]] Collected: 09/05/22 1205    Order Status: Completed Specimen: Blood Updated: 09/10/22 2022     Blood Culture, Routine No growth after 5 days.    Blood culture [[id number]] Collected: 09/05/22 1210    Order Status: Completed Specimen: Blood Updated: 09/10/22 2022     Blood Culture, Routine No growth after 5 days.    Blood culture [[id number]]     Order Status: Canceled Specimen: Blood

## 2022-09-13 NOTE — PROGRESS NOTES
[facility] [place] [facility]  Adult Nutrition  Progress Note    SUMMARY     Recommendations  1) continue with Speech therapy recommendations of trial with pureed foods.      2) If unable to advance PO diet in < 4 days replace NG for continued TF and consider PEG placement.  (Patient has refused long term alternate source of nutrition options and has pulled out NG tube)  TF recommendations remain if decision changes.    TF recommendations:  Isosource 1.5 @ 20ml/hr.  Increase by 10mls q 8 hours until goal rate of 55ml/hr is reached  + 165 ml flush q 4 hr  (provides 1980 kcal (100% EEN), 89 g protein (86%EPN), 1003 ml free water)    3) weigh weekly      Goals: 1) Patient to receive adequate nutrition prior to discharge. 2) diet ordered in < 4 days or nutrition support restarted    Nutrition Goal Status: progressing with oral pureed texture but refusing alternate source of nutrition  Communication of RD Recs: other (comment) (POC, sticky note)    Assessment and Plan    Nutrition Problem  Inability to consume oral intake     Related to (etiology):   Condition associated with diagnosis:respiratory failure    Signs and Symptoms (as evidenced by):   NPO, mechanical ventilation, and need for alternate source of nutrition     Interventions;  Texture modified diet trials and option of recommendations for enteral nutrition therapy, collaboration with other providers    Nutrition Diagnosis Status:   Continues      Malnutrition Assessment   Farrukh = 14, lip skin tear, wounds to nose, penis, frontal area, scrotum, and cheek  Edema: 1-2+  NFPE by on-site RD at f/u    Reason for Assessment    Reason For Assessment: follow up     Diagnosis: psychological disorder, other (see comments) (respiratory failure, overdose)    Past Medical History:   Diagnosis Date    Anxiety     Depression     Hepatitis C     Substance abuse     METH AND HEROIN     Interdisciplinary Rounds: did not attend- remote    General Information Comments:  "  8/27/2022: Patient with overdose and intubated at this time.  Patient already started on tubefeeing via OG tube.  RD to recommend current formula of Isosource 1.5 with goal rate of 50ml/hr.  Propofol running with rate of 25.9ml/hr.  Previous 24 hour total of propofol is 454mls (499kcals).  LBM not noted at this time.  NKFA.  RD to continue to monitor tube feeding tolerance and labs. Current labs reviewed.  (ILYA remote)  8/31/2022: Patient continues with vent, tube feeding and propofol.  Kcals for tube feeding and propofol noted.  Patient was proned last night and is now supine.  Propofol rate of 25.9ml/hr and 650mls of tube feeding recorded for the previous 24 hours.  Labs reviewed. NKFA.  RD will continue to monitor.  (ILYA remote)  9/8/22 Pt wqas tolerating TF 9/6, noted to have been restarted after pause at 6AM that morning. Pt extubated 9/7, npo x 1 day, failed SLP eval.   9/13/22:  Patient pulled out ng tube and refuses alternate source of nutrition in any form.  Speech therapy has advanced diet to pureed texture with 1 on 1 monitoring with meals to prevent aspiration.  Alternate source of nutrition recommendations remain in chart.  Tolerance of pureed diet is on a trial bases.  RD will continue to monitor and provide therapy as appropriate.  Labs reviewed.  LBM:9/11 NKFA (RD remote)    Nutrition Discharge Planning: regular diet, texture per SLP    Nutrition Risk Screen    Nutrition Risk Screen: dysphagia or difficulty swallowing    Nutrition/Diet History    Spiritual, Cultural Beliefs, [employer] Practices, Values that Affect Care: no  Food Allergies: NKFA  Factors Affecting Nutritional Intake: NPO, trouble swallowing    Anthropometrics    Temp: 98 °F (36.7 °C)  Height Method: Estimated  Height: 5' 9" (175.3 cm)  Height (inches): 69 in  Weight Method: Bed Scale  Weight: 86.2 kg (190 lb)  Weight (lb): 190 lb  Ideal Body Weight (IBW), Male: 160 lb  % Ideal Body Weight, Male (lb): 118.75 %  BMI (Calculated): " 28  BMI Grade: 25 - 29.9 - overweight     Lab/Procedures/Meds    Pertinent Labs Reviewed: reviewed  BMP  Lab Results   Component Value Date     (L) 09/13/2022    K 4.1 09/13/2022     09/13/2022    CO2 20 (L) 09/13/2022    BUN 17 09/13/2022    CREATININE 0.7 09/13/2022    CALCIUM 9.8 09/13/2022    ANIONGAP 11 09/13/2022    ESTGFRAFRICA >60 07/16/2022    EGFRNONAA >60 07/16/2022     Lab Results   Component Value Date    CALCIUM 9.8 09/13/2022    PHOS 3.9 09/13/2022     Lab Results   Component Value Date    ALBUMIN 3.2 (L) 09/13/2022     No results found for: POCTGLUCOSE    Pertinent Medications Reviewed: reviewed  Dexamethasone, polyethylene glycol, senna, NaPhos, Ca, Mg sulfate, KCl    Estimated/Assessed Needs    Weight Used For Calorie Calculations: 86.2 kg (190 lb 0.6 oz)  Energy Calorie Requirements (kcal): 1975 kcal  Energy Need Method:  MSJ ( x 1.25)   Protein Requirements: 1.2g/kg (104g/day)  Weight Used For Protein Calculations: 86.2 kg (190 lb 0.6 oz)  Fluid Requirements (mL): 2000 ml or per MD  Estimated Fluid Requirement Method: RDA MethodRDA Method (mL): 20  CHO Requirement: N/A      Nutrition Prescription Ordered    Current Diet Order: pureed (trial bases)    Evaluation of Received Nutrient/Fluid Intake    Energy Calories Required: not meeting needs  Protein Required: not meeting needs  Fluid Required: meeting needs  Tolerance: pureed  No intake or output data in the 24 hours ending 09/13/22 1804      % Intake of Estimated Energy Needs:  <25%  % Meal Intake: <25%      Nutrition Risk    Level of Risk/Frequency of Follow-up: moderate 2 x weekly      Monitor and Evaluation    Food and Nutrient Intake: enteral nutrition intake  Food and Nutrient Adminstration: enteral and parenteral nutrition administration  Knowledge/Beliefs/Attitudes: other (specify)  Physical Activity and Function: nutrition-related ADLs and IADLs  Anthropometric Measurements: height/length, weight, weight change, body mass  index  Biochemical Data, Medical Tests and Procedures: glucose/endocrine profile, gastrointestinal profile, electrolyte and renal panel, skin      Nutrition Follow-Up    RD Follow-up?: Yes    Marianna Parmar MS, RDN, LDN

## 2022-09-14 PROBLEM — A41.9 SEPTIC SHOCK: Status: RESOLVED | Noted: 2022-08-26 | Resolved: 2022-09-14

## 2022-09-14 PROBLEM — T17.908A ASPIRATION INTO AIRWAY: Status: RESOLVED | Noted: 2022-09-10 | Resolved: 2022-09-14

## 2022-09-14 PROBLEM — J15.212 PNEUMONIA DUE TO METHICILLIN RESISTANT STAPHYLOCOCCUS AUREUS (MRSA): Status: RESOLVED | Noted: 2022-09-10 | Resolved: 2022-09-14

## 2022-09-14 PROBLEM — J95.851 VENTILATOR ASSOCIATED PNEUMONIA: Status: RESOLVED | Noted: 2022-09-05 | Resolved: 2022-09-14

## 2022-09-14 PROBLEM — J80 ARDS (ADULT RESPIRATORY DISTRESS SYNDROME): Status: RESOLVED | Noted: 2022-08-30 | Resolved: 2022-09-14

## 2022-09-14 PROBLEM — R65.21 SEPTIC SHOCK: Status: RESOLVED | Noted: 2022-08-26 | Resolved: 2022-09-14

## 2022-09-14 PROBLEM — J15.20 STAPHYLOCOCCAL PNEUMONIA: Status: RESOLVED | Noted: 2022-09-05 | Resolved: 2022-09-14

## 2022-09-14 PROBLEM — R31.9 HEMATURIA: Status: RESOLVED | Noted: 2022-08-28 | Resolved: 2022-09-14

## 2022-09-14 PROBLEM — R74.8 ELEVATED CPK: Status: RESOLVED | Noted: 2022-07-12 | Resolved: 2022-09-14

## 2022-09-14 LAB
ANION GAP SERPL CALC-SCNC: 6 MMOL/L (ref 8–16)
BASOPHILS # BLD AUTO: 0.08 K/UL (ref 0–0.2)
BASOPHILS NFR BLD: 0.7 % (ref 0–1.9)
BUN SERPL-MCNC: 15 MG/DL (ref 6–20)
CALCIUM SERPL-MCNC: 9.6 MG/DL (ref 8.7–10.5)
CHLORIDE SERPL-SCNC: 105 MMOL/L (ref 95–110)
CO2 SERPL-SCNC: 21 MMOL/L (ref 23–29)
CREAT SERPL-MCNC: 0.7 MG/DL (ref 0.5–1.4)
DIFFERENTIAL METHOD: ABNORMAL
EOSINOPHIL # BLD AUTO: 0.6 K/UL (ref 0–0.5)
EOSINOPHIL NFR BLD: 5.4 % (ref 0–8)
ERYTHROCYTE [DISTWIDTH] IN BLOOD BY AUTOMATED COUNT: 14.9 % (ref 11.5–14.5)
EST. GFR  (NO RACE VARIABLE): >60 ML/MIN/1.73 M^2
GLUCOSE SERPL-MCNC: 90 MG/DL (ref 70–110)
HCT VFR BLD AUTO: 39.9 % (ref 40–54)
HGB BLD-MCNC: 13.3 G/DL (ref 14–18)
IMM GRANULOCYTES # BLD AUTO: 0.14 K/UL (ref 0–0.04)
IMM GRANULOCYTES NFR BLD AUTO: 1.3 % (ref 0–0.5)
LYMPHOCYTES # BLD AUTO: 2.4 K/UL (ref 1–4.8)
LYMPHOCYTES NFR BLD: 22 % (ref 18–48)
MAGNESIUM SERPL-MCNC: 2 MG/DL (ref 1.6–2.6)
MCH RBC QN AUTO: 29.5 PG (ref 27–31)
MCHC RBC AUTO-ENTMCNC: 33.3 G/DL (ref 32–36)
MCV RBC AUTO: 89 FL (ref 82–98)
MONOCYTES # BLD AUTO: 1.3 K/UL (ref 0.3–1)
MONOCYTES NFR BLD: 12.4 % (ref 4–15)
NEUTROPHILS # BLD AUTO: 6.3 K/UL (ref 1.8–7.7)
NEUTROPHILS NFR BLD: 58.2 % (ref 38–73)
NRBC BLD-RTO: 0 /100 WBC
PHOSPHATE SERPL-MCNC: 3.8 MG/DL (ref 2.7–4.5)
PLATELET # BLD AUTO: 600 K/UL (ref 150–450)
PMV BLD AUTO: 9.1 FL (ref 9.2–12.9)
POTASSIUM SERPL-SCNC: 3.9 MMOL/L (ref 3.5–5.1)
RBC # BLD AUTO: 4.51 M/UL (ref 4.6–6.2)
SODIUM SERPL-SCNC: 132 MMOL/L (ref 136–145)
WBC # BLD AUTO: 10.78 K/UL (ref 3.9–12.7)

## 2022-09-14 PROCEDURE — 84100 ASSAY OF PHOSPHORUS: CPT | Performed by: HOSPITALIST

## 2022-09-14 PROCEDURE — 92526 ORAL FUNCTION THERAPY: CPT

## 2022-09-14 PROCEDURE — 99233 PR SUBSEQUENT HOSPITAL CARE,LEVL III: ICD-10-PCS | Mod: ,,, | Performed by: INTERNAL MEDICINE

## 2022-09-14 PROCEDURE — 36415 COLL VENOUS BLD VENIPUNCTURE: CPT | Performed by: HOSPITALIST

## 2022-09-14 PROCEDURE — 25000003 PHARM REV CODE 250: Performed by: HOSPITALIST

## 2022-09-14 PROCEDURE — 99233 SBSQ HOSP IP/OBS HIGH 50: CPT | Mod: ,,, | Performed by: INTERNAL MEDICINE

## 2022-09-14 PROCEDURE — 63600175 PHARM REV CODE 636 W HCPCS: Performed by: HOSPITALIST

## 2022-09-14 PROCEDURE — S4991 NICOTINE PATCH NONLEGEND: HCPCS

## 2022-09-14 PROCEDURE — 80048 BASIC METABOLIC PNL TOTAL CA: CPT | Performed by: HOSPITALIST

## 2022-09-14 PROCEDURE — 83735 ASSAY OF MAGNESIUM: CPT | Performed by: HOSPITALIST

## 2022-09-14 PROCEDURE — 21400001 HC TELEMETRY ROOM

## 2022-09-14 PROCEDURE — 25000003 PHARM REV CODE 250

## 2022-09-14 PROCEDURE — 25000003 PHARM REV CODE 250: Performed by: INTERNAL MEDICINE

## 2022-09-14 PROCEDURE — 63600175 PHARM REV CODE 636 W HCPCS

## 2022-09-14 PROCEDURE — 97110 THERAPEUTIC EXERCISES: CPT

## 2022-09-14 PROCEDURE — 27000207 HC ISOLATION

## 2022-09-14 PROCEDURE — 85025 COMPLETE CBC W/AUTO DIFF WBC: CPT | Performed by: HOSPITALIST

## 2022-09-14 RX ADMIN — BUPROPION HYDROCHLORIDE 150 MG: 75 TABLET, FILM COATED ORAL at 09:09

## 2022-09-14 RX ADMIN — VANCOMYCIN HYDROCHLORIDE 1750 MG: 500 INJECTION, POWDER, LYOPHILIZED, FOR SOLUTION INTRAVENOUS at 12:09

## 2022-09-14 RX ADMIN — ENOXAPARIN SODIUM 40 MG: 100 INJECTION SUBCUTANEOUS at 05:09

## 2022-09-14 RX ADMIN — PANTOPRAZOLE SODIUM 40 MG: 40 TABLET, DELAYED RELEASE ORAL at 09:09

## 2022-09-14 RX ADMIN — NICOTINE 1 PATCH: 14 PATCH TRANSDERMAL at 09:09

## 2022-09-14 RX ADMIN — VANCOMYCIN HYDROCHLORIDE 1750 MG: 500 INJECTION, POWDER, LYOPHILIZED, FOR SOLUTION INTRAVENOUS at 01:09

## 2022-09-14 NOTE — SUBJECTIVE & OBJECTIVE
Interval History:  No acute events overnight; feeling well. Continuing to work with SLP.    Review of Systems   Constitutional:  Negative for chills and fever.   Respiratory:  Negative for cough and shortness of breath.    Cardiovascular:  Negative for chest pain and palpitations.   Gastrointestinal:  Negative for abdominal pain and nausea.   Objective:     Vital Signs (Most Recent):  Temp: 97.6 °F (36.4 °C) (09/14/22 1529)  Pulse: (!) 111 (09/14/22 1529)  Resp: 18 (09/14/22 1529)  BP: (!) 136/90 (09/14/22 1529)  SpO2: 99 % (09/14/22 1529)   Vital Signs (24h Range):  Temp:  [97.6 °F (36.4 °C)-98.9 °F (37.2 °C)] 97.6 °F (36.4 °C)  Pulse:  [] 111  Resp:  [16-18] 18  SpO2:  [95 %-99 %] 99 %  BP: (128-140)/(82-91) 136/90     Weight: 86.2 kg (190 lb)  Body mass index is 28.06 kg/m².  No intake or output data in the 24 hours ending 09/14/22 1546     Physical Exam  Vitals and nursing note reviewed.   Constitutional:       General: He is not in acute distress.     Appearance: He is well-developed.   HENT:      Head: Normocephalic and atraumatic.   Eyes:      General:         Right eye: No discharge.         Left eye: No discharge.      Conjunctiva/sclera: Conjunctivae normal.   Cardiovascular:      Rate and Rhythm: Normal rate.      Pulses: Normal pulses.   Pulmonary:      Effort: Pulmonary effort is normal. No respiratory distress.   Abdominal:      Palpations: Abdomen is soft.      Tenderness: There is no abdominal tenderness.   Musculoskeletal:         General: Normal range of motion.      Right lower leg: No edema.      Left lower leg: No edema.   Skin:     General: Skin is warm and dry.      Comments: Extensive tattoos.   Neurological:      Mental Status: He is alert and oriented to person, place, and time.     Significant Labs:   CBC:  Recent Labs   Lab 09/12/22  0530 09/13/22  0434 09/14/22  0629   WBC 13.08* 14.58* 10.78   HGB 13.4* 13.2* 13.3*   HCT 41.0 39.7* 39.9*   * 673* 600*   GRAN 61.5  8.0*  59.8  8.7* 58.2  6.3   LYMPH 19.3  2.5 21.9  3.2 22.0  2.4   MONO 11.2  1.5* 9.9  1.5* 12.4  1.3*   EOS 0.8* 0.9* 0.6*   BASO 0.11 0.10 0.08     CMP:  Recent Labs   Lab 09/12/22  0531 09/13/22  0434 09/14/22  0629    133* 132*   K 4.1 4.1 3.9    102 105   CO2 21* 20* 21*   BUN 22* 17 15   CREATININE 0.7 0.7 0.7   GLU 89 90 90   CALCIUM 10.3 9.8 9.6   MG 2.3 2.0 2.0   PHOS 4.0 3.9 3.8   ALKPHOS  --  73  --    AST  --  36  --    ALT  --  73*  --    BILITOT  --  0.8  --    PROT  --  7.4  --    ALBUMIN  --  3.2*  --    ANIONGAP 9 11 6*        Significant Imaging:   No new imaging this morning.

## 2022-09-14 NOTE — PLAN OF CARE
09/14/22 1549   Post-Acute Status   Post-Acute Authorization Placement   Post-Acute Placement Status Pending payor review/awaiting authorization (if required)   Discharge Delays (!) Post-Acute Set-up   Discharge Plan   Discharge Plan A Rehab

## 2022-09-14 NOTE — ASSESSMENT & PLAN NOTE
Bilateral PNA, ARDS, septic shock (resolved), bacteremia, h/o drug overdose, polysubstance abuse, leukocytosis, encephalopathy, dysphagia, debility  - Hypoxic to 60s on arrival and failed BiPAP 2/2 agitation and intubated at OSH. Tox positive for THC. D-dimer elevated but CTA negative for PE. COVID, RSV negative.  - Completed 14 day course of cefepime for acinetobacter, pseudomonas luteola noted on blood cultures.  - Repeat blood cultures for fever; added vancomycin for resp culture showing Staph, patient to complete full course  - s/p ARDSnet protocol; proning/supinating and steroid taper. Extubated on 09/07.  - Stepped down to floor on 9/10  - ENT consulted; appreciate assistance. Noted long-standing palatal fistula, recommended outpatient follow-up with OMFS/prosthodontics; decreased glottic sensation and mild R vocal fold paresis likely secondary to prolonged intubation.  - Start pantoprazole 40mg PO daily as per ENT recs.  - Continue PT/OT/SLP.  - Repeat blood cultures 09/10 showed GPCs in clusters, speciated as coag-negative staph, likely contaminant.  - Continue vancomycin IV with pharmacy dosing consult.  - Case management working on rehab placement

## 2022-09-14 NOTE — ASSESSMENT & PLAN NOTE
- Continue buproprion 150mg per daily, quetiapine 200mg per qHS, divalproex ER 500mg 1g PO daily, risperiodone 2mg PO daily.

## 2022-09-14 NOTE — PT/OT/SLP PROGRESS
Occupational Therapy   Treatment    Name: Leighton Carroll  MRN: [id number]  Admitting Diagnosis:  Acute respiratory failure with hypoxia and hypercarbia       Recommendations:     Discharge Recommendations: rehabilitation facility  Discharge Equipment Recommendations:   (None anticipated.)  Barriers to discharge:   (current functional level)    Assessment:   Pt. Expressed gratitude for staff and improvement as well as being very motivated to improve in strength and independence.  Requires rest breaks between sets of exercise due to decreased endurance with heavy breathing after 5 reps noted.  Pt. Less impulsive at this time with good follow through of safety during transitional movements.  Progressing towards goals.  Continued recommendation of post acute OT in IRF.  To benefit from continued acute care OT services to increase independence in self-care/functional transfers.  Continue POC.     Leighton Carroll is a 42 y.o. male with a medical diagnosis of Acute respiratory failure with hypoxia and hypercarbia.  He presents with below deficits decreasing independence in self-care/functional transfers. Performance deficits affecting function are weakness, impaired endurance, impaired self care skills, impaired functional mobility, gait instability, impaired balance, decreased safety awareness, impaired cardiopulmonary response to activity, decreased upper extremity function, decreased lower extremity function.     Rehab Prognosis:  Good; patient would benefit from acute skilled OT services to address these deficits and reach maximum level of function.       Plan:     Patient to be seen 5 x/week to address the above listed problems via self-care/home management, therapeutic activities, therapeutic exercises  Plan of Care Expires: 10/08/22  Plan of Care Reviewed with: patient    Subjective     Pain/Comfort:  Pain Rating 1: 0/10  Pain Rating Post-Intervention 1: 0/10    Objective:     Communicated with: María ZULETA RN prior to  session.  Patient found sitting edge of bed with telemetry, PICC line upon OT entry to room.    General Precautions: Standard, fall, aspiration   Orthopedic Precautions:N/A   Braces: N/A  Respiratory Status: Room air     Occupational Performance:     Functional Mobility/Transfers:  EOB sitting to stance and ambulating short household distance bed<>bedside chair with SBA.  Participated in sit<>stands from bedside chair without 0 AD and initial cuing for hand placement during transitions with good follow through 3 X 5 with seated rest between sets of 60-90 seconds.  Demos increased RR and breathing after each set.  For BUE/BLE strengthening and to improve endurance needed to increase independence in ADL/functional  transfers.     [facility] 6 Click ADL: 18    Treatment & Education:  -EOB sitting to stance and ambulating short household distance bed<>bedside chair with SBA.  Participated in sit<>stands from bedside chair without 0 AD and initial cuing for hand placement during transitions with good follow through 3 X 5 with seated rest between sets of 60-90 seconds.  Demos increased RR and breathing after each set.  For BUE/BLE strengthening and to improve endurance needed to increase independence in ADL/functional  transfers.   -Participated in BUE theraband exercise for shoulder flex, elbow flex, elbow ext, horizontal ABD, and shoulder ER requiring initial instruction/demos for each exercise, assist for elbow positioning close to trunk during shoulder ER, and decreased resistance at RUE due to increased weakness in comparison to L and also completed RUE AROM for shoulder flex all X 5 each with seated rest breaks between sets.   For BUE strengthening/endurance needed to increase independence in ADL/functional transfers.    Patient left sitting edge of bed with all lines intact, call button in reach, and nursing notified    GOALS:   Multidisciplinary Problems       Occupational Therapy Goals          Problem: Occupational  Therapy    Goal Priority Disciplines Outcome Interventions   Occupational Therapy Goal     OT, PT/OT Ongoing, Progressing    Description: Goals to be met by: 9/22/22     Patient will increase functional independence with ADLs by performing:    UE Dressing with [place].  LE Dressing with [place].  Grooming while standing with [place].  Toileting from toilet with [place] for hygiene and clothing management.   Toilet transfer to toilet with [place].                         Time Tracking:     OT Date of Treatment: 09/14/22  OT Start Time: 1513  OT Stop Time: 1538  OT Total Time (min): 25 min    Billable Minutes:Therapeutic Exercise 25    OT/BRIANA: OT          9/14/2022
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
## 2022-09-14 NOTE — PROGRESS NOTES
Patient reviewed, renal function stable, cultures reviewed, no new levels, continue current therapy; Next levels due: 9/18 @1239

## 2022-09-14 NOTE — PROGRESS NOTES
"[facility] S[facility] Medicine  Progress Note    Patient Name: Leighton Carroll  MRN: [id number]  Patient Class: IP- Inpatient   Admission Date: 8/26/2022  Length of Stay: 19 days  Attending Physician: GAVIOTA Sanon MD  Primary Care Provider: Primary Doctor No        Subjective:     Principal Problem:Acute respiratory failure with hypoxia and hypercarbia        HPI:  Patient's HPI is adapted from notes of Dr. Schuler, Dr. Morin and Dr. Hall ([facility]).   Patient arrived in [facility] ICU intubated, pt's girlfriend's number not on file, unable to verify events PTA.     Leighton Carroll is a 42 year old man with a PMHx of depression, hx of drug overdose (7/11/2022, buproprion and gabapentin), polysubstance abuse (meth, heroin) and nicotine dependence.    He was presented to [facility] on 8/25/2022 via EMS with shortness of breath and dry cough. He was reportedly found down by his girlfriend at home yesterday evening (8/25/2022) and was given multiple rounds of chest compressions. He woke up and began having shortness of breath and called EMS, which found him saturating at 66% on room air, which increased to 90s with nonrebreather mask. Patient denied subjective fever, chills, sick contacts, chest pain, palpitations, abdominal pain.     At [facility], patient had a fever to 101, with leukocytosis (20) and lactic acidosis (3.0) with a normal procal. CTA chest showed patchy perihilar opacities bilaterally most pronounced in right lower lobe. Covid, Group A strep, respiratory influenza panel -ve. D-dimer was elevated (3.68), but CTPA negative for PE. Tox postive for THC only. CXR was -ve for pneumothorax. Patient was started on IV Cefepime and IV Vancomycin.     Patient was initially on BiPAP but became agitated and was intubated. Patient reportedly had "red spit". Patient was difficult to sedate requiring propofol and precedex and multiple doses of versed and ketamine, thus became hypotensive " after intubation and was started on levophed.     Patient is transferred [facility] medicine, for management of acute respiratory failure with hypoxia and hypercapnia.             Overview/Hospital Course:  Admitted with acute hypoxemic respiratory failure.  Blood cultures demonstrated Acinetobacter, Pseudomonas luteola.  Pulmonology and ID consulted.  TTE performed without evidence of vegetation. Repeat blood culture showed bacillus in 1/4 bottles but suspected contaminant.  Opted for 14 day course of cefepime. Developed ARDS and started chemical paralysis/proning as well as ARDSnet protocol. Palliative consulted given the severity of his illness and likely extended recovery. Respiratory status was slow to improve but gradually had decreasing oxygenation/pressure requirements. Had repeat fevers and sputum culture showed Staph aureus; vancomycin added. Extubated on 9/7.  Stepped down to floor on 9/10/2022.  Patient persistently failed swallow evaluation by speech therapy and he continued NPO status.  Attempted NG placement with tube feedings has been difficult given patient's ultimately ends up removing NG overnight.  PT and OT are recommending rehab placement.  Repeat blood cultures from 09/10 remarkable for coag-negative staph, likely contaminant. Completed course of vancomycin for MRSA PNA. Placement sought.      Interval History:  No acute events overnight; feeling well. Continuing to work with SLP.    Review of Systems   Constitutional:  Negative for chills and fever.   Respiratory:  Negative for cough and shortness of breath.    Cardiovascular:  Negative for chest pain and palpitations.   Gastrointestinal:  Negative for abdominal pain and nausea.   Objective:     Vital Signs (Most Recent):  Temp: 97.6 °F (36.4 °C) (09/14/22 1529)  Pulse: (!) 111 (09/14/22 1529)  Resp: 18 (09/14/22 1529)  BP: (!) 136/90 (09/14/22 1529)  SpO2: 99 % (09/14/22 1529)   Vital Signs (24h Range):  Temp:  [97.6 °F (36.4  °C)-98.9 °F (37.2 °C)] 97.6 °F (36.4 °C)  Pulse:  [] 111  Resp:  [16-18] 18  SpO2:  [95 %-99 %] 99 %  BP: (128-140)/(82-91) 136/90     Weight: 86.2 kg (190 lb)  Body mass index is 28.06 kg/m².  No intake or output data in the 24 hours ending 09/14/22 1546     Physical Exam  Vitals and nursing note reviewed.   Constitutional:       General: He is not in acute distress.     Appearance: He is well-developed.   HENT:      Head: Normocephalic and atraumatic.   Eyes:      General:         Right eye: No discharge.         Left eye: No discharge.      Conjunctiva/sclera: Conjunctivae normal.   Cardiovascular:      Rate and Rhythm: Normal rate.      Pulses: Normal pulses.   Pulmonary:      Effort: Pulmonary effort is normal. No respiratory distress.   Abdominal:      Palpations: Abdomen is soft.      Tenderness: There is no abdominal tenderness.   Musculoskeletal:         General: Normal range of motion.      Right lower leg: No edema.      Left lower leg: No edema.   Skin:     General: Skin is warm and dry.      Comments: Extensive tattoos.   Neurological:      Mental Status: He is alert and oriented to person, place, and time.     Significant Labs:   CBC:  Recent Labs   Lab 09/12/22  0530 09/13/22  0434 09/14/22  0629   WBC 13.08* 14.58* 10.78   HGB 13.4* 13.2* 13.3*   HCT 41.0 39.7* 39.9*   * 673* 600*   GRAN 61.5  8.0* 59.8  8.7* 58.2  6.3   LYMPH 19.3  2.5 21.9  3.2 22.0  2.4   MONO 11.2  1.5* 9.9  1.5* 12.4  1.3*   EOS 0.8* 0.9* 0.6*   BASO 0.11 0.10 0.08     CMP:  Recent Labs   Lab 09/12/22  0531 09/13/22  0434 09/14/22  0629    133* 132*   K 4.1 4.1 3.9    102 105   CO2 21* 20* 21*   BUN 22* 17 15   CREATININE 0.7 0.7 0.7   GLU 89 90 90   CALCIUM 10.3 9.8 9.6   MG 2.3 2.0 2.0   PHOS 4.0 3.9 3.8   ALKPHOS  --  73  --    AST  --  36  --    ALT  --  73*  --    BILITOT  --  0.8  --    PROT  --  7.4  --    ALBUMIN  --  3.2*  --    ANIONGAP 9 11 6*        Significant Imaging:   No new  imaging this morning.      Assessment/Plan:      * Acute respiratory failure with hypoxia and hypercarbia  Bilateral PNA, ARDS, septic shock (resolved), bacteremia, h/o drug overdose, polysubstance abuse, leukocytosis, encephalopathy, dysphagia, debility  - Hypoxic to 60s on arrival and failed BiPAP 2/2 agitation and intubated at OSH. Tox positive for THC. D-dimer elevated but CTA negative for PE. COVID, RSV negative.  - Completed 14 day course of cefepime for acinetobacter, pseudomonas luteola noted on blood cultures.  - Repeat blood cultures for fever; added vancomycin for resp culture showing Staph, patient to complete full course  - s/p ARDSnet protocol; proning/supinating and steroid taper. Extubated on 09/07.  - Stepped down to floor on 9/10.  - ENT consulted; appreciate assistance. Noted long-standing palatal fistula, recommended outpatient follow-up with OMFS/prosthodontics; decreased glottic sensation and mild R vocal fold paresis likely secondary to prolonged intubation.  - Continue pantoprazole 40mg PO daily as per ENT recs.  - Continue PT/OT/SLP.  - Repeat blood cultures 09/10 showed GPCs in clusters, speciated as coag-negative staph, likely contaminant.  - Completed course of vancomycin IV for MRSA PNA.  - Case management working on rehab placement.    Hypokalemia  - Replete PRN.    Bilateral pneumonia  - As above.    Gram-negative bacteremia  - As above, completed treatment    History of drug overdose  - As above.    Depression  - Continue buproprion 150mg per daily, quetiapine 200mg per qHS, divalproex ER 500mg 1g PO daily, risperiodone 2mg PO daily.    VTE Risk Mitigation (From admission, onward)         Ordered     enoxaparin injection 40 mg  Daily         08/26/22 1422     IP VTE HIGH RISK PATIENT  Once         08/26/22 1422     Place sequential compression device  Until discontinued         08/26/22 1422     Place MARGRET hose  Until discontinued         08/26/22 1422                Discharge Planning    CHETAN:      Code Status: Full Code   Is the patient medically ready for discharge?:     Reason for patient still in hospital (select all that apply): Pending disposition  Discharge Plan A: Rehab   Discharge Delays: (!) Post-Acute Set-up              D Rai Sanon MD  Department of Hospital Medicine   [facility] ([facility]

## 2022-09-14 NOTE — ASSESSMENT & PLAN NOTE
Bilateral PNA, ARDS, septic shock (resolved), bacteremia, h/o drug overdose, polysubstance abuse, leukocytosis, encephalopathy, dysphagia, debility  - Hypoxic to 60s on arrival and failed BiPAP 2/2 agitation and intubated at OSH. Tox positive for THC. D-dimer elevated but CTA negative for PE. COVID, RSV negative.  - Completed 14 day course of cefepime for acinetobacter, pseudomonas luteola noted on blood cultures.  - Repeat blood cultures for fever; added vancomycin for resp culture showing Staph, patient to complete full course  - s/p ARDSnet protocol; proning/supinating and steroid taper. Extubated on 09/07.  - Stepped down to floor on 9/10.  - ENT consulted; appreciate assistance. Noted long-standing palatal fistula, recommended outpatient follow-up with OMFS/prosthodontics; decreased glottic sensation and mild R vocal fold paresis likely secondary to prolonged intubation.  - Continue pantoprazole 40mg PO daily as per ENT recs.  - Continue PT/OT/SLP.  - Repeat blood cultures 09/10 showed GPCs in clusters, speciated as coag-negative staph, likely contaminant.  - Completed course of vancomycin IV for MRSA PNA.  - Case management working on rehab placement.

## 2022-09-15 VITALS
HEART RATE: 112 BPM | OXYGEN SATURATION: 98 % | SYSTOLIC BLOOD PRESSURE: 132 MMHG | DIASTOLIC BLOOD PRESSURE: 86 MMHG | HEIGHT: 69 IN | RESPIRATION RATE: 20 BRPM | WEIGHT: 190 LBS | TEMPERATURE: 98 F | BODY MASS INDEX: 28.14 KG/M2

## 2022-09-15 LAB
ALBUMIN SERPL BCP-MCNC: 3.3 G/DL (ref 3.5–5.2)
ALP SERPL-CCNC: 64 U/L (ref 55–135)
ALT SERPL W/O P-5'-P-CCNC: 53 U/L (ref 10–44)
ANION GAP SERPL CALC-SCNC: 11 MMOL/L (ref 8–16)
AST SERPL-CCNC: 24 U/L (ref 10–40)
BASOPHILS # BLD AUTO: 0.07 K/UL (ref 0–0.2)
BASOPHILS NFR BLD: 0.7 % (ref 0–1.9)
BILIRUB DIRECT SERPL-MCNC: 0.2 MG/DL (ref 0.1–0.3)
BILIRUB SERPL-MCNC: 0.6 MG/DL (ref 0.1–1)
BUN SERPL-MCNC: 20 MG/DL (ref 6–20)
CALCIUM SERPL-MCNC: 9.5 MG/DL (ref 8.7–10.5)
CHLORIDE SERPL-SCNC: 105 MMOL/L (ref 95–110)
CO2 SERPL-SCNC: 20 MMOL/L (ref 23–29)
CREAT SERPL-MCNC: 0.7 MG/DL (ref 0.5–1.4)
DIFFERENTIAL METHOD: ABNORMAL
EOSINOPHIL # BLD AUTO: 0.4 K/UL (ref 0–0.5)
EOSINOPHIL NFR BLD: 4 % (ref 0–8)
ERYTHROCYTE [DISTWIDTH] IN BLOOD BY AUTOMATED COUNT: 14.8 % (ref 11.5–14.5)
EST. GFR  (NO RACE VARIABLE): >60 ML/MIN/1.73 M^2
GLUCOSE SERPL-MCNC: 82 MG/DL (ref 70–110)
HCT VFR BLD AUTO: 39.7 % (ref 40–54)
HGB BLD-MCNC: 13.2 G/DL (ref 14–18)
IMM GRANULOCYTES # BLD AUTO: 0.1 K/UL (ref 0–0.04)
IMM GRANULOCYTES NFR BLD AUTO: 0.9 % (ref 0–0.5)
LYMPHOCYTES # BLD AUTO: 2.5 K/UL (ref 1–4.8)
LYMPHOCYTES NFR BLD: 23 % (ref 18–48)
MAGNESIUM SERPL-MCNC: 2 MG/DL (ref 1.6–2.6)
MCH RBC QN AUTO: 30 PG (ref 27–31)
MCHC RBC AUTO-ENTMCNC: 33.2 G/DL (ref 32–36)
MCV RBC AUTO: 90 FL (ref 82–98)
MONOCYTES # BLD AUTO: 1.2 K/UL (ref 0.3–1)
MONOCYTES NFR BLD: 10.8 % (ref 4–15)
NEUTROPHILS # BLD AUTO: 6.4 K/UL (ref 1.8–7.7)
NEUTROPHILS NFR BLD: 60.6 % (ref 38–73)
NRBC BLD-RTO: 0 /100 WBC
PHOSPHATE SERPL-MCNC: 3.2 MG/DL (ref 2.7–4.5)
PLATELET # BLD AUTO: 551 K/UL (ref 150–450)
PMV BLD AUTO: 9.8 FL (ref 9.2–12.9)
POTASSIUM SERPL-SCNC: 3.9 MMOL/L (ref 3.5–5.1)
PROT SERPL-MCNC: 7.2 G/DL (ref 6–8.4)
RBC # BLD AUTO: 4.4 M/UL (ref 4.6–6.2)
SODIUM SERPL-SCNC: 136 MMOL/L (ref 136–145)
WBC # BLD AUTO: 10.64 K/UL (ref 3.9–12.7)

## 2022-09-15 PROCEDURE — 83735 ASSAY OF MAGNESIUM: CPT | Performed by: HOSPITALIST

## 2022-09-15 PROCEDURE — 80076 HEPATIC FUNCTION PANEL: CPT | Performed by: HOSPITALIST

## 2022-09-15 PROCEDURE — 25000003 PHARM REV CODE 250: Performed by: INTERNAL MEDICINE

## 2022-09-15 PROCEDURE — 80048 BASIC METABOLIC PNL TOTAL CA: CPT | Performed by: HOSPITALIST

## 2022-09-15 PROCEDURE — 25000003 PHARM REV CODE 250

## 2022-09-15 PROCEDURE — 99239 PR HOSPITAL DISCHARGE DAY,>30 MIN: ICD-10-PCS | Mod: 95,,, | Performed by: STUDENT IN AN ORGANIZED HEALTH CARE EDUCATION/TRAINING PROGRAM

## 2022-09-15 PROCEDURE — 25000003 PHARM REV CODE 250: Performed by: HOSPITALIST

## 2022-09-15 PROCEDURE — 97530 THERAPEUTIC ACTIVITIES: CPT

## 2022-09-15 PROCEDURE — 36415 COLL VENOUS BLD VENIPUNCTURE: CPT | Performed by: HOSPITALIST

## 2022-09-15 PROCEDURE — 97116 GAIT TRAINING THERAPY: CPT | Mod: CQ

## 2022-09-15 PROCEDURE — 99239 HOSP IP/OBS DSCHRG MGMT >30: CPT | Mod: 95,,, | Performed by: STUDENT IN AN ORGANIZED HEALTH CARE EDUCATION/TRAINING PROGRAM

## 2022-09-15 PROCEDURE — 85025 COMPLETE CBC W/AUTO DIFF WBC: CPT | Performed by: HOSPITALIST

## 2022-09-15 PROCEDURE — 84100 ASSAY OF PHOSPHORUS: CPT | Performed by: HOSPITALIST

## 2022-09-15 PROCEDURE — S4991 NICOTINE PATCH NONLEGEND: HCPCS

## 2022-09-15 RX ORDER — PANTOPRAZOLE SODIUM 40 MG/1
40 TABLET, DELAYED RELEASE ORAL DAILY
Qty: 30 TABLET | Refills: 11
Start: 2022-09-16 | End: 2023-09-16

## 2022-09-15 RX ADMIN — PANTOPRAZOLE SODIUM 40 MG: 40 TABLET, DELAYED RELEASE ORAL at 09:09

## 2022-09-15 RX ADMIN — NICOTINE 1 PATCH: 14 PATCH TRANSDERMAL at 09:09

## 2022-09-15 RX ADMIN — BUPROPION HYDROCHLORIDE 150 MG: 75 TABLET, FILM COATED ORAL at 09:09

## 2022-09-15 NOTE — PT/OT/SLP PROGRESS
Occupational Therapy   Treatment    Name: Leighton Carroll  MRN: [id number]  Admitting Diagnosis:  Acute respiratory failure with hypoxia and hypercarbia       Recommendations:     Discharge Recommendations: rehabilitation facility  Discharge Equipment Recommendations:  none  Barriers to discharge:  Other (Comment) (decreased safety awareness, decreased insight into deficits)    Assessment:     Leighton Carroll is a 42 y.o. male with a medical diagnosis of Acute respiratory failure with hypoxia and hypercarbia.  He presents with  weakness, impaired endurance, impaired self care skills, impaired functional mobility, impaired cognition, decreased safety awareness, impaired cardiopulmonary response to activity.     Pt would benefit from intensive therapies in an inpatient setting with 3 hours of therapy/day. Pt's needs cannot be met at a lower level of care. Pt is motivated to progress. Pt with decreased insight into deficits and is at a high risk for aspiration and readmission. Pt also with decreased safety awareness during therapeutic activities. Rehabilitation facility recommended to return patient to PLOF, prevent future falls and decrease readmission risk.      Rehab Prognosis:  Good; patient would benefit from acute skilled OT services to address these deficits and reach maximum level of function.       Plan:     Patient to be seen 5 x/week to address the above listed problems via self-care/home management, therapeutic activities, therapeutic exercises  Plan of Care Expires: 10/08/22  Plan of Care Reviewed with: patient, mother    Subjective     Pain/Comfort:  Pain Rating 1: 0/10    Objective:     Communicated with: RN prior to session.  Patient found up in chair with PICC line, telemetry upon OT entry to room.    General Precautions: Standard, fall, aspiration   Orthopedic Precautions:N/A   Braces: N/A  Respiratory Status: Room air     Occupational Performance:      Functional Mobility/Transfers:  Sit <> Stand:  supervision, no AD  Functional Mobility: Pt required supervision for functional room ambulation.     Activities of Daily Living:  Pt participated in goal setting discussion for IPR and drug rehab. Pt given perspective and outlook on progress made and excellent potential for recovery with positive support system and healthy frame of mind. Pt verbalized motivation to use his story as encouragement for himself and others. Pt brainstormed ways to share his story with others, including using pictures taken by mother to form a narrative. Pt verbalized excitement to attend rehab and regain strength, endurance, safety and independence with ADLs as well as progress his voice/swallowing.       [facility] 6 Click ADL: 18    Treatment & Education:  OT role, plan of care, discharge planning and rec, goal setting    Patient left sitting edge of bed with all lines intact, call button in reach, and RN notified    GOALS:   Multidisciplinary Problems       Occupational Therapy Goals          Problem: Occupational Therapy    Goal Priority Disciplines Outcome Interventions   Occupational Therapy Goal     OT, PT/OT Ongoing, Progressing    Description: Goals to be met by: 9/22/22     Patient will increase functional independence with ADLs by performing:    UE Dressing with Mathews.  LE Dressing with Mathews.  Grooming while standing with Mathews.  Toileting from toilet with Mathews for hygiene and clothing management.   Toilet transfer to toilet with Mathews.                         Time Tracking:     OT Date of Treatment: 09/15/22  OT Start Time: 1045  OT Stop Time: 1058  OT Total Time (min): 13 min    Billable Minutes:Therapeutic Activity 13 minutes    OT/BRIANA: OT          9/15/2022

## 2022-09-15 NOTE — PLAN OF CARE
[facility]    FACILITY TRANSFER ORDERS      Patient Name: Leighton Carroll  YOB: 1980    PCP: Primary Doctor No   PCP Address: None  PCP Phone Number: None  PCP Fax: None    Encounter Date: 09/15/2022    Admit to: Rehab    Vital Signs:  Routine    Diagnoses:   Active Hospital Problems    Diagnosis  POA    *Acute respiratory failure with hypoxia and hypercarbia [J96.01, J96.02]  Yes    Tachycardia [R00.0]  No    Hypokalemia [E87.6]  No    Encounter for palliative care [Z51.5]  Not Applicable    Bilateral pneumonia [J18.9]  Yes    Gram-negative bacteremia [R78.81]  Yes    History of drug overdose [Z91.89]  Not Applicable    Depression [F32.A]  Yes      Resolved Hospital Problems    Diagnosis Date Resolved POA    Aspiration into airway [T17.908A] 09/14/2022 No    Pneumonia due to methicillin resistant Staphylococcus aureus (MRSA) [J15.212] 09/14/2022 No    Ventilator associated pneumonia [J95.851] 09/14/2022 No    Staphylococcal pneumonia [J15.20] 09/14/2022 No    ARDS (adult respiratory distress syndrome) [J80] 09/14/2022 Yes    Hematuria [R31.9] 09/14/2022 Yes    Septic shock [A41.9, R65.21] 09/14/2022 Yes       Allergies:Review of patient's allergies indicates:  No Known Allergies    Diet: pureed diet thin liquids    Activities: Activity as tolerated    Goals of Care Treatment Preferences:  Code Status: Full Code      Nursing: per facility protocol      Labs: per facility protocol      CONSULTS:    Physical Therapy to evaluate and treat. , Occupational Therapy to evaluate and treat., Speech Therapy to evaluate and treat for Language and Swallowing., and  to evaluate for community resources/long-range planning.    MISCELLANEOUS CARE:  N/a    WOUND CARE ORDERS  None    Medications: Review discharge medications with patient and family and provide education.      Current Discharge Medication List        START taking these medications    Details   pantoprazole (PROTONIX) 40 MG tablet  Take 1 tablet (40 mg total) by mouth once daily.  Qty: 30 tablet, Refills: 11           CONTINUE these medications which have NOT CHANGED    Details   buPROPion (WELLBUTRIN XL) 150 MG TB24 tablet Take 1 tablet (150 mg total) by mouth once daily.  Qty: 30 tablet, Refills: 0      divalproex ER (DEPAKOTE) 500 MG Tb24 Take 2 tablets (1,000 mg total) by mouth once daily.  Qty: 60 tablet, Refills: 0      nicotine (NICODERM CQ) 14 mg/24 hr Place 1 patch onto the skin once daily.      QUEtiapine (SEROQUEL) 200 MG Tab Take 1 tablet (200 mg total) by mouth every evening.  Qty: 30 tablet, Refills: 0      risperiDONE (RISPERDAL) 2 MG tablet Take 1 tablet (2 mg total) by mouth once daily.  Qty: 30 tablet, Refills: 0           STOP taking these medications       gabapentin (NEURONTIN) 300 MG capsule Comments:   Reason for Stopping:         LORazepam (ATIVAN) 0.5 MG tablet Comments:   Reason for Stopping:                  Immunizations Administered as of 9/15/2022  Reviewed on 9/15/2022      No immunizations on file.              _________________________________  Valente Swift MD  09/15/2022

## 2022-09-15 NOTE — PLAN OF CARE
Patient has been accepted to [facility]. Patient is being transferred to facility by their transportation van service. Mom at bedside and patient prepared for transfer.   09/15/22 1246   Final Note   Assessment Type Final Discharge Note   Anticipated Discharge Disposition Rehab   Hospital Resources/Appts/Education Provided Provided patient/caregiver with written discharge plan information   Post-Acute Status   Post-Acute Authorization Placement   Post-Acute Placement Status Set-up Complete/Auth obtained   Patient choice form signed by patient/caregiver List with quality metrics by geographic area provided;List from CMS Compare;List from System Post-Acute Care   Discharge Delays None known at this time   [facility]urg ([facility])  Discharge Final Note    Primary Care Provider: Primary Doctor No    Expected Discharge Date: 9/15/2022    Final Discharge Note (most recent)       Final Note - 09/15/22 1246          Final Note    Assessment Type Final Discharge Note     Anticipated Discharge Disposition Rehab Facility (P)      Hospital Resources/Appts/Education Provided Provided patient/caregiver with written discharge plan information (P)         Post-Acute Status    Post-Acute Authorization Placement (P)      Post-Acute Placement Status Set-up Complete/Auth obtained (P)      Patient choice form signed by patient/caregiver List with quality metrics by geographic area provided;List from CMS Compare;List from System Post-Acute Care (P)      Discharge Delays None known at this time (P)                      Important Message from Medicare

## 2022-09-15 NOTE — CONSULTS
[facility] Hospital Medicine  Telemedicine Consult Note       Leighton Carroll has been accepted for transfer to [facility] and will be followed through telemedicine services beginning  at 7 AM.      Nicole Rodríguez MD  [facility] Medicine Staff

## 2022-09-15 NOTE — PT/OT/SLP PROGRESS
Physical Therapy Treatment    Patient Name:  Leighton Carroll   MRN:  [id number]    Recommendations:     Discharge Recommendations:  rehabilitation facility   Discharge Equipment Recommendations: none   Barriers to discharge: Decreased caregiver support    Assessment:     Leighton Carroll is a 42 y.o. male admitted with a medical diagnosis of Acute respiratory failure with hypoxia and hypercarbia.  He presents with the following impairments/functional limitations:  weakness, impaired endurance, impaired self care skills, impaired functional mobility, gait instability, impaired balance, decreased lower extremity function, decreased coordination, decreased safety awareness, impaired cardiopulmonary response to activity ;pt with good mobility today, inc amb distance in hallway, and good O2 sats on RA, though pt with inc anxiety and needing cueing for focusing on task at hand ,and for taking appropriate rest breaks. Pt w/ poor safety awareness, would benefit from in pt REHAB, [facility] for OT and ST needs.     Patient was independent at home prior to this event for locomotion, ADLs, and IADLs. There is expectation of returning to prior level of function to maintain independence avoiding readmission.      Pt's clinical condition meets full inpatient rehab criteria. Inpatient rehab will provide to total interdisciplinary treatment approach needed. Pt is at high risk of unplanned readmission due to fall risk and inability to perform ADLs without significant assistance. The lower level of care cannot provide total interdisciplinary approach needed.      Pt is able to tolerate 3 hours of daily therapy. Pt is pleasant and motivated to return to prior level of function.        Rehab Prognosis: Good; patient would benefit from acute skilled PT services to address these deficits and reach maximum level of function.    Recent Surgery: * No surgery found *      Plan:     During this hospitalization, patient to be seen 3 x/week to address the  identified rehab impairments via gait training, therapeutic activities, therapeutic exercises, neuromuscular re-education and progress toward the following goals:    Plan of Care Expires:  10/08/22    Subjective     Chief Complaint: none stated  Patient/Family Comments/goals: pt agreeable to session, pleasant and cooperative.  Pain/Comfort:  Pain Rating 1: 0/10  Pain Rating Post-Intervention 1: 0/10      Objective:     Communicated with nurse prior to session.  Patient found sitting edge of bed with PICC line, telemetry upon PT entry to room.     General Precautions: Standard, fall, aspiration   Orthopedic Precautions:N/A   Braces: N/A  Respiratory Status: Room air     Functional Mobility:  Transfers:     Sit to Stand:  independence and supervision with no AD  Gait: amb'd ~150' w/ Supervision, some  cueing for safety (pt ambulating fast and some pathway deviations noted).   O2:98% on RA      AM-PAC 6 CLICK MOBILITY  Turning over in bed (including adjusting bedclothes, sheets and blankets)?: 4  Sitting down on and standing up from a chair with arms (e.g., wheelchair, bedside commode, etc.): 4  Moving from lying on back to sitting on the side of the bed?: 4  Moving to and from a bed to a chair (including a wheelchair)?: 4  Need to walk in hospital room?: 3  Climbing 3-5 steps with a railing?: 3  Basic Mobility Total Score: 22       Therapeutic Activities and Exercises:       Patient left sitting edge of bed with all lines intact, nurse notified, and mother present..    GOALS:   Multidisciplinary Problems       Physical Therapy Goals          Problem: Physical Therapy    Goal Priority Disciplines Outcome Goal Variances Interventions   Physical Therapy Goal     PT, PT/OT Ongoing, Progressing     Description: Goals to be met by: 10/8/22    Patient will increase functional independence with mobility by performin. Single leg stance on either leg x10 sec without UE support.  2. Dual tasking gait x100 ft without  pathway deviations.   3. Ascend/descend 10 stairs without HR with supervision.  4. Activity x5 min on appropriate supplemental O2 with SpO2 >90%.      Met Goals:  Supine<>sit with CGA with appropriate use of hospital bed features. - MET  Sit<>stand with CGA with LRAD. MET 9/13  Sitting EOB x5 min with upright posture with SBA. MET 9/13  Standing x1 min with CGA with LRAD and upright posture. MET 9/13  Gait x 10 feet with modA with LRAD. MET 9/13                       Time Tracking:     PT Received On: 09/15/22  PT Start Time: 0935     PT Stop Time: 0952  PT Total Time (min): 17 min     Billable Minutes: Gait Training 17    Treatment Type: Treatment  PT/PTA: PTA     PTA Visit Number: 1     09/15/2022

## 2022-09-15 NOTE — DISCHARGE INSTRUCTIONS
Please keep the patient on pureed diet until speech clears him to change to regular.    Please encourage the patient to swallow two times with each bite and then cough and clear the lungs after two bites taken.  Please supervise to make sure that he isnt eating too fast.      Patient was allowed to have double portions here at the hospital per the MD.

## 2022-09-16 LAB — BACTERIA BLD CULT: NORMAL

## 2022-09-17 LAB
BACTERIA BLD CULT: NORMAL
BACTERIA BLD CULT: NORMAL

## 2022-09-28 LAB
EJ AB SER QL: NOT DETECTED
ENA JO1 AB SER IA-ACNC: NORMAL AI
KU AB SER QL: NOT DETECTED
MI2 AB SER QL: NOT DETECTED
OJ AB SER QL: NOT DETECTED
PL12 AB SER QL: NOT DETECTED
PL7 AB SER QL: NOT DETECTED
SRP AB SERPL QL: NOT DETECTED

## 2022-10-10 NOTE — DISCHARGE SUMMARY
"[facility] - [facility] Medicine  Discharge Summary      Patient Name: Leighton Carroll  MRN: [id number]  Patient Class: IP- Inpatient  Admission Date: 8/26/2022  Hospital Length of Stay: 20 days  Discharge Date and Time: 9/15/2022  2:12 PM  Attending Physician: No att. providers found   Discharging Provider: Valente Swift MD  Primary Care Provider: Primary Doctor No      HPI:   Patient's HPI is adapted from notes of Dr. Schuler, Dr. Morin and Dr. Hall ([facility]).   Patient arrived in [facility] ICU intubated, pt's girlfriend's number not on file, unable to verify events PTA.     Leighton Carroll is a 42 year old man with a PMHx of depression, hx of drug overdose (7/11/2022, buproprion and gabapentin), polysubstance abuse (meth, heroin) and nicotine dependence.    He was presented to [facility] on 8/25/2022 via EMS with shortness of breath and dry cough. He was reportedly found down by his girlfriend at home yesterday evening (8/25/2022) and was given multiple rounds of chest compressions. He woke up and began having shortness of breath and called EMS, which found him saturating at 66% on room air, which increased to 90s with nonrebreather mask. Patient denied subjective fever, chills, sick contacts, chest pain, palpitations, abdominal pain.     At [facility], patient had a fever to 101, with leukocytosis (20) and lactic acidosis (3.0) with a normal procal. CTA chest showed patchy perihilar opacities bilaterally most pronounced in right lower lobe. Covid, Group A strep, respiratory influenza panel -ve. D-dimer was elevated (3.68), but CTPA negative for PE. Tox postive for THC only. CXR was -ve for pneumothorax. Patient was started on IV Cefepime and IV Vancomycin.     Patient was initially on BiPAP but became agitated and was intubated. Patient reportedly had "red spit". Patient was difficult to sedate requiring propofol and precedex and multiple doses of versed and ketamine, thus became " hypotensive after intubation and was started on levophed.     Patient is transferred to [facility] ICU, hospital medicine, for management of acute respiratory failure with hypoxia and hypercapnia.             * No surgery found *      Hospital Course:   Admitted with acute hypoxemic respiratory failure.  Blood cultures demonstrated Acinetobacter, Pseudomonas luteola.  Pulmonology and ID consulted.  TTE performed without evidence of vegetation. Repeat blood culture showed bacillus in 1/4 bottles but suspected contaminant.  Opted for 14 day course of cefepime. Developed ARDS and started chemical paralysis/proning as well as ARDSnet protocol. Palliative consulted given the severity of his illness and likely extended recovery. Respiratory status was slow to improve but gradually had decreasing oxygenation/pressure requirements. Had repeat fevers and sputum culture showed Staph aureus; vancomycin added. Extubated on 9/7.  Stepped down to floor on 9/10/2022.  Patient persistently failed swallow evaluation by speech therapy and he continued NPO status.  Attempted NG placement with tube feedings has been difficult given patient's ultimately ends up removing NG overnight.  PT and OT are recommending rehab placement.  Repeat blood cultures from 09/10 remarkable for coag-negative staph, likely contaminant. Completed course of vancomycin for MRSA PNA. Placement sought.       Goals of Care Treatment Preferences:  Code Status: Full Code      Consults:   Consults (From admission, onward)        Status Ordering Provider     Inpatient virtual consult to Hospital Medicine  Once        Provider:  (Not yet assigned)    Completed GAVIOTA KEANE     Inpatient consult to ENT  Once        Provider:  Eliazar Garcia MD    Completed BLANCA MOTA     Inpatient consult to Registered Dietitian/Nutritionist  Once        Provider:  (Not yet assigned)    Completed ELLERMAN, JUSTIN     Inpatient consult to Palliative Care  Once        Provider:  (Not  yet assigned)    Completed GAVIOTA KEANE     Inpatient consult to Infectious Diseases  Once        Provider:  (Not yet assigned)    Completed JUANITO AMIN     Inpatient consult to Registered Dietitian/Nutritionist  Once        Provider:  (Not yet assigned)    Completed TAMAR ROMO     Inpatient consult to Pulmonary Critical Care  Once        Provider:  (Not yet assigned)    Completed TAMAR ROMO          No new Assessment & Plan notes have been filed under this hospital service since the last note was generated.  Service: Hospital Medicine    Final Active Diagnoses:    Diagnosis Date Noted POA    PRINCIPAL PROBLEM:  Acute respiratory failure with hypoxia and hypercarbia [J96.01, J96.02] 08/26/2022 Yes    Tachycardia [R00.0] 09/10/2022 No    Hypokalemia [E87.6] 09/06/2022 No    Encounter for palliative care [Z51.5] 09/01/2022 Not Applicable    Bilateral pneumonia [J18.9] 08/29/2022 Yes    Gram-negative bacteremia [R78.81] 08/28/2022 Yes    History of drug overdose [Z91.89] 08/26/2022 Not Applicable    Depression [F32.A] 06/18/2022 Yes      Problems Resolved During this Admission:    Diagnosis Date Noted Date Resolved POA    Aspiration into airway [T17.908A] 09/10/2022 09/14/2022 No    Pneumonia due to methicillin resistant Staphylococcus aureus (MRSA) [J15.212] 09/10/2022 09/14/2022 No    Ventilator associated pneumonia [J95.851] 09/05/2022 09/14/2022 No    Staphylococcal pneumonia [J15.20] 09/05/2022 09/14/2022 No    ARDS (adult respiratory distress syndrome) [J80] 08/30/2022 09/14/2022 Yes    Hematuria [R31.9] 08/28/2022 09/14/2022 Yes    Septic shock [A41.9, R65.21] 08/26/2022 09/14/2022 Yes       Discharged Condition: stable    Disposition: Rehab Facility    Follow Up:    Patient Instructions:   No discharge procedures on file.    Significant Diagnostic Studies: Labs: All labs within the past 24 hours have been reviewed    Pending Diagnostic Studies:     None         Medications:  Reconciled  Home Medications:      Medication List      START taking these medications    pantoprazole 40 MG tablet  Commonly known as: PROTONIX  Take 1 tablet (40 mg total) by mouth once daily.        CONTINUE taking these medications    buPROPion 150 MG TB24 tablet  Commonly known as: WELLBUTRIN XL  Take 1 tablet (150 mg total) by mouth once daily.     divalproex 500 MG Tb24  Take 2 tablets (1,000 mg total) by mouth once daily.     nicotine 14 mg/24 hr  Commonly known as: NICODERM CQ  Place 1 patch onto the skin once daily.     QUEtiapine 200 MG Tab  Commonly known as: SEROQUEL  Take 1 tablet (200 mg total) by mouth every evening.     risperiDONE 2 MG tablet  Commonly known as: RISPERDAL  Take 1 tablet (2 mg total) by mouth once daily.        STOP taking these medications    gabapentin 300 MG capsule  Commonly known as: NEURONTIN     LORazepam 0.5 MG tablet  Commonly known as: ATIVAN            Indwelling Lines/Drains at time of discharge:   Lines/Drains/Airways     Peripherally Inserted Central Catheter Line  Duration           PICC Double Lumen 09/09/22 1600 left brachial 30 days                Time spent on the discharge of patient: > 35 minutes         The attending portion of this evaluation, treatment, and documentation was performed per Valente Swift MD via Telemedicine AudioVisual using the secure [employer] software platform with 2 way audio/video. The provider was located off-site and the patient is located in the hospital. The aforementioned video software was utilized to document the relevant history and physical exam    Valente Swift MD  Department of Hospital Medicine  [facility]

## 2022-11-28 PROBLEM — J96.01 ACUTE RESPIRATORY FAILURE WITH HYPOXIA AND HYPERCARBIA: Status: RESOLVED | Noted: 2022-08-26 | Resolved: 2022-11-28

## 2022-11-28 PROBLEM — J18.9 BILATERAL PNEUMONIA: Status: RESOLVED | Noted: 2022-08-29 | Resolved: 2022-11-28

## 2022-11-28 PROBLEM — J96.02 ACUTE RESPIRATORY FAILURE WITH HYPOXIA AND HYPERCARBIA: Status: RESOLVED | Noted: 2022-08-26 | Resolved: 2022-11-28

## 2023-02-15 NOTE — PROGRESS NOTES
[facility] Intensive Care [facility]  Adult Nutrition  Progress Note    SUMMARY     Recommendations    Recommendation/Intervention:   1. TF recommendations:  Isosource 1.5    Start with rate of 20ml/hr.  Increase by 10mls q 8 hours until goal rate of 50ml/hr is reached (as tolerated)    2. Monitor labs.   3. Monitor weights    4. Collaboration with medical providers.    Goals: Patient to receive adequate nutrition prior to discharge.    Nutrition Goal Status: progressing towards goal    Communication of RD Recs: other (comment) (POC, Consults, Sticky notes)    Assessment and Plan    Nutrition Problem  Inability to consume oral intake     Related to (etiology):   Condition associated with diagnosis:respiratory failure    Signs and Symptoms (as evidenced by):   NPO, mechanical ventilation, and need for alternate source of nutrition     Interventions/Recommendations (treatment strategy):  1. TF recommendations:  Isosource 1.5    Start with rate of 20ml/hr.  Increase by 10mls q 8 hours until goal rate of 50ml/hr is reached (as tolerated)    2. Monitor labs.   3. Monitor weights    4. Collaboration with medical providers.    Nutrition Diagnosis Status:   Continues      Malnutrition Assessment                                       Reason for Assessment    Reason For Assessment: consult, new tube feeding, other (see comments) (Vent)    Diagnosis: psychological disorder, other (see comments) (respiratory failure, overdose)  Patient Active Problem List   Diagnosis    Depression    Suicidal ideation    Substance abuse    Nicotine abuse    Elevated CPK    Suicide attempt    Acute respiratory failure with hypoxia and hypercarbia    History of drug overdose    Septic shock    Gram-negative bacteremia    Hematuria    Bilateral pneumonia    Acute respiratory distress syndrome (ARDS)     Past Medical History:   Diagnosis Date    Anxiety     Depression     Hepatitis C     Substance abuse     METH AND HEROIN       Interdisciplinary  "Rounds: did not attend    General Information Comments:   8/27/2022: Patient with overdose and intubated at this time.  Patient already started on tubefeeing via OG tube.  RD to recommend current formula of Isosource 1.5 with goal rate of 50ml/hr.  Propofol running with rate of 25.9ml/hr.  Previous 24 hour total of propofol is 454mls (499kcals).  LBM not noted at this time.  NKFA.  RD to continue to monitor tube feeding tolerance and labs. Current labs reviewed.  (RD remote)  8/31/2022: Patient continues with vent, tube feeding and propofol.  Kcals for tube feeding and propofol noted.  Patient was proned last night and is now supine.  Propofol rate of 25.9ml/hr and 650mls of tube feeding recorded for the previous 24 hours.  Labs reviewed. NKFA.  RD will continue to monitor.  (RD remote)    Nutrition Discharge Planning: Patient to tolerate appropriate nutrition prior to discharge.    Nutrition Risk Screen    Nutrition Risk Screen: tube feeding or parenteral nutrition    Nutrition/Diet History    Spiritual, Cultural Beliefs, Restorationism Practices, Values that Affect Care: no  Food Allergies: NKFA  Factors Affecting Nutritional Intake: NPO, on mechanical ventilation    Anthropometrics    Temp: 99.3 °F (37.4 °C)  Height Method: Estimated  Height: 5' 9" (175.3 cm)  Height (inches): 69 in  Weight Method: Bed Scale  Weight: 86.2 kg (190 lb)  Weight (lb): 190 lb  Ideal Body Weight (IBW), Male: 160 lb  % Ideal Body Weight, Male (lb): 118.75 %  BMI (Calculated): 28  BMI Grade: 25 - 29.9 - overweight     Wt Readings from Last 3 Encounters:   08/27/22 86.2 kg (190 lb)   08/26/22 76.2 kg (168 lb)   08/26/22 76.2 kg (168 lb)       Lab/Procedures/Meds    Pertinent Labs Reviewed: reviewed  Pertinent Medications Reviewed: reviewed  BMP  Lab Results   Component Value Date     08/31/2022    K 4.1 08/31/2022    CL 92 (L) 08/31/2022    CO2 35 (H) 08/31/2022    BUN 16 08/31/2022    CREATININE 0.6 08/31/2022    CALCIUM 8.6 (L) " 08/31/2022    ANIONGAP 10 08/31/2022    ESTGFRAFRICA >60 07/16/2022    EGFRNONAA >60 07/16/2022     Lab Results   Component Value Date     08/31/2022    K 4.1 08/31/2022    CL 92 (L) 08/31/2022    CO2 35 (H) 08/31/2022     Lab Results   Component Value Date    CREATININE 0.6 08/31/2022    BUN 16 08/31/2022     08/31/2022    K 4.1 08/31/2022    CL 92 (L) 08/31/2022    CO2 35 (H) 08/31/2022     Lab Results   Component Value Date    CALCIUM 8.6 (L) 08/31/2022    PHOS 3.8 08/25/2022     Lab Results   Component Value Date    LABPROT 11.3 08/25/2022    ALBUMIN 1.9 (L) 08/31/2022     Scheduled Meds:   buPROPion  150 mg Oral BID    ceFEPime (MAXIPIME) IVPB  2 g Intravenous Q8H    enoxaparin  40 mg Subcutaneous Daily    famotidine (PF)  20 mg Intravenous BID    furosemide (LASIX) injection  40 mg Intravenous Q6H    mupirocin   Nasal BID    nicotine  1 patch Transdermal Daily    polyethylene glycol  17 g Oral Daily    QUEtiapine  200 mg Oral QHS    senna-docusate 8.6-50 mg  1 tablet Oral BID    sodium chloride 3%  4 mL Nebulization Q12H    white petrolatum-mineral oiL   Both Eyes Q8H     Continuous Infusions:   cisatracurium (NIMBEX) infusion 1 mcg/kg/min (08/31/22 1108)    fentanyl 300 mcg/hr (08/31/22 1133)    ketamine (KETALAR) 5 mg/mL infusion (titrating) 20 mcg/kg/min (08/31/22 1308)    midazolam 3 mg/hr (08/31/22 1217)    propofoL 50 mcg/kg/min (08/31/22 1517)     PRN Meds:.acetaminophen, hydrALAZINE, midazolam, ondansetron, prochlorperazine, sodium chloride 0.9%    Physical Findings/Assessment         Estimated/Assessed Needs    Weight Used For Calorie Calculations: 86.2 kg (190 lb 0.6 oz)  Energy Calorie Requirements (kcal): 20-25kcals (1724-2155kcals per day)  Energy Need Method: Kcal/kg  Protein Requirements: 1.0-1.2g/kg (86-104g/day)  Weight Used For Protein Calculations: 86.2 kg (190 lb 0.6 oz)  Fluid Requirements (mL): 2155mls  Estimated Fluid Requirement Method: RDA Method  RDA Method (mL): 20  CHO  Requirement: 216-269      Nutrition Prescription Ordered    Current Diet Order: NPO  Current Nutrition Support Formula Ordered: Isosource 1.5  Current Nutrition Support Rate Ordered: 20 (ml) (progressing to goal rate of 50ml/hr)    Evaluation of Received Nutrient/Fluid Intake    Enteral Calories (kcal): 975  Enteral Protein (gm): 44  Enteral (Free Water) Fluid (mL): 497  Other Calories (kcal): 646(propofol kcals)  Total Calories (kcal): 1622  % Kcal Needs:80%  % Protein Needs: 51%  Energy Calories Required: not meeting needs  Protein Required: not meeting needs  Fluid Required: meeting needs  Comments: RD to continue progress of tube feeding to recommended goal rate  Tolerance: tolerating  % Intake of Estimated Energy Needs: 50 - 75 %  % Meal Intake: NPO  Intake/Output - Last 3 Shifts         08/29 0700 08/30 0659 08/30 0700 08/31 0659 08/31 0700 09/01 0659    I.V. (mL/kg) 1666.1 (19.3) 1942.5 (22.5) 418.5 (4.9)    NG/GT 1090 1300 554    IV Piggyback 893.7 145.1     Total Intake(mL/kg) 3649.8 (42.3) 3387.6 (39.3) 972.5 (11.3)    Urine (mL/kg/hr) 3825 (1.8) 5745 (2.8) 1225 (1.5)    Total Output 3825 5745 1225    Net -175.2 -2357.5 -252.5                   Nutrition Risk    Level of Risk/Frequency of Follow-up: moderate       Monitor and Evaluation    Food and Nutrient Intake: enteral nutrition intake  Food and Nutrient Adminstration: enteral and parenteral nutrition administration  Knowledge/Beliefs/Attitudes: other (specify)  Physical Activity and Function: nutrition-related ADLs and IADLs, factors affecting access to physical activity  Anthropometric Measurements: height/length, weight, weight change, body mass index  Biochemical Data, Medical Tests and Procedures: inflammatory profile, glucose/endocrine profile, gastrointestinal profile, electrolyte and renal panel, lipid profile       Nutrition Follow-Up    RD Follow-up?: Yes  Marianna Parmar, MS, RDN, LDN     Admitted

## 2023-02-17 NOTE — SUBJECTIVE & OBJECTIVE
Interval History: No acute events. On 8L HFNC and now down to 5L, reports feeling better and voice stronger.    Review of Systems   Constitutional:  Negative for chills and fever.   Respiratory:  Positive for shortness of breath.    Cardiovascular:  Negative for chest pain.   Neurological:  Positive for weakness.   Objective:     Vital Signs (Most Recent):  Temp: 98 °F (36.7 °C) (09/10/22 1130)  Pulse: 98 (09/10/22 1330)  Resp: 17 (09/10/22 1330)  BP: (!) 135/90 (09/10/22 1330)  SpO2: 96 % (09/10/22 1330)   Vital Signs (24h Range):  Temp:  [98 °F (36.7 °C)-99 °F (37.2 °C)] 98 °F (36.7 °C)  Pulse:  [] 98  Resp:  [15-28] 17  SpO2:  [89 %-99 %] 96 %  BP: (113-148)/(74-90) 135/90     Weight: 86.2 kg (190 lb)  Body mass index is 28.06 kg/m².    Intake/Output Summary (Last 24 hours) at 9/10/2022 1550  Last data filed at 9/10/2022 0543  Gross per 24 hour   Intake 2256.41 ml   Output 725 ml   Net 1531.41 ml        Physical Exam  Vitals and nursing note reviewed.   Constitutional:       General: He is not in acute distress.     Appearance: He is well-developed.   HENT:      Head: Normocephalic and atraumatic.      Comments: HFNC in place     Nose: Nose normal.   Eyes:      General:         Right eye: No discharge.         Left eye: No discharge.      Conjunctiva/sclera: Conjunctivae normal.   Cardiovascular:      Rate and Rhythm: Normal rate.      Pulses: Normal pulses.   Pulmonary:      Effort: Pulmonary effort is normal. No respiratory distress.      Comments: Course breath sounds bilaterally, but even and unlabored  Abdominal:      General: Bowel sounds are normal.      Palpations: Abdomen is soft.      Tenderness: There is no abdominal tenderness. There is no guarding or rebound.   Musculoskeletal:         General: Normal range of motion.      Right lower leg: No edema.      Left lower leg: No edema.   Skin:     General: Skin is warm and dry.      Comments: Extensive tatoos throughout entire body   Neurological:       Comments: Awake, alert, oriented x 2, answers simple questions with weak voice and follow commands     Significant Labs:   CBC:  Recent Labs   Lab 09/09/22  0421 09/10/22  0056 09/10/22  0433   WBC 17.80* 17.14* 16.16*   HGB 13.2* 13.3* 13.2*   HCT 39.0* 39.5* 38.8*   * 1,043* 961*   GRAN 75.1*  13.4* 74.1*  12.7* 71.4  11.5*   LYMPH 10.4*  1.9 12.3*  2.1 12.7*  2.1   MONO 10.6  1.9* 9.9  1.7* 11.3  1.8*   EOS 0.2 0.2 0.3   BASO 0.10 0.06 0.10     CMP:  Recent Labs   Lab 09/08/22  0336 09/09/22  0421 09/10/22  0433   * 136 135*   K 3.3* 3.2* 3.5   CL 93* 97 102   CO2 29 27 23   BUN 39* 41* 33*   CREATININE 0.7 0.7 0.6   * 102 106   CALCIUM 9.9 10.0 9.8   MG 2.3 2.1 2.0   PHOS 3.0 2.9 3.2   ALKPHOS  --  93  --    AST  --  81*  --    ALT  --  122*  --    BILITOT  --  0.9  --    PROT  --  8.3  --    ALBUMIN  --  3.1*  --    ANIONGAP 13 12 10        Significant Imaging:   All imaging were reviewed   Date Of Previous Biopsy (Optional): 07/26/22